# Patient Record
Sex: MALE | Race: WHITE | NOT HISPANIC OR LATINO | Employment: OTHER | URBAN - METROPOLITAN AREA
[De-identification: names, ages, dates, MRNs, and addresses within clinical notes are randomized per-mention and may not be internally consistent; named-entity substitution may affect disease eponyms.]

---

## 2017-02-28 ENCOUNTER — TELEPHONE (OUTPATIENT)
Dept: HEMATOLOGY ONCOLOGY | Facility: MEDICAL CENTER | Age: 52
End: 2017-02-28

## 2017-02-28 NOTE — TELEPHONE ENCOUNTER
Left voicemail for patient to call me back at medical oncology. He is currently scheduled to see Dr. Marlow via telemed on 3/2/17, but Dr. Marlow will be travelling to [place] and seeing patients there on 3/7/17. I wanted to see if he wanted to reschedule his appointment to see Dr. Marlow in person on 3/7/17.

## 2017-03-02 ENCOUNTER — APPOINTMENT (OUTPATIENT)
Dept: HEMATOLOGY ONCOLOGY | Facility: MEDICAL CENTER | Age: 52
End: 2017-03-02
Payer: MEDICAID

## 2017-03-07 ENCOUNTER — OFFICE VISIT (OUTPATIENT)
Dept: HEMATOLOGY ONCOLOGY | Facility: PHYSICIAN GROUP | Age: 52
End: 2017-03-07
Payer: MEDICAID

## 2017-03-07 DIAGNOSIS — C73 PAPILLARY THYROID CARCINOMA (HCC): ICD-10-CM

## 2017-03-07 PROCEDURE — 99213 OFFICE O/P EST LOW 20 MIN: CPT | Performed by: INTERNAL MEDICINE

## 2017-03-07 NOTE — Clinical Note
[facility]    [address] [place] [address] Ave, [facility] [address]  [place] [address]        Date:3/7/2017                     Reference to Ottoniel Davies    Follow Up Note: Oncology   Date: 3/7/17   Time: 11:20 pm   Primary care physician: Dr. Vanegas   Prior Oncologist: Dr. Kong   Diagnosis: H/o Papillary thyroid cancer   Chief complaint: He is here for a follow up visit and to review labs     History of presenting illness:   Mr. Davies is a 51-year-old male with history of papillary thyroid cancer. He was diagnosed at age 25 secondary to change in his voice as well as an enlarged thyroid gland. Further workup showed a cold nodule in the thyroid and he underwent thyroidectomy. He was found to have a tumor in the right thyroid lobe with involvement of pretracheal tissue my right jugular nodes, right cervical lymph nodes. He had been on observation and had recurrence approximately 2 years after original diagnosis. At this time he was treated with radioactive iodine. He had symptoms stable with no evidence of recurrence.   Interval History:   He is here for follow up visit. He has been fairly stable since his last visit without any significant changes. He is not had recent labs. He is on hypertensive medication and has been on potassium replacements. He continues to follow up closely with his primary care physician. He otherwise has stable energy, appetite and weight. He denies any cough, shortness of breath, nausea, vomiting or dysphagia.     Past Medical History:   1. 1990: Papillary thyroid cancer  2. Hypothyroid  3. HTN  4. HLP  Allergies:   Review of patient's allergies indicates no known allergies.   Medications:   Current Outpatient Prescriptions on File Prior to Visit    Medication  Sig  Dispense  Refill    •  potassium chloride SA (K-DUR) 10 MEQ Tab CR  TAKE 1 TABLET BY MOUTH EVERY DAY  60 Tab  0    •  metformin (GLUCOPHAGE) 850 MG TABS  Take 1 Tab by mouth every day.  30 Tab  0    •   hydrocodone-acetaminophen (NORCO) 5-325 MG TABS per tablet  Take 1-2 Tabs by mouth every 6 hours as needed.  16 Tab  0    •  losartan (COZAAR) 100 MG TABS  Take 100 mg by mouth every day.      •  triamterene-hydrochlorothiazide (MAXZIDE 75-50) 75-50 MG TABS  Take 1 Tab by mouth every day.      •  levothyroxine (SYNTHROID) 300 MCG TABS  Take 1 Tab by mouth every day.  30 Tab  3    •  pravastatin (PRAVACHOL) 40 MG tablet  Take 1 Tab by mouth every day.  30 Tab  11      No current facility-administered medications on file prior to visit.      Review of Systems:   All other review of systems are negative except what was mentioned above in the HPI.   Constitutional: Negative for fever and chills. Positive for mild malaise/fatigue stable.   HENT: Negative for ear pain, nosebleeds.   Eyes: Negative for blurred vision.   Respiratory: Negative for cough, sputum production, shortness of breath.   Cardiovascular: Negative for chest pain, and leg swelling.   Gastrointestinal: Negative for nausea, vomiting and abdominal pain.   Genitourinary: Negative for dysuria.   Musculoskeletal: Positive for myalgias and back pain.   Skin: Negative for rash and itching.   Neurological: Negative for dizziness, sensory change, focal weakness and headaches.   Endo/Heme/Allergies: No bruise/bleed easily.   Psychiatric/Behavioral: Negative for depression and memory loss.   Physical Exam:   Vitals: Reviewed   General: Not in acute distress, alert and oriented x 3   HEENT: normalcephalic, atraumatic, pupils equally reactive to light bilaterally, extra ocular muscles intact, moist oral mucus membranes, and oral cavity without any lesions.   Neck: Supple neck, full range of motion, no palpable masses.   Lymph nodes: No palpable bilateral cervical and supraclavicular lymphadenopathy.   CVS: regular rate and rhythm   RESP: Clear to auscultate bilaterally.   ABD: Soft, non tender, non distended, positive bowel sounds, and no palpable organomegaly.      EXT: No edema or cyanosis   CNS: Alert and oriented x3, cranial nerves grossly intact, muscle strength grossly intact, and normal gait.   Labs:   No visits with results within 1 Day(s) from this visit.   Latest known visit with results is:   Hospital Outpatient Visit on 02/18/2016    Component  Date  Value  Ref Range  Status    •  Sodium  02/18/2016  137  135 - 145 mmol/L  Final    •  Potassium  02/18/2016  3.2*  3.6 - 5.5 mmol/L  Final    •  Chloride  02/18/2016  103  96 - 112 mmol/L  Final    •  Co2  02/18/2016  24  20 - 33 mmol/L  Final    •  Anion Gap  02/18/2016  10.0  0.0 - 11.9  Final    •  Glucose  02/18/2016  134*  65 - 99 mg/dL  Final    •  Bun  02/18/2016  27*  8 - 22 mg/dL  Final    •  Creatinine  02/18/2016  1.43*  0.50 - 1.40 mg/dL  Final    •  Calcium  02/18/2016  9.7  8.5 - 10.5 mg/dL  Final    •  AST(SGOT)  02/18/2016  19  12 - 45 U/L  Final    •  ALT(SGPT)  02/18/2016  13  2 - 50 U/L  Final    •  Alkaline Phosphatase  02/18/2016  80  30 - 99 U/L  Final    •  Total Bilirubin  02/18/2016  0.8  0.1 - 1.5 mg/dL  Final    •  Albumin  02/18/2016  4.5  3.2 - 4.9 g/dL  Final    •  Total Protein  02/18/2016  7.2  6.0 - 8.2 g/dL  Final    •  Globulin  02/18/2016  2.7  1.9 - 3.5 g/dL  Final    •  A-G Ratio  02/18/2016  1.7   Final    •  WBC  02/18/2016  6.3  4.8 - 10.8 K/uL  Final    •  RBC  02/18/2016  5.10  4.70 - 6.10 M/uL  Final    •  Hemoglobin  02/18/2016  14.0  14.0 - 18.0 g/dL  Final    •  Hematocrit  02/18/2016  42.8  42.0 - 52.0 %  Final    •  MCV  02/18/2016  83.9  81.4 - 97.8 fL  Final    •  MCH  02/18/2016  27.5  27.0 - 33.0 pg  Final    •  MCHC  02/18/2016  32.7*  33.7 - 35.3 g/dL  Final    •  RDW  02/18/2016  41.0  35.9 - 50.0 fL  Final    •  Platelet Count  02/18/2016  240  164 - 446 K/uL  Final    •  MPV  02/18/2016  10.8  9.0 - 12.9 fL  Final    •  Neutrophils-Polys  02/18/2016  61.40  44.00 - 72.00 %  Final    •  Lymphocytes  02/18/2016  23.40  22.00 - 41.00 %  Final    •  Monocytes   02/18/2016  10.60  0.00 - 13.40 %  Final    •  Eosinophils  02/18/2016  3.50  0.00 - 6.90 %  Final    •  Basophils  02/18/2016  0.60  0.00 - 1.80 %  Final    •  Immature Granulocytes  02/18/2016  0.50  0.00 - 0.90 %  Final    •  Nucleated RBC  02/18/2016  0.00   Final    •  Neutrophils (Absolute)  02/18/2016  3.84  1.82 - 7.42 K/uL  Final     Includes immature neutrophils, if present.    •  Lymphs (Absolute)  02/18/2016  1.46  1.00 - 4.80 K/uL  Final    •  Monos (Absolute)  02/18/2016  0.66  0.00 - 0.85 K/uL  Final    •  Eos (Absolute)  02/18/2016  0.22  0.00 - 0.51 K/uL  Final    •  Baso (Absolute)  02/18/2016  0.04  0.00 - 0.12 K/uL  Final    •  Immature Granulocytes (abs)  02/18/2016  0.03  0.00 - 0.11 K/uL  Final    •  NRBC (Absolute)  02/18/2016  0.00   Final    •  TSH  02/18/2016  0.030*  0.300 - 3.700 uIU/mL  Final    •  Thyroglobulin  02/18/2016  <0.1*  1.3 - 31.8 ng/mL  Final     Comment: INTERPRETIVE INFORMATION: Thyroglobulin, Serum or Plasma   Specimens negative for thyroglobulin antibodies (TgAb) are tested   for thyroglobulin (Tg) by chemiluminescent immunoassay (MARTY) using   the [employer] Access DxI method. Specimens with TgAb results   above the upper reference limit are tested for Tg by   high-performance liquid chromatography-tandem mass spectrometry   (LC-MS/MS). Results obtained with different test methods or kits   cannot be used interchangeably. Tg results, regardless of   concentration, should not be interpreted as absolute evidence for   the presence or absence of papillary or follicular thyroid cancer.   Tg testing is not recommended for use as a screening procedure to   detect the presence of thyroid cancer in the general population.    •  Anti-Thyroglobulin  02/18/2016  <0.9  0.0 - 4.0 IU/mL  Final     Comment: INTERPRETIVE INFORMATION: Thyroglobulin Antibody   A value of 4.0 IU/mL or less indicates a negative result for   thyroglobulin antibodies.   The Thyroglobulin Antibody assay  is being performed using the   [employer] Access DxI method.    •  Thyroglobulin by LC-MC/MS-S/P  02/18/2016  Not Applicable  1.3 - 31.8 ng/mL  Final     Comment: INTERPRETIVE INFORMATION: Thyroglobulin by LC-MS/MS, Serum/Plasma   Lower limit of detection for Thyroglobulin by LC-MS/MS is 0.5   ng/mL.   Test developed and characteristics determined by [facility]. See Compliance Statement B: [email or web address]    •  GFR If   02/18/2016  >60  >60 mL/min/1.73 m 2  Final    •  GFR If Non   02/18/2016  52*  >60 mL/min/1.73 m 2  Final    ]   Assessment and Plan:   1. History of Papillary thyroid cancer: He was diagnosed and treated at age 25. At initial diagnosis he underwent total thyroidectomy. He was found to have locally invasive disease with involvement of the right jugular and cervical lymph nodes. He was on observation and had recurrence approximately 2 years post diagnosis. He was then treated with radioactive iodine. He has been clinically stable without any evidence of recurrence.  2. He was recommended to get TSH, thyroglobulin and antithyroglobulin levels every 6 months. He has not had recent blood work. He is advised to get laboratory workup. Clinically he is fairly asymptomatic.  3. He is advised to get labs every 6 months and standing orders have been placed. If his labs are in range then he is to follow-up again in 6 months or sooner as needed.     He agreed and verbalized his agreement and understanding with the current plan. I answered all questions and concerns he has at this time and advised him to call at any time in the interim with questions or concerns in regards to his care. Thank you for allowing me to participate in his care, I will continue to follow.   Please note that this dictation was created using voice recognition software. I have made every reasonable attempt to correct obvious errors, but I expect that there are errors of grammar and possibly  content that I did not discover before finalizing the note.        Sincerely,  Ce Marlow  [address] [facility], [facility] [address] [phone]

## 2017-03-07 NOTE — PROGRESS NOTES
Follow Up Note: Oncology     Date: 3/7/17  Time: 11:20 pm  Location: [facility]    Primary care physician:  Dr. Vanegas   Prior Oncologist: Dr. Kong    Diagnosis: H/o Papillary thyroid cancer    Chief complaint: He is here for a follow up visit and to review labs    History of presenting illness:  Mr. Davies is a 51-year-old male with history of papillary thyroid cancer. He was diagnosed at age 25 secondary to change in his voice as well as an enlarged thyroid gland. Further workup showed a cold nodule in the thyroid and he underwent thyroidectomy. He was found to have a tumor in the right thyroid lobe with involvement of pretracheal tissue my right jugular nodes, right cervical lymph nodes. He had been on observation and had recurrence approximately 2 years after original diagnosis. At this time he was treated with radioactive iodine. He had symptoms stable with no evidence of recurrence.     Interval History:   He is here for follow up visit. He has been fairly stable since his last visit without any significant changes. He is not had recent labs. He is on hypertensive medication and has been on potassium replacements. He continues to follow up closely with his primary care physician. He otherwise has stable energy, appetite and weight. He denies any cough, shortness of breath, nausea, vomiting or dysphagia.    Past Medical History:  1. 1990: Papillary thyroid cancer  2. Hypothyroid  3. HTN  4. HLP      Allergies:  Review of patient's allergies indicates no known allergies.    Medications:  Current Outpatient Prescriptions on File Prior to Visit   Medication Sig Dispense Refill   • potassium chloride SA (K-DUR) 10 MEQ Tab CR TAKE 1 TABLET BY MOUTH EVERY DAY 60 Tab 0   • metformin (GLUCOPHAGE) 850 MG TABS Take 1 Tab by mouth every day. 30 Tab 0   • hydrocodone-acetaminophen (NORCO) 5-325 MG TABS per tablet Take 1-2 Tabs by mouth every 6 hours as needed. 16 Tab 0   • losartan (COZAAR) 100 MG TABS Take 100 mg by mouth  every day.     • triamterene-hydrochlorothiazide (MAXZIDE 75-50) 75-50 MG TABS Take 1 Tab by mouth every day.     • levothyroxine (SYNTHROID) 300 MCG TABS Take 1 Tab by mouth every day. 30 Tab 3   • pravastatin (PRAVACHOL) 40 MG tablet Take 1 Tab by mouth every day. 30 Tab 11     No current facility-administered medications on file prior to visit.       Review of Systems:  All other review of systems are negative except what was mentioned above in the HPI.  Constitutional: Negative for fever and chills.  Positive for mild malaise/fatigue stable.    HENT: Negative for ear pain, nosebleeds.     Eyes: Negative for blurred vision.    Respiratory: Negative for cough, sputum production, shortness of breath.    Cardiovascular: Negative for chest pain, and leg swelling.    Gastrointestinal: Negative for nausea, vomiting and abdominal pain.   Genitourinary: Negative for dysuria.    Musculoskeletal: Positive for myalgias and back pain.    Skin: Negative for rash and itching.    Neurological: Negative for dizziness, sensory change, focal weakness and headaches.    Endo/Heme/Allergies: No bruise/bleed easily.    Psychiatric/Behavioral: Negative for depression and memory loss.      Physical Exam:  Vitals: Reviewed   General: Not in acute distress, alert and oriented x 3  HEENT: normalcephalic, atraumatic, pupils equally reactive to light bilaterally, extra ocular muscles intact, moist oral mucus membranes, and oral cavity without any lesions.  Neck: Supple neck, full range of motion, no palpable masses.    Lymph nodes: No palpable bilateral cervical and supraclavicular lymphadenopathy.    CVS: regular rate and rhythm  RESP: Clear to auscultate bilaterally.   ABD: Soft, non tender, non distended, positive bowel sounds, and no palpable organomegaly.   EXT: No edema or cyanosis  CNS: Alert and oriented x3, cranial nerves grossly intact, muscle strength grossly intact, and normal gait.     Labs:  No visits with results within 1  Day(s) from this visit.  Latest known visit with results is:    Hospital Outpatient Visit on 02/18/2016   Component Date Value Ref Range Status   • Sodium 02/18/2016 137  135 - 145 mmol/L Final   • Potassium 02/18/2016 3.2* 3.6 - 5.5 mmol/L Final   • Chloride 02/18/2016 103  96 - 112 mmol/L Final   • Co2 02/18/2016 24  20 - 33 mmol/L Final   • Anion Gap 02/18/2016 10.0  0.0 - 11.9 Final   • Glucose 02/18/2016 134* 65 - 99 mg/dL Final   • Bun 02/18/2016 27* 8 - 22 mg/dL Final   • Creatinine 02/18/2016 1.43* 0.50 - 1.40 mg/dL Final   • Calcium 02/18/2016 9.7  8.5 - 10.5 mg/dL Final   • AST(SGOT) 02/18/2016 19  12 - 45 U/L Final   • ALT(SGPT) 02/18/2016 13  2 - 50 U/L Final   • Alkaline Phosphatase 02/18/2016 80  30 - 99 U/L Final   • Total Bilirubin 02/18/2016 0.8  0.1 - 1.5 mg/dL Final   • Albumin 02/18/2016 4.5  3.2 - 4.9 g/dL Final   • Total Protein 02/18/2016 7.2  6.0 - 8.2 g/dL Final   • Globulin 02/18/2016 2.7  1.9 - 3.5 g/dL Final   • A-G Ratio 02/18/2016 1.7   Final   • WBC 02/18/2016 6.3  4.8 - 10.8 K/uL Final   • RBC 02/18/2016 5.10  4.70 - 6.10 M/uL Final   • Hemoglobin 02/18/2016 14.0  14.0 - 18.0 g/dL Final   • Hematocrit 02/18/2016 42.8  42.0 - 52.0 % Final   • MCV 02/18/2016 83.9  81.4 - 97.8 fL Final   • MCH 02/18/2016 27.5  27.0 - 33.0 pg Final   • MCHC 02/18/2016 32.7* 33.7 - 35.3 g/dL Final   • RDW 02/18/2016 41.0  35.9 - 50.0 fL Final   • Platelet Count 02/18/2016 240  164 - 446 K/uL Final   • MPV 02/18/2016 10.8  9.0 - 12.9 fL Final   • Neutrophils-Polys 02/18/2016 61.40  44.00 - 72.00 % Final   • Lymphocytes 02/18/2016 23.40  22.00 - 41.00 % Final   • Monocytes 02/18/2016 10.60  0.00 - 13.40 % Final   • Eosinophils 02/18/2016 3.50  0.00 - 6.90 % Final   • Basophils 02/18/2016 0.60  0.00 - 1.80 % Final   • Immature Granulocytes 02/18/2016 0.50  0.00 - 0.90 % Final   • Nucleated RBC 02/18/2016 0.00   Final   • Neutrophils (Absolute) 02/18/2016 3.84  1.82 - 7.42 K/uL Final    Includes immature  neutrophils, if present.   • Lymphs (Absolute) 02/18/2016 1.46  1.00 - 4.80 K/uL Final   • Monos (Absolute) 02/18/2016 0.66  0.00 - 0.85 K/uL Final   • Eos (Absolute) 02/18/2016 0.22  0.00 - 0.51 K/uL Final   • Baso (Absolute) 02/18/2016 0.04  0.00 - 0.12 K/uL Final   • Immature Granulocytes (abs) 02/18/2016 0.03  0.00 - 0.11 K/uL Final   • NRBC (Absolute) 02/18/2016 0.00   Final   • TSH 02/18/2016 0.030* 0.300 - 3.700 uIU/mL Final   • Thyroglobulin 02/18/2016 <0.1* 1.3 - 31.8 ng/mL Final    Comment: INTERPRETIVE INFORMATION: Thyroglobulin, Serum or Plasma  Specimens negative for thyroglobulin antibodies (TgAb) are tested  for thyroglobulin (Tg) by chemiluminescent immunoassay (MARTY) using  the [employer] Access DxI method. Specimens with TgAb results  above the upper reference limit are tested for Tg by  high-performance liquid chromatography-tandem mass spectrometry  (LC-MS/MS). Results obtained with different test methods or kits  cannot be used interchangeably. Tg results, regardless of  concentration, should not be interpreted as absolute evidence for  the presence or absence of papillary or follicular thyroid cancer.  Tg testing is not recommended for use as a screening procedure to  detect the presence of thyroid cancer in the general population.     • Anti-Thyroglobulin 02/18/2016 <0.9  0.0 - 4.0 IU/mL Final    Comment: INTERPRETIVE INFORMATION: Thyroglobulin Antibody  A value of 4.0 IU/mL or less indicates a negative result for  thyroglobulin antibodies.  The Thyroglobulin Antibody assay is being performed using the  [employer] Access DxI method.     • Thyroglobulin by LC-MC/MS-S/P 02/18/2016 Not Applicable  1.3 - 31.8 ng/mL Final    Comment: INTERPRETIVE INFORMATION: Thyroglobulin by LC-MS/MS, Serum/Plasma  Lower limit of detection for Thyroglobulin by LC-MS/MS is 0.5  ng/mL.  Test developed and characteristics determined by [employer]. See Compliance Statement B: [email or web address]     • GFR  If  02/18/2016 >60  >60 mL/min/1.73 m 2 Final   • GFR If Non  02/18/2016 52* >60 mL/min/1.73 m 2 Final   ]    Assessment and Plan:  1. History of Papillary thyroid cancer:  He was diagnosed and treated at age 25. At initial diagnosis he underwent total thyroidectomy. He was found to have locally invasive disease  with involvement of the right jugular and cervical lymph nodes. He was on observation and had recurrence approximately 2 years post diagnosis. He was then treated with radioactive iodine. He has been clinically stable without any evidence of recurrence.  2. He was recommended to get TSH, thyroglobulin and antithyroglobulin levels every 6 months. He has not had recent blood work. He is advised to get laboratory workup. Clinically he is fairly asymptomatic.  3. He is advised to get labs every 6 months and standing orders have been placed. If his labs are in range then he is to follow-up again in 6 months or sooner as needed.      He agreed and verbalized his agreement and understanding with the current plan.  I answered all questions and concerns he has at this time and advised him to call at any time in the interim with questions or concerns in regards to his care.  Thank you for allowing me to participate in his care, I will continue to follow.    Please note that this dictation was created using voice recognition software. I have made every reasonable attempt to correct obvious errors, but I expect that there are errors of grammar and possibly content that I did not discover before finalizing the note.

## 2017-09-05 ENCOUNTER — APPOINTMENT (OUTPATIENT)
Dept: LAB | Facility: MEDICAL CENTER | Age: 52
End: 2017-09-05
Attending: INTERNAL MEDICINE
Payer: MEDICAID

## 2017-09-05 ENCOUNTER — OFFICE VISIT (OUTPATIENT)
Dept: HEMATOLOGY ONCOLOGY | Facility: MEDICAL CENTER | Age: 52
End: 2017-09-05
Payer: MEDICAID

## 2017-09-05 ENCOUNTER — HOSPITAL ENCOUNTER (OUTPATIENT)
Dept: LAB | Facility: MEDICAL CENTER | Age: 52
End: 2017-09-05
Attending: NURSE PRACTITIONER
Payer: MEDICAID

## 2017-09-05 VITALS
DIASTOLIC BLOOD PRESSURE: 80 MMHG | WEIGHT: 235.34 LBS | BODY MASS INDEX: 36.94 KG/M2 | OXYGEN SATURATION: 95 % | HEART RATE: 78 BPM | SYSTOLIC BLOOD PRESSURE: 145 MMHG | TEMPERATURE: 98.2 F | HEIGHT: 67 IN | RESPIRATION RATE: 14 BRPM

## 2017-09-05 DIAGNOSIS — C73 PAPILLARY THYROID CARCINOMA (HCC): ICD-10-CM

## 2017-09-05 LAB
T3 SERPL-MCNC: 97.6 NG/DL (ref 60–181)
T4 FREE SERPL-MCNC: 2.3 NG/DL (ref 0.53–1.43)
TSH SERPL DL<=0.005 MIU/L-ACNC: 0.03 UIU/ML (ref 0.3–3.7)

## 2017-09-05 PROCEDURE — 99213 OFFICE O/P EST LOW 20 MIN: CPT | Performed by: INTERNAL MEDICINE

## 2017-09-05 PROCEDURE — 84443 ASSAY THYROID STIM HORMONE: CPT

## 2017-09-05 PROCEDURE — 84439 ASSAY OF FREE THYROXINE: CPT

## 2017-09-05 PROCEDURE — 84480 ASSAY TRIIODOTHYRONINE (T3): CPT

## 2017-09-05 PROCEDURE — 36415 COLL VENOUS BLD VENIPUNCTURE: CPT

## 2017-09-05 PROCEDURE — 86800 THYROGLOBULIN ANTIBODY: CPT

## 2017-09-05 PROCEDURE — 84432 ASSAY OF THYROGLOBULIN: CPT

## 2017-09-05 RX ORDER — TRAZODONE HYDROCHLORIDE 50 MG/1
50 TABLET ORAL NIGHTLY
Status: ON HOLD | COMMUNITY
End: 2019-04-22

## 2017-09-05 ASSESSMENT — PAIN SCALES - GENERAL: PAINLEVEL: 2=MINIMAL-SLIGHT
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
## 2017-09-06 LAB
THYROGLOB AB SERPL-ACNC: <0.9 IU/ML (ref 0–4)
THYROGLOB SERPL-MCNC: <0.1 NG/ML (ref 1.3–31.8)
THYROGLOB SERPL-MCNC: ABNORMAL NG/ML (ref 1.3–31.8)

## 2018-09-05 ENCOUNTER — OFFICE VISIT (OUTPATIENT)
Dept: HEMATOLOGY ONCOLOGY | Facility: MEDICAL CENTER | Age: 53
End: 2018-09-05
Payer: MEDICAID

## 2018-09-05 VITALS
SYSTOLIC BLOOD PRESSURE: 154 MMHG | OXYGEN SATURATION: 97 % | WEIGHT: 215.28 LBS | BODY MASS INDEX: 33.79 KG/M2 | TEMPERATURE: 99 F | RESPIRATION RATE: 16 BRPM | HEIGHT: 67 IN | DIASTOLIC BLOOD PRESSURE: 84 MMHG | HEART RATE: 85 BPM

## 2018-09-05 DIAGNOSIS — C73 PAPILLARY THYROID CARCINOMA (HCC): ICD-10-CM

## 2018-09-05 PROCEDURE — 99213 OFFICE O/P EST LOW 20 MIN: CPT | Performed by: INTERNAL MEDICINE

## 2018-09-05 ASSESSMENT — PAIN SCALES - GENERAL: PAINLEVEL: 2=MINIMAL-SLIGHT
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
## 2019-04-09 ENCOUNTER — HOSPITAL ENCOUNTER (INPATIENT)
Facility: MEDICAL CENTER | Age: 54
LOS: 13 days | DRG: 216 | End: 2019-04-22
Attending: HOSPITALIST | Admitting: HOSPITALIST
Payer: MEDICAID

## 2019-04-09 ENCOUNTER — HOSPITAL ENCOUNTER (OUTPATIENT)
Facility: MEDICAL CENTER | Age: 54
DRG: 216 | End: 2019-04-09
Payer: MEDICAID

## 2019-04-09 DIAGNOSIS — Z96.651 S/P TOTAL KNEE ARTHROPLASTY, RIGHT: ICD-10-CM

## 2019-04-09 DIAGNOSIS — Z98.890 S/P MVR (MITRAL VALVE REPAIR): ICD-10-CM

## 2019-04-09 DIAGNOSIS — I05.9 MITRAL VALVE DISEASE: ICD-10-CM

## 2019-04-09 PROCEDURE — A9270 NON-COVERED ITEM OR SERVICE: HCPCS | Performed by: INTERNAL MEDICINE

## 2019-04-09 PROCEDURE — 700111 HCHG RX REV CODE 636 W/ 250 OVERRIDE (IP)

## 2019-04-09 PROCEDURE — 700102 HCHG RX REV CODE 250 W/ 637 OVERRIDE(OP): Performed by: INTERNAL MEDICINE

## 2019-04-09 PROCEDURE — 99255 IP/OBS CONSLTJ NEW/EST HI 80: CPT | Performed by: INTERNAL MEDICINE

## 2019-04-09 PROCEDURE — P9047 ALBUMIN (HUMAN), 25%, 50ML: HCPCS

## 2019-04-09 PROCEDURE — 700101 HCHG RX REV CODE 250

## 2019-04-09 PROCEDURE — 99223 1ST HOSP IP/OBS HIGH 75: CPT | Performed by: INTERNAL MEDICINE

## 2019-04-09 PROCEDURE — 770020 HCHG ROOM/CARE - TELE (206)

## 2019-04-09 RX ORDER — TRAZODONE HYDROCHLORIDE 50 MG/1
50 TABLET ORAL NIGHTLY
Status: DISCONTINUED | OUTPATIENT
Start: 2019-04-09 | End: 2019-04-13

## 2019-04-09 RX ORDER — POTASSIUM CHLORIDE 20 MEQ/1
10 TABLET, EXTENDED RELEASE ORAL DAILY
Status: DISCONTINUED | OUTPATIENT
Start: 2019-04-10 | End: 2019-04-10

## 2019-04-09 RX ORDER — LEVOTHYROXINE SODIUM 0.07 MG/1
300 TABLET ORAL DAILY
Status: DISCONTINUED | OUTPATIENT
Start: 2019-04-10 | End: 2019-04-11

## 2019-04-09 RX ORDER — PRAVASTATIN SODIUM 20 MG
40 TABLET ORAL DAILY
Status: DISCONTINUED | OUTPATIENT
Start: 2019-04-10 | End: 2019-04-22 | Stop reason: HOSPADM

## 2019-04-09 RX ORDER — FUROSEMIDE 20 MG/1
20 TABLET ORAL
Status: DISCONTINUED | OUTPATIENT
Start: 2019-04-10 | End: 2019-04-09

## 2019-04-09 RX ORDER — AMLODIPINE BESYLATE 10 MG/1
10 TABLET ORAL DAILY
Status: ON HOLD | COMMUNITY
Start: 2015-01-01 | End: 2019-04-18

## 2019-04-09 RX ORDER — BISACODYL 10 MG
10 SUPPOSITORY, RECTAL RECTAL
Status: DISCONTINUED | OUTPATIENT
Start: 2019-04-09 | End: 2019-04-13

## 2019-04-09 RX ORDER — DEXTROSE MONOHYDRATE 25 G/50ML
25 INJECTION, SOLUTION INTRAVENOUS
Status: DISCONTINUED | OUTPATIENT
Start: 2019-04-09 | End: 2019-04-13

## 2019-04-09 RX ORDER — AMOXICILLIN 250 MG
2 CAPSULE ORAL 2 TIMES DAILY
Status: DISCONTINUED | OUTPATIENT
Start: 2019-04-09 | End: 2019-04-13

## 2019-04-09 RX ORDER — FUROSEMIDE 10 MG/ML
20 INJECTION INTRAMUSCULAR; INTRAVENOUS
Status: DISCONTINUED | OUTPATIENT
Start: 2019-04-10 | End: 2019-04-10

## 2019-04-09 RX ORDER — POLYETHYLENE GLYCOL 3350 17 G/17G
1 POWDER, FOR SOLUTION ORAL
Status: DISCONTINUED | OUTPATIENT
Start: 2019-04-09 | End: 2019-04-13

## 2019-04-09 RX ORDER — LOSARTAN POTASSIUM 50 MG/1
100 TABLET ORAL DAILY
Status: DISCONTINUED | OUTPATIENT
Start: 2019-04-10 | End: 2019-04-10

## 2019-04-09 RX ADMIN — SENNOSIDES,DOCUSATE SODIUM 2 TABLET: 8.6; 5 TABLET, FILM COATED ORAL at 23:53

## 2019-04-09 RX ADMIN — TRAZODONE HYDROCHLORIDE 50 MG: 50 TABLET ORAL at 23:53

## 2019-04-09 ASSESSMENT — PATIENT HEALTH QUESTIONNAIRE - PHQ9
1. LITTLE INTEREST OR PLEASURE IN DOING THINGS: NOT AT ALL
SUM OF ALL RESPONSES TO PHQ9 QUESTIONS 1 AND 2: 0
2. FEELING DOWN, DEPRESSED, IRRITABLE, OR HOPELESS: NOT AT ALL

## 2019-04-09 ASSESSMENT — COPD QUESTIONNAIRES
COPD SCREENING SCORE: 5
DURING THE PAST 4 WEEKS HOW MUCH DID YOU FEEL SHORT OF BREATH: SOME OF THE TIME
DO YOU EVER COUGH UP ANY MUCUS OR PHLEGM?: YES, A FEW DAYS A WEEK OR MONTH
IN THE PAST 12 MONTHS DO YOU DO LESS THAN YOU USED TO BECAUSE OF YOUR BREATHING PROBLEMS: DISAGREE/UNSURE
HAVE YOU SMOKED AT LEAST 100 CIGARETTES IN YOUR ENTIRE LIFE: YES

## 2019-04-09 ASSESSMENT — LIFESTYLE VARIABLES
CONSUMPTION TOTAL: NEGATIVE
EVER FELT BAD OR GUILTY ABOUT YOUR DRINKING: NO
AVERAGE NUMBER OF DAYS PER WEEK YOU HAVE A DRINK CONTAINING ALCOHOL: 1
HAVE YOU EVER FELT YOU SHOULD CUT DOWN ON YOUR DRINKING: NO
HOW MANY TIMES IN THE PAST YEAR HAVE YOU HAD 5 OR MORE DRINKS IN A DAY: 0
EVER HAD A DRINK FIRST THING IN THE MORNING TO STEADY YOUR NERVES TO GET RID OF A HANGOVER: NO
HAVE PEOPLE ANNOYED YOU BY CRITICIZING YOUR DRINKING: NO
EVER_SMOKED: YES
TOTAL SCORE: 0
ALCOHOL_USE: YES
TOTAL SCORE: 0
TOTAL SCORE: 0
ON A TYPICAL DAY WHEN YOU DRINK ALCOHOL HOW MANY DRINKS DO YOU HAVE: 1

## 2019-04-09 ASSESSMENT — COGNITIVE AND FUNCTIONAL STATUS - GENERAL
CLIMB 3 TO 5 STEPS WITH RAILING: A LOT
MOBILITY SCORE: 14
DRESSING REGULAR UPPER BODY CLOTHING: A LITTLE
WALKING IN HOSPITAL ROOM: A LOT
MOVING FROM LYING ON BACK TO SITTING ON SIDE OF FLAT BED: A LOT
DAILY ACTIVITIY SCORE: 20
MOVING TO AND FROM BED TO CHAIR: A LITTLE
STANDING UP FROM CHAIR USING ARMS: A LOT
TURNING FROM BACK TO SIDE WHILE IN FLAT BAD: A LITTLE
TOILETING: A LITTLE
SUGGESTED CMS G CODE MODIFIER MOBILITY: CL
SUGGESTED CMS G CODE MODIFIER DAILY ACTIVITY: CJ
HELP NEEDED FOR BATHING: A LITTLE
DRESSING REGULAR LOWER BODY CLOTHING: A LITTLE

## 2019-04-09 NOTE — PROGRESS NOTES
Pt admitted to [facility] in [place] for TKA which was completed without issue.  Post op went into acute heart failure and found to have severe MR.  Case DW Dr Emerson by Dr Montiel from [place]; Notify Cards on arrival

## 2019-04-10 ENCOUNTER — APPOINTMENT (OUTPATIENT)
Dept: RADIOLOGY | Facility: MEDICAL CENTER | Age: 54
DRG: 216 | End: 2019-04-10
Attending: NURSE PRACTITIONER
Payer: MEDICAID

## 2019-04-10 ENCOUNTER — APPOINTMENT (OUTPATIENT)
Dept: CARDIOLOGY | Facility: MEDICAL CENTER | Age: 54
DRG: 216 | End: 2019-04-10
Attending: INTERNAL MEDICINE
Payer: MEDICAID

## 2019-04-10 PROBLEM — E78.49 OTHER HYPERLIPIDEMIA: Status: ACTIVE | Noted: 2019-04-10

## 2019-04-10 PROBLEM — I05.9 MITRAL VALVE DISEASE: Status: ACTIVE | Noted: 2019-04-10

## 2019-04-10 PROBLEM — G47.33 OBSTRUCTIVE SLEEP APNEA: Status: ACTIVE | Noted: 2019-04-10

## 2019-04-10 PROBLEM — Z96.651 S/P TOTAL KNEE ARTHROPLASTY, RIGHT: Status: ACTIVE | Noted: 2019-04-10

## 2019-04-10 PROBLEM — J81.1 PULMONARY EDEMA: Status: ACTIVE | Noted: 2019-04-10

## 2019-04-10 PROBLEM — J96.01 ACUTE RESPIRATORY FAILURE WITH HYPOXIA (HCC): Status: ACTIVE | Noted: 2019-04-10

## 2019-04-10 PROBLEM — E87.70 VOLUME OVERLOAD: Status: ACTIVE | Noted: 2019-04-10

## 2019-04-10 LAB
ANION GAP SERPL CALC-SCNC: 11 MMOL/L (ref 0–11.9)
APTT PPP: 35.8 SEC (ref 24.7–36)
BNP SERPL-MCNC: 240 PG/ML (ref 0–100)
BUN SERPL-MCNC: 24 MG/DL (ref 8–22)
CALCIUM SERPL-MCNC: 7.9 MG/DL (ref 8.5–10.5)
CHLORIDE SERPL-SCNC: 105 MMOL/L (ref 96–112)
CO2 SERPL-SCNC: 22 MMOL/L (ref 20–33)
CREAT SERPL-MCNC: 1.18 MG/DL (ref 0.5–1.4)
EKG IMPRESSION: NORMAL
ERYTHROCYTE [DISTWIDTH] IN BLOOD BY AUTOMATED COUNT: 43.8 FL (ref 35.9–50)
GLUCOSE BLD-MCNC: 148 MG/DL (ref 65–99)
GLUCOSE BLD-MCNC: 155 MG/DL (ref 65–99)
GLUCOSE BLD-MCNC: 159 MG/DL (ref 65–99)
GLUCOSE BLD-MCNC: 160 MG/DL (ref 65–99)
GLUCOSE SERPL-MCNC: 183 MG/DL (ref 65–99)
HCT VFR BLD AUTO: 40.3 % (ref 42–52)
HGB BLD-MCNC: 12.5 G/DL (ref 14–18)
LV EJECT FRACT  99904: 65
LV EJECT FRACT MOD 2C 99903: 57.85
LV EJECT FRACT MOD 4C 99902: 57.81
LV EJECT FRACT MOD BP 99901: 60.22
MCH RBC QN AUTO: 26.3 PG (ref 27–33)
MCHC RBC AUTO-ENTMCNC: 31 G/DL (ref 33.7–35.3)
MCV RBC AUTO: 84.8 FL (ref 81.4–97.8)
PLATELET # BLD AUTO: 222 K/UL (ref 164–446)
PMV BLD AUTO: 10.2 FL (ref 9–12.9)
POTASSIUM SERPL-SCNC: 3 MMOL/L (ref 3.6–5.5)
POTASSIUM SERPL-SCNC: 3 MMOL/L (ref 3.6–5.5)
POTASSIUM SERPL-SCNC: 3.5 MMOL/L (ref 3.6–5.5)
RBC # BLD AUTO: 4.75 M/UL (ref 4.7–6.1)
SODIUM SERPL-SCNC: 138 MMOL/L (ref 135–145)
T4 FREE SERPL-MCNC: 3.19 NG/DL (ref 0.53–1.43)
TSH SERPL DL<=0.005 MIU/L-ACNC: <0.005 UIU/ML (ref 0.38–5.33)
WBC # BLD AUTO: 14.8 K/UL (ref 4.8–10.8)

## 2019-04-10 PROCEDURE — 700117 HCHG RX CONTRAST REV CODE 255: Performed by: INTERNAL MEDICINE

## 2019-04-10 PROCEDURE — 93454 CORONARY ARTERY ANGIO S&I: CPT

## 2019-04-10 PROCEDURE — 85027 COMPLETE CBC AUTOMATED: CPT

## 2019-04-10 PROCEDURE — 84439 ASSAY OF FREE THYROXINE: CPT

## 2019-04-10 PROCEDURE — 99255 IP/OBS CONSLTJ NEW/EST HI 80: CPT | Performed by: THORACIC SURGERY (CARDIOTHORACIC VASCULAR SURGERY)

## 2019-04-10 PROCEDURE — 80048 BASIC METABOLIC PNL TOTAL CA: CPT

## 2019-04-10 PROCEDURE — 84132 ASSAY OF SERUM POTASSIUM: CPT | Mod: 91

## 2019-04-10 PROCEDURE — 84443 ASSAY THYROID STIM HORMONE: CPT

## 2019-04-10 PROCEDURE — 93306 TTE W/DOPPLER COMPLETE: CPT

## 2019-04-10 PROCEDURE — A9270 NON-COVERED ITEM OR SERVICE: HCPCS | Performed by: HOSPITALIST

## 2019-04-10 PROCEDURE — 700111 HCHG RX REV CODE 636 W/ 250 OVERRIDE (IP)

## 2019-04-10 PROCEDURE — 93010 ELECTROCARDIOGRAM REPORT: CPT | Performed by: INTERNAL MEDICINE

## 2019-04-10 PROCEDURE — A9270 NON-COVERED ITEM OR SERVICE: HCPCS | Performed by: INTERNAL MEDICINE

## 2019-04-10 PROCEDURE — 700102 HCHG RX REV CODE 250 W/ 637 OVERRIDE(OP): Performed by: HOSPITALIST

## 2019-04-10 PROCEDURE — 700102 HCHG RX REV CODE 250 W/ 637 OVERRIDE(OP): Performed by: INTERNAL MEDICINE

## 2019-04-10 PROCEDURE — 99233 SBSQ HOSP IP/OBS HIGH 50: CPT | Performed by: HOSPITALIST

## 2019-04-10 PROCEDURE — 99152 MOD SED SAME PHYS/QHP 5/>YRS: CPT | Performed by: INTERNAL MEDICINE

## 2019-04-10 PROCEDURE — 85730 THROMBOPLASTIN TIME PARTIAL: CPT

## 2019-04-10 PROCEDURE — 700105 HCHG RX REV CODE 258: Performed by: INTERNAL MEDICINE

## 2019-04-10 PROCEDURE — 94660 CPAP INITIATION&MGMT: CPT

## 2019-04-10 PROCEDURE — 700111 HCHG RX REV CODE 636 W/ 250 OVERRIDE (IP): Performed by: INTERNAL MEDICINE

## 2019-04-10 PROCEDURE — 700111 HCHG RX REV CODE 636 W/ 250 OVERRIDE (IP): Performed by: HOSPITALIST

## 2019-04-10 PROCEDURE — 93454 CORONARY ARTERY ANGIO S&I: CPT | Mod: 26 | Performed by: INTERNAL MEDICINE

## 2019-04-10 PROCEDURE — 700111 HCHG RX REV CODE 636 W/ 250 OVERRIDE (IP): Performed by: NURSE PRACTITIONER

## 2019-04-10 PROCEDURE — 71275 CT ANGIOGRAPHY CHEST: CPT

## 2019-04-10 PROCEDURE — 83880 ASSAY OF NATRIURETIC PEPTIDE: CPT

## 2019-04-10 PROCEDURE — 82962 GLUCOSE BLOOD TEST: CPT

## 2019-04-10 PROCEDURE — 93005 ELECTROCARDIOGRAM TRACING: CPT | Performed by: INTERNAL MEDICINE

## 2019-04-10 PROCEDURE — 700101 HCHG RX REV CODE 250

## 2019-04-10 PROCEDURE — B2111ZZ FLUOROSCOPY OF MULTIPLE CORONARY ARTERIES USING LOW OSMOLAR CONTRAST: ICD-10-PCS | Performed by: INTERNAL MEDICINE

## 2019-04-10 PROCEDURE — 99233 SBSQ HOSP IP/OBS HIGH 50: CPT | Performed by: INTERNAL MEDICINE

## 2019-04-10 PROCEDURE — 700117 HCHG RX CONTRAST REV CODE 255: Performed by: NURSE PRACTITIONER

## 2019-04-10 PROCEDURE — 770022 HCHG ROOM/CARE - ICU (200)

## 2019-04-10 PROCEDURE — 93306 TTE W/DOPPLER COMPLETE: CPT | Mod: 26 | Performed by: INTERNAL MEDICINE

## 2019-04-10 PROCEDURE — 99291 CRITICAL CARE FIRST HOUR: CPT | Performed by: INTERNAL MEDICINE

## 2019-04-10 PROCEDURE — 71045 X-RAY EXAM CHEST 1 VIEW: CPT

## 2019-04-10 RX ORDER — SODIUM CHLORIDE, SODIUM LACTATE, POTASSIUM CHLORIDE, CALCIUM CHLORIDE 600; 310; 30; 20 MG/100ML; MG/100ML; MG/100ML; MG/100ML
INJECTION, SOLUTION INTRAVENOUS
Status: ACTIVE
Start: 2019-04-10 | End: 2019-04-11

## 2019-04-10 RX ORDER — FUROSEMIDE 10 MG/ML
40 INJECTION INTRAMUSCULAR; INTRAVENOUS ONCE
Status: COMPLETED | OUTPATIENT
Start: 2019-04-10 | End: 2019-04-10

## 2019-04-10 RX ORDER — HEPARIN SODIUM 1000 [USP'U]/ML
6000 INJECTION, SOLUTION INTRAVENOUS; SUBCUTANEOUS ONCE
Status: COMPLETED | OUTPATIENT
Start: 2019-04-10 | End: 2019-04-10

## 2019-04-10 RX ORDER — POTASSIUM CHLORIDE 7.45 MG/ML
10 INJECTION INTRAVENOUS
Status: COMPLETED | OUTPATIENT
Start: 2019-04-10 | End: 2019-04-11

## 2019-04-10 RX ORDER — FUROSEMIDE 10 MG/ML
20 INJECTION INTRAMUSCULAR; INTRAVENOUS
Status: DISCONTINUED | OUTPATIENT
Start: 2019-04-10 | End: 2019-04-10

## 2019-04-10 RX ORDER — VERAPAMIL HYDROCHLORIDE 2.5 MG/ML
INJECTION, SOLUTION INTRAVENOUS
Status: COMPLETED
Start: 2019-04-10 | End: 2019-04-10

## 2019-04-10 RX ORDER — MIDAZOLAM HYDROCHLORIDE 1 MG/ML
INJECTION INTRAMUSCULAR; INTRAVENOUS
Status: COMPLETED
Start: 2019-04-10 | End: 2019-04-10

## 2019-04-10 RX ORDER — ENALAPRILAT 1.25 MG/ML
2.5 INJECTION INTRAVENOUS EVERY 4 HOURS
Status: DISCONTINUED | OUTPATIENT
Start: 2019-04-10 | End: 2019-04-11

## 2019-04-10 RX ORDER — SODIUM CHLORIDE 9 MG/ML
INJECTION, SOLUTION INTRAVENOUS CONTINUOUS
Status: ACTIVE | OUTPATIENT
Start: 2019-04-10 | End: 2019-04-10

## 2019-04-10 RX ORDER — DEXTROSE MONOHYDRATE 50 MG/ML
INJECTION, SOLUTION INTRAVENOUS CONTINUOUS
Status: DISCONTINUED | OUTPATIENT
Start: 2019-04-10 | End: 2019-04-13

## 2019-04-10 RX ORDER — LIDOCAINE HYDROCHLORIDE 20 MG/ML
INJECTION, SOLUTION INFILTRATION; PERINEURAL
Status: COMPLETED
Start: 2019-04-10 | End: 2019-04-10

## 2019-04-10 RX ORDER — HEPARIN SODIUM,PORCINE 1000/ML
VIAL (ML) INJECTION
Status: COMPLETED
Start: 2019-04-10 | End: 2019-04-10

## 2019-04-10 RX ORDER — ENALAPRILAT 1.25 MG/ML
2.5 INJECTION INTRAVENOUS
Status: DISCONTINUED | OUTPATIENT
Start: 2019-04-10 | End: 2019-04-13

## 2019-04-10 RX ORDER — FUROSEMIDE 10 MG/ML
80 INJECTION INTRAMUSCULAR; INTRAVENOUS
Status: DISCONTINUED | OUTPATIENT
Start: 2019-04-10 | End: 2019-04-13

## 2019-04-10 RX ORDER — POTASSIUM CHLORIDE 20 MEQ/1
20 TABLET, EXTENDED RELEASE ORAL 2 TIMES DAILY
Status: DISCONTINUED | OUTPATIENT
Start: 2019-04-10 | End: 2019-04-10

## 2019-04-10 RX ORDER — OXYCODONE HYDROCHLORIDE 5 MG/1
5 TABLET ORAL EVERY 4 HOURS PRN
Status: DISCONTINUED | OUTPATIENT
Start: 2019-04-10 | End: 2019-04-13

## 2019-04-10 RX ORDER — FUROSEMIDE 10 MG/ML
20 INJECTION INTRAMUSCULAR; INTRAVENOUS ONCE
Status: COMPLETED | OUTPATIENT
Start: 2019-04-10 | End: 2019-04-10

## 2019-04-10 RX ORDER — MORPHINE SULFATE 4 MG/ML
3 INJECTION, SOLUTION INTRAMUSCULAR; INTRAVENOUS
Status: DISCONTINUED | OUTPATIENT
Start: 2019-04-10 | End: 2019-04-13

## 2019-04-10 RX ORDER — HEPARIN SODIUM 1000 [USP'U]/ML
3200 INJECTION, SOLUTION INTRAVENOUS; SUBCUTANEOUS PRN
Status: DISCONTINUED | OUTPATIENT
Start: 2019-04-10 | End: 2019-04-13

## 2019-04-10 RX ADMIN — POTASSIUM CHLORIDE 20 MEQ: 1500 TABLET, EXTENDED RELEASE ORAL at 06:00

## 2019-04-10 RX ADMIN — LEVOTHYROXINE SODIUM 300 MCG: 100 TABLET ORAL at 05:00

## 2019-04-10 RX ADMIN — PRAVASTATIN SODIUM 40 MG: 20 TABLET ORAL at 05:00

## 2019-04-10 RX ADMIN — MORPHINE SULFATE 3 MG: 4 INJECTION INTRAVENOUS at 09:00

## 2019-04-10 RX ADMIN — FUROSEMIDE 20 MG: 10 INJECTION, SOLUTION INTRAMUSCULAR; INTRAVENOUS at 08:15

## 2019-04-10 RX ADMIN — IOHEXOL 36 ML: 350 INJECTION, SOLUTION INTRAVENOUS at 17:47

## 2019-04-10 RX ADMIN — DEXTROSE MONOHYDRATE: 50 INJECTION, SOLUTION INTRAVENOUS at 18:22

## 2019-04-10 RX ADMIN — LOSARTAN POTASSIUM 100 MG: 50 TABLET ORAL at 05:01

## 2019-04-10 RX ADMIN — POTASSIUM CHLORIDE 10 MEQ: 7.46 INJECTION, SOLUTION INTRAVENOUS at 21:32

## 2019-04-10 RX ADMIN — OXYCODONE HYDROCHLORIDE 5 MG: 5 TABLET ORAL at 11:08

## 2019-04-10 RX ADMIN — ENALAPRILAT 2.5 MG: 2.5 INJECTION INTRAVENOUS at 20:28

## 2019-04-10 RX ADMIN — OXYCODONE HYDROCHLORIDE 5 MG: 5 TABLET ORAL at 20:42

## 2019-04-10 RX ADMIN — AMIODARONE HYDROCHLORIDE 150 MG: 1.5 INJECTION, SOLUTION INTRAVENOUS at 18:21

## 2019-04-10 RX ADMIN — ENALAPRILAT 2.5 MG: 2.5 INJECTION INTRAVENOUS at 14:25

## 2019-04-10 RX ADMIN — POTASSIUM CHLORIDE 10 MEQ: 7.46 INJECTION, SOLUTION INTRAVENOUS at 22:48

## 2019-04-10 RX ADMIN — VITAMIN D, TAB 1000IU (100/BT) 1000 UNITS: 25 TAB at 06:00

## 2019-04-10 RX ADMIN — FUROSEMIDE 20 MG: 10 INJECTION, SOLUTION INTRAMUSCULAR; INTRAVENOUS at 05:02

## 2019-04-10 RX ADMIN — POTASSIUM CHLORIDE 10 MEQ: 7.46 INJECTION, SOLUTION INTRAVENOUS at 23:50

## 2019-04-10 RX ADMIN — HEPARIN SODIUM 2000 UNITS: 1000 INJECTION, SOLUTION INTRAVENOUS; SUBCUTANEOUS at 16:44

## 2019-04-10 RX ADMIN — NITROGLYCERIN 10 ML: 20 INJECTION INTRAVENOUS at 16:44

## 2019-04-10 RX ADMIN — MIDAZOLAM HYDROCHLORIDE 1 MG: 1 INJECTION, SOLUTION INTRAMUSCULAR; INTRAVENOUS at 17:44

## 2019-04-10 RX ADMIN — HEPARIN SODIUM: 1000 INJECTION, SOLUTION INTRAVENOUS; SUBCUTANEOUS at 16:43

## 2019-04-10 RX ADMIN — HEPARIN SODIUM 6000 UNITS: 1000 INJECTION INTRAVENOUS; SUBCUTANEOUS at 21:44

## 2019-04-10 RX ADMIN — LIDOCAINE HYDROCHLORIDE: 20 INJECTION, SOLUTION INFILTRATION; PERINEURAL at 16:43

## 2019-04-10 RX ADMIN — FUROSEMIDE 40 MG: 10 INJECTION, SOLUTION INTRAMUSCULAR; INTRAVENOUS at 00:30

## 2019-04-10 RX ADMIN — IOHEXOL 100 ML: 350 INJECTION, SOLUTION INTRAVENOUS at 10:19

## 2019-04-10 RX ADMIN — HEPARIN SODIUM 1200 UNITS/HR: 5000 INJECTION, SOLUTION INTRAVENOUS at 21:48

## 2019-04-10 RX ADMIN — AMIODARONE HYDROCHLORIDE 1 MG/MIN: 50 INJECTION, SOLUTION INTRAVENOUS at 18:40

## 2019-04-10 RX ADMIN — FUROSEMIDE 80 MG: 10 INJECTION, SOLUTION INTRAMUSCULAR; INTRAVENOUS at 13:30

## 2019-04-10 RX ADMIN — TRAZODONE HYDROCHLORIDE 50 MG: 50 TABLET ORAL at 22:06

## 2019-04-10 RX ADMIN — MORPHINE SULFATE 3 MG: 4 INJECTION INTRAVENOUS at 22:06

## 2019-04-10 RX ADMIN — FENTANYL CITRATE 75 MCG: 50 INJECTION, SOLUTION INTRAMUSCULAR; INTRAVENOUS at 17:44

## 2019-04-10 RX ADMIN — ENALAPRILAT 2.5 MG: 2.5 INJECTION INTRAVENOUS at 11:11

## 2019-04-10 RX ADMIN — VERAPAMIL HYDROCHLORIDE 2.5 MG: 2.5 INJECTION, SOLUTION INTRAVENOUS at 16:44

## 2019-04-10 RX ADMIN — FUROSEMIDE 40 MG: 10 INJECTION, SOLUTION INTRAMUSCULAR; INTRAVENOUS at 10:53

## 2019-04-10 RX ADMIN — INSULIN HUMAN 1 UNITS: 100 INJECTION, SOLUTION PARENTERAL at 20:44

## 2019-04-10 ASSESSMENT — ENCOUNTER SYMPTOMS
TREMORS: 0
COLOR CHANGE: 0
BLURRED VISION: 0
DIZZINESS: 0
EYE REDNESS: 0
FLANK PAIN: 0
NUMBNESS: 0
INSOMNIA: 0
DEPRESSION: 0
BACK PAIN: 1
VOMITING: 0
EYE PAIN: 0
SEIZURES: 0
ABDOMINAL PAIN: 0
ORTHOPNEA: 1
DIAPHORESIS: 0
NAUSEA: 0
HEARTBURN: 0
MYALGIAS: 1
CHEST TIGHTNESS: 0
HEMOPTYSIS: 0
FEVER: 0
PALPITATIONS: 0
COUGH: 0
JOINT SWELLING: 1
FALLS: 0
CONFUSION: 0
NERVOUS/ANXIOUS: 0
FOCAL WEAKNESS: 0
HEADACHES: 0
LOSS OF CONSCIOUSNESS: 0
WHEEZING: 0
NECK PAIN: 0
SORE THROAT: 0
COUGH: 1
BLOOD IN STOOL: 0
WEAKNESS: 1
CHILLS: 0
ABDOMINAL DISTENTION: 0
BACK PAIN: 0
SPUTUM PRODUCTION: 1
DIARRHEA: 0
TROUBLE SWALLOWING: 0
CONSTIPATION: 0
AGITATION: 0
SHORTNESS OF BREATH: 1
BRUISES/BLEEDS EASILY: 0

## 2019-04-10 ASSESSMENT — COPD QUESTIONNAIRES
DURING THE PAST 4 WEEKS HOW MUCH DID YOU FEEL SHORT OF BREATH: SOME OF THE TIME
DO YOU EVER COUGH UP ANY MUCUS OR PHLEGM?: YES, A FEW DAYS A WEEK OR MONTH
HAVE YOU SMOKED AT LEAST 100 CIGARETTES IN YOUR ENTIRE LIFE: YES
COPD SCREENING SCORE: 5

## 2019-04-10 ASSESSMENT — LIFESTYLE VARIABLES: EVER_SMOKED: YES

## 2019-04-10 NOTE — CONSULTS
Critical Care Consultation    Date of consult: 4/10/2019    Referring Physician  Tori Purdy M.D.    Reason for Consultation  SOB    History of Presenting Illness  54 y.o. male who presented 4/9/2019 with shortness of breath from [facility].  He presented there on April 8 for an elective right knee total arthroplasty that was performed without complications.  That evening he started to develop shortness of breath that he says he has had intermittently for the last few months.  He started to cough a frothy tinged mucus.  Chest x-ray revealed pulmonary edema and a CT PE study was negative for pulmonary embolism.  He underwent echocardiography which unfortunately revealed severe mitral valve regurgitation for which she was transferred to [facility].  He was given Lasix and is being evaluated by cardiothoracic surgery for emergent valve replacement.  He was transferred to the intensive care unit for acute hypoxic respiratory failure and I was consulted for his critical care management.    Code Status  Full Code    Review of Systems  Review of Systems   Constitutional: Negative for fever and malaise/fatigue.   HENT: Negative for congestion and sore throat.    Eyes: Negative for blurred vision.   Respiratory: Positive for cough, sputum production and shortness of breath. Negative for hemoptysis and wheezing.    Cardiovascular: Positive for orthopnea and leg swelling. Negative for chest pain and palpitations.   Gastrointestinal: Negative for abdominal pain, heartburn, nausea and vomiting.   Genitourinary: Negative for dysuria and flank pain.   Musculoskeletal: Positive for joint pain. Negative for back pain and neck pain.   Skin: Negative for rash.   Neurological: Negative for dizziness and headaches.   Endo/Heme/Allergies: Does not bruise/bleed easily.   Psychiatric/Behavioral: Negative for depression. The patient is not nervous/anxious.    All other systems reviewed and are negative.      Past  Medical History   has a past medical history of Dandruff; H/O medullary carcinoma of thyroid (1/31/2014); Hyperlipidemia; Hypertension; Hypothyroid; and Thyroid ca (CMS-HCC). He also has no past medical history of Diabetes.    Surgical History   has a past surgical history that includes thyroidectomy; other; rhinoplasty (2007); knee replacement, total (2012); other orthopedic surgery (1976); and hernia repair.    Family History  family history includes Cancer in his mother; Diabetes in his father and maternal grandmother; Stroke in his maternal grandmother.    Social History   reports that he has quit smoking. He has a 8.75 pack-year smoking history. He has quit using smokeless tobacco. His smokeless tobacco use included Chew. He reports that he drinks alcohol. He reports that he does not use drugs.    Medications  Home Medications    **Home medications have not yet been reviewed for this encounter**       Current Facility-Administered Medications   Medication Dose Route Frequency Provider Last Rate Last Dose   • morphine (pf) 4 mg/ml injection 3 mg  3 mg Intravenous Q3HRS PRN Estela Fagan M.D.   3 mg at 04/10/19 0900   • oxyCODONE immediate-release (ROXICODONE) tablet 5 mg  5 mg Oral Q4HRS PRN Estela Fagan M.D.   5 mg at 04/10/19 1108   • enalaprilat (VASOTEC) injection 2.5 mg  2.5 mg Intravenous Q4HR Tori Purdy M.D.   2.5 mg at 04/10/19 1425   • furosemide (LASIX) injection 80 mg  80 mg Intravenous BID DIURETIC Tori Purdy M.D.   80 mg at 04/10/19 1330   • enalaprilat (VASOTEC) injection 2.5 mg  2.5 mg Intravenous Q3HRS PRN Tori Purdy M.D.       • levothyroxine (SYNTHROID) tablet 300 mcg  300 mcg Oral DAILY Ld Morel M.D.   300 mcg at 04/10/19 0500   • pravastatin (PRAVACHOL) tablet 40 mg  40 mg Oral DAILY Ld Morel M.D.   40 mg at 04/10/19 0500   • traZODone (DESYREL) tablet 50 mg  50 mg Oral Nightly Ld Morel M.D.   50 mg at 04/09/19 4473   • senna-docusate  (PERICOLACE or SENOKOT S) 8.6-50 MG per tablet 2 Tab  2 Tab Oral BID Ld Morel M.D.   2 Tab at 04/09/19 2353    And   • polyethylene glycol/lytes (MIRALAX) PACKET 1 Packet  1 Packet Oral QDAY PRN Ld Morel M.D.        And   • magnesium hydroxide (MILK OF MAGNESIA) suspension 30 mL  30 mL Oral QDAY PRN Ld Morel M.D.        And   • bisacodyl (DULCOLAX) suppository 10 mg  10 mg Rectal QDAY PRN Ld Morel M.D.       • Respiratory Care per Protocol   Nebulization Continuous RT Ld Morel M.D.       • enoxaparin (LOVENOX) inj 40 mg  40 mg Subcutaneous DAILY Ld Morel M.D.   Stopped at 04/10/19 0600   • insulin regular (HUMULIN R) injection 1-6 Units  1-6 Units Subcutaneous 4X/DAY ACHS Ld Morel M.D.   Stopped at 04/10/19 0700    And   • glucose 4 g chewable tablet 16 g  16 g Oral Q15 MIN PRN Ld Morel M.D.        And   • dextrose 50% (D50W) injection 25 mL  25 mL Intravenous Q15 MIN PRN Ld Morel M.D.           Allergies  No Known Allergies    Vital Signs last 24 hours  Temp:  [36.9 °C (98.5 °F)-38 °C (100.4 °F)] 36.9 °C (98.5 °F)  Pulse:  [82-96] 90  Resp:  [16-24] 20  BP: (137-151)/(82-97) 151/97  SpO2:  [91 %-97 %] 97 %    Physical Exam  Physical Exam   Constitutional: He is oriented to person, place, and time. He appears well-developed and well-nourished. He appears distressed.   HENT:   Head: Normocephalic and atraumatic.   Nose: Nose normal.   Mouth/Throat: Oropharynx is clear and moist.   Eyes: Pupils are equal, round, and reactive to light. Conjunctivae are normal. No scleral icterus.   Neck: Neck supple. JVD present. No tracheal deviation present.   Cardiovascular: Regular rhythm and intact distal pulses.   Occasional extrasystoles are present. Tachycardia present.  PMI is displaced.  Exam reveals no distant heart sounds.    Murmur heard.   Systolic murmur is present with a grade of 5/6   Pulmonary/Chest: No accessory muscle usage or stridor.  Tachypnea noted. He is in respiratory distress (Mild). He has no decreased breath sounds. He has no wheezes. He has no rhonchi. He has rales in the right lower field and the left lower field.   Abdominal: Soft. Bowel sounds are normal. He exhibits no distension. There is no tenderness. There is no rebound.   Musculoskeletal: He exhibits edema and tenderness.   Right lower extremity bandages clean/dry/intact, ice pack wrapped around knee, very tender to any movement   Neurological: He is alert and oriented to person, place, and time. No cranial nerve deficit. He exhibits normal muscle tone.   Skin: Skin is warm and dry. He is not diaphoretic. No pallor.   Psychiatric: He has a normal mood and affect. His behavior is normal. Judgment and thought content normal.   Nursing note and vitals reviewed.      Fluids    Intake/Output Summary (Last 24 hours) at 04/10/19 1453  Last data filed at 04/10/19 1400   Gross per 24 hour   Intake                0 ml   Output             4895 ml   Net            -4895 ml       Laboratory  Recent Results (from the past 48 hour(s))   CBC without Differential    Collection Time: 04/10/19  2:24 AM   Result Value Ref Range    WBC 14.8 (H) 4.8 - 10.8 K/uL    RBC 4.75 4.70 - 6.10 M/uL    Hemoglobin 12.5 (L) 14.0 - 18.0 g/dL    Hematocrit 40.3 (L) 42.0 - 52.0 %    MCV 84.8 81.4 - 97.8 fL    MCH 26.3 (L) 27.0 - 33.0 pg    MCHC 31.0 (L) 33.7 - 35.3 g/dL    RDW 43.8 35.9 - 50.0 fL    Platelet Count 222 164 - 446 K/uL    MPV 10.2 9.0 - 12.9 fL   Basic Metabolic Panel (BMP)    Collection Time: 04/10/19  2:24 AM   Result Value Ref Range    Sodium 138 135 - 145 mmol/L    Potassium 3.5 (L) 3.6 - 5.5 mmol/L    Chloride 105 96 - 112 mmol/L    Co2 22 20 - 33 mmol/L    Glucose 183 (H) 65 - 99 mg/dL    Bun 24 (H) 8 - 22 mg/dL    Creatinine 1.18 0.50 - 1.40 mg/dL    Calcium 7.9 (L) 8.5 - 10.5 mg/dL    Anion Gap 11.0 0.0 - 11.9   TSH WITH REFLEX TO FT4    Collection Time: 04/10/19  2:24 AM   Result Value Ref  Range    TSH <0.005 (L) 0.380 - 5.330 uIU/mL   BTYPE NATRIURETIC PEPTIDE    Collection Time: 04/10/19  2:24 AM   Result Value Ref Range    B Natriuretic Peptide 240 (H) 0 - 100 pg/mL   ESTIMATED GFR    Collection Time: 04/10/19  2:24 AM   Result Value Ref Range    GFR If African American >60 >60 mL/min/1.73 m 2    GFR If Non African American >60 >60 mL/min/1.73 m 2   FREE THYROXINE    Collection Time: 04/10/19  2:24 AM   Result Value Ref Range    Free T-4 3.19 (H) 0.53 - 1.43 ng/dL   ACCU-CHEK GLUCOSE    Collection Time: 04/10/19  5:12 AM   Result Value Ref Range    Glucose - Accu-Ck 155 (H) 65 - 99 mg/dL   EC-ECHOCARDIOGRAM COMPLETE W/O CONT    Collection Time: 04/10/19 11:25 AM   Result Value Ref Range    Eject.Frac. MOD BP 60.22     Eject.Frac. MOD 4C 57.81     Eject.Frac. MOD 2C 57.85     Left Ventrical Ejection Fraction 65        Imaging  EC-ECHOCARDIOGRAM COMPLETE W/O CONT   Final Result      CT-CTA CHEST PULMONARY ARTERY W/ RECONS   Final Result      1.  Patchy groundglass opacification with septal thickening, perihilar in distribution. Findings are nonspecific and may represent pulmonary edema or an infectious/inflammatory process.      2.  Small left pleural effusion.      3.  Cardiomegaly.      4.  Mild nonspecific mediastinal adenopathy            DX-CHEST-LIMITED (1 VIEW)   Final Result      1.  Cardiomegaly.      2.  Increased perihilar interstitial markings with left basilar and right upper lobe hazy opacification. Findings may be related to pulmonary edema. Typical infection could have a similar appearance.      EC-MICAH W/O CONT    (Results Pending)   CL-LEFT HEART CATHETERIZATION WITH POSSIBLE INTERVENTION    (Results Pending)    *Personally reviewed chest x-ray and compared to prior film showing enlarged cardiac silhouette with mild edema worse in the right upper lobe, retrocardiac opacification, no tubes or lines    Assessment/Plan  * Acute respiratory failure with hypoxia with pulmonary edema (HCC)    Assessment & Plan    Transition to high flow nasal cannula titrating flow and FiO2 to keep SaO2 greater than 92%  Consider BiPAP or intubation if patient's work of breathing worsens  Diuresis  RT/O2 protocol  Limit sedatives     Mitral valve disease   Assessment & Plan    Severe regurgitation with valve dysfunction  Cardio thoracic surgery consultation for possible valve surgery  Diuresis and strict blood pressure control     Other hyperlipidemia   Assessment & Plan    Statin     Obstructive sleep apnea   Assessment & Plan    CPAP nightly     S/P total knee arthroplasty, right   Assessment & Plan    Analgesia  Therapy  Consider involving orthopedics at [facility]     Pulmonary edema   Assessment & Plan    Diuresis  Positive pressure oxygen therapy  Valve surgery     Hypothyroid- (present on admission)   Assessment & Plan    Continue Synthroid at current dose     Full code    Discussed patient condition and risk of morbidity and/or mortality with Hospitalist, RN, RT, Pharmacy, , Charge nurse / hot rounds, Patient and cardiology and [facility].    The patient remains critically ill.  Critical care time = 35 minutes in directly providing and coordinating critical care and extensive data review.  No time overlap and excludes procedures.

## 2019-04-10 NOTE — PROGRESS NOTES
Cardiology Follow Up Progress Note    Date of Service  4/10/2019    Attending Physician  Estela Fagan M.D.    Chief Complaint   Mildly SOB     HPI  Ottoniel Davies is a 54 y.o. male admitted 4/9/2019 with short of breath.  He was transferred from [facility] for right total knee arthroplasty and subsequently postoperatively developed dyspnea.  Past medical history of tobacco abuse and dyslipidemia    Interim Events  4/10/19: Patient sitting up in bed complaining of shortness of breath on 10 L of oxygen.  He stated that it got worse after knee surgery.  Was not on oxygen prior to surgery.  No rhythm issues overnight.  Vital signs stable.    Review of Systems  Review of Systems   Constitutional: Negative for chills, diaphoresis and fever.   HENT: Negative for nosebleeds and trouble swallowing.    Respiratory: Positive for shortness of breath. Negative for cough and chest tightness.    Cardiovascular: Positive for leg swelling. Negative for chest pain and palpitations.   Gastrointestinal: Negative for abdominal distention, abdominal pain and blood in stool.   Genitourinary: Negative for hematuria.   Musculoskeletal: Positive for back pain and joint swelling.   Skin: Negative for color change.   Neurological: Negative for dizziness, syncope and numbness.   Psychiatric/Behavioral: Negative for agitation and confusion. The patient is not nervous/anxious.        Vital signs in last 24 hours  Temp:  [37.3 °C (99.1 °F)-37.6 °C (99.6 °F)] 37.3 °C (99.1 °F)  Pulse:  [92-96] 94  Resp:  [16-18] 16  BP: (137-141)/(82-96) 137/96  SpO2:  [91 %-93 %] 91 %    Physical Exam  Physical Exam   Constitutional: He is oriented to person, place, and time. He appears well-developed and well-nourished. No distress.   HENT:   Head: Normocephalic.   Neck: Normal range of motion. No JVD present.   Cardiovascular: Normal rate, regular rhythm and intact distal pulses.    Murmur heard.  Pulmonary/Chest: Effort normal. No respiratory distress.  He has decreased breath sounds. He has no wheezes.   Abdominal: He exhibits no distension. There is no tenderness.   Musculoskeletal: He exhibits edema (1+ pitting edema ). He exhibits no tenderness.   Brace on the right knee   Neurological: He is alert and oriented to person, place, and time.   Skin: Skin is warm and dry. No rash noted. He is not diaphoretic.   Psychiatric: His behavior is normal. Judgment normal.   Nursing note and vitals reviewed.      Lab Review  Lab Results   Component Value Date/Time    WBC 14.8 (H) 04/10/2019 02:24 AM    RBC 4.75 04/10/2019 02:24 AM    HEMOGLOBIN 12.5 (L) 04/10/2019 02:24 AM    HEMATOCRIT 40.3 (L) 04/10/2019 02:24 AM    MCV 84.8 04/10/2019 02:24 AM    MCH 26.3 (L) 04/10/2019 02:24 AM    MCHC 31.0 (L) 04/10/2019 02:24 AM    MPV 10.2 04/10/2019 02:24 AM      Lab Results   Component Value Date/Time    SODIUM 138 04/10/2019 02:24 AM    POTASSIUM 3.5 (L) 04/10/2019 02:24 AM    CHLORIDE 105 04/10/2019 02:24 AM    CO2 22 04/10/2019 02:24 AM    GLUCOSE 183 (H) 04/10/2019 02:24 AM    BUN 24 (H) 04/10/2019 02:24 AM    CREATININE 1.18 04/10/2019 02:24 AM      Lab Results   Component Value Date/Time    ASTSGOT 19 02/18/2016 09:53 AM    ALTSGPT 13 02/18/2016 09:53 AM     Lab Results   Component Value Date/Time    CHOLSTRLTOT 124 05/21/2015 09:08 AM    LDL 31 05/21/2015 09:08 AM    HDL 28 (A) 05/21/2015 09:08 AM    TRIGLYCERIDE 326 (H) 05/21/2015 09:08 AM       Recent Labs      04/10/19   0224   BNPBTYPENAT  240*       Cardiac Imaging and Procedures Review    ECHO CONCLUSIONS (4/9/2019):  Acute severe mitral regurgitation, possibly flail leaflet not present on prior study 6/2017  Increased left ventricular cavity size LVEDD 6 cm  Severe eccentric mitral regurgitation with possible acute flail posterior mitral leaflet at the P2 scallop  Dilated IVC with reduced inspiratory collapse.  Trivial pericardial effusion.  Report generated by Dr. Emerson        Assessment/Plan  No new Assessment &  Plan notes have been filed under this hospital service since the last note was generated.  Service: Cardiology    1. Severe mitral regurgitation:  - SOB on 10 L of oxygen, repeat CXR as patient is SOB. Hold off MICAH for now. Will discuss with Dr. Purdy  - ECHO: acute flail posterior mitral leaflet at the P2 scallop    2.SOB:  - could be related to the severe mitral regurgitation due to recent knee surgery couple days ago we will rule out pulmonary embolism. CTA chest ordered   - repeat CXR, at [facility] showed pulmonary edema. Will give 20mg furosemide now, beside the BID dosage       3. Acute decompensated vavular heart disease:  - severe MR  -     3. Hyperlipidemia:  - continue pravastatin     Addedenum: CTA chest showed no PE, CTS consulted for Severe MR and surface ECHO ordered     Thank you for allowing me to participate in the care of this patient.  I will continue to follow this patient    Please contact me with any questions.    ADÁN Martinez   [facility]

## 2019-04-10 NOTE — ASSESSMENT & PLAN NOTE
High dose Synthroid 300 mcg outpatient  TSH is suppressed at <0.005  Decreased dose to 175 mcg daily.

## 2019-04-10 NOTE — PROGRESS NOTES
Pt transported to T606 via hospital bed with ICU RNs.  Report given to RUTHIE Cedeno. All belongings with patient.  Monitor room notified.    17 yo F with special needs, epilepsy, presenting with ? seizure. Will discuss with neuro and reassess.
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
## 2019-04-10 NOTE — H&P
Hospital Medicine History & Physical Note    Date of Service  4/9/2019    Primary Care Physician  Shane Vanegas M.D.    Consultants  Dr. Emerson, Cardiology  Dr. Mallory notified.    Code Status  full    Chief Complaint  Direct admit for postoperative acute heart failure.    History of Presenting Illness  54 y.o. male who presented 4/9/2019 direct admit from [facility] for postoperative acute heart failure.  History of severe osteoarthritis of the knees  History of heart murmur  Admitted to Dr. Martin Orthopedics at [facility]. He is s/p R knee arthroplasty on 4/8. Postoperative he developed respiratory failure. Imaging showed evidence of pulmonary edema. An echocardiogram was ordered which showed severe mitral valve regurgitation and mitral valve leaflet fail. It was reported that an echo in 2017 did not show this.   Internal Medicine consulted and Dr. Emerson, Cardiology was notified. Lasix given and transfer recommended for possible MICAH and surgical intervention of mitral valve.IV LAsix was given.  When I saw him at telemetry, no acute distress. Murmur noted. Trace edema, LE. R knee bandages/incision CDI  Bibasilar crackles.    Review of Systems  Review of Systems   Constitutional: Positive for malaise/fatigue. Negative for chills and fever.   HENT: Negative for congestion, hearing loss and nosebleeds.    Eyes: Negative for pain and redness.   Respiratory: Positive for cough and shortness of breath. Negative for hemoptysis and wheezing.    Cardiovascular: Negative for chest pain and palpitations.   Gastrointestinal: Negative for abdominal pain, blood in stool, constipation, diarrhea, nausea and vomiting.   Genitourinary: Negative for dysuria, frequency and hematuria.   Musculoskeletal: Positive for joint pain and myalgias. Negative for falls.   Skin: Negative for rash.   Neurological: Positive for weakness. Negative for dizziness, tremors, focal weakness, seizures, loss of consciousness and headaches.    Psychiatric/Behavioral: The patient is not nervous/anxious and does not have insomnia.    All other systems reviewed and are negative.      Past Medical History   has a past medical history of Dandruff; H/O medullary carcinoma of thyroid (1/31/2014); Hyperlipidemia; Hypertension; Hypothyroid; and Thyroid ca (CMS-HCC). He also has no past medical history of Diabetes.    Surgical History   has a past surgical history that includes thyroidectomy; other; rhinoplasty (2007); knee replacement, total (2012); other orthopedic surgery (1976); and hernia repair.     Family History  family history includes Cancer in his mother; Diabetes in his father and maternal grandmother; Stroke in his maternal grandmother.     Social History   reports that he has quit smoking. He has a 8.75 pack-year smoking history. He has quit using smokeless tobacco. His smokeless tobacco use included Chew. He reports that he drinks alcohol. He reports that he does not use drugs.    Allergies  No Known Allergies    Medications  Prior to Admission Medications   Prescriptions Last Dose Informant Patient Reported? Taking?   amLODIPine (NORVASC) 10 MG Tab 4/9/2019 at 0600 Patient Yes Yes   Sig: Take 10 mg by mouth every day.   hydrocodone-acetaminophen (NORCO) 5-325 MG TABS per tablet 4/9/2019 at 1300  No No   Sig: Take 1-2 Tabs by mouth every 6 hours as needed.   levothyroxine (SYNTHROID) 300 MCG TABS 4/9/2019 at 0600  No No   Sig: Take 1 Tab by mouth every day.   losartan (COZAAR) 100 MG TABS 4/9/2019 at 0600  Yes No   Sig: Take 100 mg by mouth every day.   metformin (GLUCOPHAGE) 850 MG TABS 4/9/2019 at 0600  No No   Sig: Take 1 Tab by mouth every day.   potassium chloride SA (K-DUR) 10 MEQ Tab CR 4/9/2019 at 0600  No No   Sig: TAKE 1 TABLET BY MOUTH EVERY DAY   pravastatin (PRAVACHOL) 40 MG tablet 4/9/2019 at 0600  No No   Sig: Take 1 Tab by mouth every day.   trazodone (DESYREL) 50 MG Tab 4/7/2019 at 2100  Yes No   Sig: Take 50 mg by mouth every  evening.   triamterene-hydrochlorothiazide (MAXZIDE 75-50) 75-50 MG TABS 1/1/2015  Yes No   Sig: Take 1 Tab by mouth every day.   vitamin D (CHOLECALCIFEROL) 1000 UNIT Tab 4/7/2019 at 0600 Patient Yes Yes   Sig: Take 1,000 Units by mouth every day.      Facility-Administered Medications: None       Physical Exam  Temp:  [37.6 °C (99.6 °F)] 37.6 °C (99.6 °F)  Pulse:  [95] 95  Resp:  [18] 18  BP: (141)/(82) 141/82  SpO2:  [93 %] 93 %    Physical Exam   Constitutional: He appears well-developed.   HENT:   Head: Normocephalic and atraumatic.   Eyes: Conjunctivae are normal. No scleral icterus.   Neck: Normal range of motion. Neck supple. JVD present.   Cardiovascular: Normal rate and regular rhythm.  Exam reveals no gallop and no friction rub.    Murmur heard.  Pulmonary/Chest: Effort normal. No respiratory distress. He has no wheezes. He has rales (Bibasilar crackles).   Abdominal: Soft. Bowel sounds are normal. He exhibits no distension. There is no tenderness. There is no rebound and no guarding.   Musculoskeletal: He exhibits edema (Trace, bilateral LE) and tenderness (Mild knee soreness).   Neurological: He is alert.   Skin: Skin is warm.   Psychiatric: He has a normal mood and affect. His behavior is normal.       Laboratory:          No results for input(s): ALTSGPT, ASTSGOT, ALKPHOSPHAT, TBILIRUBIN, DBILIRUBIN, GAMMAGT, AMYLASE, LIPASE, ALB, PREALBUMIN, GLUCOSE in the last 72 hours.              No results for input(s): TROPONINI in the last 72 hours.    Urinalysis:    No results found     Imaging:  No orders to display         Assessment/Plan:  I anticipate this patient will require at least two midnights for appropriate medical management, necessitating inpatient admission.    * Acute respiratory failure with hypoxia with pulmonary edema (HCC)   Assessment & Plan    Resp and O2 per protocol  Ordered IV LAsix  Ordered labs still pending.     S/P total knee arthroplasty, right   Assessment & Plan    Ordered  PT/OT  Can speak with Dr. Andino, orthopedics in the AM or ask for further recs from Ortho PA     Volume overload   Assessment & Plan    IV Lasix  Order K supplementation when labs results.     Mitral valve disease   Assessment & Plan    Was not seen on prior echo  Cardiology planned MICAH, defer to them  Dr. Mallory notified, will defer to Cardiology for MICAH order.     Pulmonary edema   Assessment & Plan    Postoperative pulmonary edema  Ordered Lasix  Cardiology consulted     Hypothyroid- (present on admission)   Assessment & Plan    High dose Synthroid  Ordered TSH with rT4 still pending  Please adjust dosage depending on result.         VTE prophylaxis: Lovenox SQ in the AM however if Cr- abnormal, please switch to heparin SQ    Reviewed vitals, labs, imaging, staff notes.  Discussed assessment and plan with Ottoniel Davies  Discussed with RN.

## 2019-04-10 NOTE — PROGRESS NOTES
Hospital Medicine Daily Progress Note    Date of Service  4/10/2019    Chief Complaint  54 y.o. male admitted 4/9/2019 with shortness of breath.    Hospital Course   Mr. Davies has a history of dyslipidemia, DM, and hypertension that underwent a right knee arthroplasty on 4/8 by Dr. Martin in [place].  Preoperatively, he developed pulmonary edema and shortness of breath therefore an echocardiogram was done which revealed severe mitral regurgitation which was new compared to previous echocardiogram 2017.  He was transferred here for higher level of care.  As his respiratory status has decompensated, decision was made to transfer him to the intensive care unit.  Is been seen by cardiothoracic surgery and likely will go to the operating room after a heart catheterization is done to decide whether he also needs a bypass graft.      Interval Problem Update  4/10: patient seen in the ICU.  Patient notes that he is not able to lay down flat without becoming quite short of breath.  He understands that he will need to go to the cardiac Cath Lab prior to valve surgery.     Consultants/Specialty  Cardiology. I discussed with Dr. Purdy at bedside.  Dr. Littlejohn, Cardiothoracic surgery  Critical Care. I discussed with Dr. Gonda  Code Status  full    Disposition  CIC    Review of Systems  Review of Systems   Constitutional: Negative for chills and fever.   Respiratory: Positive for shortness of breath.    Cardiovascular: Negative for chest pain.   Musculoskeletal:        Right knee pain with movement.    All other systems reviewed and are negative.       Physical Exam  Temp:  [36.9 °C (98.5 °F)-38 °C (100.4 °F)] 36.9 °C (98.5 °F)  Pulse:  [82-96] 89  Resp:  [16-24] 22  BP: (137-151)/(82-97) 151/97  SpO2:  [91 %-96 %] 94 %    Physical Exam   Constitutional: He is oriented to person, place, and time. He appears well-developed and well-nourished.   HENT:   Head: Normocephalic and atraumatic.   Neck: Normal range of motion. Neck  supple.   Cardiovascular: Normal rate and regular rhythm.    V/VI DIPTI left sternal border.    Pulmonary/Chest: Effort normal. No respiratory distress. He has no wheezes.   Abdominal: Soft. He exhibits no distension. There is no tenderness.   Musculoskeletal: He exhibits no edema.   Left leg is wrapped.   Neurological: He is alert and oriented to person, place, and time.   Skin: Skin is warm. He is diaphoretic.   Psychiatric: He has a normal mood and affect. His behavior is normal.   Nursing note and vitals reviewed.      Fluids    Intake/Output Summary (Last 24 hours) at 04/10/19 1327  Last data filed at 04/10/19 1230   Gross per 24 hour   Intake                0 ml   Output             4270 ml   Net            -4270 ml       Laboratory  Recent Labs      04/10/19   0224   WBC  14.8*   RBC  4.75   HEMOGLOBIN  12.5*   HEMATOCRIT  40.3*   MCV  84.8   MCH  26.3*   MCHC  31.0*   RDW  43.8   PLATELETCT  222   MPV  10.2     Recent Labs      04/10/19   0224   SODIUM  138   POTASSIUM  3.5*   CHLORIDE  105   CO2  22   GLUCOSE  183*   BUN  24*   CREATININE  1.18   CALCIUM  7.9*         Recent Labs      04/10/19   0224   BNPBTYPENAT  240*           Imaging  EC-ECHOCARDIOGRAM COMPLETE W/O CONT   Final Result      CT-CTA CHEST PULMONARY ARTERY W/ RECONS   Final Result      1.  Patchy groundglass opacification with septal thickening, perihilar in distribution. Findings are nonspecific and may represent pulmonary edema or an infectious/inflammatory process.      2.  Small left pleural effusion.      3.  Cardiomegaly.      4.  Mild nonspecific mediastinal adenopathy            DX-CHEST-LIMITED (1 VIEW)   Final Result      1.  Cardiomegaly.      2.  Increased perihilar interstitial markings with left basilar and right upper lobe hazy opacification. Findings may be related to pulmonary edema. Typical infection could have a similar appearance.      EC-MICAH W/O CONT    (Results Pending)        Assessment/Plan  * Acute respiratory failure  with hypoxia with pulmonary edema (HCC)   Assessment & Plan    Secondary to severe mitral regurgitation  He is requiring supplemental oxygen and critical care is consulted  IV diuresis as tolerated and follow his basic metabolic panel with electrolytes in the morning as well as renal function and urine output  Supplemental oxygen as indicated     Mitral valve disease   Assessment & Plan    Severe mitral regurgitation noted on echocardiogram  A MICAH has been ordered  He will go to cardiac Cath Lab to evaluate if he needs a bypass graft prior to cardiothoracic surgery for replacement or repair of his mitral valve.     S/P total knee arthroplasty, right   Assessment & Plan    Status post right knee arthroplasty by Dr. Andino, orthopedics in [facility]  Oral pain medications on is n.p.o. IV morphine for breakthrough pain     Volume overload   Assessment & Plan    IV Lasix  Order K supplementation when labs results.     Pulmonary edema   Assessment & Plan    Postoperative pulmonary edema  Ordered Lasix  Cardiology consulted     Hypothyroid- (present on admission)   Assessment & Plan    High dose Synthroid as outpatient - duration unclear  Tsh is suppressed  Likely needs lower dose            VTE prophylaxis:

## 2019-04-10 NOTE — PROGRESS NOTES
Patient is a direct admit from [facility], patient of Dr. Gustafson for acute heart failure and severe mitral valve regurgitation.  Dr. Castellanos is accepting the patient, Dr. Whitmore will consult.  ADT order to be entered by MD.  Held orders to be released upon patients arrival to unit.

## 2019-04-10 NOTE — PROGRESS NOTES
Brought patient down to cath lab holding with this RN and transport on [facility] monitor. No complications.

## 2019-04-10 NOTE — PROGRESS NOTES
Patient arrived to T606 with rapid RNs on 15 L HFNC.Quickly reduced to 8L with SpO2s >92%. Patient AOx4, right knee in pain from surgery yesterday. Sinus rhythm 80s-90s, SBPs 130s-140s.     Dr. Littlejohn and Kiley Perez at bedside and discussed the need for surgery, pending Dr. Dye' consultation.

## 2019-04-10 NOTE — PROGRESS NOTES
Orders to recheck potassium and start K scale. SBP 150s, orders for enalaprilat PRN in addition to scheduled per Dr. Purdy.

## 2019-04-10 NOTE — PROGRESS NOTES
Assumed care of pt.  Assessment complete.  A&O x 4. Pt complains of 10/10 pain in R Knee, medicated per MAR. Discussed plan of care.  Call light within reach.  Bed alarm active.  Treaded socks on pt.  Will continue to monitor.

## 2019-04-10 NOTE — PROGRESS NOTES
Received report from [facility]. Pt is A&O x 4. Pt transported via [facility]. Pt arrived to unit, tele box on patient, confirmed  with monitor room. Pt transferred via sliding. Pt oriented to unit and call light, verbalized understanding. Fall precautions in place. Call light and bedside table within reach, bed locked in lowest position with controls on. Assessment completed. Will continue to monitor. Pt is a direct admit. Hospitalist/Cards paged.

## 2019-04-10 NOTE — PROGRESS NOTES
Updated Dr. Tori Purdy about patient converting into a. Fib around 1345. Rate 90s-110s, asymptomatic.    Orders for amio bolus and drip when patient comes back from cath lab.

## 2019-04-10 NOTE — ASSESSMENT & PLAN NOTE
Severe mitral regurgitation noted on echocardiogram  Clear coronaries on cardiac cath.  Mitral valve surgery on 4/13 by Dr. Dye

## 2019-04-10 NOTE — CONSULTS
Reason of Consult: Acutely decompensated heart failure    Consulting Physician: Dr. Morel    HPI:  54-year-old male admitted to [facility] for right total knee arthroplasty and subsequently developed postoperative dyspnea.  Cardiogram completed there showed previously unknown severe mitral regurgitation and he was diagnosed with valvular acute decompensated heart failure.  He recommended transfer to [facility].  He arrives and is hemodynamically and electrically stable.  Outside records are reviewed.  He remains mildly short of breath.  Has a former smoking history currently not smoking, occasional alcohol use but denies drug use.  No family history precocious CAD.    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   • Hyperlipidemia    • Hypertension    • Hypothyroid    • Thyroid ca (CMS-HCC)        Social History     Social History   • Marital status: Single     Spouse name: N/A   • Number of children: N/A   • Years of education: N/A     Occupational History   • Not on file.     Social History Main Topics   • Smoking status: Former Smoker     Packs/day: 0.25     Years: 35.00   • Smokeless tobacco: Former User     Types: Chew      Comment: quit weeks ago   • Alcohol use Yes      Comment: ocassional   • Drug use: No   • Sexual activity: Not on file     Other Topics Concern   • Not on file     Social History Narrative   • No narrative on file       No current facility-administered medications on file prior to encounter.      Current Outpatient Prescriptions on File Prior to Encounter   Medication Sig Dispense Refill   • trazodone (DESYREL) 50 MG Tab Take 50 mg by mouth every evening.     • potassium chloride SA (K-DUR) 10 MEQ Tab CR TAKE 1 TABLET BY MOUTH EVERY DAY 60 Tab 0   • metformin (GLUCOPHAGE) 850 MG TABS Take 1 Tab by mouth every day. 30 Tab 0   • hydrocodone-acetaminophen (NORCO) 5-325 MG TABS per tablet Take 1-2 Tabs by mouth every 6 hours as needed.  16 Tab 0   • losartan (COZAAR) 100 MG TABS Take 100 mg by mouth every day.     • triamterene-hydrochlorothiazide (MAXZIDE 75-50) 75-50 MG TABS Take 1 Tab by mouth every day.     • levothyroxine (SYNTHROID) 300 MCG TABS Take 1 Tab by mouth every day. 30 Tab 3   • pravastatin (PRAVACHOL) 40 MG tablet Take 1 Tab by mouth every day. 30 Tab 11       Current Facility-Administered Medications   Medication Dose Frequency Provider Last Rate Last Dose   • [START ON 4/10/2019] levothyroxine (SYNTHROID) tablet 300 mcg  300 mcg DAILY Ld Morel M.D.       • [START ON 4/10/2019] losartan (COZAAR) tablet 100 mg  100 mg DAILY Ld Morel M.D.       • [START ON 4/10/2019] potassium chloride SA (Kdur) tablet 10 mEq  10 mEq QDAY Ld Morel M.D.       • [START ON 4/10/2019] pravastatin (PRAVACHOL) tablet 40 mg  40 mg DAILY Ld Morel M.D.       • traZODone (DESYREL) tablet 50 mg  50 mg Nightly Ld Morel M.D.       • [START ON 4/10/2019] vitamin D (cholecalciferol) tablet 1,000 Units  1,000 Units DAILY Ld Morel M.D.       • [START ON 4/10/2019] furosemide (LASIX) tablet 20 mg  20 mg Q DAY Ld Morel M.D.       • senna-docusate (PERICOLACE or SENOKOT S) 8.6-50 MG per tablet 2 Tab  2 Tab BID Ld Morel M.D.        And   • polyethylene glycol/lytes (MIRALAX) PACKET 1 Packet  1 Packet QDAY PRN Ld Morel M.D.        And   • magnesium hydroxide (MILK OF MAGNESIA) suspension 30 mL  30 mL QDAY PRN Ld Morel M.D.        And   • bisacodyl (DULCOLAX) suppository 10 mg  10 mg QDAY PRN Ld Morel M.D.       • Respiratory Care per Protocol   Continuous RT Ld Morel M.D.       • [START ON 4/10/2019] enoxaparin (LOVENOX) inj 40 mg  40 mg DAILY Ld Morel M.D.       • [START ON 4/10/2019] insulin regular (HUMULIN R) injection 1-6 Units  1-6 Units 4X/DAY ACHS Ld Morel M.D.        And   • glucose 4 g chewable tablet 16 g  16 g Q15 MIN PRN Ld CHINO  ANITA Morel        And   • dextrose 50% (D50W) injection 25 mL  25 mL Q15 MIN PRN Ld Morel M.D.         Last reviewed on 9/5/2018 11:26 AM by Kaden Gross Ass't    Patient has no known allergies.    Family History   Problem Relation Age of Onset   • Cancer Mother         breast/skin ca   • Diabetes Father    • Diabetes Maternal Grandmother    • Stroke Maternal Grandmother    • Lung Disease Neg Hx    • Heart Disease Neg Hx        ROS: As per HPI all other systems reviewed and negative     Physical Exam   Blood pressure 141/82, pulse 95, temperature 37.6 °C (99.6 °F), temperature source Temporal, resp. rate 18, weight 101 kg (222 lb 11.2 oz), SpO2 93 %.    Constitutional: Appears well-developed.   HENT: Normocephalic and atraumatic. No scleral icterus.   Neck: No JVD present.   Cardiovascular: Normal rate. Exam reveals no gallop and no friction rub.  3/6 blowing holosystolic murmur heard.   Pulmonary/Chest: Coarse   Abdominal: S/NT/ND BS+   Musculoskeletal:  Pulses present. No atrophy. Strength normal.  Extremities: Exhibits no edema. No clubbing or cyanosis.   Skin: Skin is warm and dry.   Neuro: Non-focal, CN 2-12 intact grossly    No intake or output data in the 24 hours ending 04/09/19 2317                              Laboratory studies 4/9/2019 from [facility]: Heme globin 13, DVC 17.9, platelet 245, A1c 7.4%, creatinine 1.25, sodium 140, potassium 4.4, CO2 23.    ECHO CONCLUSIONS (4/9/2019):  Acute severe mitral regurgitation, possibly flail leaflet not present on prior study 6/2017  Increased left ventricular cavity size LVEDD 6 cm  Severe eccentric mitral regurgitation with possible acute flail posterior mitral leaflet at the P2 scallop  Dilated IVC with reduced inspiratory collapse.  Trivial pericardial effusion.  Report generated by Dr. Emerson    Imaging reviewed    Impressions:  1.  Acute severe mitral regurgitation  2.  Acutely decompensated valvular heart disease  3.  Status post  right total knee arthroplasty 4/8/19  4.  Hyperlipidemia  5.  Diabetes mellitus    Recommendations:  Appears to have severe mitral regurgitation.  His dilated LV is not consistent with acute rather more so with subacute and likely decompensation related to surgery and anesthesia.  Nonetheless it is severe and require surgical evaluation.  Would recommend n.p.o. after midnight for transesophageal echocardiogram in the morning if tolerated from a respiratory standpoint and diuresis.    1.  N.p.o. after midnight for possible MICAH  2.  Following MICAH surgical consultation for with cardio thoracic surgery will be entertained  3.  CBC, CMP  4.  Intravenous diuresis    Thank you for this interesting consultation. It was my pleasure to see Ottoniel Davies today.    Antonio Mallory MD, FACC, [facility]  Division of Interventional Cardiology  [facility]

## 2019-04-10 NOTE — PROGRESS NOTES
· 2 RN skin check complete with RUTHIE Munguia.  · Devices in place Nasal Cannula, Bandage and Icepack to R leg. Teo hoes to bilateral legs  · Skin assessed under devices Foam dressing in place to R knee.  · Confirmed pressure ulcers found on N/A  · New potential pressure ulcers noted on N/A. Wound consult placed and wound reported.         The following interventions in place pillows in use to support/position. Pt educated on Q2 turns.    Bilateral ears red and blanching. Foam pads in place.  Bruising to abdomen noted.  Sacrum pink and intact.  Incision to R knee in dressing. Removed top layer, foam dressing in place. Wound consult placed. (Pt received knee surgery yesterday)  Scab to L shin.   Edema noted to R knee/foot.  Bilateral heels dry, red and blanching. Pillows in use to float.

## 2019-04-10 NOTE — ASSESSMENT & PLAN NOTE
Status post right knee arthroplasty by Dr. Andino, orthopedics in [facility]  Oral pain medications on is n.p.o. IV morphine for breakthrough pain  I called Dr. Andino on 4/11 and he recommends PT for 15 minutes of ROM as tolerated at least BID; CPM is not indicated unless the ROM is not able to be performed.

## 2019-04-11 ENCOUNTER — APPOINTMENT (OUTPATIENT)
Dept: RADIOLOGY | Facility: MEDICAL CENTER | Age: 54
DRG: 216 | End: 2019-04-11
Attending: NURSE PRACTITIONER
Payer: MEDICAID

## 2019-04-11 PROBLEM — E87.6 HYPOKALEMIA: Status: ACTIVE | Noted: 2019-04-11

## 2019-04-11 PROBLEM — I48.91 ATRIAL FIBRILLATION (HCC): Status: ACTIVE | Noted: 2019-04-11

## 2019-04-11 PROBLEM — E83.42 HYPOMAGNESEMIA: Status: ACTIVE | Noted: 2019-04-11

## 2019-04-11 LAB
ABO GROUP BLD: NORMAL
ABO GROUP BLD: NORMAL
ALBUMIN SERPL BCP-MCNC: 3.1 G/DL (ref 3.2–4.9)
ALBUMIN SERPL BCP-MCNC: 3.3 G/DL (ref 3.2–4.9)
ALBUMIN/GLOB SERPL: 1 G/DL
ALBUMIN/GLOB SERPL: 1.2 G/DL
ALP SERPL-CCNC: 51 U/L (ref 30–99)
ALP SERPL-CCNC: 62 U/L (ref 30–99)
ALT SERPL-CCNC: <5 U/L (ref 2–50)
ALT SERPL-CCNC: <5 U/L (ref 2–50)
ANION GAP SERPL CALC-SCNC: 10 MMOL/L (ref 0–11.9)
ANION GAP SERPL CALC-SCNC: 11 MMOL/L (ref 0–11.9)
ANION GAP SERPL CALC-SCNC: 17 MMOL/L (ref 0–11.9)
APPEARANCE UR: CLEAR
APTT PPP: 132.6 SEC (ref 24.7–36)
APTT PPP: 45.6 SEC (ref 24.7–36)
APTT PPP: 47.5 SEC (ref 24.7–36)
APTT PPP: 49.4 SEC (ref 24.7–36)
AST SERPL-CCNC: 10 U/L (ref 12–45)
AST SERPL-CCNC: 11 U/L (ref 12–45)
BASOPHILS # BLD AUTO: 0.2 % (ref 0–1.8)
BASOPHILS # BLD: 0.03 K/UL (ref 0–0.12)
BILIRUB SERPL-MCNC: 0.6 MG/DL (ref 0.1–1.5)
BILIRUB SERPL-MCNC: 0.6 MG/DL (ref 0.1–1.5)
BILIRUB UR QL STRIP.AUTO: NEGATIVE
BLD GP AB SCN SERPL QL: NORMAL
BUN SERPL-MCNC: 20 MG/DL (ref 8–22)
BUN SERPL-MCNC: 22 MG/DL (ref 8–22)
BUN SERPL-MCNC: 23 MG/DL (ref 8–22)
CALCIUM SERPL-MCNC: 7.1 MG/DL (ref 8.5–10.5)
CALCIUM SERPL-MCNC: 7.3 MG/DL (ref 8.5–10.5)
CALCIUM SERPL-MCNC: 7.4 MG/DL (ref 8.5–10.5)
CHLORIDE SERPL-SCNC: 94 MMOL/L (ref 96–112)
CHLORIDE SERPL-SCNC: 99 MMOL/L (ref 96–112)
CHLORIDE SERPL-SCNC: 99 MMOL/L (ref 96–112)
CO2 SERPL-SCNC: 27 MMOL/L (ref 20–33)
CO2 SERPL-SCNC: 27 MMOL/L (ref 20–33)
CO2 SERPL-SCNC: 29 MMOL/L (ref 20–33)
COLOR UR: YELLOW
CREAT SERPL-MCNC: 1.03 MG/DL (ref 0.5–1.4)
CREAT SERPL-MCNC: 1.09 MG/DL (ref 0.5–1.4)
CREAT SERPL-MCNC: 1.16 MG/DL (ref 0.5–1.4)
EKG IMPRESSION: NORMAL
EOSINOPHIL # BLD AUTO: 0.2 K/UL (ref 0–0.51)
EOSINOPHIL NFR BLD: 1.6 % (ref 0–6.9)
ERYTHROCYTE [DISTWIDTH] IN BLOOD BY AUTOMATED COUNT: 42.6 FL (ref 35.9–50)
ERYTHROCYTE [DISTWIDTH] IN BLOOD BY AUTOMATED COUNT: 42.9 FL (ref 35.9–50)
EST. AVERAGE GLUCOSE BLD GHB EST-MCNC: 160 MG/DL
GLOBULIN SER CALC-MCNC: 2.6 G/DL (ref 1.9–3.5)
GLOBULIN SER CALC-MCNC: 3.2 G/DL (ref 1.9–3.5)
GLUCOSE BLD-MCNC: 148 MG/DL (ref 65–99)
GLUCOSE BLD-MCNC: 214 MG/DL (ref 65–99)
GLUCOSE BLD-MCNC: 222 MG/DL (ref 65–99)
GLUCOSE BLD-MCNC: 252 MG/DL (ref 65–99)
GLUCOSE SERPL-MCNC: 162 MG/DL (ref 65–99)
GLUCOSE SERPL-MCNC: 189 MG/DL (ref 65–99)
GLUCOSE SERPL-MCNC: 239 MG/DL (ref 65–99)
GLUCOSE UR STRIP.AUTO-MCNC: NEGATIVE MG/DL
HBA1C MFR BLD: 7.2 % (ref 0–5.6)
HCT VFR BLD AUTO: 33.8 % (ref 42–52)
HCT VFR BLD AUTO: 38.5 % (ref 42–52)
HGB BLD-MCNC: 10.8 G/DL (ref 14–18)
HGB BLD-MCNC: 12.2 G/DL (ref 14–18)
IMM GRANULOCYTES # BLD AUTO: 0.07 K/UL (ref 0–0.11)
IMM GRANULOCYTES NFR BLD AUTO: 0.6 % (ref 0–0.9)
INR PPP: 1.18 (ref 0.87–1.13)
INR PPP: 1.33 (ref 0.87–1.13)
KETONES UR STRIP.AUTO-MCNC: NEGATIVE MG/DL
LEUKOCYTE ESTERASE UR QL STRIP.AUTO: NEGATIVE
LYMPHOCYTES # BLD AUTO: 0.97 K/UL (ref 1–4.8)
LYMPHOCYTES NFR BLD: 8 % (ref 22–41)
MAGNESIUM SERPL-MCNC: 1.4 MG/DL (ref 1.5–2.5)
MCH RBC QN AUTO: 26.5 PG (ref 27–33)
MCH RBC QN AUTO: 26.5 PG (ref 27–33)
MCHC RBC AUTO-ENTMCNC: 31.7 G/DL (ref 33.7–35.3)
MCHC RBC AUTO-ENTMCNC: 32 G/DL (ref 33.7–35.3)
MCV RBC AUTO: 83 FL (ref 81.4–97.8)
MCV RBC AUTO: 83.5 FL (ref 81.4–97.8)
MICRO URNS: NORMAL
MONOCYTES # BLD AUTO: 1.3 K/UL (ref 0–0.85)
MONOCYTES NFR BLD AUTO: 10.7 % (ref 0–13.4)
NEUTROPHILS # BLD AUTO: 9.57 K/UL (ref 1.82–7.42)
NEUTROPHILS NFR BLD: 78.9 % (ref 44–72)
NITRITE UR QL STRIP.AUTO: NEGATIVE
NRBC # BLD AUTO: 0 K/UL
NRBC BLD-RTO: 0 /100 WBC
PH UR STRIP.AUTO: 6 [PH]
PLATELET # BLD AUTO: 181 K/UL (ref 164–446)
PLATELET # BLD AUTO: 254 K/UL (ref 164–446)
PMV BLD AUTO: 10.8 FL (ref 9–12.9)
PMV BLD AUTO: 10.8 FL (ref 9–12.9)
POTASSIUM SERPL-SCNC: 3 MMOL/L (ref 3.6–5.5)
POTASSIUM SERPL-SCNC: 3.1 MMOL/L (ref 3.6–5.5)
POTASSIUM SERPL-SCNC: 3.2 MMOL/L (ref 3.6–5.5)
POTASSIUM SERPL-SCNC: 3.4 MMOL/L (ref 3.6–5.5)
PROT SERPL-MCNC: 5.7 G/DL (ref 6–8.2)
PROT SERPL-MCNC: 6.5 G/DL (ref 6–8.2)
PROT UR QL STRIP: NEGATIVE MG/DL
PROTHROMBIN TIME: 15.1 SEC (ref 12–14.6)
PROTHROMBIN TIME: 16.6 SEC (ref 12–14.6)
RBC # BLD AUTO: 4.07 M/UL (ref 4.7–6.1)
RBC # BLD AUTO: 4.61 M/UL (ref 4.7–6.1)
RBC UR QL AUTO: NEGATIVE
RH BLD: NORMAL
RH BLD: NORMAL
SODIUM SERPL-SCNC: 136 MMOL/L (ref 135–145)
SODIUM SERPL-SCNC: 138 MMOL/L (ref 135–145)
SODIUM SERPL-SCNC: 139 MMOL/L (ref 135–145)
SP GR UR STRIP.AUTO: 1.01
UROBILINOGEN UR STRIP.AUTO-MCNC: 0.2 MG/DL
WBC # BLD AUTO: 11.5 K/UL (ref 4.8–10.8)
WBC # BLD AUTO: 12.1 K/UL (ref 4.8–10.8)

## 2019-04-11 PROCEDURE — 82962 GLUCOSE BLOOD TEST: CPT

## 2019-04-11 PROCEDURE — 80053 COMPREHEN METABOLIC PANEL: CPT

## 2019-04-11 PROCEDURE — 93010 ELECTROCARDIOGRAM REPORT: CPT | Performed by: INTERNAL MEDICINE

## 2019-04-11 PROCEDURE — 700111 HCHG RX REV CODE 636 W/ 250 OVERRIDE (IP): Performed by: HOSPITALIST

## 2019-04-11 PROCEDURE — 700101 HCHG RX REV CODE 250: Performed by: NURSE PRACTITIONER

## 2019-04-11 PROCEDURE — 700111 HCHG RX REV CODE 636 W/ 250 OVERRIDE (IP): Performed by: INTERNAL MEDICINE

## 2019-04-11 PROCEDURE — 99291 CRITICAL CARE FIRST HOUR: CPT | Performed by: INTERNAL MEDICINE

## 2019-04-11 PROCEDURE — 80048 BASIC METABOLIC PNL TOTAL CA: CPT

## 2019-04-11 PROCEDURE — 85610 PROTHROMBIN TIME: CPT

## 2019-04-11 PROCEDURE — 700102 HCHG RX REV CODE 250 W/ 637 OVERRIDE(OP): Performed by: HOSPITALIST

## 2019-04-11 PROCEDURE — 700102 HCHG RX REV CODE 250 W/ 637 OVERRIDE(OP): Performed by: INTERNAL MEDICINE

## 2019-04-11 PROCEDURE — 700105 HCHG RX REV CODE 258

## 2019-04-11 PROCEDURE — 83036 HEMOGLOBIN GLYCOSYLATED A1C: CPT

## 2019-04-11 PROCEDURE — 83735 ASSAY OF MAGNESIUM: CPT

## 2019-04-11 PROCEDURE — 85025 COMPLETE CBC W/AUTO DIFF WBC: CPT

## 2019-04-11 PROCEDURE — 85730 THROMBOPLASTIN TIME PARTIAL: CPT

## 2019-04-11 PROCEDURE — 97535 SELF CARE MNGMENT TRAINING: CPT

## 2019-04-11 PROCEDURE — 85027 COMPLETE CBC AUTOMATED: CPT

## 2019-04-11 PROCEDURE — 99233 SBSQ HOSP IP/OBS HIGH 50: CPT | Performed by: HOSPITALIST

## 2019-04-11 PROCEDURE — 99233 SBSQ HOSP IP/OBS HIGH 50: CPT | Performed by: INTERNAL MEDICINE

## 2019-04-11 PROCEDURE — 86900 BLOOD TYPING SEROLOGIC ABO: CPT

## 2019-04-11 PROCEDURE — 93005 ELECTROCARDIOGRAM TRACING: CPT | Performed by: NURSE PRACTITIONER

## 2019-04-11 PROCEDURE — A9270 NON-COVERED ITEM OR SERVICE: HCPCS | Performed by: HOSPITALIST

## 2019-04-11 PROCEDURE — 700105 HCHG RX REV CODE 258: Performed by: INTERNAL MEDICINE

## 2019-04-11 PROCEDURE — 86901 BLOOD TYPING SEROLOGIC RH(D): CPT

## 2019-04-11 PROCEDURE — A9270 NON-COVERED ITEM OR SERVICE: HCPCS | Performed by: INTERNAL MEDICINE

## 2019-04-11 PROCEDURE — 86850 RBC ANTIBODY SCREEN: CPT

## 2019-04-11 PROCEDURE — 81003 URINALYSIS AUTO W/O SCOPE: CPT

## 2019-04-11 PROCEDURE — 97162 PT EVAL MOD COMPLEX 30 MIN: CPT

## 2019-04-11 PROCEDURE — 770022 HCHG ROOM/CARE - ICU (200)

## 2019-04-11 PROCEDURE — 84132 ASSAY OF SERUM POTASSIUM: CPT

## 2019-04-11 PROCEDURE — 93880 EXTRACRANIAL BILAT STUDY: CPT

## 2019-04-11 PROCEDURE — 94660 CPAP INITIATION&MGMT: CPT

## 2019-04-11 RX ORDER — MAGNESIUM SULFATE HEPTAHYDRATE 40 MG/ML
4 INJECTION, SOLUTION INTRAVENOUS ONCE
Status: COMPLETED | OUTPATIENT
Start: 2019-04-11 | End: 2019-04-11

## 2019-04-11 RX ORDER — SODIUM CHLORIDE 9 MG/ML
INJECTION, SOLUTION INTRAVENOUS CONTINUOUS
Status: DISCONTINUED | OUTPATIENT
Start: 2019-04-11 | End: 2019-04-13

## 2019-04-11 RX ORDER — ALPRAZOLAM 0.25 MG/1
0.25 TABLET ORAL EVERY 6 HOURS PRN
Status: DISCONTINUED | OUTPATIENT
Start: 2019-04-11 | End: 2019-04-13

## 2019-04-11 RX ORDER — POTASSIUM CHLORIDE 7.45 MG/ML
10 INJECTION INTRAVENOUS
Status: COMPLETED | OUTPATIENT
Start: 2019-04-11 | End: 2019-04-11

## 2019-04-11 RX ORDER — DEXMEDETOMIDINE HYDROCHLORIDE 4 UG/ML
0-1.5 INJECTION, SOLUTION INTRAVENOUS CONTINUOUS
Status: DISCONTINUED | OUTPATIENT
Start: 2019-04-12 | End: 2019-04-12

## 2019-04-11 RX ORDER — DEXTROSE MONOHYDRATE 50 MG/ML
INJECTION, SOLUTION INTRAVENOUS
Status: ACTIVE
Start: 2019-04-11 | End: 2019-04-11

## 2019-04-11 RX ORDER — POTASSIUM CHLORIDE 7.45 MG/ML
10 INJECTION INTRAVENOUS
Status: COMPLETED | OUTPATIENT
Start: 2019-04-11 | End: 2019-04-12

## 2019-04-11 RX ORDER — POTASSIUM CHLORIDE 20 MEQ/1
40 TABLET, EXTENDED RELEASE ORAL DAILY
Status: DISCONTINUED | OUTPATIENT
Start: 2019-04-11 | End: 2019-04-13

## 2019-04-11 RX ADMIN — ENALAPRILAT 2.5 MG: 2.5 INJECTION INTRAVENOUS at 04:45

## 2019-04-11 RX ADMIN — MUPIROCIN 1 APPLICATION: 20 OINTMENT TOPICAL at 16:47

## 2019-04-11 RX ADMIN — POTASSIUM CHLORIDE 10 MEQ: 7.46 INJECTION, SOLUTION INTRAVENOUS at 23:10

## 2019-04-11 RX ADMIN — PRAVASTATIN SODIUM 40 MG: 20 TABLET ORAL at 06:02

## 2019-04-11 RX ADMIN — OXYCODONE HYDROCHLORIDE 5 MG: 5 TABLET ORAL at 20:55

## 2019-04-11 RX ADMIN — SENNOSIDES,DOCUSATE SODIUM 2 TABLET: 8.6; 5 TABLET, FILM COATED ORAL at 16:48

## 2019-04-11 RX ADMIN — FUROSEMIDE 80 MG: 10 INJECTION, SOLUTION INTRAMUSCULAR; INTRAVENOUS at 08:29

## 2019-04-11 RX ADMIN — OXYCODONE HYDROCHLORIDE 5 MG: 5 TABLET ORAL at 11:39

## 2019-04-11 RX ADMIN — INSULIN HUMAN 3 UNITS: 100 INJECTION, SOLUTION PARENTERAL at 11:42

## 2019-04-11 RX ADMIN — AMIODARONE HYDROCHLORIDE 0.5 MG/MIN: 50 INJECTION, SOLUTION INTRAVENOUS at 17:25

## 2019-04-11 RX ADMIN — HEPARIN SODIUM 3200 UNITS: 1000 INJECTION INTRAVENOUS; SUBCUTANEOUS at 12:49

## 2019-04-11 RX ADMIN — POTASSIUM CHLORIDE 10 MEQ: 7.46 INJECTION, SOLUTION INTRAVENOUS at 14:33

## 2019-04-11 RX ADMIN — AMIODARONE HYDROCHLORIDE 0.5 MG/MIN: 50 INJECTION, SOLUTION INTRAVENOUS at 03:15

## 2019-04-11 RX ADMIN — POTASSIUM CHLORIDE 10 MEQ: 7.46 INJECTION, SOLUTION INTRAVENOUS at 21:30

## 2019-04-11 RX ADMIN — DEXTROSE MONOHYDRATE: 50 INJECTION, SOLUTION INTRAVENOUS at 03:15

## 2019-04-11 RX ADMIN — POTASSIUM CHLORIDE 40 MEQ: 1500 TABLET, EXTENDED RELEASE ORAL at 10:37

## 2019-04-11 RX ADMIN — MAGNESIUM SULFATE IN WATER 4 G: 40 INJECTION, SOLUTION INTRAVENOUS at 10:37

## 2019-04-11 RX ADMIN — HEPARIN SODIUM 3200 UNITS: 1000 INJECTION INTRAVENOUS; SUBCUTANEOUS at 18:44

## 2019-04-11 RX ADMIN — POTASSIUM CHLORIDE 10 MEQ: 7.46 INJECTION, SOLUTION INTRAVENOUS at 04:20

## 2019-04-11 RX ADMIN — POTASSIUM CHLORIDE 10 MEQ: 7.46 INJECTION, SOLUTION INTRAVENOUS at 11:37

## 2019-04-11 RX ADMIN — SENNOSIDES,DOCUSATE SODIUM 2 TABLET: 8.6; 5 TABLET, FILM COATED ORAL at 06:02

## 2019-04-11 RX ADMIN — ENALAPRILAT 2.5 MG: 2.5 INJECTION INTRAVENOUS at 08:32

## 2019-04-11 RX ADMIN — INSULIN HUMAN 1 UNITS: 100 INJECTION, SOLUTION PARENTERAL at 21:12

## 2019-04-11 RX ADMIN — POTASSIUM CHLORIDE 10 MEQ: 7.46 INJECTION, SOLUTION INTRAVENOUS at 03:12

## 2019-04-11 RX ADMIN — POTASSIUM CHLORIDE 10 MEQ: 7.46 INJECTION, SOLUTION INTRAVENOUS at 08:43

## 2019-04-11 RX ADMIN — POTASSIUM CHLORIDE 10 MEQ: 7.46 INJECTION, SOLUTION INTRAVENOUS at 05:25

## 2019-04-11 RX ADMIN — POTASSIUM CHLORIDE 10 MEQ: 7.46 INJECTION, SOLUTION INTRAVENOUS at 20:47

## 2019-04-11 RX ADMIN — FUROSEMIDE 80 MG: 10 INJECTION, SOLUTION INTRAMUSCULAR; INTRAVENOUS at 16:47

## 2019-04-11 RX ADMIN — LEVOTHYROXINE SODIUM 300 MCG: 100 TABLET ORAL at 06:02

## 2019-04-11 RX ADMIN — POTASSIUM CHLORIDE 10 MEQ: 7.46 INJECTION, SOLUTION INTRAVENOUS at 10:08

## 2019-04-11 RX ADMIN — TRAZODONE HYDROCHLORIDE 50 MG: 50 TABLET ORAL at 23:30

## 2019-04-11 RX ADMIN — OXYCODONE HYDROCHLORIDE 5 MG: 5 TABLET ORAL at 06:42

## 2019-04-11 RX ADMIN — POTASSIUM CHLORIDE 10 MEQ: 7.46 INJECTION, SOLUTION INTRAVENOUS at 15:41

## 2019-04-11 RX ADMIN — ENALAPRILAT 2.5 MG: 2.5 INJECTION INTRAVENOUS at 00:17

## 2019-04-11 RX ADMIN — HEPARIN SODIUM 1600 UNITS/HR: 5000 INJECTION, SOLUTION INTRAVENOUS at 14:36

## 2019-04-11 RX ADMIN — HEPARIN SODIUM 3200 UNITS: 1000 INJECTION INTRAVENOUS; SUBCUTANEOUS at 04:43

## 2019-04-11 ASSESSMENT — ENCOUNTER SYMPTOMS
DEPRESSION: 0
HEADACHES: 0
MYALGIAS: 0
COUGH: 1
SHORTNESS OF BREATH: 1
ORTHOPNEA: 1
FATIGUE: 1
NAUSEA: 0
ABDOMINAL PAIN: 0
BACK PAIN: 0
EYE DISCHARGE: 0
SPUTUM PRODUCTION: 1
ARTHRALGIAS: 1
CHEST TIGHTNESS: 0
EYE ITCHING: 0
FEVER: 0
COUGH: 0
JOINT SWELLING: 1
CHILLS: 0
VOMITING: 0
AGITATION: 0
NERVOUS/ANXIOUS: 0
PALPITATIONS: 0
PHOTOPHOBIA: 0
ADENOPATHY: 0
WHEEZING: 0
HEMOPTYSIS: 0
DIZZINESS: 0
BRUISES/BLEEDS EASILY: 0
FLANK PAIN: 0
SINUS PAIN: 0

## 2019-04-11 ASSESSMENT — COGNITIVE AND FUNCTIONAL STATUS - GENERAL
MOVING FROM LYING ON BACK TO SITTING ON SIDE OF FLAT BED: UNABLE
TURNING FROM BACK TO SIDE WHILE IN FLAT BAD: UNABLE
MOBILITY SCORE: 6
MOVING TO AND FROM BED TO CHAIR: UNABLE
WALKING IN HOSPITAL ROOM: TOTAL
SUGGESTED CMS G CODE MODIFIER MOBILITY: CN
CLIMB 3 TO 5 STEPS WITH RAILING: TOTAL
STANDING UP FROM CHAIR USING ARMS: TOTAL

## 2019-04-11 ASSESSMENT — GAIT ASSESSMENTS: GAIT LEVEL OF ASSIST: UNABLE TO PARTICIPATE

## 2019-04-11 NOTE — CARE PLAN
Problem: Oxygenation/Respiratory Function  Goal: Patient will Achieve/Maintain Optimum Respiratory Rate/Effort  O2 requirements improved from 15L down to 6 L NC after multiple Lasix doses.    Problem: Hemodynamic Status  Goal: Vital Signs and Fluid Balance Management  Converted into a. Fib at 1345 rates 90s-130s. Converted into sinus rhythm at 1845 after amiodarone bolus and gtt.

## 2019-04-11 NOTE — ASSESSMENT & PLAN NOTE
Remains in normal sinus rhythm times 72 hours  Optimize electrolytes  Continuous telemetry  Coumadin, still subtherapeutic.

## 2019-04-11 NOTE — PROGRESS NOTES
Called lab twice to inquire about the latest PTT result.    Will adjust gtt when results post in epic.

## 2019-04-11 NOTE — PROGRESS NOTES
Critical Care Progress Note    Date of admission  4/9/2019    Chief Complaint  54 y.o. male admitted 4/9/2019 with shortness of breath    Hospital Course    54 y.o. male who presented 4/9/2019 with shortness of breath from [facility].  He presented there on April 8 for an elective right knee total arthroplasty that was performed without complications.  That evening he started to develop shortness of breath that he says he has had intermittently for the last few months.  He started to cough a frothy tinged mucus.  Chest x-ray revealed pulmonary edema and a CT PE study was negative for pulmonary embolism.  He underwent echocardiography which unfortunately revealed severe mitral valve regurgitation for which she was transferred to [facility].  He was given Lasix and is being evaluated by cardiothoracic surgery for emergent valve replacement.  He was transferred to the intensive care unit for acute hypoxic respiratory failure and I was consulted for his critical care management.        Interval Problem Update  Reviewed last 24 hour events:   - Underwent cardiac cath yesterday revealing no coronary arterial disease but severe mitral regurgitation   - Started on heparin drip overnight for A-fib + amio gtt   - Wore BiPAP last night   - NSR   - voiding well without beavers   - decreasing WBC, Tm 38   - low K/Mag   - diuresing well with 80 mg lasix    Review of Systems  Review of Systems   Constitutional: Negative for fever.   HENT: Negative for sinus pain.    Eyes: Negative for photophobia.   Respiratory: Positive for cough, sputum production and shortness of breath. Negative for hemoptysis and wheezing.    Cardiovascular: Positive for orthopnea and leg swelling. Negative for chest pain and palpitations.   Gastrointestinal: Negative for abdominal pain, nausea and vomiting.   Genitourinary: Negative for flank pain.   Musculoskeletal: Positive for joint pain. Negative for back pain.   Skin: Negative for  rash.   Neurological: Negative for dizziness and headaches.   Endo/Heme/Allergies: Does not bruise/bleed easily.   Psychiatric/Behavioral: Negative for depression. The patient is not nervous/anxious.    All other systems reviewed and are negative.       Vital Signs for last 24 hours   Temp:  [36.1 °C (96.9 °F)-38 °C (100.4 °F)] 36.7 °C (98.1 °F)  Pulse:  [] 80  Resp:  [13-26] 13  BP: (151)/(97) 151/97  SpO2:  [91 %-97 %] 93 %    Respiratory Information for the last 24 hours   CPAP 8 lpm qhs, 6 L/min nasal cannula during the day    Physical Exam   Physical Exam   Constitutional: He is oriented to person, place, and time. He appears well-developed and well-nourished. No distress.   HENT:   Head: Normocephalic and atraumatic.   Nose: Nose normal.   Mouth/Throat: Oropharynx is clear and moist.   Eyes: Pupils are equal, round, and reactive to light. Conjunctivae are normal. No scleral icterus.   Neck: Neck supple. JVD present. No tracheal deviation present.   Cardiovascular: Normal rate, regular rhythm and intact distal pulses.    Murmur heard.  Pulmonary/Chest: Effort normal. No stridor. No respiratory distress. He has no wheezes. He has rales.   Abdominal: Soft. Bowel sounds are normal. He exhibits no distension. There is no tenderness. There is no rebound.   Musculoskeletal: He exhibits edema and tenderness. He exhibits no deformity.   Right lower extremity with bandage that is clean/dry/intact, tender around the knee   Neurological: He is alert and oriented to person, place, and time. No cranial nerve deficit. He exhibits normal muscle tone.   Skin: Skin is warm and dry. No pallor.   Psychiatric: He has a normal mood and affect. His behavior is normal. Judgment and thought content normal.   Nursing note and vitals reviewed.      Medications  Current Facility-Administered Medications   Medication Dose Route Frequency Provider Last Rate Last Dose   • lidocaine (XYLOCAINE) 1 % injection 0.5 mL  0.5 mL Intradermal  Once PRN Tori Purdy M.D.       • NS infusion   Intravenous Continuous Tori Purdy M.D.       • DEXTROSE 5 % IV SOLN            • morphine (pf) 4 mg/ml injection 3 mg  3 mg Intravenous Q3HRS PRN Estela Fagan M.D.   3 mg at 04/10/19 2206   • oxyCODONE immediate-release (ROXICODONE) tablet 5 mg  5 mg Oral Q4HRS PRN Estela Fagan M.D.   5 mg at 04/11/19 0642   • enalaprilat (VASOTEC) injection 2.5 mg  2.5 mg Intravenous Q4HR Tori Purdy M.D.   2.5 mg at 04/11/19 0445   • furosemide (LASIX) injection 80 mg  80 mg Intravenous BID DIURETIC Tori Purdy M.D.   Stopped at 04/11/19 0602   • enalaprilat (VASOTEC) injection 2.5 mg  2.5 mg Intravenous Q3HRS PRN Tori Purdy M.D.       • D5W infusion   Intravenous Continuous Tori Purdy M.D. 30 mL/hr at 04/11/19 0315     • amiodarone (CORDARONE) 450 mg in D5W 250 mL Infusion  0.5-1 mg/min Intravenous Continuous Tori Purdy M.D. 17 mL/hr at 04/11/19 0315 0.5 mg/min at 04/11/19 0315   • K+ Scale: Goal of 4.5  1 Each Intravenous Q6HRS Tori Purdy M.D.   Stopped at 04/11/19 0722   • heparin injection 3,200 Units  3,200 Units Intravenous PRN Alli Merrill M.D.   3,200 Units at 04/11/19 0443    And   • heparin infusion 25,000 units in 500 ml 0.45% nacl   Intravenous Continuous Alli Merrill M.D. 29 mL/hr at 04/11/19 0440 1,450 Units/hr at 04/11/19 0440   • levothyroxine (SYNTHROID) tablet 300 mcg  300 mcg Oral DAILY Ld Morel M.D.   300 mcg at 04/11/19 0602   • pravastatin (PRAVACHOL) tablet 40 mg  40 mg Oral DAILY Ld Morel M.D.   40 mg at 04/11/19 0602   • traZODone (DESYREL) tablet 50 mg  50 mg Oral Nightly Ld Morel M.D.   50 mg at 04/10/19 2206   • senna-docusate (PERICOLACE or SENOKOT S) 8.6-50 MG per tablet 2 Tab  2 Tab Oral BID Ld Morel M.D.   2 Tab at 04/11/19 0602    And   • polyethylene glycol/lytes (MIRALAX) PACKET 1 Packet  1 Packet Oral QDAY PRN Ld CHINO  ANITA Morel        And   • magnesium hydroxide (MILK OF MAGNESIA) suspension 30 mL  30 mL Oral QDAY PRN Ld Morel M.D.        And   • bisacodyl (DULCOLAX) suppository 10 mg  10 mg Rectal QDAY PRN Ld Morel M.D.       • Respiratory Care per Protocol   Nebulization Continuous RT Ld Morel M.D.       • insulin regular (HUMULIN R) injection 1-6 Units  1-6 Units Subcutaneous 4X/DAY ACHS Ld Morel M.D.   Stopped at 04/11/19 0722    And   • glucose 4 g chewable tablet 16 g  16 g Oral Q15 MIN PRN Ld Morel M.D.        And   • dextrose 50% (D50W) injection 25 mL  25 mL Intravenous Q15 MIN PRN Ld Morel M.D.           Fluids    Intake/Output Summary (Last 24 hours) at 04/11/19 0805  Last data filed at 04/11/19 0600   Gross per 24 hour   Intake          2009.47 ml   Output             4245 ml   Net         -2235.53 ml       Laboratory      Recent Labs      04/10/19   0224   BNPBTYPENAT  240*     Recent Labs      04/10/19   0224  04/10/19   1329  04/10/19   1921  04/11/19   0116   SODIUM  138   --    --   139   POTASSIUM  3.5*  3.0*  3.0*  3.1*   CHLORIDE  105   --    --   99   CO2  22   --    --   29   BUN  24*   --    --   23*   CREATININE  1.18   --    --   1.09   MAGNESIUM   --    --    --   1.4*   CALCIUM  7.9*   --    --   7.4*     Recent Labs      04/10/19   0224  04/11/19   0116   GLUCOSE  183*  189*     Recent Labs      04/10/19   0224   WBC  14.8*     Recent Labs      04/10/19   0224  04/10/19   2116  04/11/19   0350  04/11/19   0652   RBC  4.75   --    --    --    HEMOGLOBIN  12.5*   --    --    --    HEMATOCRIT  40.3*   --    --    --    PLATELETCT  222   --    --    --    PROTHROMBTM   --    --    --   16.6*   APTT   --   35.8  45.6*  132.6*   INR   --    --    --   1.33*       Imaging  X-Ray:  I have personally reviewed the images and compared with prior images. and My impression is: None today but yesterday showed pulmonary edema with enlarged cardiac  silhouette  Echo:   Reviewed    Assessment/Plan  * Acute respiratory failure with hypoxia with pulmonary edema (HCC)   Assessment & Plan    Continue aggressive diuresis  RT/O2 protocol, titrate oxygen to keep SaO2 greater than 92%  CPAP nightly  Limit sedatives     Mitral valve disease   Assessment & Plan    Severe regurgitation with valve dysfunction  Cardio thoracic surgery consultation for possible valve surgery  Continue diuresis and strict blood pressure control     Atrial fibrillation (HCC)   Assessment & Plan    Continue amiodarone and heparin drips  Optimize electrolytes  Continue telemetry     Other hyperlipidemia   Assessment & Plan    Statin     Obstructive sleep apnea   Assessment & Plan    CPAP nightly     S/P total knee arthroplasty, right   Assessment & Plan    Analgesia  Therapy  Knee mobilizer device in the perioperative period  DVT prophylaxis     Pulmonary edema   Assessment & Plan    Diuresis  Positive pressure oxygen therapy if needs  Valve surgery     Hypothyroid- (present on admission)   Assessment & Plan    Continue Synthroid at current dose     Hypomagnesemia   Assessment & Plan    Repletion and monitor closely     Hypokalemia   Assessment & Plan    Repletion and monitor closely     Volume overload   Assessment & Plan    Diuresis  Monitor strict ins and outs and kidney function     Full code    VTE:  Heparin  Ulcer: Not Indicated  Lines: Holder Catheter  Ongoing indication addressed    I have performed a physical exam and reviewed and updated ROS and Plan today (4/11/2019). In review of yesterday's note (4/10/2019), there are no changes except as documented above.     Patient is critically ill today required my active management of his amiodarone and heparin drips. High risk of deterioration and worsening vital organ dysfunction and death without the above critical care interventions.    Discussed patient condition and risk of morbidity and/or mortality with Hospitalist, RN, RT, Pharmacy,  Charge nurse / hot rounds, Patient and cardiology and [facility].  Critical care time: 31 minutes.  No time overlap.  Procedures are not included in time.

## 2019-04-11 NOTE — PROGRESS NOTES
Patient moved to [facility] with cell phone, home bipap and bag of personal belongings.

## 2019-04-11 NOTE — PROGRESS NOTES
0130- called lab because they cancelled the stat potassium I put in, they said they would run it off my BMP stat  0240: called lab again awaiting potassium result, spoke with Ludmila from chemistry and potassium resulted as 3.1 but the labs are taking around an hour to post.

## 2019-04-11 NOTE — PROGRESS NOTES
Received report from RN Echo. Patient resting in bed comfortably. Patient assessed and drips verified. Patient updated on plan of care.

## 2019-04-11 NOTE — WOUND TEAM
Wound consult received for right knee. Patient transferred from [facility] after having a TKA. Patient appears to have initial dressing over right knee incision which remains dry and intact. Updated Echo bedside RN that it may need to be clarified w/ pt's surgeon on when to remove dressing. Wound consult complete at this time. Please reconsult wound team if needed.

## 2019-04-11 NOTE — PROGRESS NOTES
Cardiology Follow Up Progress Note    Date of Service  4/11/2019    Attending Physician  Tori Purdy M.D.    Chief Complaint   Severe acute MR    KATIA Davies is a 54 y.o. male admitted 4/9/2019 from [facility] with SOB and new dx of acute severe MR by echo.  Normal echo there 2016.    POD1 from elective TKR there.  Acute decompensation here yesterday -- tx to [facility] for severe pulmonary edema   Improved dramatically with aggressive IV diuresis & afterload reduction    Wearing his home BiPAP  Interim Events    In AF - Amio gtt and hep gtt    Review of Systems  Review of Systems   Constitutional: Positive for fatigue. Negative for chills and fever.   HENT: Negative for congestion and dental problem.    Eyes: Negative for discharge and itching.   Respiratory: Positive for shortness of breath. Negative for cough, chest tightness and wheezing.    Cardiovascular: Negative for chest pain, palpitations and leg swelling.   Gastrointestinal: Negative for abdominal pain and nausea.   Endocrine: Negative for cold intolerance and heat intolerance.   Genitourinary: Negative for difficulty urinating and dysuria.   Musculoskeletal: Positive for arthralgias and joint swelling. Negative for myalgias.   Skin: Negative for pallor and rash.   Neurological: Negative for dizziness and headaches.   Hematological: Negative for adenopathy. Does not bruise/bleed easily.   Psychiatric/Behavioral: Negative for agitation and behavioral problems.   All other systems reviewed and are negative.      Vital signs in last 24 hours  Temp:  [36.1 °C (96.9 °F)-36.7 °C (98.1 °F)] 36.7 °C (98.1 °F)  Pulse:  [] 82  Resp:  [13-26] 20  SpO2:  [92 %-95 %] 94 %    Physical Exam  Physical Exam   Constitutional: He is oriented to person, place, and time.   HENT:   Head: Normocephalic and atraumatic.   Eyes: Pupils are equal, round, and reactive to light. EOM are normal. No scleral icterus.   Neck: No tracheal deviation present. No thyromegaly  Continued Stay Note   Brigido     Patient Name: Yudy Vásquez  MRN: [id number]  Today's Date: 3/7/2017    Admit Date: 3/2/2017          Discharge Plan       03/07/17 1538    Case Management/Social Work Plan    Plan Pt agreeable to short term rehab.  Has been at [facility] and requests referral be sent.  Referral faxed to [facility].    Additional Comments Received telephone call from Francisca at [facility] who reports will be able to accept pt tomorrow.      03/07/17 1431    Case Management/Social Work Plan    Plan SW spoke to patient due to LACE status. She states she plans to go to [facility] for rehab. Denies other needs. Will discuss with CM, also.      Patient/Family In Agreement With Plan yes    Additional Comments Following due to LACE status    Final Note    Final Note Following due to LACE status.               Discharge Codes     None        Expected Discharge Date and Time     Expected Discharge Date Expected Discharge Time    Mar 6, 2017             Ann Nevarez     present.   Cardiovascular: Normal rate and intact distal pulses.  Exam reveals no friction rub.    Murmur heard.  Pulmonary/Chest: Effort normal. He has rales.   Abdominal: Soft. Bowel sounds are normal.   Musculoskeletal: He exhibits no edema or tenderness.   Neurological: He is alert and oriented to person, place, and time. No cranial nerve deficit.   Skin: Skin is warm and dry.   Psychiatric: He has a normal mood and affect. His behavior is normal.       Lab Review  Lab Results   Component Value Date/Time    WBC 11.5 (H) 04/11/2019 07:38 AM    RBC 4.07 (L) 04/11/2019 07:38 AM    HEMOGLOBIN 10.8 (L) 04/11/2019 07:38 AM    HEMATOCRIT 33.8 (L) 04/11/2019 07:38 AM    MCV 83.0 04/11/2019 07:38 AM    MCH 26.5 (L) 04/11/2019 07:38 AM    MCHC 32.0 (L) 04/11/2019 07:38 AM    MPV 10.8 04/11/2019 07:38 AM      Lab Results   Component Value Date/Time    SODIUM 136 04/11/2019 07:38 AM    POTASSIUM 3.4 (L) 04/11/2019 01:16 PM    CHLORIDE 99 04/11/2019 07:38 AM    CO2 27 04/11/2019 07:38 AM    GLUCOSE 162 (H) 04/11/2019 07:38 AM    BUN 22 04/11/2019 07:38 AM    CREATININE 1.03 04/11/2019 07:38 AM      Lab Results   Component Value Date/Time    ASTSGOT 10 (L) 04/11/2019 07:38 AM    ALTSGPT <5 04/11/2019 07:38 AM     Lab Results   Component Value Date/Time    CHOLSTRLTOT 124 05/21/2015 09:08 AM    LDL 31 05/21/2015 09:08 AM    HDL 28 (A) 05/21/2015 09:08 AM    TRIGLYCERIDE 326 (H) 05/21/2015 09:08 AM       Recent Labs      04/10/19   0224   BNPBTYPENAT  240*       Cardiac Imaging and Procedures Review  EKG:  My personal interpretation of the EKG dated yesterday - AF, rates 90s    Echocardiogram:  As above - normal LVEF    Cardiac Catheterization:  No CAD    Imaging  Chest X-Ray:  Bilateral congestions    Assessment/Plan    MR  Severe  Surgical  D/w Dr Dye - to OR tomorrow  Close scrutiny of hemodynamics in ICU, low threshoild to intubate  Keep SBP <120  agressive  IV lasix   Very critically ill, surgically    HTN  As  above    Thyroid   No evidence of active cancer  Tsh      Thank you for allowing me to participate in the care of this patient.  He is very critically ill, at risk for multisystem organ failure based on the above  Please contact me with any questions.    Tori Purdy M.D.   Cardiologist, [facility]  [phone]

## 2019-04-11 NOTE — PROGRESS NOTES
Spoke with Dr. Cain regarding PTT results. RN instructed to draw PTT at 1000 and adjust gtt regarding that PTT result.

## 2019-04-11 NOTE — PROGRESS NOTES
Patient arrived at 1800 from cath lab with cath lab RNs Manuela and Malorie. Converted back to sinus rhythm roughly around 1845, after receiving amiodarone bolus and initiation of amiodarone drip. TR band on right wrist, 13 mLs of air total, 6 mL removed prior to shift ending. Remains on 6 L nasal cannula.    Per Dr. Cain, cardiac/diabetic diet, then NPO at midnight.

## 2019-04-11 NOTE — CARE PLAN
Problem: Skin Integrity  Goal: Skin Integrity is maintained or improved  Patient turned Q2 hours, check bony prominences for break down and reposition pillows.       Problem: Pain  Goal: Alleviation of Pain or a reduction in pain to the patient's comfort goal    Intervention: Pain Management--Medications  Appropriate pain measuring tool used for assessment. Addressing patients pain needs with appropriate interventions. Assessing pain interventions every two hours or/and PRN.

## 2019-04-11 NOTE — PROGRESS NOTES
2000: TR band removed after finishing deflating per order, no signs of bleeding, patient tolerated well

## 2019-04-11 NOTE — DIETARY
Nutrition Services: Consult cardiac rehab diet education    Pt is a 54 y.o. Male with Dx: Acute Heart Failure, Severe Mitral Valve Regurgitation, Acute systolic heart failure (HCC)    Admit day 2.  S/p cardiac cath (4/10): No evidence of coronary artery disease. Severe mitral regurgitation with heart failure.  CTS following for POC.  Per RD judgment, diet education not warranted @ this time.    RD available as as indicated.

## 2019-04-11 NOTE — PROGRESS NOTES
2030: Spoke with Dr. Eemrson about heparin protocol, okay to began because of patient going in and out of afib.

## 2019-04-11 NOTE — ASSESSMENT & PLAN NOTE
Paroxysmal atrial fibrillation.  IV heparin drip for anticoagulation and titrate PTTs accordingly and stop prior to surgery.  S/p IV amiodarone drip for rate/rhythm control.

## 2019-04-11 NOTE — PROGRESS NOTES
Patient scheduled for mitral valve repair vs replacement with MICAH tomorrow afternoon. D/w patient in detail and all questions and concerns were addressed.

## 2019-04-11 NOTE — ASSESSMENT & PLAN NOTE
Intubated in the OR 4/13-extubated the evening of 4/13  Encourage incentive spirometry, PEP, out of bed to chair  RT/O2 protocol  Titrate oxygen to keep SaO2 greater than 92%  Limit sedatives  Early mobilization, complicated by knee recovery  Continue diuresis

## 2019-04-11 NOTE — FLOWSHEET NOTE
04/11/19 0234   Events/Summary/Plan   Events/Summary/Plan Pt remains on BiPAP   General Vent Information   Pulse Oximetry 94 %   Heart Rate (Monitored) 78   Resuscitation Bag Resuscitation Bag and Mask at Bedside and Checked   CPAP/BiPAP [employer]   Nocturnal CPAP or BiPAP BiPap   #System Evaluation Yes   Home Unit Used? Yes   Equipment Inspected for Cleanliness, Operation, and Safety? Yes   Settings (If Known) (AutoBiPAP)   FiO2 or LPM 8   Home Mask Used? Yes

## 2019-04-11 NOTE — ASSESSMENT & PLAN NOTE
Severe regurgitation with valve dysfunction status post mitral valve repair 4/13 (Dr. Dye)  Routine postop care  Analgesia  Monitor mediastinal chest tubes, possible removal today  V pacing wires in place if needed  Strict blood pressure control  Coumadin remains subtherapeutic, Lovenox DVT prophylaxis initiated

## 2019-04-11 NOTE — PROGRESS NOTES
Called lab again regarding K+ level. Results not posted in epic yet. Will address k-scale once results post.

## 2019-04-12 ENCOUNTER — APPOINTMENT (OUTPATIENT)
Dept: RADIOLOGY | Facility: MEDICAL CENTER | Age: 54
DRG: 216 | End: 2019-04-12
Attending: THORACIC SURGERY (CARDIOTHORACIC VASCULAR SURGERY)
Payer: MEDICAID

## 2019-04-12 LAB
APTT PPP: 45.6 SEC (ref 24.7–36)
GLUCOSE BLD-MCNC: 156 MG/DL (ref 65–99)
GLUCOSE BLD-MCNC: 180 MG/DL (ref 65–99)
GLUCOSE BLD-MCNC: 187 MG/DL (ref 65–99)
GLUCOSE BLD-MCNC: 189 MG/DL (ref 65–99)
MAGNESIUM SERPL-MCNC: 2.1 MG/DL (ref 1.5–2.5)
POTASSIUM SERPL-SCNC: 3.3 MMOL/L (ref 3.6–5.5)
POTASSIUM SERPL-SCNC: 3.7 MMOL/L (ref 3.6–5.5)
POTASSIUM SERPL-SCNC: 3.7 MMOL/L (ref 3.6–5.5)

## 2019-04-12 PROCEDURE — 700101 HCHG RX REV CODE 250: Performed by: NURSE PRACTITIONER

## 2019-04-12 PROCEDURE — 700105 HCHG RX REV CODE 258: Performed by: NURSE PRACTITIONER

## 2019-04-12 PROCEDURE — 700111 HCHG RX REV CODE 636 W/ 250 OVERRIDE (IP): Performed by: HOSPITALIST

## 2019-04-12 PROCEDURE — 770022 HCHG ROOM/CARE - ICU (200)

## 2019-04-12 PROCEDURE — 94660 CPAP INITIATION&MGMT: CPT

## 2019-04-12 PROCEDURE — 99291 CRITICAL CARE FIRST HOUR: CPT | Performed by: INTERNAL MEDICINE

## 2019-04-12 PROCEDURE — 82962 GLUCOSE BLOOD TEST: CPT

## 2019-04-12 PROCEDURE — 700105 HCHG RX REV CODE 258: Performed by: INTERNAL MEDICINE

## 2019-04-12 PROCEDURE — 97140 MANUAL THERAPY 1/> REGIONS: CPT

## 2019-04-12 PROCEDURE — 99233 SBSQ HOSP IP/OBS HIGH 50: CPT | Performed by: INTERNAL MEDICINE

## 2019-04-12 PROCEDURE — 99233 SBSQ HOSP IP/OBS HIGH 50: CPT | Performed by: HOSPITALIST

## 2019-04-12 PROCEDURE — 97110 THERAPEUTIC EXERCISES: CPT

## 2019-04-12 PROCEDURE — 700102 HCHG RX REV CODE 250 W/ 637 OVERRIDE(OP): Performed by: INTERNAL MEDICINE

## 2019-04-12 PROCEDURE — A9270 NON-COVERED ITEM OR SERVICE: HCPCS | Performed by: INTERNAL MEDICINE

## 2019-04-12 PROCEDURE — 700102 HCHG RX REV CODE 250 W/ 637 OVERRIDE(OP): Performed by: NURSE PRACTITIONER

## 2019-04-12 PROCEDURE — A9270 NON-COVERED ITEM OR SERVICE: HCPCS | Performed by: HOSPITALIST

## 2019-04-12 PROCEDURE — 700105 HCHG RX REV CODE 258

## 2019-04-12 PROCEDURE — 84132 ASSAY OF SERUM POTASSIUM: CPT

## 2019-04-12 PROCEDURE — 700111 HCHG RX REV CODE 636 W/ 250 OVERRIDE (IP): Performed by: INTERNAL MEDICINE

## 2019-04-12 PROCEDURE — 83735 ASSAY OF MAGNESIUM: CPT

## 2019-04-12 PROCEDURE — 700111 HCHG RX REV CODE 636 W/ 250 OVERRIDE (IP): Performed by: NURSE PRACTITIONER

## 2019-04-12 PROCEDURE — 700102 HCHG RX REV CODE 250 W/ 637 OVERRIDE(OP): Performed by: HOSPITALIST

## 2019-04-12 PROCEDURE — 85730 THROMBOPLASTIN TIME PARTIAL: CPT

## 2019-04-12 RX ORDER — POTASSIUM CHLORIDE 7.45 MG/ML
10 INJECTION INTRAVENOUS
Status: COMPLETED | OUTPATIENT
Start: 2019-04-12 | End: 2019-04-12

## 2019-04-12 RX ORDER — ENALAPRILAT 1.25 MG/ML
2.5 INJECTION INTRAVENOUS EVERY 6 HOURS
Status: DISCONTINUED | OUTPATIENT
Start: 2019-04-12 | End: 2019-04-13

## 2019-04-12 RX ORDER — POTASSIUM CHLORIDE 20 MEQ/1
40 TABLET, EXTENDED RELEASE ORAL DAILY
Status: DISCONTINUED | OUTPATIENT
Start: 2019-04-12 | End: 2019-04-13

## 2019-04-12 RX ORDER — SODIUM CHLORIDE 9 MG/ML
INJECTION, SOLUTION INTRAVENOUS
Status: COMPLETED
Start: 2019-04-12 | End: 2019-04-13

## 2019-04-12 RX ORDER — POTASSIUM CHLORIDE 7.45 MG/ML
10 INJECTION INTRAVENOUS ONCE
Status: COMPLETED | OUTPATIENT
Start: 2019-04-12 | End: 2019-04-12

## 2019-04-12 RX ORDER — POTASSIUM CHLORIDE 7.45 MG/ML
10 INJECTION INTRAVENOUS ONCE
Status: DISCONTINUED | OUTPATIENT
Start: 2019-04-12 | End: 2019-04-13

## 2019-04-12 RX ORDER — DEXMEDETOMIDINE HYDROCHLORIDE 4 UG/ML
0-1.5 INJECTION, SOLUTION INTRAVENOUS CONTINUOUS
Status: DISCONTINUED | OUTPATIENT
Start: 2019-04-13 | End: 2019-04-13

## 2019-04-12 RX ADMIN — ENALAPRILAT 2.5 MG: 2.5 INJECTION INTRAVENOUS at 08:20

## 2019-04-12 RX ADMIN — POTASSIUM CHLORIDE 10 MEQ: 7.46 INJECTION, SOLUTION INTRAVENOUS at 03:22

## 2019-04-12 RX ADMIN — ENALAPRILAT 2.5 MG: 2.5 INJECTION INTRAVENOUS at 21:05

## 2019-04-12 RX ADMIN — TRAZODONE HYDROCHLORIDE 50 MG: 50 TABLET ORAL at 21:05

## 2019-04-12 RX ADMIN — MORPHINE SULFATE 3 MG: 4 INJECTION INTRAVENOUS at 07:27

## 2019-04-12 RX ADMIN — FUROSEMIDE 80 MG: 10 INJECTION, SOLUTION INTRAMUSCULAR; INTRAVENOUS at 16:25

## 2019-04-12 RX ADMIN — INSULIN HUMAN 1 UNITS: 100 INJECTION, SOLUTION PARENTERAL at 21:00

## 2019-04-12 RX ADMIN — SENNOSIDES,DOCUSATE SODIUM 2 TABLET: 8.6; 5 TABLET, FILM COATED ORAL at 17:39

## 2019-04-12 RX ADMIN — INSULIN HUMAN 1 UNITS: 100 INJECTION, SOLUTION PARENTERAL at 11:34

## 2019-04-12 RX ADMIN — POTASSIUM CHLORIDE 10 MEQ: 7.46 INJECTION, SOLUTION INTRAVENOUS at 15:10

## 2019-04-12 RX ADMIN — MUPIROCIN 1 APPLICATION: 20 OINTMENT TOPICAL at 17:41

## 2019-04-12 RX ADMIN — POTASSIUM CHLORIDE 40 MEQ: 1500 TABLET, EXTENDED RELEASE ORAL at 17:35

## 2019-04-12 RX ADMIN — OXYCODONE HYDROCHLORIDE 5 MG: 5 TABLET ORAL at 19:50

## 2019-04-12 RX ADMIN — POTASSIUM CHLORIDE 10 MEQ: 7.46 INJECTION, SOLUTION INTRAVENOUS at 02:19

## 2019-04-12 RX ADMIN — POTASSIUM CHLORIDE 10 MEQ: 7.46 INJECTION, SOLUTION INTRAVENOUS at 08:09

## 2019-04-12 RX ADMIN — INSULIN HUMAN 1 UNITS: 100 INJECTION, SOLUTION PARENTERAL at 17:50

## 2019-04-12 RX ADMIN — SODIUM CHLORIDE: 9 INJECTION, SOLUTION INTRAVENOUS at 21:24

## 2019-04-12 RX ADMIN — ENALAPRILAT 2.5 MG: 2.5 INJECTION INTRAVENOUS at 14:39

## 2019-04-12 RX ADMIN — INSULIN HUMAN 1 UNITS: 100 INJECTION, SOLUTION PARENTERAL at 06:11

## 2019-04-12 RX ADMIN — AMIODARONE HYDROCHLORIDE 0.5 MG/MIN: 50 INJECTION, SOLUTION INTRAVENOUS at 07:55

## 2019-04-12 RX ADMIN — AMIODARONE HYDROCHLORIDE 0.5 MG/MIN: 50 INJECTION, SOLUTION INTRAVENOUS at 21:06

## 2019-04-12 RX ADMIN — FUROSEMIDE 80 MG: 10 INJECTION, SOLUTION INTRAMUSCULAR; INTRAVENOUS at 05:49

## 2019-04-12 RX ADMIN — POTASSIUM CHLORIDE 10 MEQ: 7.46 INJECTION, SOLUTION INTRAVENOUS at 09:20

## 2019-04-12 RX ADMIN — SODIUM CHLORIDE 500 ML: 9 INJECTION, SOLUTION INTRAVENOUS at 08:06

## 2019-04-12 RX ADMIN — MUPIROCIN 1 APPLICATION: 20 OINTMENT TOPICAL at 06:12

## 2019-04-12 RX ADMIN — HEPARIN SODIUM 2000 UNITS: 5000 INJECTION, SOLUTION INTRAVENOUS at 05:49

## 2019-04-12 RX ADMIN — MORPHINE SULFATE 3 MG: 4 INJECTION INTRAVENOUS at 19:50

## 2019-04-12 RX ADMIN — OXYCODONE HYDROCHLORIDE 5 MG: 5 TABLET ORAL at 13:47

## 2019-04-12 ASSESSMENT — ENCOUNTER SYMPTOMS
JOINT SWELLING: 1
ABDOMINAL PAIN: 0
NERVOUS/ANXIOUS: 1
APNEA: 0
FEVER: 0
WHEEZING: 0
ACTIVITY CHANGE: 0
COUGH: 1
SHORTNESS OF BREATH: 0
HEADACHES: 0
NAUSEA: 0
AGITATION: 0
ARTHRALGIAS: 1
WEAKNESS: 0
BLURRED VISION: 0
BRUISES/BLEEDS EASILY: 0
APPETITE CHANGE: 0
CHEST TIGHTNESS: 0
SPUTUM PRODUCTION: 0
PALPITATIONS: 0
FATIGUE: 1
ORTHOPNEA: 0
STRIDOR: 0
HALLUCINATIONS: 0
COUGH: 0
MYALGIAS: 0
CHILLS: 0
EYE ITCHING: 0
NECK PAIN: 0
DIZZINESS: 0
HEARTBURN: 0
SENSORY CHANGE: 0
ADENOPATHY: 0
EYE DISCHARGE: 0

## 2019-04-12 NOTE — CARE PLAN
Problem: Mobility  Goal: Risk for activity intolerance will decrease    Intervention: Encourage patient to increase activity level in collaboration with Interdisciplinary Team  Patient strongly encouraged and educated to get to up to the chair. He was agreeable in the afternoon and was mobilized to the edge of the bed and then up to the chair.       Problem: Pain  Goal: Alleviation of Pain or a reduction in pain to the patient's comfort goal    Intervention: Pain Management--Non Pharmacologic techniques.  Examples: Relaxation, Distraction, Massage, Cold or Heat Therapy  Patient medicated per MAR and ice packs used for pain.

## 2019-04-12 NOTE — PROGRESS NOTES
INOCENTE Tang, notified of this RN's concerns that patient will be unwilling to get out of bed and walk post surgery.    Per report from Dr. Cain in Hot Rounds (Dr. Cain spoke with the surgeon in [place] who performed surgery), at the very least patient should have passive leg of motion applied to his right leg for 15 minutes X 2 day.

## 2019-04-12 NOTE — PROGRESS NOTES
Pt requested RN to call father, Jaleel, to update him of OHS tomorrow AM. Jaleel called, no answer. Voicemail left with RN's phone number. Pt informed of no answer from Jaleel.

## 2019-04-12 NOTE — PROGRESS NOTES
Hospital Medicine Daily Progress Note    Date of Service  4/12/2019    Chief Complaint  54 y.o. male admitted 4/9/2019 with shortness of breath.    Hospital Course   Mr. Davies has a history of dyslipidemia, DM, and hypertension that underwent a right knee arthroplasty on 4/8 by Dr. Martin in [place].  Preoperatively, he developed pulmonary edema and shortness of breath therefore an echocardiogram was done which revealed severe mitral regurgitation which was new compared to previous echocardiogram 2017.  He was transferred here for higher level of care.  As his respiratory status has decompensated, decision was made to transfer him to the intensive care unit.  Is been seen by cardiothoracic surgery and likely will go to the operating room after a heart catheterization is done to decide whether he also needs a bypass graft.      Interval Problem Update  4/10: patient seen in the ICU.  Patient notes that he is not able to lay down flat without becoming quite short of breath.  He understands that he will need to go to the cardiac Cath Lab prior to valve surgery.   4/11: Patient seen and evaluated in the intensive care unit.  He had a cardiac catheterization yesterday which did not show evidence of coronary disease. He is on an amiodarone drip and heparin drip due to atrial fibrillation.  I discussed with Dr. Dye about probable surgery tomorrow. I called Dr. Martin and discussed ROM with him today.   4/12: patient seen and evaluated in the ICU. He remains on 4 liters nasal cannula oxygen and was on 6 liters over night. He was on IV heparin over night and has been in a sinus rhythm. He is scheduled for mitral valve surgery today.  Consultants/Specialty  Cardiology.   Dr. Dye, Cardiothoracic surgery  Critical Care. I discussed with Dr. Gonda on ICU Hot Rounds  Code Status  full    Disposition  [facility]    Review of Systems  Review of Systems   Constitutional: Negative for fever.   Respiratory:        Shortness of  breath with exertion   Cardiovascular: Positive for leg swelling. Negative for chest pain.   Musculoskeletal:        Right knee pain with movement  Right knee is stiff    Psychiatric/Behavioral:        Sleeping a lot   All other systems reviewed and are negative.       Physical Exam  Temp:  [36.7 °C (98.1 °F)-37.6 °C (99.7 °F)] 36.8 °C (98.2 °F)  Pulse:  [76-91] 82  Resp:  [13-24] 17  SpO2:  [90 %-99 %] 99 %    Physical Exam   Constitutional: He appears well-developed and well-nourished. No distress.   HENT:   Head: Atraumatic.   Neck: Normal range of motion. Neck supple.   Cardiovascular: Normal rate and regular rhythm.    V/VI DIPTI left sternal border.    Pulmonary/Chest: Effort normal. No respiratory distress. He has no wheezes.   Abdominal: Soft. He exhibits no distension. There is no tenderness.   Musculoskeletal: He exhibits no edema.   Right knee incision is covered  Right knee is swollen though not painful.   Neurological:   Sleepy  He opens his eyes and is conversant then goes back to sleep.   Skin: Skin is warm and dry. He is not diaphoretic. There is pallor.   Psychiatric:   Cooperative with exam   Nursing note and vitals reviewed.      Fluids    Intake/Output Summary (Last 24 hours) at 04/12/19 0716  Last data filed at 04/12/19 0600   Gross per 24 hour   Intake           3225.2 ml   Output             4150 ml   Net           -924.8 ml       Laboratory  Recent Labs      04/10/19   0224  04/11/19   0738  04/11/19   1730   WBC  14.8*  11.5*  12.1*   RBC  4.75  4.07*  4.61*   HEMOGLOBIN  12.5*  10.8*  12.2*   HEMATOCRIT  40.3*  33.8*  38.5*   MCV  84.8  83.0  83.5   MCH  26.3*  26.5*  26.5*   MCHC  31.0*  32.0*  31.7*   RDW  43.8  42.6  42.9   PLATELETCT  222  181  254   MPV  10.2  10.8  10.8     Recent Labs      04/11/19   0116  04/11/19   0738   04/11/19   1730  04/12/19   0050  04/12/19   0600   SODIUM  139  136   --   138   --    --    POTASSIUM  3.1*  3.0*   < >  3.2*  3.7  3.3*   CHLORIDE  99  99   --    94*   --    --    CO2  29  27   --   27   --    --    GLUCOSE  189*  162*   --   239*   --    --    BUN  23*  22   --   20   --    --    CREATININE  1.09  1.03   --   1.16   --    --    CALCIUM  7.4*  7.1*   --   7.3*   --    --     < > = values in this interval not displayed.     Recent Labs      04/11/19   0652  04/11/19   1015  04/11/19   1730  04/12/19   0040   APTT  132.6*  47.5*  49.4*  45.6*   INR  1.33*   --   1.18*   --      Recent Labs      04/10/19   0224   BNPBTYPENAT  240*           Imaging  US-CAROTID DOPPLER BILAT   Final Result      EC-ECHOCARDIOGRAM COMPLETE W/O CONT   Final Result      CT-CTA CHEST PULMONARY ARTERY W/ RECONS   Final Result      1.  Patchy groundglass opacification with septal thickening, perihilar in distribution. Findings are nonspecific and may represent pulmonary edema or an infectious/inflammatory process.      2.  Small left pleural effusion.      3.  Cardiomegaly.      4.  Mild nonspecific mediastinal adenopathy            DX-CHEST-LIMITED (1 VIEW)   Final Result      1.  Cardiomegaly.      2.  Increased perihilar interstitial markings with left basilar and right upper lobe hazy opacification. Findings may be related to pulmonary edema. Typical infection could have a similar appearance.      EC-MICAH W/O CONT    (Results Pending)   CL-LEFT HEART CATHETERIZATION WITH POSSIBLE INTERVENTION    (Results Pending)        Assessment/Plan  * Acute respiratory failure with hypoxia with pulmonary edema (HCC)   Assessment & Plan    Secondary to severe mitral regurgitation  He is requiring supplemental oxygen and critical care is consulted  IV lasix for diuresis as tolerated--potassium is low at 3.3   Supplemental oxygen at 4 liters      Mitral valve disease   Assessment & Plan    Severe mitral regurgitation noted on echocardiogram  Clear coronaries on cardiac cath.  Mitral valve surgery on 4/12 by Dr. Dye      Atrial fibrillation (HCC)   Assessment & Plan    Paroxysmal atrial  fibrillation.  IV heparin drip for anticoagulation and titrate PTTs accordingly and stop prior to surgery.  IV amiodarone drip for rate/rhythm control.      Obstructive sleep apnea   Assessment & Plan    Resume home CPAP     S/P total knee arthroplasty, right   Assessment & Plan    Status post right knee arthroplasty by Dr. Andino, orthopedics in [facility]  Oral pain medications on is n.p.o. IV morphine for breakthrough pain  I called Dr. Andino on 4/11 and he recommends PT for 15 minutes of ROM as tolerated at least BID; CPM is not indicated unless the ROM is not able to be performed.      Pulmonary edema   Assessment & Plan    Postoperative pulmonary edema  Ordered Lasix       Hypothyroid- (present on admission)   Assessment & Plan    High dose Synthroid 300 mcg outpatient  TSH is suppressed at <0.005  Decreased dose to 175 mcg daily.        Volume overload   Assessment & Plan    IV Lasix  Potassium scale ordered for replacement          VTE prophylaxis: IV heparin drip

## 2019-04-12 NOTE — CARE PLAN
Problem: Pre Op  Goal: Optimal preparation for CABG/Heart Valve surgery    Intervention: Pre Op education to patient/significant other. Provide patient [facility] Patient Guideline for Cardiac Surgery (See Pt. Ed.)  Complete. TALHA Fernando trained RN went over education with pt.   Intervention: Consents obtained for Surgery, Anesthesia and Transfusion/Bloodless Program Participant  Complete. Anesthesia to be completed by anesthesiologist.   Intervention: Pre op checklist completed. Admit profile completed. Advanced directive verified.  Complete.   Intervention: Pre op labs per MD order: CBC, CMP, INR, PTT, COD if not done in past 72 hours.  HbA1C if diabetic.  Complete.   Intervention: Pre op diagnostics per MD order (EKG, CXR, Bilateral carotid doppler study and vein mapping)  Complete. Vein mapping not needed for pt.   Intervention: Baseline assessment documented to include IS volume, weight, bilateral BP and peripheral pulses.  1500 baseline   Intervention: NPO at midnight except cardiac medications. (No ASA, coumadin or Plavix)  Complete.   Intervention: Shower with chlorhexidine x 2  Complete. 2nd chlorhexidine to be done at 0800.   Intervention: Prep with clippers  Complete.   Intervention: Remove dentures, valuables and jewelry  Complete. Dentures not available.   Intervention: Determine if patient is taking Beta Blockers  Contraindicated per MD for possible heart block  Intervention: Cardiac/BP meds and Mupirocin ointment given prior to surgery. Verify orders for hold or administer of anticoags.  Mupirocin given.   Intervention: If diabetic, administer insulin night before surgery  Complete.   Intervention: If diabetic, FSBS in am and follow sliding scale if indicated  Complete. 1 unit given.   Intervention: Pre op antibiotics sent to surgery with patient per MD order (surgery to administer)  Not available.   Intervention: Medical record sent to surgery with current H&P  Complete.   Intervention: Transport to surgery  with cardiac monitor and oxygen  Complete.

## 2019-04-12 NOTE — PROGRESS NOTES
Patient strongly educated that post-surgery he is going to be required to get up to the chair and walk despite the pain in his right knee from knee surgery. Patient was not very receptive to education. This RN is concerned that patient will not do well after surgery due to his hesitation to participate in recovery regimen. This RN discussed these concerns with MDs during hot rounds and also with TALHA Anthony RN. She will discuss with surgeons.

## 2019-04-12 NOTE — THERAPY
"Physical Therapy Evaluation completed.   Bed Mobility:  Supine to Sit: Total Assist (max raised HOB)  Transfers: Sit to Stand: Unable to Participate  Gait: Level Of Assist: Unable to Participate       Plan of Care: Will reassess post OHS   Discharge Recommendations: Equipment: Will Continue to Assess for Equipment Needs.    Pt presents with impaired activity tolerance, dynamic balance, ROM and pain s/p recent TKA awaiting emergent OHS. Pt is most limited by activity tolerance, extreme pain with any knee movement and has not performed any of the ROM given post operatively at other hospital; removed ace wrap as pt with cold insensate 1st and 2nd toe, improved circulation post removal (pt still has occlusive bandage over inicision itself); manual PROM performed at knee and positioning given; pt was fairly immobile at baseline due to right knee pain and needs specific step wise cues for use of his own strength for balance and support; anticipate prolonged physical recovery with addition of sternal precautions; will follow;        See \"Rehab Therapy-Acute\" Patient Summary Report for complete documentation.     "

## 2019-04-12 NOTE — PROGRESS NOTES
Critical Care Progress Note    Date of admission  4/9/2019    Chief Complaint  54 y.o. male admitted 4/9/2019 with shortness of breath    Hospital Course    54 y.o. male who presented 4/9/2019 with shortness of breath from [facility].  He presented there on April 8 for an elective right knee total arthroplasty that was performed without complications.  That evening he started to develop shortness of breath that he says he has had intermittently for the last few months.  He started to cough a frothy tinged mucus.  Chest x-ray revealed pulmonary edema and a CT PE study was negative for pulmonary embolism.  He underwent echocardiography which unfortunately revealed severe mitral valve regurgitation for which she was transferred to [facility].  He was given Lasix and is being evaluated by cardiothoracic surgery for emergent valve replacement.  He was transferred to the intensive care unit for acute hypoxic respiratory failure and I was consulted for his critical care management.        Interval Problem Update  Reviewed last 24 hour events:   - plan for MVR today at noon   - NPO   - SBP goal 100   - good UOP   - refusing to mobilize   - heparin gtt discontinued   - NSR   - low K   - plans for CPM vs RN mobility of knee    Review of Systems  Review of Systems   Constitutional: Negative for chills.   Eyes: Negative for blurred vision.   Respiratory: Positive for cough. Negative for sputum production and shortness of breath.    Cardiovascular: Positive for leg swelling. Negative for chest pain, palpitations and orthopnea.   Gastrointestinal: Negative for abdominal pain and heartburn.   Genitourinary: Negative for dysuria.   Musculoskeletal: Positive for joint pain. Negative for neck pain.   Neurological: Negative for sensory change.   Psychiatric/Behavioral: Negative for hallucinations. The patient is nervous/anxious.    All other systems reviewed and are negative.       Vital Signs for last 24 hours    Temp:  [36.8 °C (98.2 °F)-37.6 °C (99.7 °F)] 36.8 °C (98.2 °F)  Pulse:  [76-91] 82  Resp:  [16-24] 17  SpO2:  [90 %-99 %] 99 %   SBP:     Respiratory Information for the last 24 hours   CPAP 6 lpm qhs, 4 L/min nasal cannula during the day    Physical Exam   Physical Exam   Constitutional: He is oriented to person, place, and time. He appears well-developed and well-nourished.   Mildly anxious for upcoming surgery   HENT:   Head: Normocephalic and atraumatic.   Right Ear: External ear normal.   Left Ear: External ear normal.   Mouth/Throat: No oropharyngeal exudate.   Eyes: Pupils are equal, round, and reactive to light. Conjunctivae are normal. Right eye exhibits no discharge. Left eye exhibits no discharge.   Neck: Neck supple. JVD present.   Cardiovascular: Normal rate, regular rhythm and intact distal pulses.   Occasional extrasystoles are present. PMI is displaced.  Exam reveals no friction rub.    Murmur heard.  Pulmonary/Chest: Effort normal. No accessory muscle usage. No respiratory distress. He has no decreased breath sounds. He has no wheezes. He has rales in the right lower field and the left lower field.   Breathing comfortably, speaking in full sentences   Abdominal: Soft. Bowel sounds are normal. There is no tenderness. There is no rebound and no guarding.   Musculoskeletal: He exhibits edema (Improving) and tenderness (Of the right knee, dressing clean/dry/intact, good distal circulation). He exhibits no deformity.   Right lower extremity with bandage that is clean/dry/intact, tender around the knee   Lymphadenopathy:     He has no cervical adenopathy.   Neurological: He is alert and oriented to person, place, and time. No cranial nerve deficit. He exhibits normal muscle tone.   Skin: Skin is warm and dry. He is not diaphoretic. No erythema.   Psychiatric: He has a normal mood and affect. His behavior is normal. Judgment and thought content normal.   Nursing note and vitals  reviewed.      Medications  Current Facility-Administered Medications   Medication Dose Route Frequency Provider Last Rate Last Dose   • potassium chloride (KCL) ivpb 10 mEq  10 mEq Intravenous Q HOUR Felix Alvares D.O. 100 mL/hr at 04/12/19 0809 10 mEq at 04/12/19 0809   • enalaprilat (VASOTEC) injection 2.5 mg  2.5 mg Intravenous Q6HRS Tori Purdy M.D.   2.5 mg at 04/12/19 0820   • NS infusion   Intravenous Continuous Tori Purdy M.D.       • potassium chloride SA (Kdur) tablet 40 mEq  40 mEq Oral DAILY Mariano Cain M.D.   Stopped at 04/12/19 0600   • Respiratory Care per Protocol   Nebulization Continuous RT Malina Locke       • lidocaine (XYLOCAINE) 1 % injection 0.5 mL  0.5 mL Intradermal Once PRN Malina Locke       • insulin regular (HUMULIN R) injection 2-15 Units  2-15 Units Subcutaneous Once PRN Malina Locke       • insulin regular human (HUMULIN/NOVOLIN R) 62.5 Units in  mL infusion per protocol  1-6 Units/hr Intravenous Continuous Malina Locke       • EPINEPHrine 4 mg in  mL Infusion  0-0.2 mcg/kg/min Intravenous Continuous Malina Locke       • norepinephrine (LEVOPHED) 8 mg in  mL Infusion  0-30 mcg/min Intravenous Continuous Malina Locke       • dexmedetomidine (PRECEDEX) 400 mcg/100mL NS premix infusion  0-1.5 mcg/kg/hr Intravenous Continuous Malina Locke       • vancomycin 1500 mg/250mL NS IVPB premix  1,500 mg Intravenous Once Malina Locke       • mupirocin (BACTROBAN) 2 % ointment   Topical BID Malina Locke   1 Application at 04/12/19 0612   • ALPRAZolam (XANAX) tablet 0.25 mg  0.25 mg Oral Q6HRS PRN Malina Locke       • aminocaproic acid (AMICAR) 9.3 g in  mL IV Bolus  100 mg/kg Intravenous Once Malina Locke       • aminocaproic acid (AMICAR) 5 g in  mL Infusion  2 g/hr Intravenous Once Malina Locke       • levothyroxine (SYNTHROID) tablet 175 mcg  175 mcg Oral DAILY Mariano Cain M.D.   Stopped at  04/12/19 0600   • morphine (pf) 4 mg/ml injection 3 mg  3 mg Intravenous Q3HRS PRN Estela Fagan M.D.   3 mg at 04/12/19 0727   • oxyCODONE immediate-release (ROXICODONE) tablet 5 mg  5 mg Oral Q4HRS PRN Estela Fagan M.D.   5 mg at 04/11/19 2055   • furosemide (LASIX) injection 80 mg  80 mg Intravenous BID DIURETIC Tori Purdy M.D.   80 mg at 04/12/19 0549   • enalaprilat (VASOTEC) injection 2.5 mg  2.5 mg Intravenous Q3HRS PRN Tori Purdy M.D.       • D5W infusion   Intravenous Continuous Tori Purdy M.D. 30 mL/hr at 04/12/19 0700     • amiodarone (CORDARONE) 450 mg in D5W 250 mL Infusion  0.5-1 mg/min Intravenous Continuous Tori Purdy M.D. 17 mL/hr at 04/12/19 0755 0.5 mg/min at 04/12/19 0755   • K+ Scale: Goal of 4.5  1 Each Intravenous Q6HRS Tori Purdy M.D.   1 Each at 04/12/19 0600   • heparin injection 3,200 Units  3,200 Units Intravenous PRN Alli Merrill M.D.   3,200 Units at 04/11/19 1844    And   • heparin infusion 25,000 units in 500 ml 0.45% nacl   Intravenous Continuous Alli Merrill M.D. 40 mL/hr at 04/12/19 0727 2,000 Units/hr at 04/12/19 0727   • pravastatin (PRAVACHOL) tablet 40 mg  40 mg Oral DAILY Ld Morel M.D.   Stopped at 04/12/19 0600   • traZODone (DESYREL) tablet 50 mg  50 mg Oral Nightly Ld Morel M.D.   50 mg at 04/11/19 2330   • senna-docusate (PERICOLACE or SENOKOT S) 8.6-50 MG per tablet 2 Tab  2 Tab Oral BID Ld Morel M.D.   Stopped at 04/12/19 0600    And   • polyethylene glycol/lytes (MIRALAX) PACKET 1 Packet  1 Packet Oral QDAY PRN Ld Morel M.D.        And   • magnesium hydroxide (MILK OF MAGNESIA) suspension 30 mL  30 mL Oral QDAY PRN Ld Morel M.D.        And   • bisacodyl (DULCOLAX) suppository 10 mg  10 mg Rectal QDAY PRN Ld Morel M.D.       • Respiratory Care per Protocol   Nebulization Continuous RT dL Morel M.D.       • insulin regular (HUMULIN R) injection  1-6 Units  1-6 Units Subcutaneous 4X/DAY ACHS Ld Morel M.D.   1 Units at 04/12/19 0611    And   • glucose 4 g chewable tablet 16 g  16 g Oral Q15 MIN PRN Ld Morel M.D.        And   • dextrose 50% (D50W) injection 25 mL  25 mL Intravenous Q15 MIN PRN Ld Morel M.D.           Fluids    Intake/Output Summary (Last 24 hours) at 04/12/19 0829  Last data filed at 04/12/19 0600   Gross per 24 hour   Intake           2985.2 ml   Output             3650 ml   Net           -664.8 ml       Laboratory      Recent Labs      04/10/19   0224   BNPBTYPENAT  240*     Recent Labs      04/11/19 0116 04/11/19 0738   04/11/19   1730 04/12/19   0050  04/12/19   0600   SODIUM  139  136   --   138   --    --    POTASSIUM  3.1*  3.0*   < >  3.2*  3.7  3.3*   CHLORIDE  99  99   --   94*   --    --    CO2  29  27   --   27   --    --    BUN  23*  22   --   20   --    --    CREATININE  1.09  1.03   --   1.16   --    --    MAGNESIUM  1.4*   --    --    --    --   2.1   CALCIUM  7.4*  7.1*   --   7.3*   --    --     < > = values in this interval not displayed.     Recent Labs      04/11/19 0116 04/11/19 0738 04/11/19 1730   ALTSGPT   --   <5  <5   ASTSGOT   --   10*  11*   ALKPHOSPHAT   --   51  62   TBILIRUBIN   --   0.6  0.6   GLUCOSE  189*  162*  239*     Recent Labs      04/10/19   0224  04/11/19   0738  04/11/19   1730   WBC  14.8*  11.5*  12.1*   NEUTSPOLYS   --    --   78.90*   LYMPHOCYTES   --    --   8.00*   MONOCYTES   --    --   10.70   EOSINOPHILS   --    --   1.60   BASOPHILS   --    --   0.20   ASTSGOT   --   10*  11*   ALTSGPT   --   <5  <5   ALKPHOSPHAT   --   51  62   TBILIRUBIN   --   0.6  0.6     Recent Labs      04/10/19   0224   04/11/19   0652  04/11/19   0738  04/11/19   1015  04/11/19   1730  04/12/19   0040   RBC  4.75   --    --   4.07*   --   4.61*   --    HEMOGLOBIN  12.5*   --    --   10.8*   --   12.2*   --    HEMATOCRIT  40.3*   --    --   33.8*   --   38.5*   --     PLATELETCT  222   --    --   181   --   254   --    PROTHROMBTM   --    --   16.6*   --    --   15.1*   --    APTT   --    < >  132.6*   --   47.5*  49.4*  45.6*   INR   --    --   1.33*   --    --   1.18*   --     < > = values in this interval not displayed.       Imaging  X-Ray:  I have personally reviewed the images and compared with prior images. and No film today  Echo:   Reviewed    Assessment/Plan  * Acute respiratory failure with hypoxia with pulmonary edema (HCC)   Assessment & Plan    Continue aggressive diuresis  RT/O2 protocol, titrate oxygen to keep SaO2 greater than 92%  CPAP nightly  Limit sedatives     Mitral valve disease   Assessment & Plan    Severe regurgitation with valve dysfunction  Planned open heart surgery for valve replacement today  Continue diuresis and strict blood pressure control (goal SBP less than 100)     Atrial fibrillation (HCC)   Assessment & Plan    Back in normal sinus rhythm now  Continue amiodarone drip, heparin drip on hold for surgery  Optimize electrolytes  Continuous telemetry     Other hyperlipidemia   Assessment & Plan    Statin     Obstructive sleep apnea   Assessment & Plan    CPAP nightly     S/P total knee arthroplasty, right   Assessment & Plan    Analgesia  Therapy  CPM  DVT prophylaxis     Pulmonary edema   Assessment & Plan    Improving  Continue diuresis  Valve surgery today     Hypothyroid- (present on admission)   Assessment & Plan    Continue Synthroid     Hypomagnesemia   Assessment & Plan    Repleted     Hypokalemia   Assessment & Plan    Replete again today preoperatively and monitor postoperatively very closely     Volume overload   Assessment & Plan    Continue diuresis  Monitor strict ins and outs and kidney function     Full code    VTE:  Heparin  Ulcer: Not Indicated  Lines: Holder Catheter  Ongoing indication addressed    I have performed a physical exam and reviewed and updated ROS and Plan today (4/12/2019). In review of yesterday's note  (4/11/2019), there are no changes except as documented above.     Patient remains critically ill today requiring active titration of his amiodarone drip in preparation for open heart surgery. High risk of deterioration and worsening vital organ dysfunction and death without the above critical care interventions.    Discussed patient condition and risk of morbidity and/or mortality with RN, RT, Pharmacy, Charge nurse / hot rounds, Patient, cardiology and [facility] and Dr. Cain.  Critical care time: 32 minutes.  No time overlap.  Procedures are not included in this time.

## 2019-04-12 NOTE — PROGRESS NOTES
Dr. Purdy at bedside to assess patient. Discussed patient's BPs. Per Dr. Purdy goal is to keep SBP around 100. Orders entered in Epic by MD.

## 2019-04-12 NOTE — PROGRESS NOTES
INOCENTE Bell, at bedside. Patient is not going to surgery today due to another previous prolonged surgery case. Malina updated patient on this development. Ok to order patient a diet. It is not sure at this time when patient will be rescheduled.

## 2019-04-13 ENCOUNTER — ANESTHESIA (OUTPATIENT)
Dept: SURGERY | Facility: MEDICAL CENTER | Age: 54
DRG: 216 | End: 2019-04-13
Payer: MEDICAID

## 2019-04-13 ENCOUNTER — ANESTHESIA EVENT (OUTPATIENT)
Dept: SURGERY | Facility: MEDICAL CENTER | Age: 54
DRG: 216 | End: 2019-04-13
Payer: MEDICAID

## 2019-04-13 ENCOUNTER — APPOINTMENT (OUTPATIENT)
Dept: CARDIOLOGY | Facility: MEDICAL CENTER | Age: 54
DRG: 216 | End: 2019-04-13
Attending: INTERNAL MEDICINE
Payer: MEDICAID

## 2019-04-13 ENCOUNTER — APPOINTMENT (OUTPATIENT)
Dept: RADIOLOGY | Facility: MEDICAL CENTER | Age: 54
DRG: 216 | End: 2019-04-13
Attending: THORACIC SURGERY (CARDIOTHORACIC VASCULAR SURGERY)
Payer: MEDICAID

## 2019-04-13 LAB
ACTION RANGE TRIGGERED IACRT: NO
ACTION RANGE TRIGGERED IACRT: YES
ALBUMIN SERPL BCP-MCNC: 3.1 G/DL (ref 3.2–4.9)
ALBUMIN/GLOB SERPL: 1.2 G/DL
ALP SERPL-CCNC: 52 U/L (ref 30–99)
ALT SERPL-CCNC: <5 U/L (ref 2–50)
ANION GAP SERPL CALC-SCNC: 11 MMOL/L (ref 0–11.9)
APTT PPP: 28.1 SEC (ref 24.7–36)
AST SERPL-CCNC: 9 U/L (ref 12–45)
BASE EXCESS BLDA CALC-SCNC: -1 MMOL/L (ref -4–3)
BASE EXCESS BLDA CALC-SCNC: -2 MMOL/L (ref -4–3)
BASE EXCESS BLDA CALC-SCNC: -3 MMOL/L (ref -4–3)
BASE EXCESS BLDA CALC-SCNC: -4 MMOL/L (ref -4–3)
BASE EXCESS BLDA CALC-SCNC: -4 MMOL/L (ref -4–3)
BASE EXCESS BLDA CALC-SCNC: -5 MMOL/L (ref -4–3)
BASE EXCESS BLDA CALC-SCNC: -5 MMOL/L (ref -4–3)
BASE EXCESS BLDA CALC-SCNC: 1 MMOL/L (ref -4–3)
BASE EXCESS BLDA CALC-SCNC: 2 MMOL/L (ref -4–3)
BASE EXCESS BLDA CALC-SCNC: 2 MMOL/L (ref -4–3)
BASE EXCESS BLDV CALC-SCNC: 0 MMOL/L (ref -4–3)
BASOPHILS # BLD AUTO: 0.2 % (ref 0–1.8)
BASOPHILS # BLD: 0.02 K/UL (ref 0–0.12)
BILIRUB SERPL-MCNC: 0.7 MG/DL (ref 0.1–1.5)
BODY TEMPERATURE: ABNORMAL DEGREES
BODY TEMPERATURE: NORMAL DEGREES
BUN SERPL-MCNC: 27 MG/DL (ref 8–22)
CA-I BLD ISE-SCNC: 0.84 MMOL/L (ref 1.1–1.3)
CA-I BLD ISE-SCNC: 0.87 MMOL/L (ref 1.1–1.3)
CA-I BLD ISE-SCNC: 0.99 MMOL/L (ref 1.1–1.3)
CA-I BLD ISE-SCNC: 1.05 MMOL/L (ref 1.1–1.3)
CALCIUM SERPL-MCNC: 7.3 MG/DL (ref 8.5–10.5)
CHLORIDE SERPL-SCNC: 102 MMOL/L (ref 96–112)
CO2 BLDA-SCNC: 23 MMOL/L (ref 20–33)
CO2 BLDA-SCNC: 24 MMOL/L (ref 20–33)
CO2 BLDA-SCNC: 25 MMOL/L (ref 20–33)
CO2 BLDA-SCNC: 26 MMOL/L (ref 20–33)
CO2 BLDA-SCNC: 26 MMOL/L (ref 20–33)
CO2 BLDA-SCNC: 27 MMOL/L (ref 20–33)
CO2 BLDA-SCNC: 29 MMOL/L (ref 20–33)
CO2 BLDA-SCNC: 29 MMOL/L (ref 20–33)
CO2 BLDV-SCNC: 27 MMOL/L (ref 20–33)
CO2 SERPL-SCNC: 24 MMOL/L (ref 20–33)
CREAT SERPL-MCNC: 1.09 MG/DL (ref 0.5–1.4)
EKG IMPRESSION: NORMAL
EKG IMPRESSION: NORMAL
EOSINOPHIL # BLD AUTO: 0.44 K/UL (ref 0–0.51)
EOSINOPHIL NFR BLD: 4.3 % (ref 0–6.9)
ERYTHROCYTE [DISTWIDTH] IN BLOOD BY AUTOMATED COUNT: 40.1 FL (ref 35.9–50)
GLOBULIN SER CALC-MCNC: 2.5 G/DL (ref 1.9–3.5)
GLUCOSE BLD-MCNC: 161 MG/DL (ref 65–99)
GLUCOSE BLD-MCNC: 179 MG/DL (ref 65–99)
GLUCOSE BLD-MCNC: 186 MG/DL (ref 65–99)
GLUCOSE BLD-MCNC: 197 MG/DL (ref 65–99)
GLUCOSE BLD-MCNC: 205 MG/DL (ref 65–99)
GLUCOSE BLD-MCNC: 227 MG/DL (ref 65–99)
GLUCOSE BLD-MCNC: 228 MG/DL (ref 65–99)
GLUCOSE BLD-MCNC: 229 MG/DL (ref 65–99)
GLUCOSE BLD-MCNC: 229 MG/DL (ref 65–99)
GLUCOSE SERPL-MCNC: 169 MG/DL (ref 65–99)
HCO3 BLDA-SCNC: 21.9 MMOL/L (ref 17–25)
HCO3 BLDA-SCNC: 22.8 MMOL/L (ref 17–25)
HCO3 BLDA-SCNC: 22.9 MMOL/L (ref 17–25)
HCO3 BLDA-SCNC: 23 MMOL/L (ref 17–25)
HCO3 BLDA-SCNC: 23.2 MMOL/L (ref 17–25)
HCO3 BLDA-SCNC: 23.6 MMOL/L (ref 17–25)
HCO3 BLDA-SCNC: 23.9 MMOL/L (ref 17–25)
HCO3 BLDA-SCNC: 24.3 MMOL/L (ref 17–25)
HCO3 BLDA-SCNC: 24.4 MMOL/L (ref 17–25)
HCO3 BLDA-SCNC: 26 MMOL/L (ref 17–25)
HCO3 BLDA-SCNC: 27.6 MMOL/L (ref 17–25)
HCO3 BLDA-SCNC: 27.9 MMOL/L (ref 17–25)
HCO3 BLDV-SCNC: 25.3 MMOL/L (ref 24–28)
HCT VFR BLD AUTO: 33.7 % (ref 42–52)
HCT VFR BLD CALC: 23 % (ref 42–52)
HCT VFR BLD CALC: 24 % (ref 42–52)
HCT VFR BLD CALC: 26 % (ref 42–52)
HCT VFR BLD CALC: 31 % (ref 42–52)
HCT VFR BLD CALC: 32 % (ref 42–52)
HCT VFR BLD CALC: 34 % (ref 42–52)
HGB BLD-MCNC: 10.5 G/DL (ref 14–18)
HGB BLD-MCNC: 10.9 G/DL (ref 14–18)
HGB BLD-MCNC: 11.2 G/DL (ref 14–18)
HGB BLD-MCNC: 11.6 G/DL (ref 14–18)
HGB BLD-MCNC: 7.8 G/DL (ref 14–18)
HGB BLD-MCNC: 8.2 G/DL (ref 14–18)
HGB BLD-MCNC: 8.8 G/DL (ref 14–18)
HOROWITZ INDEX BLDA+IHG-RTO: 135 MM[HG]
HOROWITZ INDEX BLDA+IHG-RTO: 143 MM[HG]
HOROWITZ INDEX BLDA+IHG-RTO: 151 MM[HG]
HOROWITZ INDEX BLDA+IHG-RTO: 200 MM[HG]
HOROWITZ INDEX BLDA+IHG-RTO: 205 MM[HG]
HOROWITZ INDEX BLDA+IHG-RTO: 206 MM[HG]
HOROWITZ INDEX BLDA+IHG-RTO: 224 MM[HG]
HOROWITZ INDEX BLDA+IHG-RTO: 230 MM[HG]
HOROWITZ INDEX BLDA+IHG-RTO: 260 MM[HG]
HOROWITZ INDEX BLDA+IHG-RTO: 279 MM[HG]
HOROWITZ INDEX BLDA+IHG-RTO: 494 MM[HG]
HOROWITZ INDEX BLDA+IHG-RTO: 66 MM[HG]
HOROWITZ INDEX BLDV+IHG-RTO: 45 MM[HG]
IMM GRANULOCYTES # BLD AUTO: 0.07 K/UL (ref 0–0.11)
IMM GRANULOCYTES NFR BLD AUTO: 0.7 % (ref 0–0.9)
INR PPP: 1.34 (ref 0.87–1.13)
INST. QUALIFIED PATIENT IIQPT: YES
LV EJECT FRACT  99904: 45
LYMPHOCYTES # BLD AUTO: 0.87 K/UL (ref 1–4.8)
LYMPHOCYTES NFR BLD: 8.5 % (ref 22–41)
MAGNESIUM SERPL-MCNC: 1.9 MG/DL (ref 1.5–2.5)
MAGNESIUM SERPL-MCNC: 2.9 MG/DL (ref 1.5–2.5)
MCH RBC QN AUTO: 26.9 PG (ref 27–33)
MCHC RBC AUTO-ENTMCNC: 33.2 G/DL (ref 33.7–35.3)
MCV RBC AUTO: 81 FL (ref 81.4–97.8)
MONOCYTES # BLD AUTO: 1.16 K/UL (ref 0–0.85)
MONOCYTES NFR BLD AUTO: 11.3 % (ref 0–13.4)
NEUTROPHILS # BLD AUTO: 7.7 K/UL (ref 1.82–7.42)
NEUTROPHILS NFR BLD: 75 % (ref 44–72)
NRBC # BLD AUTO: 0 K/UL
NRBC BLD-RTO: 0 /100 WBC
O2/TOTAL GAS SETTING VFR VENT: 100 %
O2/TOTAL GAS SETTING VFR VENT: 30 %
O2/TOTAL GAS SETTING VFR VENT: 40 %
O2/TOTAL GAS SETTING VFR VENT: 50 %
O2/TOTAL GAS SETTING VFR VENT: 80 %
PCO2 BLDA: 38.4 MMHG (ref 26–37)
PCO2 BLDA: 39 MMHG (ref 26–37)
PCO2 BLDA: 43.3 MMHG (ref 26–37)
PCO2 BLDA: 43.6 MMHG (ref 26–37)
PCO2 BLDA: 45.8 MMHG (ref 26–37)
PCO2 BLDA: 46.7 MMHG (ref 26–37)
PCO2 BLDA: 47.2 MMHG (ref 26–37)
PCO2 BLDA: 48 MMHG (ref 26–37)
PCO2 BLDA: 48.7 MMHG (ref 26–37)
PCO2 BLDA: 49.4 MMHG (ref 26–37)
PCO2 BLDA: 49.9 MMHG (ref 26–37)
PCO2 BLDA: 56.4 MMHG (ref 26–37)
PCO2 BLDV: 44.2 MMHG (ref 41–51)
PCO2 TEMP ADJ BLDA: 36.9 MMHG (ref 26–37)
PCO2 TEMP ADJ BLDA: 39 MMHG (ref 26–37)
PCO2 TEMP ADJ BLDA: 42.8 MMHG (ref 26–37)
PCO2 TEMP ADJ BLDA: 43 MMHG (ref 26–37)
PCO2 TEMP ADJ BLDA: 44.6 MMHG (ref 26–37)
PCO2 TEMP ADJ BLDA: 46.3 MMHG (ref 26–37)
PCO2 TEMP ADJ BLDA: 46.4 MMHG (ref 26–37)
PCO2 TEMP ADJ BLDA: 48 MMHG (ref 26–37)
PCO2 TEMP ADJ BLDA: 48.3 MMHG (ref 26–37)
PCO2 TEMP ADJ BLDA: 49.4 MMHG (ref 26–37)
PCO2 TEMP ADJ BLDA: 49.9 MMHG (ref 26–37)
PCO2 TEMP ADJ BLDA: 55.4 MMHG (ref 26–37)
PCO2 TEMP ADJ BLDV: 44.2 MMHG (ref 41–51)
PH BLDA: 7.22 [PH] (ref 7.4–7.5)
PH BLDA: 7.28 [PH] (ref 7.4–7.5)
PH BLDA: 7.28 [PH] (ref 7.4–7.5)
PH BLDA: 7.3 [PH] (ref 7.4–7.5)
PH BLDA: 7.31 [PH] (ref 7.4–7.5)
PH BLDA: 7.32 [PH] (ref 7.4–7.5)
PH BLDA: 7.35 [PH] (ref 7.4–7.5)
PH BLDA: 7.36 [PH] (ref 7.4–7.5)
PH BLDA: 7.39 [PH] (ref 7.4–7.5)
PH BLDA: 7.43 [PH] (ref 7.4–7.5)
PH BLDV: 7.37 [PH] (ref 7.31–7.45)
PH TEMP ADJ BLDA: 7.22 [PH] (ref 7.4–7.5)
PH TEMP ADJ BLDA: 7.28 [PH] (ref 7.4–7.5)
PH TEMP ADJ BLDA: 7.28 [PH] (ref 7.4–7.5)
PH TEMP ADJ BLDA: 7.31 [PH] (ref 7.4–7.5)
PH TEMP ADJ BLDA: 7.32 [PH] (ref 7.4–7.5)
PH TEMP ADJ BLDA: 7.32 [PH] (ref 7.4–7.5)
PH TEMP ADJ BLDA: 7.35 [PH] (ref 7.4–7.5)
PH TEMP ADJ BLDA: 7.36 [PH] (ref 7.4–7.5)
PH TEMP ADJ BLDA: 7.4 [PH] (ref 7.4–7.5)
PH TEMP ADJ BLDA: 7.43 [PH] (ref 7.4–7.5)
PH TEMP ADJ BLDV: 7.37 [PH] (ref 7.31–7.45)
PLATELET # BLD AUTO: 226 K/UL (ref 164–446)
PLATELET # BLD AUTO: 250 K/UL (ref 164–446)
PMV BLD AUTO: 10.4 FL (ref 9–12.9)
PO2 BLDA: 103 MMHG (ref 64–87)
PO2 BLDA: 108 MMHG (ref 64–87)
PO2 BLDA: 121 MMHG (ref 64–87)
PO2 BLDA: 143 MMHG (ref 64–87)
PO2 BLDA: 224 MMHG (ref 64–87)
PO2 BLDA: 279 MMHG (ref 64–87)
PO2 BLDA: 395 MMHG (ref 64–87)
PO2 BLDA: 66 MMHG (ref 64–87)
PO2 BLDA: 78 MMHG (ref 64–87)
PO2 BLDA: 80 MMHG (ref 64–87)
PO2 BLDA: 82 MMHG (ref 64–87)
PO2 BLDA: 92 MMHG (ref 64–87)
PO2 BLDV: 36 MMHG (ref 25–40)
PO2 TEMP ADJ BLDA: 102 MMHG (ref 64–87)
PO2 TEMP ADJ BLDA: 105 MMHG (ref 64–87)
PO2 TEMP ADJ BLDA: 118 MMHG (ref 64–87)
PO2 TEMP ADJ BLDA: 141 MMHG (ref 64–87)
PO2 TEMP ADJ BLDA: 224 MMHG (ref 64–87)
PO2 TEMP ADJ BLDA: 279 MMHG (ref 64–87)
PO2 TEMP ADJ BLDA: 395 MMHG (ref 64–87)
PO2 TEMP ADJ BLDA: 66 MMHG (ref 64–87)
PO2 TEMP ADJ BLDA: 75 MMHG (ref 64–87)
PO2 TEMP ADJ BLDA: 77 MMHG (ref 64–87)
PO2 TEMP ADJ BLDA: 79 MMHG (ref 64–87)
PO2 TEMP ADJ BLDA: 90 MMHG (ref 64–87)
PO2 TEMP ADJ BLDV: 36 MMHG (ref 25–40)
POTASSIUM BLD-SCNC: 3.4 MMOL/L (ref 3.6–5.5)
POTASSIUM BLD-SCNC: 3.7 MMOL/L (ref 3.6–5.5)
POTASSIUM BLD-SCNC: 3.8 MMOL/L (ref 3.6–5.5)
POTASSIUM BLD-SCNC: 3.9 MMOL/L (ref 3.6–5.5)
POTASSIUM BLD-SCNC: 4 MMOL/L (ref 3.6–5.5)
POTASSIUM SERPL-SCNC: 3.5 MMOL/L (ref 3.6–5.5)
POTASSIUM SERPL-SCNC: 3.8 MMOL/L (ref 3.6–5.5)
POTASSIUM SERPL-SCNC: 4 MMOL/L (ref 3.6–5.5)
PROT SERPL-MCNC: 5.6 G/DL (ref 6–8.2)
PROTHROMBIN TIME: 16.7 SEC (ref 12–14.6)
RBC # BLD AUTO: 4.16 M/UL (ref 4.7–6.1)
SAO2 % BLDA: 100 % (ref 93–99)
SAO2 % BLDA: 93 % (ref 93–99)
SAO2 % BLDA: 95 % (ref 93–99)
SAO2 % BLDA: 95 % (ref 93–99)
SAO2 % BLDA: 96 % (ref 93–99)
SAO2 % BLDA: 97 % (ref 93–99)
SAO2 % BLDA: 98 % (ref 93–99)
SAO2 % BLDA: 99 % (ref 93–99)
SAO2 % BLDV: 66 %
SODIUM BLD-SCNC: 136 MMOL/L (ref 135–145)
SODIUM BLD-SCNC: 136 MMOL/L (ref 135–145)
SODIUM BLD-SCNC: 137 MMOL/L (ref 135–145)
SODIUM BLD-SCNC: 137 MMOL/L (ref 135–145)
SODIUM BLD-SCNC: 138 MMOL/L (ref 135–145)
SODIUM SERPL-SCNC: 137 MMOL/L (ref 135–145)
SPECIMEN DRAWN FROM PATIENT: ABNORMAL
SPECIMEN DRAWN FROM PATIENT: NORMAL
WBC # BLD AUTO: 10.3 K/UL (ref 4.8–10.8)

## 2019-04-13 PROCEDURE — B246ZZ4 ULTRASONOGRAPHY OF RIGHT AND LEFT HEART, TRANSESOPHAGEAL: ICD-10-PCS | Performed by: THORACIC SURGERY (CARDIOTHORACIC VASCULAR SURGERY)

## 2019-04-13 PROCEDURE — 85025 COMPLETE CBC W/AUTO DIFF WBC: CPT

## 2019-04-13 PROCEDURE — A9270 NON-COVERED ITEM OR SERVICE: HCPCS | Performed by: NURSE PRACTITIONER

## 2019-04-13 PROCEDURE — 5A1221Z PERFORMANCE OF CARDIAC OUTPUT, CONTINUOUS: ICD-10-PCS | Performed by: THORACIC SURGERY (CARDIOTHORACIC VASCULAR SURGERY)

## 2019-04-13 PROCEDURE — 82962 GLUCOSE BLOOD TEST: CPT

## 2019-04-13 PROCEDURE — 85347 COAGULATION TIME ACTIVATED: CPT | Mod: 91

## 2019-04-13 PROCEDURE — 160009 HCHG ANES TIME/MIN: Performed by: THORACIC SURGERY (CARDIOTHORACIC VASCULAR SURGERY)

## 2019-04-13 PROCEDURE — 93010 ELECTROCARDIOGRAM REPORT: CPT | Mod: 77 | Performed by: INTERNAL MEDICINE

## 2019-04-13 PROCEDURE — 93005 ELECTROCARDIOGRAM TRACING: CPT | Performed by: NURSE PRACTITIONER

## 2019-04-13 PROCEDURE — 82803 BLOOD GASES ANY COMBINATION: CPT | Mod: 91

## 2019-04-13 PROCEDURE — 99232 SBSQ HOSP IP/OBS MODERATE 35: CPT | Performed by: HOSPITALIST

## 2019-04-13 PROCEDURE — 94002 VENT MGMT INPAT INIT DAY: CPT

## 2019-04-13 PROCEDURE — 501519 HCHG SUTURE, E PACK: Performed by: THORACIC SURGERY (CARDIOTHORACIC VASCULAR SURGERY)

## 2019-04-13 PROCEDURE — 33427 REPAIR OF MITRAL VALVE: CPT | Mod: 22 | Performed by: THORACIC SURGERY (CARDIOTHORACIC VASCULAR SURGERY)

## 2019-04-13 PROCEDURE — 700102 HCHG RX REV CODE 250 W/ 637 OVERRIDE(OP): Performed by: NURSE PRACTITIONER

## 2019-04-13 PROCEDURE — 80053 COMPREHEN METABOLIC PANEL: CPT

## 2019-04-13 PROCEDURE — C1729 CATH, DRAINAGE: HCPCS | Performed by: THORACIC SURGERY (CARDIOTHORACIC VASCULAR SURGERY)

## 2019-04-13 PROCEDURE — 84295 ASSAY OF SERUM SODIUM: CPT | Mod: 91

## 2019-04-13 PROCEDURE — 02L70ZK OCCLUSION OF LEFT ATRIAL APPENDAGE, OPEN APPROACH: ICD-10-PCS | Performed by: THORACIC SURGERY (CARDIOTHORACIC VASCULAR SURGERY)

## 2019-04-13 PROCEDURE — 02BG0ZZ EXCISION OF MITRAL VALVE, OPEN APPROACH: ICD-10-PCS | Performed by: THORACIC SURGERY (CARDIOTHORACIC VASCULAR SURGERY)

## 2019-04-13 PROCEDURE — 500890 HCHG PACK, OPEN HEART: Performed by: THORACIC SURGERY (CARDIOTHORACIC VASCULAR SURGERY)

## 2019-04-13 PROCEDURE — 700111 HCHG RX REV CODE 636 W/ 250 OVERRIDE (IP): Performed by: NURSE PRACTITIONER

## 2019-04-13 PROCEDURE — 770022 HCHG ROOM/CARE - ICU (200)

## 2019-04-13 PROCEDURE — 85049 AUTOMATED PLATELET COUNT: CPT

## 2019-04-13 PROCEDURE — 503000 HCHG SUTURE, OHS: Performed by: THORACIC SURGERY (CARDIOTHORACIC VASCULAR SURGERY)

## 2019-04-13 PROCEDURE — 501506 HCHG SUTURE GUIDE, VALVE REPLACEMENT: Performed by: THORACIC SURGERY (CARDIOTHORACIC VASCULAR SURGERY)

## 2019-04-13 PROCEDURE — 93325 DOPPLER ECHO COLOR FLOW MAPG: CPT

## 2019-04-13 PROCEDURE — 500734 HCHG INSERT, STEALTH: Performed by: THORACIC SURGERY (CARDIOTHORACIC VASCULAR SURGERY)

## 2019-04-13 PROCEDURE — 160031 HCHG SURGERY MINUTES - 1ST 30 MINS LEVEL 5: Performed by: THORACIC SURGERY (CARDIOTHORACIC VASCULAR SURGERY)

## 2019-04-13 PROCEDURE — 700105 HCHG RX REV CODE 258: Performed by: NURSE PRACTITIONER

## 2019-04-13 PROCEDURE — C1725 CATH, TRANSLUMIN NON-LASER: HCPCS | Performed by: THORACIC SURGERY (CARDIOTHORACIC VASCULAR SURGERY)

## 2019-04-13 PROCEDURE — 99291 CRITICAL CARE FIRST HOUR: CPT | Performed by: INTERNAL MEDICINE

## 2019-04-13 PROCEDURE — 700111 HCHG RX REV CODE 636 W/ 250 OVERRIDE (IP): Performed by: ANESTHESIOLOGY

## 2019-04-13 PROCEDURE — 700111 HCHG RX REV CODE 636 W/ 250 OVERRIDE (IP): Performed by: CLINICAL NURSE SPECIALIST

## 2019-04-13 PROCEDURE — C1751 CATH, INF, PER/CENT/MIDLINE: HCPCS | Performed by: THORACIC SURGERY (CARDIOTHORACIC VASCULAR SURGERY)

## 2019-04-13 PROCEDURE — 94150 VITAL CAPACITY TEST: CPT

## 2019-04-13 PROCEDURE — 88304 TISSUE EXAM BY PATHOLOGIST: CPT

## 2019-04-13 PROCEDURE — C1898 LEAD, PMKR, OTHER THAN TRANS: HCPCS | Performed by: THORACIC SURGERY (CARDIOTHORACIC VASCULAR SURGERY)

## 2019-04-13 PROCEDURE — C9248 INJ, CLEVIDIPINE BUTYRATE: HCPCS | Performed by: ANESTHESIOLOGY

## 2019-04-13 PROCEDURE — 700111 HCHG RX REV CODE 636 W/ 250 OVERRIDE (IP): Performed by: INTERNAL MEDICINE

## 2019-04-13 PROCEDURE — 160042 HCHG SURGERY MINUTES - EA ADDL 1 MIN LEVEL 5: Performed by: THORACIC SURGERY (CARDIOTHORACIC VASCULAR SURGERY)

## 2019-04-13 PROCEDURE — 500385 HCHG DRAIN, PLEUROVAC ADUL: Performed by: THORACIC SURGERY (CARDIOTHORACIC VASCULAR SURGERY)

## 2019-04-13 PROCEDURE — 501745 HCHG WIRE, SURGICAL STEEL: Performed by: THORACIC SURGERY (CARDIOTHORACIC VASCULAR SURGERY)

## 2019-04-13 PROCEDURE — 85610 PROTHROMBIN TIME: CPT

## 2019-04-13 PROCEDURE — 33427 REPAIR OF MITRAL VALVE: CPT | Mod: 22,AS | Performed by: NURSE PRACTITIONER

## 2019-04-13 PROCEDURE — 85730 THROMBOPLASTIN TIME PARTIAL: CPT

## 2019-04-13 PROCEDURE — 700105 HCHG RX REV CODE 258: Performed by: INTERNAL MEDICINE

## 2019-04-13 PROCEDURE — 700102 HCHG RX REV CODE 250 W/ 637 OVERRIDE(OP): Performed by: ANESTHESIOLOGY

## 2019-04-13 PROCEDURE — 85014 HEMATOCRIT: CPT | Mod: 91

## 2019-04-13 PROCEDURE — 88305 TISSUE EXAM BY PATHOLOGIST: CPT

## 2019-04-13 PROCEDURE — 82330 ASSAY OF CALCIUM: CPT | Mod: 91

## 2019-04-13 PROCEDURE — 503001 HCHG PERFUSION: Performed by: THORACIC SURGERY (CARDIOTHORACIC VASCULAR SURGERY)

## 2019-04-13 PROCEDURE — 93005 ELECTROCARDIOGRAM TRACING: CPT | Performed by: INTERNAL MEDICINE

## 2019-04-13 PROCEDURE — 71045 X-RAY EXAM CHEST 1 VIEW: CPT

## 2019-04-13 PROCEDURE — 02UG08Z SUPPLEMENT MITRAL VALVE WITH ZOOPLASTIC TISSUE, OPEN APPROACH: ICD-10-PCS | Performed by: THORACIC SURGERY (CARDIOTHORACIC VASCULAR SURGERY)

## 2019-04-13 PROCEDURE — 84132 ASSAY OF SERUM POTASSIUM: CPT | Mod: 91

## 2019-04-13 PROCEDURE — 160048 HCHG OR STATISTICAL LEVEL 1-5: Performed by: THORACIC SURGERY (CARDIOTHORACIC VASCULAR SURGERY)

## 2019-04-13 PROCEDURE — 93010 ELECTROCARDIOGRAM REPORT: CPT | Performed by: INTERNAL MEDICINE

## 2019-04-13 PROCEDURE — 02VG0ZZ RESTRICTION OF MITRAL VALVE, OPEN APPROACH: ICD-10-PCS | Performed by: THORACIC SURGERY (CARDIOTHORACIC VASCULAR SURGERY)

## 2019-04-13 PROCEDURE — 110372 HCHG SHELL REV 278: Performed by: THORACIC SURGERY (CARDIOTHORACIC VASCULAR SURGERY)

## 2019-04-13 PROCEDURE — C9248 INJ, CLEVIDIPINE BUTYRATE: HCPCS | Performed by: NURSE PRACTITIONER

## 2019-04-13 PROCEDURE — 700105 HCHG RX REV CODE 258

## 2019-04-13 PROCEDURE — 500002 HCHG ADHESIVE, DERMABOND: Performed by: THORACIC SURGERY (CARDIOTHORACIC VASCULAR SURGERY)

## 2019-04-13 PROCEDURE — 700105 HCHG RX REV CODE 258: Performed by: ANESTHESIOLOGY

## 2019-04-13 PROCEDURE — 83735 ASSAY OF MAGNESIUM: CPT | Mod: 91

## 2019-04-13 PROCEDURE — 37799 UNLISTED PX VASCULAR SURGERY: CPT

## 2019-04-13 PROCEDURE — C1894 INTRO/SHEATH, NON-LASER: HCPCS | Performed by: THORACIC SURGERY (CARDIOTHORACIC VASCULAR SURGERY)

## 2019-04-13 PROCEDURE — 700101 HCHG RX REV CODE 250: Performed by: NURSE PRACTITIONER

## 2019-04-13 PROCEDURE — 700101 HCHG RX REV CODE 250: Performed by: ANESTHESIOLOGY

## 2019-04-13 PROCEDURE — 82947 ASSAY GLUCOSE BLOOD QUANT: CPT

## 2019-04-13 DEVICE — ANNULO. RING MITRAL 4600-38 ---ALWAYS SHIP OVERNIGHT---: Type: IMPLANTABLE DEVICE | Status: FUNCTIONAL

## 2019-04-13 RX ORDER — MORPHINE SULFATE 4 MG/ML
4 INJECTION, SOLUTION INTRAMUSCULAR; INTRAVENOUS
Status: DISCONTINUED | OUTPATIENT
Start: 2019-04-13 | End: 2019-04-14

## 2019-04-13 RX ORDER — ACETAMINOPHEN 325 MG/1
650 TABLET ORAL EVERY 4 HOURS PRN
Status: DISCONTINUED | OUTPATIENT
Start: 2019-04-13 | End: 2019-04-22 | Stop reason: HOSPADM

## 2019-04-13 RX ORDER — METHADONE HYDROCHLORIDE 10 MG/ML
INJECTION, SOLUTION INTRAMUSCULAR; INTRAVENOUS; SUBCUTANEOUS PRN
Status: DISCONTINUED | OUTPATIENT
Start: 2019-04-13 | End: 2019-04-13

## 2019-04-13 RX ORDER — AMOXICILLIN 250 MG
2 CAPSULE ORAL 2 TIMES DAILY
Status: DISCONTINUED | OUTPATIENT
Start: 2019-04-13 | End: 2019-04-22 | Stop reason: HOSPADM

## 2019-04-13 RX ORDER — SODIUM CHLORIDE 9 MG/ML
INJECTION, SOLUTION INTRAVENOUS
Status: DISCONTINUED | OUTPATIENT
Start: 2019-04-13 | End: 2019-04-13

## 2019-04-13 RX ORDER — SODIUM CHLORIDE 9 MG/ML
INJECTION, SOLUTION INTRAVENOUS
Status: DISCONTINUED
Start: 2019-04-13 | End: 2019-04-13

## 2019-04-13 RX ORDER — DEXMEDETOMIDINE HYDROCHLORIDE 4 UG/ML
0-1.5 INJECTION, SOLUTION INTRAVENOUS CONTINUOUS
Status: DISCONTINUED | OUTPATIENT
Start: 2019-04-13 | End: 2019-04-14

## 2019-04-13 RX ORDER — PHENYLEPHRINE HYDROCHLORIDE 10 MG/ML
INJECTION, SOLUTION INTRAMUSCULAR; INTRAVENOUS; SUBCUTANEOUS PRN
Status: DISCONTINUED | OUTPATIENT
Start: 2019-04-13 | End: 2019-04-13

## 2019-04-13 RX ORDER — MAGNESIUM SULFATE 1 G/100ML
1 INJECTION INTRAVENOUS
Status: COMPLETED | OUTPATIENT
Start: 2019-04-13 | End: 2019-04-15

## 2019-04-13 RX ORDER — SODIUM CHLORIDE 9 MG/ML
INJECTION, SOLUTION INTRAVENOUS
Status: COMPLETED
Start: 2019-04-13 | End: 2019-04-13

## 2019-04-13 RX ORDER — NITROGLYCERIN 20 MG/100ML
0-100 INJECTION INTRAVENOUS CONTINUOUS
Status: DISCONTINUED | OUTPATIENT
Start: 2019-04-13 | End: 2019-04-14

## 2019-04-13 RX ORDER — ONDANSETRON 2 MG/ML
INJECTION INTRAMUSCULAR; INTRAVENOUS PRN
Status: DISCONTINUED | OUTPATIENT
Start: 2019-04-13 | End: 2019-04-13 | Stop reason: SURG

## 2019-04-13 RX ORDER — OXYCODONE HYDROCHLORIDE 5 MG/1
5 TABLET ORAL
Status: DISCONTINUED | OUTPATIENT
Start: 2019-04-13 | End: 2019-04-22 | Stop reason: HOSPADM

## 2019-04-13 RX ORDER — POTASSIUM CHLORIDE 14.9 MG/ML
20 INJECTION INTRAVENOUS ONCE
Status: COMPLETED | OUTPATIENT
Start: 2019-04-13 | End: 2019-04-13

## 2019-04-13 RX ORDER — ALUMINA, MAGNESIA, AND SIMETHICONE 2400; 2400; 240 MG/30ML; MG/30ML; MG/30ML
30 SUSPENSION ORAL EVERY 4 HOURS PRN
Status: DISCONTINUED | OUTPATIENT
Start: 2019-04-13 | End: 2019-04-22 | Stop reason: HOSPADM

## 2019-04-13 RX ORDER — OXYCODONE HYDROCHLORIDE 10 MG/1
10 TABLET ORAL
Status: DISCONTINUED | OUTPATIENT
Start: 2019-04-13 | End: 2019-04-22 | Stop reason: HOSPADM

## 2019-04-13 RX ORDER — HEPARIN SODIUM,PORCINE 1000/ML
VIAL (ML) INJECTION PRN
Status: DISCONTINUED | OUTPATIENT
Start: 2019-04-13 | End: 2019-04-13

## 2019-04-13 RX ORDER — CALCIUM CHLORIDE 100 MG/ML
INJECTION INTRAVENOUS; INTRAVENTRICULAR PRN
Status: DISCONTINUED | OUTPATIENT
Start: 2019-04-13 | End: 2019-04-13

## 2019-04-13 RX ORDER — SODIUM CHLORIDE, SODIUM LACTATE, POTASSIUM CHLORIDE, CALCIUM CHLORIDE 600; 310; 30; 20 MG/100ML; MG/100ML; MG/100ML; MG/100ML
INJECTION, SOLUTION INTRAVENOUS
Status: DISCONTINUED | OUTPATIENT
Start: 2019-04-13 | End: 2019-04-13

## 2019-04-13 RX ORDER — PROMETHAZINE HYDROCHLORIDE 25 MG/1
25 SUPPOSITORY RECTAL EVERY 6 HOURS PRN
Status: DISCONTINUED | OUTPATIENT
Start: 2019-04-13 | End: 2019-04-13

## 2019-04-13 RX ORDER — DEXMEDETOMIDINE HYDROCHLORIDE 4 UG/ML
INJECTION, SOLUTION INTRAVENOUS
Status: DISCONTINUED | OUTPATIENT
Start: 2019-04-13 | End: 2019-04-13

## 2019-04-13 RX ORDER — BISACODYL 10 MG
10 SUPPOSITORY, RECTAL RECTAL
Status: DISCONTINUED | OUTPATIENT
Start: 2019-04-13 | End: 2019-04-22 | Stop reason: HOSPADM

## 2019-04-13 RX ORDER — POLYETHYLENE GLYCOL 3350 17 G/17G
1 POWDER, FOR SOLUTION ORAL
Status: DISCONTINUED | OUTPATIENT
Start: 2019-04-13 | End: 2019-04-22 | Stop reason: HOSPADM

## 2019-04-13 RX ORDER — TRAMADOL HYDROCHLORIDE 50 MG/1
50 TABLET ORAL EVERY 4 HOURS PRN
Status: DISCONTINUED | OUTPATIENT
Start: 2019-04-13 | End: 2019-04-22 | Stop reason: HOSPADM

## 2019-04-13 RX ORDER — MIDAZOLAM HYDROCHLORIDE 1 MG/ML
2 INJECTION INTRAMUSCULAR; INTRAVENOUS
Status: DISCONTINUED | OUTPATIENT
Start: 2019-04-13 | End: 2019-04-14

## 2019-04-13 RX ORDER — PROTAMINE SULFATE 10 MG/ML
INJECTION, SOLUTION INTRAVENOUS PRN
Status: DISCONTINUED | OUTPATIENT
Start: 2019-04-13 | End: 2019-04-13

## 2019-04-13 RX ORDER — DIPHENHYDRAMINE HCL 25 MG
25 TABLET ORAL
Status: DISCONTINUED | OUTPATIENT
Start: 2019-04-13 | End: 2019-04-22 | Stop reason: HOSPADM

## 2019-04-13 RX ORDER — ETOMIDATE 2 MG/ML
INJECTION INTRAVENOUS PRN
Status: DISCONTINUED | OUTPATIENT
Start: 2019-04-13 | End: 2019-04-13

## 2019-04-13 RX ORDER — SODIUM CHLORIDE, SODIUM GLUCONATE, SODIUM ACETATE, POTASSIUM CHLORIDE AND MAGNESIUM CHLORIDE 526; 502; 368; 37; 30 MG/100ML; MG/100ML; MG/100ML; MG/100ML; MG/100ML
INJECTION, SOLUTION INTRAVENOUS PRN
Status: DISCONTINUED | OUTPATIENT
Start: 2019-04-13 | End: 2019-04-14

## 2019-04-13 RX ORDER — SODIUM CHLORIDE 9 MG/ML
INJECTION, SOLUTION INTRAVENOUS CONTINUOUS
Status: DISCONTINUED | OUTPATIENT
Start: 2019-04-13 | End: 2019-04-21

## 2019-04-13 RX ORDER — ONDANSETRON 2 MG/ML
4 INJECTION INTRAMUSCULAR; INTRAVENOUS EVERY 6 HOURS PRN
Status: DISCONTINUED | OUTPATIENT
Start: 2019-04-13 | End: 2019-04-13

## 2019-04-13 RX ORDER — METOCLOPRAMIDE HYDROCHLORIDE 5 MG/ML
INJECTION INTRAMUSCULAR; INTRAVENOUS PRN
Status: DISCONTINUED | OUTPATIENT
Start: 2019-04-13 | End: 2019-04-13

## 2019-04-13 RX ORDER — ACETAMINOPHEN 650 MG/1
650 SUPPOSITORY RECTAL EVERY 4 HOURS PRN
Status: DISCONTINUED | OUTPATIENT
Start: 2019-04-13 | End: 2019-04-22 | Stop reason: HOSPADM

## 2019-04-13 RX ORDER — POTASSIUM CHLORIDE 7.45 MG/ML
10 INJECTION INTRAVENOUS ONCE
Status: COMPLETED | OUTPATIENT
Start: 2019-04-13 | End: 2019-04-13

## 2019-04-13 RX ORDER — DEXTROSE MONOHYDRATE 25 G/50ML
25 INJECTION, SOLUTION INTRAVENOUS PRN
Status: ACTIVE | OUTPATIENT
Start: 2019-04-13 | End: 2019-04-14

## 2019-04-13 RX ORDER — MAGNESIUM SULFATE HEPTAHYDRATE 40 MG/ML
INJECTION, SOLUTION INTRAVENOUS PRN
Status: DISCONTINUED | OUTPATIENT
Start: 2019-04-13 | End: 2019-04-13

## 2019-04-13 RX ORDER — SODIUM CHLORIDE, SODIUM GLUCONATE, SODIUM ACETATE, POTASSIUM CHLORIDE AND MAGNESIUM CHLORIDE 526; 502; 368; 37; 30 MG/100ML; MG/100ML; MG/100ML; MG/100ML; MG/100ML
INJECTION, SOLUTION INTRAVENOUS
Status: DISCONTINUED | OUTPATIENT
Start: 2019-04-13 | End: 2019-04-13

## 2019-04-13 RX ORDER — DEXAMETHASONE SODIUM PHOSPHATE 4 MG/ML
INJECTION, SOLUTION INTRA-ARTICULAR; INTRALESIONAL; INTRAMUSCULAR; INTRAVENOUS; SOFT TISSUE PRN
Status: DISCONTINUED | OUTPATIENT
Start: 2019-04-13 | End: 2019-04-13

## 2019-04-13 RX ADMIN — FENTANYL CITRATE 100 MCG: 50 INJECTION, SOLUTION INTRAMUSCULAR; INTRAVENOUS at 10:58

## 2019-04-13 RX ADMIN — INSULIN HUMAN 1 UNITS: 100 INJECTION, SOLUTION PARENTERAL at 17:15

## 2019-04-13 RX ADMIN — MORPHINE SULFATE 4 MG: 4 INJECTION INTRAVENOUS at 14:20

## 2019-04-13 RX ADMIN — ROCURONIUM BROMIDE 50 MG: 10 INJECTION, SOLUTION INTRAVENOUS at 10:23

## 2019-04-13 RX ADMIN — SODIUM CHLORIDE: 9 INJECTION, SOLUTION INTRAVENOUS at 08:20

## 2019-04-13 RX ADMIN — FENTANYL CITRATE 50 MCG: 50 INJECTION, SOLUTION INTRAMUSCULAR; INTRAVENOUS at 23:35

## 2019-04-13 RX ADMIN — CLEVIPIDINE 10 MG/HR: 0.5 EMULSION INTRAVENOUS at 14:23

## 2019-04-13 RX ADMIN — INSULIN HUMAN 4 UNITS: 100 INJECTION, SOLUTION PARENTERAL at 12:31

## 2019-04-13 RX ADMIN — ONDANSETRON 4 MG: 2 INJECTION INTRAMUSCULAR; INTRAVENOUS at 10:46

## 2019-04-13 RX ADMIN — SODIUM CHLORIDE 19 UNITS/HR: 9 INJECTION, SOLUTION INTRAVENOUS at 15:37

## 2019-04-13 RX ADMIN — SODIUM CHLORIDE 500 ML: 9 INJECTION, SOLUTION INTRAVENOUS at 12:20

## 2019-04-13 RX ADMIN — HEPARIN SODIUM 15000 UNITS: 1000 INJECTION, SOLUTION INTRAVENOUS; SUBCUTANEOUS at 09:13

## 2019-04-13 RX ADMIN — SODIUM CHLORIDE, POTASSIUM CHLORIDE, SODIUM LACTATE AND CALCIUM CHLORIDE: 600; 310; 30; 20 INJECTION, SOLUTION INTRAVENOUS at 08:00

## 2019-04-13 RX ADMIN — OXYCODONE HYDROCHLORIDE 10 MG: 10 TABLET ORAL at 21:14

## 2019-04-13 RX ADMIN — PROTAMINE SULFATE 500 MG: 10 INJECTION, SOLUTION INTRAVENOUS at 10:33

## 2019-04-13 RX ADMIN — SUCCINYLCHOLINE CHLORIDE 200 MG: 20 INJECTION, SOLUTION INTRAMUSCULAR; INTRAVENOUS at 08:10

## 2019-04-13 RX ADMIN — SODIUM CHLORIDE, SODIUM GLUCONATE, SODIUM ACETATE, POTASSIUM CHLORIDE AND MAGNESIUM CHLORIDE 1000 ML: 526; 502; 368; 37; 30 INJECTION, SOLUTION INTRAVENOUS at 11:45

## 2019-04-13 RX ADMIN — ROCURONIUM BROMIDE 100 MG: 10 INJECTION, SOLUTION INTRAVENOUS at 08:10

## 2019-04-13 RX ADMIN — CALCIUM CHLORIDE 1 G: 100 INJECTION, SOLUTION INTRAVENOUS at 10:15

## 2019-04-13 RX ADMIN — VANCOMYCIN HYDROCHLORIDE 1.5 G: 1 INJECTION, POWDER, LYOPHILIZED, FOR SOLUTION INTRAVENOUS at 08:25

## 2019-04-13 RX ADMIN — MIDAZOLAM HYDROCHLORIDE 2 MG: 1 INJECTION, SOLUTION INTRAMUSCULAR; INTRAVENOUS at 08:06

## 2019-04-13 RX ADMIN — EPINEPHRINE 0.04 MCG/KG/MIN: 1 INJECTION, SOLUTION, CONCENTRATE INTRAVENOUS at 10:23

## 2019-04-13 RX ADMIN — MUPIROCIN 1 APPLICATION: 20 OINTMENT TOPICAL at 18:06

## 2019-04-13 RX ADMIN — POTASSIUM CHLORIDE 10 MEQ: 7.46 INJECTION, SOLUTION INTRAVENOUS at 18:44

## 2019-04-13 RX ADMIN — AMINOCAPROIC ACID 5 G: 250 INJECTION, SOLUTION INTRAVENOUS at 09:00

## 2019-04-13 RX ADMIN — ETOMIDATE 20 MG: 2 INJECTION INTRAVENOUS at 08:10

## 2019-04-13 RX ADMIN — INSULIN HUMAN 2 UNITS: 100 INJECTION, SOLUTION PARENTERAL at 16:13

## 2019-04-13 RX ADMIN — DEXMEDETOMIDINE HYDROCHLORIDE 0.8 MCG/KG/HR: 100 INJECTION, SOLUTION INTRAVENOUS at 10:23

## 2019-04-13 RX ADMIN — SODIUM CHLORIDE: 9 INJECTION, SOLUTION INTRAVENOUS at 08:02

## 2019-04-13 RX ADMIN — MAGNESIUM SULFATE IN WATER 2 G: 40 INJECTION, SOLUTION INTRAVENOUS at 10:14

## 2019-04-13 RX ADMIN — CALCIUM CHLORIDE 1 G: 100 INJECTION, SOLUTION INTRAVENOUS at 10:55

## 2019-04-13 RX ADMIN — PHENYLEPHRINE HYDROCHLORIDE 100 MCG: 10 INJECTION INTRAVENOUS at 10:34

## 2019-04-13 RX ADMIN — ROCURONIUM BROMIDE 50 MG: 10 INJECTION, SOLUTION INTRAVENOUS at 09:26

## 2019-04-13 RX ADMIN — ENALAPRILAT 2.5 MG: 2.5 INJECTION INTRAVENOUS at 01:40

## 2019-04-13 RX ADMIN — PHENYLEPHRINE HYDROCHLORIDE 100 MCG: 10 INJECTION INTRAVENOUS at 09:18

## 2019-04-13 RX ADMIN — CLEVIDIPINE 2 MG/HR: 0.5 EMULSION INTRAVENOUS at 10:50

## 2019-04-13 RX ADMIN — MUPIROCIN 1 %: 20 OINTMENT TOPICAL at 06:00

## 2019-04-13 RX ADMIN — METOCLOPRAMIDE 10 MG: 5 INJECTION, SOLUTION INTRAMUSCULAR; INTRAVENOUS at 10:46

## 2019-04-13 RX ADMIN — INSULIN HUMAN 3 UNITS: 100 INJECTION, SOLUTION PARENTERAL at 14:44

## 2019-04-13 RX ADMIN — Medication 10 MG: at 08:05

## 2019-04-13 RX ADMIN — SODIUM CHLORIDE 4 UNITS/HR: 9 INJECTION, SOLUTION INTRAVENOUS at 09:36

## 2019-04-13 RX ADMIN — POTASSIUM CHLORIDE 20 MEQ: 14.9 INJECTION, SOLUTION INTRAVENOUS at 11:44

## 2019-04-13 RX ADMIN — INSULIN HUMAN 3 UNITS: 100 INJECTION, SOLUTION PARENTERAL at 13:44

## 2019-04-13 RX ADMIN — SODIUM CHLORIDE: 9 INJECTION, SOLUTION INTRAVENOUS at 11:20

## 2019-04-13 RX ADMIN — SODIUM BICARBONATE 50 MEQ: 84 INJECTION, SOLUTION INTRAVENOUS at 14:02

## 2019-04-13 RX ADMIN — HEPARIN SODIUM 37000 UNITS: 1000 INJECTION, SOLUTION INTRAVENOUS; SUBCUTANEOUS at 08:59

## 2019-04-13 RX ADMIN — AMINOCAPROIC ACID 9.55 G: 250 INJECTION, SOLUTION INTRAVENOUS at 08:25

## 2019-04-13 RX ADMIN — PHENYLEPHRINE HYDROCHLORIDE 100 MCG: 10 INJECTION INTRAVENOUS at 08:49

## 2019-04-13 RX ADMIN — SODIUM CHLORIDE 19 UNITS/HR: 9 INJECTION, SOLUTION INTRAVENOUS at 19:01

## 2019-04-13 RX ADMIN — MAGNESIUM SULFATE IN DEXTROSE 1 G: 10 INJECTION, SOLUTION INTRAVENOUS at 11:44

## 2019-04-13 RX ADMIN — VANCOMYCIN HYDROCHLORIDE 1500 MG: 100 INJECTION, POWDER, LYOPHILIZED, FOR SOLUTION INTRAVENOUS at 19:53

## 2019-04-13 RX ADMIN — SODIUM CHLORIDE, SODIUM GLUCONATE, SODIUM ACETATE, POTASSIUM CHLORIDE AND MAGNESIUM CHLORIDE: 526; 502; 368; 37; 30 INJECTION, SOLUTION INTRAVENOUS at 08:15

## 2019-04-13 RX ADMIN — INSULIN HUMAN 1 UNITS: 100 INJECTION, SOLUTION PARENTERAL at 05:32

## 2019-04-13 RX ADMIN — DEXAMETHASONE SODIUM PHOSPHATE 4 MG: 4 INJECTION, SOLUTION INTRA-ARTICULAR; INTRALESIONAL; INTRAMUSCULAR; INTRAVENOUS; SOFT TISSUE at 08:10

## 2019-04-13 ASSESSMENT — ENCOUNTER SYMPTOMS: FEVER: 0

## 2019-04-13 ASSESSMENT — PULMONARY FUNCTION TESTS: FVC: 1.35

## 2019-04-13 NOTE — PROGRESS NOTES
Patient arrived to unit at 1120 with OR team. Connected to monitor. Report received from Dr. Ortiz. Lines and drips verified, see MAR. Pacer tested, settings in flowsheet. First ABG done and reviewed, BG checked, labs sent, EKG done. Mediastinal chest tubes in place, no airleak noted. Holder draining. Intensivist bedside to assess.

## 2019-04-13 NOTE — ANESTHESIA PROCEDURE NOTES
Arterial Line  Performed by: MIGUEL ERIC  Authorized by: MIGUEL ERIC     Start Time:  4/13/2019 8:08 AM  End Time:  4/13/2019 8:10 AM  Localization: ultrasound guidance  Image captured, interpreted and electronically stored.  Patient Location:  OR  Indication: continuous blood pressure monitoring and blood sampling needed    Catheter Size:  20 G  Seldinger Technique?: Yes    Laterality:  Left  Site:  Radial artery  Line Secured:  Transparent dressing, tape and antimicrobial disc  Events: patient tolerated procedure well with no complications

## 2019-04-13 NOTE — PROGRESS NOTES
Patient unable to open eyes but all extremities twitching. Able to stick tongue out on command. Weak  strength bilaterally, 2/5. Wiggles toes on command. PERRL.

## 2019-04-13 NOTE — ANESTHESIA PROCEDURE NOTES
Airway  Date/Time: 4/13/2019 8:12 AM  Performed by: MIGUEL ERIC  Authorized by: MIGUEL ERIC     Location:  OR  Urgency:  Elective  Difficult Airway: No    Indications for Airway Management:  Anesthesia  Spontaneous Ventilation: absent    Sedation Level:  Deep  Preoxygenated: Yes    Patient Position:  Sniffing  Mask Difficulty Assessment:  0 - not attempted  Final Airway Type:  Endotracheal airway  Final Endotracheal Airway:  ETT  Cuffed: Yes    Technique Used for Successful ETT Placement:  Direct laryngoscopy  Insertion Site:  Oral  Blade Type:  Joana  Laryngoscope Blade/Videolaryngoscope Blade Size:  4  ETT Size (mm):  8.5  Measured from:  Lips  ETT to Lips (cm):  23  Placement Verified by: auscultation and capnometry    Cormack-Lehane Classification:  Grade I - full view of glottis

## 2019-04-13 NOTE — PROGRESS NOTES
[facility] Medicine Daily Progress Note    Date of Service  4/13/2019    Chief Complaint  54 y.o. male admitted 4/9/2019 with shortness of breath.    Hospital Course   Mr. Davies has a history of dyslipidemia, DM, and hypertension that underwent a right knee arthroplasty on 4/8 by Dr. Martin in [place].  Preoperatively, he developed pulmonary edema and shortness of breath therefore an echocardiogram was done which revealed severe mitral regurgitation which was new compared to previous echocardiogram 2017.  He was transferred here for higher level of care.  As his respiratory status has decompensated, decision was made to transfer him to the intensive care unit.  Is been seen by cardiothoracic surgery and likely will go to the operating room after a heart catheterization is done to decide whether he also needs a bypass graft.      Interval Problem Update  4/10: patient seen in the ICU.  Patient notes that he is not able to lay down flat without becoming quite short of breath.  He understands that he will need to go to the cardiac Cath Lab prior to valve surgery.   4/11: Patient seen and evaluated in the intensive care unit.  He had a cardiac catheterization yesterday which did not show evidence of coronary disease. He is on an amiodarone drip and heparin drip due to atrial fibrillation.  I discussed with Dr. Dye about probable surgery tomorrow. I called Dr. Martin and discussed ROM with him today.   4/12: patient seen and evaluated in the ICU. He remains on 4 liters nasal cannula oxygen and was on 6 liters over night. He was on IV heparin over night and has been in a sinus rhythm. He is scheduled f for surgery tomorrow.  4/13: Patient seen and evaluated in the cardiac intensive care unit this morning.  He will go to surgery this morning for valve replacement.  He was quite sleepy this morning and does awaken and tell me that his knee continues to be stiff and have pain.  Discussed with physical therapy and the  decision was made to arrange a CPM as he will likely come back on a ventilator.   Consultants/Specialty  Cardiology.   Dr. Dye, Cardiothoracic surgery  Critical Care. I discussed with Dr. Gonda on ICU Hot Rounds  Code Status  full    Disposition  CIC    Review of Systems  Review of Systems   Constitutional: Negative for fever.   Respiratory:        No shortness of breath at rest   Cardiovascular: Negative for chest pain.   Musculoskeletal:        Right knee pain/swelling   Psychiatric/Behavioral:        Sleepy   All other systems reviewed and are negative.       Physical Exam  Temp:  [35.9 °C (96.7 °F)-36.8 °C (98.3 °F)] 36.8 °C (98.3 °F)  Pulse:  [68-85] 79  Resp:  [13-27] 15  SpO2:  [90 %-98 %] 92 %    Physical Exam   Constitutional: He appears well-developed and well-nourished. No distress.   HENT:   Head: Atraumatic.   Cardiovascular: Normal rate and regular rhythm.    V/VI DIPTI left sternal border.   Sinus rhythm.   Pulmonary/Chest: Effort normal. No respiratory distress. He has no wheezes.   Abdominal: Soft. He exhibits no distension. There is no tenderness.   Musculoskeletal: He exhibits no edema.   Right knee incision is covered  Right knee is swollen though not painful.   Neurological:   He moves extremities equally   Skin: Skin is dry. No rash noted. He is not diaphoretic. There is pallor.   Psychiatric:   sleepy   Nursing note and vitals reviewed.      Fluids    Intake/Output Summary (Last 24 hours) at 04/13/19 0735  Last data filed at 04/13/19 0600   Gross per 24 hour   Intake          2173.33 ml   Output             2190 ml   Net           -16.67 ml       Laboratory  Recent Labs      04/11/19   0738  04/11/19   1730  04/13/19   0659   WBC  11.5*  12.1*  10.3   RBC  4.07*  4.61*  4.16*   HEMOGLOBIN  10.8*  12.2*  11.2*   HEMATOCRIT  33.8*  38.5*  33.7*   MCV  83.0  83.5  81.0*   MCH  26.5*  26.5*  26.9*   MCHC  32.0*  31.7*  33.2*   RDW  42.6  42.9  40.1   PLATELETCT  181  254  250   MPV  10.8  10.8   10.4     Recent Labs      04/11/19   0738   04/11/19   1730   04/12/19   0600  04/12/19   1200  04/13/19   0659   SODIUM  136   --   138   --    --    --   137   POTASSIUM  3.0*   < >  3.2*   < >  3.3*  3.7  3.5*   CHLORIDE  99   --   94*   --    --    --   102   CO2  27   --   27   --    --    --   24   GLUCOSE  162*   --   239*   --    --    --   169*   BUN  22   --   20   --    --    --   27*   CREATININE  1.03   --   1.16   --    --    --   1.09   CALCIUM  7.1*   --   7.3*   --    --    --   7.3*    < > = values in this interval not displayed.     Recent Labs      04/11/19   0652  04/11/19   1015  04/11/19   1730  04/12/19   0040   APTT  132.6*  47.5*  49.4*  45.6*   INR  1.33*   --   1.18*   --                Imaging  US-CAROTID DOPPLER BILAT   Final Result      EC-ECHOCARDIOGRAM COMPLETE W/O CONT   Final Result      CT-CTA CHEST PULMONARY ARTERY W/ RECONS   Final Result      1.  Patchy groundglass opacification with septal thickening, perihilar in distribution. Findings are nonspecific and may represent pulmonary edema or an infectious/inflammatory process.      2.  Small left pleural effusion.      3.  Cardiomegaly.      4.  Mild nonspecific mediastinal adenopathy            DX-CHEST-LIMITED (1 VIEW)   Final Result      1.  Cardiomegaly.      2.  Increased perihilar interstitial markings with left basilar and right upper lobe hazy opacification. Findings may be related to pulmonary edema. Typical infection could have a similar appearance.      EC-MICAH W/O CONT    (Results Pending)   CL-LEFT HEART CATHETERIZATION WITH POSSIBLE INTERVENTION    (Results Pending)        Assessment/Plan  * Acute respiratory failure with hypoxia with pulmonary edema (HCC)   Assessment & Plan    Secondary to severe mitral regurgitation  He is requiring supplemental oxygen and critical care is consulted  IV lasix for diuresis as tolerated--potassium is low at 3.5 with replacement  Supplemental oxygen at 4 liters      Mitral valve  disease   Assessment & Plan    Severe mitral regurgitation noted on echocardiogram  Clear coronaries on cardiac cath.  Mitral valve surgery on 4/13 by Dr. Dye      Atrial fibrillation ([facility])   Assessment & Plan    Paroxysmal atrial fibrillation.  IV heparin drip for anticoagulation and titrate PTTs accordingly and stop prior to surgery.  S/p IV amiodarone drip for rate/rhythm control.      Obstructive sleep apnea   Assessment & Plan    Resume home nocturnal CPAP     S/P total knee arthroplasty, right   Assessment & Plan    Status post right knee arthroplasty by Dr. Andino, orthopedics in [facility]  Oral pain medications on is n.p.o. IV morphine for breakthrough pain  I called Dr. Andino on 4/11 and he recommends PT for 15 minutes of ROM as tolerated at least BID; CPM is not indicated unless the ROM is not able to be performed.      Pulmonary edema- (present on admission)   Assessment & Plan    Postoperative pulmonary edema  IV Lasix  Secondary to mitral valve regurgitation.      Hypothyroid- (present on admission)   Assessment & Plan    High dose Synthroid 300 mcg outpatient  TSH is suppressed at <0.005  Decreased dose to 175 mcg daily.        Volume overload   Assessment & Plan    IV Lasix  Potassium scale ordered for replacement          VTE prophylaxis: IV heparin drip to be turned off

## 2019-04-13 NOTE — ANESTHESIA QCDR
2019 [facility] Clinical Data Registry (for Quality Improvement)     Postoperative nausea/vomiting risk protocol (Adult = 18 yrs and Pediatric 3-17 yrs)- (430 and 463)  General inhalation anesthetic (NOT TIVA) with PONV risk factors: Yes  Provision of anti-emetic therapy with at least 2 different classes of agents: Yes   Patient DID NOT receive anti-emetic therapy and reason is documented in Medical Record:  N/A    Multimodal Pain Management- (AQI59)  Patient undergoing Elective Surgery (i.e. Outpatient, or ASC, or Prescheduled Surgery prior to Hospital Admission): No  Use of Multimodal Pain Management, two or more drugs and/or interventions, NOT including systemic opioids: N/A  Exception: Documented allergy to multiple classes of analgesics: N/A    PACU assessment of acute postoperative pain prior to Anesthesia Care End- Applies to Patients Age = 18- (ABG7)  Initial PACU pain score is which of the following: Pain not assessed  Patient unable to report pain score: Yes (Patient Unable to Report)    Post-anesthetic transfer of care checklist/protocol to PACU/ICU- (426 and 427)  Upon conclusion of case, patient transferred to which of the following locations: ICU  Use of transfer checklist/protocol: Yes  Exclusion: Service Performed in Patient Hospital Room (and thus did not require transfer): N/A    PACU Reintubation- (AQI31)  General anesthesia requiring endotracheal intubation (ETT) along with subsequent extubation in OR or PACU: No  Required reintubation in the PACU: N/A  Extubation was a planned trial documented in the medical record prior to removal of the original airway device: N/A    Unplanned admission to ICU related to anesthesia service up through end of PACU care- (MD51)  Unplanned admission to ICU (not initially anticipated at anesthesia start time): No

## 2019-04-13 NOTE — PROGRESS NOTES
Yenifer from PT followed up. Stated to call traction to have CPM set up.    Called traction, states they can set CPM up but they need order with specific flexion and extension degrees from MD.    Dr. Gonda notified, order to be placed.

## 2019-04-13 NOTE — ANESTHESIA PREPROCEDURE EVALUATION
Relevant Problems   (+) Acute respiratory failure with hypoxia with pulmonary edema (HCC)   (+) Atrial fibrillation (HCC)   (+) HTN (hypertension)   (+) Hypothyroid   (+) Mitral valve disease   (+) Obstructive sleep apnea   (+) Pulmonary edema   (+) S/P total knee arthroplasty, right       Physical Exam    Airway   Mallampati: I  TM distance: >3 FB  Neck ROM: full       Cardiovascular   Rhythm: regular  Rate: normal     Dental   (+) upper dentures        Very poor dentition   Pulmonary   (+) decreased breath sounds     Abdominal    Neurological - normal exam                 Anesthesia Plan    ASA 4   ASA physical status 4 criteria: severe valve dysfunction    Plan - general       Airway plan will be ETT        Induction: intravenous      Pertinent diagnostic labs and testing reviewed    Informed Consent:    Anesthetic plan and risks discussed with patient.

## 2019-04-13 NOTE — THERAPY
"Physical Therapy Treatment completed.        Plan of Care: Will benefit from Physical Therapy 3 times per week  Discharge Recommendations: Equipment: Will Continue to Assess for Equipment Needs.     Pt with improving tolerance of range, achieving full neutral this session, ~ 60 deg flexion with empty end feel due to pain; discussed positioning to reduce torque and improve comfort; appears more comfortable today and willing to mobilize to improve OHS outcomes; will follow; of note, discussed care with hospitalist re: ROM and frequency; will discuss with bedside RN post op for optimal carryover and donning of CPM as this will likely be pt's best option at maintaining current willingness to move extremity while post op vented/sedated and acute PT will continue to progress once pt able to participate;       See \"Rehab Therapy-Acute\" Patient Summary Report for complete documentation.       "

## 2019-04-13 NOTE — PROGRESS NOTES
Critical Care Progress Note    Date of admission  4/9/2019    Chief Complaint  54 y.o. male admitted 4/9/2019 with shortness of breath    Hospital Course    54 y.o. male who presented 4/9/2019 with shortness of breath from [facility].  He presented there on April 8 for an elective right knee total arthroplasty that was performed without complications.  That evening he started to develop shortness of breath that he says he has had intermittently for the last few months.  He started to cough a frothy tinged mucus.  Chest x-ray revealed pulmonary edema and a CT PE study was negative for pulmonary embolism.  He underwent echocardiography which unfortunately revealed severe mitral valve regurgitation for which she was transferred to [facility].  He was given Lasix and is being evaluated by cardiothoracic surgery for emergent valve replacement.  He was transferred to the intensive care unit for acute hypoxic respiratory failure and I was consulted for his critical care management.        Interval Problem Update  Reviewed last 24 hour events:   - to OR today for MVR, returns intubated without immediate complications   - improving leukocytosis   - Insulin drip    Review of Systems  Review of Systems   Unable to perform ROS: Intubated        Vital Signs for last 24 hours   Temp:  [36.3 °C (97.3 °F)-37.3 °C (99.1 °F)] 37.3 °C (99.1 °F)  Pulse:  [73-85] 82  Resp:  [13-27] 15  BP: (127)/(79) 127/79  SpO2:  [90 %-98 %] 94 %     Respiratory Information for the last 24 hours  Logan Vent Mode: ASV  Rate (breaths/min): 22  PEEP/CPAP: 10  P MEAN: 13  Control VTE (exp VT): 481    Physical Exam   Physical Exam   Constitutional: He appears well-developed and well-nourished. He is uncooperative.  Non-toxic appearance. He appears ill. He is sedated and intubated.   HENT:   Head: Normocephalic and atraumatic.   Nose: Nose normal.   Mouth/Throat: Oropharynx is clear and moist.   Endotracheal tube and gastric tube in  position   Eyes: Pupils are equal, round, and reactive to light. Conjunctivae are normal. No scleral icterus.   Pupils are 1-2 mm bilaterally   Neck: No JVD present. No tracheal deviation present.   Right IJ central venous catheter in position without surrounding hematoma   Cardiovascular: Normal rate, regular rhythm and intact distal pulses.   Occasional extrasystoles are present. PMI is displaced.  Exam reveals distant heart sounds.    Murmur (Improving) heard.  Pulses:       Radial pulses are 1+ on the right side, and 1+ on the left side.   Epinephrine drip at 0.04, Cleviprex drip, sternotomy incision is clean/dry/intact, mediastinal chest tubes x2 with minimal serosanguineous output, V wires in place but maintaining normal rhythm without pacing   Pulmonary/Chest: Effort normal. No accessory muscle usage. No tachypnea. He is intubated. He has no decreased breath sounds. He has no wheezes. He has rhonchi in the left lower field. He has rales in the right lower field and the left lower field.   ASV mode with 160% support, PEEP of 10, 100% FiO2 requiring pressure support of 8-10 with a peak pressure of 20   Abdominal: Soft. Bowel sounds are normal. He exhibits no distension. There is no tenderness. There is no rebound.   Genitourinary: Penis normal.   Genitourinary Comments: Holder catheter in place   Musculoskeletal: He exhibits edema and tenderness (Of the right knee, dressing clean/dry/intact, good distal circulation). He exhibits no deformity.   Neurological: He is unresponsive.   Heavily sedated post surgery, Precedex drip   Skin: Skin is dry. No pallor.   Cool   Psychiatric:   Unable to assess given current clinical condition   Nursing note and vitals reviewed.      Medications  Current Facility-Administered Medications   Medication Dose Route Frequency Provider Last Rate Last Dose   • K+ Scale: Goal of 4.5  1 Each Intravenous Q6HRS Malina Locke       • aminocaproic acid (AMICAR) 9.55 g in  mL IV Bolus   100 mg/kg Intravenous Once Malina Locke       • aminocaproic acid (AMICAR) 5 g in  mL Infusion  2 g/hr Intravenous Once Malina Locke       • EPINEPHrine 4 mg in  mL Infusion  0-0.2 mcg/kg/min Intravenous Continuous Malina Locke       • dexmedetomidine (PRECEDEX) 400 mcg/100mL NS premix infusion  0-1.5 mcg/kg/hr Intravenous Continuous Malina Locke       • [MAR Hold] levothyroxine (SYNTHROID) tablet 175 mcg  175 mcg Oral DAILY Mariano Cain M.D.   Stopped at 04/12/19 0600   • [MAR Hold] pravastatin (PRAVACHOL) tablet 40 mg  40 mg Oral DAILY Ld Morel M.D.   Stopped at 04/12/19 0600       Fluids    Intake/Output Summary (Last 24 hours) at 04/13/19 0824  Last data filed at 04/13/19 0600   Gross per 24 hour   Intake          2047.33 ml   Output             1915 ml   Net           132.33 ml       Laboratory          Recent Labs      04/11/19   0116  04/11/19 0738   04/11/19   1730 04/12/19   0600 04/12/19   1200  04/13/19   0659   SODIUM  139  136   --   138   --    --    --   137   POTASSIUM  3.1*  3.0*   < >  3.2*   < >  3.3*  3.7  3.5*   CHLORIDE  99  99   --   94*   --    --    --   102   CO2  29  27   --   27   --    --    --   24   BUN  23*  22   --   20   --    --    --   27*   CREATININE  1.09  1.03   --   1.16   --    --    --   1.09   MAGNESIUM  1.4*   --    --    --    --   2.1   --   1.9   CALCIUM  7.4*  7.1*   --   7.3*   --    --    --   7.3*    < > = values in this interval not displayed.     Recent Labs      04/11/19 0738 04/11/19 1730 04/13/19 0659   ALTSGPT  <5  <5  <5   ASTSGOT  10*  11*  9*   ALKPHOSPHAT  51  62  52   TBILIRUBIN  0.6  0.6  0.7   GLUCOSE  162*  239*  169*     Recent Labs      04/11/19   0738  04/11/19 1730  04/13/19   0659   WBC  11.5*  12.1*  10.3   NEUTSPOLYS   --   78.90*  75.00*   LYMPHOCYTES   --   8.00*  8.50*   MONOCYTES   --   10.70  11.30   EOSINOPHILS   --   1.60  4.30   BASOPHILS   --   0.20  0.20   ASTSGOT  10*  11*  9*    ALTSGPT  <5  <5  <5   ALKPHOSPHAT  51  62  52   TBILIRUBIN  0.6  0.6  0.7     Recent Labs      04/11/19   0652  04/11/19   0738  04/11/19   1015  04/11/19   1730  04/12/19   0040  04/13/19   0659   RBC   --   4.07*   --   4.61*   --   4.16*   HEMOGLOBIN   --   10.8*   --   12.2*   --   11.2*   HEMATOCRIT   --   33.8*   --   38.5*   --   33.7*   PLATELETCT   --   181   --   254   --   250   PROTHROMBTM  16.6*   --    --   15.1*   --    --    APTT  132.6*   --   47.5*  49.4*  45.6*   --    INR  1.33*   --    --   1.18*   --    --        Imaging  X-Ray:  I have personally reviewed the images and compared with prior images. and My impression is: Moderate pulmonary edema with enlarged cardiac silhouette, sternotomy wires noted with retrocardiac opacification, endotracheal tube/gastric tube/right IJ central venous catheter/PA catheter/mediastinal chest tubes x2 in good position  EKG:  I have personally reviewed the images and compared with prior images. and My impression is: Normal sinus rhythm with a rate of 78 with QTc interval prolonged at 567, normal voltage, no ST changes    Assessment/Plan  * Acute respiratory failure with hypoxia with pulmonary edema (HCC)   Assessment & Plan    Intubated in the OR 4/13  Continue full vent management using ASV mode  Begin weaning process per protocol  RT/O2 protocol, titrate oxygen to keep SaO2 greater than 92%  Limit sedatives (Precedex drip)  Early mobilization  Diuresis     Mitral valve disease   Assessment & Plan    Severe regurgitation with valve dysfunction status post mitral valve repair 4/13 (Dr. Dye)  Routine postop care  Analgesia  Monitor mediastinal chest tubes  V pacing wires in place if needed  Strict blood pressure control  Perioperative shock management  Anticoagulation per surgery  Insulin drip for tight glycemic control     Atrial fibrillation (HCC)   Assessment & Plan    Remains in normal sinus rhythm times 48 hours  Optimize electrolytes  Continuous  telemetry     Other hyperlipidemia   Assessment & Plan    Statin     Obstructive sleep apnea   Assessment & Plan    CPAP nightly once extubated     S/P total knee arthroplasty, right   Assessment & Plan    Analgesia  Therapy  CPM  DVT prophylaxis     Pulmonary edema   Assessment & Plan    Worsening postoperatively as expected  Continue diuresis  Blood pressure control     Hypothyroid- (present on admission)   Assessment & Plan    Continue Synthroid     Hypomagnesemia   Assessment & Plan    Repletion and monitor closely     Hypokalemia   Assessment & Plan    Repletion and monitor closely     Volume overload   Assessment & Plan    Continue diuresis  Monitor strict ins and outs and kidney function     Full code    VTE:  Contraindicated  Ulcer: H2 Antagonist  Lines: Central Line  Ongoing indication addressed, Arterial Line  Ongoing indication addressed and Holder Catheter  Ongoing indication addressed    I have performed a physical exam and reviewed and updated ROS and Plan today (4/13/2019). In review of yesterday's note (4/12/2019), there are no changes except as documented above.     Patient is critically ill today requiring active management of his mechanical ventilatory support, titration of the vasoactive drips and sedation. High risk of deterioration and worsening vital organ dysfunction and death without the above critical care interventions.    Discussed patient condition and risk of morbidity and/or mortality with Hospitalist, RN, RT, Pharmacy, Charge nurse / hot rounds and cardiology and [facility].  Critical care time: 35 minutes.  No time overlap.  Procedures are not included in this time.

## 2019-04-13 NOTE — ANESTHESIA PROCEDURE NOTES
Central Venous Line  Performed by: MIGUEL ERIC  Authorized by: MIGUEL ERIC     Start Time:  4/13/2019 8:15 AM  End Time:  4/13/2019 8:24 AM  Patient Location:  OR  Indication: central venous access    provider hand hygiene performed prior to central venous catheter insertion, all 5 sterile barriers used (gloves, gown, cap, mask, large sterile drape) during central venous catheter insertion and skin prep agent completely dried prior to procedure    Patient Position:  Trendelenburg  Laterality:  Right  Site:  Internal jugular  Prep:  Chlorhexidine  Catheter Size:  7 Fr  Catheter Length (cm):  20  Number of Lumens:  Triple lumen  target vein identified, needle advanced into vein and blood aspirated and guidewire advanced into vein    Seldinger Technique?: Yes    Ultrasound-Guided: ultrasound-guided  Image captured, interpreted and electronically stored.  Sterile Gel and Probe Cover Used for Ultrasound?: Yes    Intravenous Verification: verified by ultrasound, venous blood return and chest x-ray pending    all ports aspirated, all ports flushed easily, guidewire was removed intact, biopatch was applied, line was sutured in place and dressing was applied    Events: patient tolerated procedure well with no complications    PA Catheter Placed?: Yes    PA Catheter Type:  Oximetric  PA Catheter Size:  7  Laterality:  Right  Site:  Through introducer  Placement Confirmation: MICAH and waveform    PA Catheter Depth (cm):  60  Events: patient tolerated procedure well with no complications and unable to wedge     Central line advanced to 16 cm at the skin  PA catheter will be repositioned after case and wedge will be attempted

## 2019-04-13 NOTE — PROGRESS NOTES
Call to physical therapy for continuous passive movement machine for patient's right knee. Yenifer stated she would follow up with physical therapist if that is their department's responsibility, and will call back with follow-up.

## 2019-04-13 NOTE — CARE PLAN
Problem: Oxygenation:  Goal: Maintain adequate oxygenation dependent on patient condition    Intervention: Manage oxygen therapy by monitoring pulse oximetry and/or ABG values  Wean fio2  As tolerated to extubate      Problem: Ventilation Defect:  Goal: Ability to achieve and maintain unassisted ventilation or tolerate decreased levels of ventilator support  Outcome: PROGRESSING AS EXPECTED  Day #1   ETT #8.5  24 cm at lips  Vent setting  ASV mode  140%  Ve   .+8, 40%  Wean  To 100% Ve to extubate

## 2019-04-13 NOTE — ANESTHESIA TIME REPORT
Anesthesia Start and Stop Event Times     Date Time Event    4/13/2019 0756 Anesthesia Start     1127 Anesthesia Stop        Responsible Staff  04/13/19    Name Role Begin End    Carlos Alberto Ortiz M.D. Anesth 0756 1127        Preop Diagnosis (Free Text):  Pre-op Diagnosis     severe mitral regurgitation        Preop Diagnosis (Codes):  Diagnosis Information     Diagnosis Code(s):         Post op Diagnosis  Severe mitral valve regurgitation      Premium Reason  E. Weekend    Comments:

## 2019-04-13 NOTE — CARE PLAN
Problem: Pre Op  Goal: Optimal preparation for CABG/Heart Valve surgery    Intervention: Pre Op education to patient/significant other. Provide patient [facility] Patient Guideline for Cardiac Surgery (See Pt. Ed.)  Pre op education given by Abdullahi on 04/12/19  Intervention: Consents obtained for Surgery, Anesthesia and Transfusion/Bloodless Program Participant  Consent for surgery and blood signed.   Intervention: Pre op checklist completed. Admit profile completed. Advanced directive verified.  Completed    Intervention: Pre op labs per MD order: CBC, CMP, INR, PTT, COD if not done in past 72 hours.  HbA1C if diabetic.  Done within 72 hours     Intervention: Pre op diagnostics per MD order (EKG, CXR, Bilateral carotid doppler study and vein mapping)  Completed prior     Intervention: Baseline assessment documented to include IS volume, weight, bilateral BP and peripheral pulses.  Baseline IS 2500  Left arm /78  Right arm /76  Weight 90.8K  2+ bilateral upper and lower pulses     Intervention: NPO at midnight except cardiac medications. (No ASA, coumadin or Plavix)  NPO since midnight     Intervention: Shower with chlorhexidine x 2  Completed    Intervention: Prep with clippers  Completed prior     Intervention: Remove dentures, valuables and jewelry  Completed    Intervention: Cardiac/BP meds and Mupirocin ointment given prior to surgery. Verify orders for hold or administer of anticoags.  Given     Intervention: If diabetic, administer insulin night before surgery  Finger stick 161, one unit given

## 2019-04-13 NOTE — OP REPORT
DATE OF SERVICE:  04/13/2019    REFERRING PHYSICIAN:  Tori Purdy MD    PREOPERATIVE DIAGNOSES:  Acute congestive heart failure, valvular heart   disease (New York Heart Association class 3-4), severe mitral regurgitation   (4+, degenerative), posterior mitral valve leaflet prolapse, acute respiratory   failure, pulmonary edema, atrial fibrillation, obstructive sleep apnea,   hypothyroidism.      POSTOPERATIVE DIAGNOSES:  Acute congestive heart failure, valvular heart   disease (New York Heart Association class 3-4), severe mitral regurgitation   (4+, degenerative), posterior mitral valve leaflet prolapse, acute respiratory   failure, pulmonary edema, atrial fibrillation, obstructive sleep apnea,   hypothyroidism.      PROCEDURE PERFORMED:  Radical mitral valve repair (P2, triangular resection,   38 mm Gray flexible annuloplasty band), left atrial appendage ligation and   intraoperative transesophageal echocardiography.      SURGEON:  Kian Dye MD    FIRST ASSISTANT:  INOCENTE Bell    ANESTHESIOLOGIST:  Carlos Alberto Ortiz MD    ANESTHESIA:  General endotracheal.    ESTIMATED BLOOD LOSS:  Minimal.    DRAINS:  Mediastinal chest tubes x2 (36-Bolivian angled and 32-Bolivian straight).    MISCELLANEOUS:  Temporary epicardial ventricular pacemaker wires.    COMPLICATIONS:  None.    INDICATIONS:  The patient is a 54-year-old male who recently underwent right   knee replacement.  He developed acute respiratory failure postoperatively.    Echocardiography showed severe mitral regurgitation and P2 prolapse.  His left   ventricular ejection fraction was normal at approximately 65%.  Cardiac   catheterization did not show any hemodynamically significant coronary artery   disease.    DESCRIPTION OF PROCEDURE:  The patient was brought to the operating room and   placed on the operating room table in the supine position.  After successful   induction of general anesthesia with endotracheal intubation, the patient was    prepped and draped in the usual sterile fashion.  INOCENTE Bell, assisted   with exposure, retraction, valve repair during the operation and she also   closed the sternal wound.  Intraoperative transesophageal echocardiography   showed severe mitral regurgitation and P2 and partial P3 prolapse with   multiple ruptured primary cords.  His left ventricular ejection fraction was   normal at approximately 60%.  An incision was made from the sternal notch to   the xiphoid.  The sternum was opened longitudinally with a sternal saw.    Hemostasis was obtained with electrocautery of the sternal edges.  The patient   was systemically heparinized.  The pericardium was opened longitudinally and   tented anteriorly with Ethibond stay stitches.  The aortic cannula was   inserted first followed by dual-stage venous cannula.  An antegrade   cardioplegia cannula was placed in the ascending aorta.  Cardiopulmonary   bypass was instituted.  The aorta was cross-clamped and the patient was given   1 L of cold cardioplegia in an antegrade fashion.  There was prompt cardiac   arrest.  Ice slush was placed on the heart for further myocardial protection.    A phrenic nerve protector pad was used.  From this point on, cardioplegia was   given in an antegrade fashion every 20 minutes while the aorta was   cross-clamped.  The left atrial appendage was ligated at its base and excised.    The stump was oversewn in 2 layers using #4-0 Prolene sutures.  Left   atriotomy was performed just below the interatrial groove.  The mitral valve   leaflets had fairly normal appearance.  They were only slightly sclerotic.    There was severe prolapse of P2 and partial prolapse of P3.  There were   several ruptured primary cords.  A small triangular resection of P2 was   performed and the edges were reapproximated using #4-0 Prolene sutures in a   figure-of-eight fashion.  A #2-0 Ethibond stitches were then placed around the   mitral valve annulus from  trigone to trigone.  A 38 mm Gray flexible   annuloplasty band was then placed in the mitral valve annulus and secured in   place with the Ethibond stitches.  Testing of the repair with cold saline   showed mild mitral regurgitation in the area of the leaflet repair.  This was   felt to be more than acceptable.  Rewarming of the patient was initiated.  The   left atriotomy was closed in 2 layers using #4-0 Prolene sutures.  The aortic   cross-clamp was removed.  The aortic cross-clamp time was 60 minutes.  Total   cardiopulmonary bypass time was 70 minutes.  The left ventricle was deaired in   the usual fashion.  The carbon dioxide which had been released over the   operative field during the operation was discontinued.  A straight and angled   32 and 36-Upper sorbian chest tubes were placed in the mediastinum.  Temporary   epicardial ventricular pacemaker wires were inserted.  There was spontaneous   conversion into sinus rhythm.  The antegrade cardioplegia cannula was removed.    When he was adequately warmed, he was slowly taken off cardiopulmonary   bypass, which he tolerated well.  The dual-stage venous cannula was removed.    Protamine was given to reverse the effects of heparin.  The aortic cannula was   removed.  When adequate hemostasis had been obtained, the sternum was   reapproximated using size 5 sternal wires and the remainder of the incision   was closed in 3 layers using Vicryl sutures.  Intraoperative transesophageal   echocardiography showed good mitral valve repair with only mild mitral   regurgitation.  His left ventricular ejection fraction remained the same at   approximately 60%.  There were no apparent complications.  The patient   tolerated the procedure well and left the operating room in guarded condition.       ____________________________________     Kian VALLEJO    DD:  04/13/2019 11:13:33  DT:  04/13/2019 11:35:16    D#:  [id number]  Job#:  [id number]

## 2019-04-13 NOTE — PROGRESS NOTES
Patient has complained about his veins burning from the IV potassium. Potassium administered at half rate for patient's comfort but it did not help the burning sensation. Dr. Purdy notified. Orders received to stop Kscale and to administer 40mg PO Potassium q day with a dose to be given tonight. Discussed that patient only got the 10mEq IV of the 20mEq IV ordered from the 1200 potassium lab draw. Ok to hold the 10mEq that is still ordered and to just give the 40mg PO.

## 2019-04-13 NOTE — OR SURGEON
Immediate Post OP Note    PreOp Diagnosis: Acute congestive heart failure valvular heart disease (NYHA class III-IV), severe mitral regurgitation (4+, degenerative), posterior mitral valve leaflet prolapse, acute respiratory failure, pulmonary edema, obstructive sleep apnea, hypothyroidism.    PostOp Diagnosis: Same    Procedure(s):  RADICAL MITRAL VALVE REPAIR (P2 triangular resection, 38 mm Gray flexible annuloplasty band)  ASHOK LIGATION  ECHOCARDIOGRAM, TRANSESOPHAGEAL    Surgeon(s):  Kian Dye M.D.    Assistant:  INOCENTE Bell    Anesthesiologist/Type of Anesthesia:  Anesthesiologist: Carlos Alberto Ortiz M.D./General    Surgical Staff:  Assistant: Malina Locke  Circulator: Charmaine Davis R.N.  Perfusionist: Kwasi Alexander  Scrub Person: Negrita King; EMMA Page IV    Specimens removed if any:  ID Type Source Tests Collected by Time Destination   A : left atrial appendage Tissue Heart PATHOLOGY SPECIMEN Kian Dye M.D. 4/13/2019  9:35 AM    B : mitral valve leaflet Tissue Heart Valve PATHOLOGY SPECIMEN Kian Dye M.D. 4/13/2019 10:25 AM        Estimated Blood Loss: Minimal    Findings: P2 and partial P3 prolapse with ruptured primary cords    Complications: None        4/13/2019 10:56 AM Kian Dye M.D.

## 2019-04-14 ENCOUNTER — APPOINTMENT (OUTPATIENT)
Dept: RADIOLOGY | Facility: MEDICAL CENTER | Age: 54
DRG: 216 | End: 2019-04-14
Attending: NURSE PRACTITIONER
Payer: MEDICAID

## 2019-04-14 LAB
ANION GAP SERPL CALC-SCNC: 7 MMOL/L (ref 0–11.9)
BUN SERPL-MCNC: 23 MG/DL (ref 8–22)
CALCIUM SERPL-MCNC: 7.8 MG/DL (ref 8.5–10.5)
CHLORIDE SERPL-SCNC: 108 MMOL/L (ref 96–112)
CO2 SERPL-SCNC: 25 MMOL/L (ref 20–33)
CREAT SERPL-MCNC: 0.94 MG/DL (ref 0.5–1.4)
EKG IMPRESSION: NORMAL
ERYTHROCYTE [DISTWIDTH] IN BLOOD BY AUTOMATED COUNT: 42.6 FL (ref 35.9–50)
GLUCOSE BLD-MCNC: 103 MG/DL (ref 65–99)
GLUCOSE BLD-MCNC: 104 MG/DL (ref 65–99)
GLUCOSE BLD-MCNC: 115 MG/DL (ref 65–99)
GLUCOSE BLD-MCNC: 116 MG/DL (ref 65–99)
GLUCOSE BLD-MCNC: 126 MG/DL (ref 65–99)
GLUCOSE BLD-MCNC: 129 MG/DL (ref 65–99)
GLUCOSE BLD-MCNC: 139 MG/DL (ref 65–99)
GLUCOSE BLD-MCNC: 144 MG/DL (ref 65–99)
GLUCOSE BLD-MCNC: 161 MG/DL (ref 65–99)
GLUCOSE BLD-MCNC: 175 MG/DL (ref 65–99)
GLUCOSE BLD-MCNC: 184 MG/DL (ref 65–99)
GLUCOSE BLD-MCNC: 191 MG/DL (ref 65–99)
GLUCOSE BLD-MCNC: 192 MG/DL (ref 65–99)
GLUCOSE BLD-MCNC: 242 MG/DL (ref 65–99)
GLUCOSE BLD-MCNC: 255 MG/DL (ref 65–99)
GLUCOSE BLD-MCNC: 57 MG/DL (ref 65–99)
GLUCOSE BLD-MCNC: 57 MG/DL (ref 65–99)
GLUCOSE BLD-MCNC: 64 MG/DL (ref 65–99)
GLUCOSE BLD-MCNC: 70 MG/DL (ref 65–99)
GLUCOSE BLD-MCNC: 95 MG/DL (ref 65–99)
GLUCOSE SERPL-MCNC: 65 MG/DL (ref 65–99)
HCT VFR BLD AUTO: 30.5 % (ref 42–52)
HGB BLD-MCNC: 9.6 G/DL (ref 14–18)
INR PPP: 1.15 (ref 0.87–1.13)
MCH RBC QN AUTO: 26.4 PG (ref 27–33)
MCHC RBC AUTO-ENTMCNC: 31.5 G/DL (ref 33.7–35.3)
MCV RBC AUTO: 83.8 FL (ref 81.4–97.8)
PLATELET # BLD AUTO: 209 K/UL (ref 164–446)
PMV BLD AUTO: 11.1 FL (ref 9–12.9)
POTASSIUM SERPL-SCNC: 4.3 MMOL/L (ref 3.6–5.5)
POTASSIUM SERPL-SCNC: 4.6 MMOL/L (ref 3.6–5.5)
PROTHROMBIN TIME: 14.8 SEC (ref 12–14.6)
RBC # BLD AUTO: 3.64 M/UL (ref 4.7–6.1)
SODIUM SERPL-SCNC: 140 MMOL/L (ref 135–145)
WBC # BLD AUTO: 18.5 K/UL (ref 4.8–10.8)

## 2019-04-14 PROCEDURE — 770020 HCHG ROOM/CARE - TELE (206)

## 2019-04-14 PROCEDURE — 700105 HCHG RX REV CODE 258: Performed by: NURSE PRACTITIONER

## 2019-04-14 PROCEDURE — 85610 PROTHROMBIN TIME: CPT

## 2019-04-14 PROCEDURE — A9270 NON-COVERED ITEM OR SERVICE: HCPCS | Performed by: INTERNAL MEDICINE

## 2019-04-14 PROCEDURE — 82962 GLUCOSE BLOOD TEST: CPT | Mod: 91

## 2019-04-14 PROCEDURE — 80048 BASIC METABOLIC PNL TOTAL CA: CPT

## 2019-04-14 PROCEDURE — 99024 POSTOP FOLLOW-UP VISIT: CPT | Performed by: NURSE PRACTITIONER

## 2019-04-14 PROCEDURE — 94668 MNPJ CHEST WALL SBSQ: CPT

## 2019-04-14 PROCEDURE — 94660 CPAP INITIATION&MGMT: CPT

## 2019-04-14 PROCEDURE — 99233 SBSQ HOSP IP/OBS HIGH 50: CPT | Performed by: INTERNAL MEDICINE

## 2019-04-14 PROCEDURE — 94667 MNPJ CHEST WALL 1ST: CPT

## 2019-04-14 PROCEDURE — 700102 HCHG RX REV CODE 250 W/ 637 OVERRIDE(OP): Performed by: NURSE PRACTITIONER

## 2019-04-14 PROCEDURE — 93010 ELECTROCARDIOGRAM REPORT: CPT | Performed by: INTERNAL MEDICINE

## 2019-04-14 PROCEDURE — 71045 X-RAY EXAM CHEST 1 VIEW: CPT

## 2019-04-14 PROCEDURE — 85027 COMPLETE CBC AUTOMATED: CPT

## 2019-04-14 PROCEDURE — 700102 HCHG RX REV CODE 250 W/ 637 OVERRIDE(OP): Performed by: HOSPITALIST

## 2019-04-14 PROCEDURE — A9270 NON-COVERED ITEM OR SERVICE: HCPCS | Performed by: HOSPITALIST

## 2019-04-14 PROCEDURE — 700102 HCHG RX REV CODE 250 W/ 637 OVERRIDE(OP): Performed by: INTERNAL MEDICINE

## 2019-04-14 PROCEDURE — A9270 NON-COVERED ITEM OR SERVICE: HCPCS | Performed by: NURSE PRACTITIONER

## 2019-04-14 PROCEDURE — 700111 HCHG RX REV CODE 636 W/ 250 OVERRIDE (IP): Performed by: NURSE PRACTITIONER

## 2019-04-14 PROCEDURE — 93005 ELECTROCARDIOGRAM TRACING: CPT | Performed by: NURSE PRACTITIONER

## 2019-04-14 RX ORDER — FUROSEMIDE 10 MG/ML
20 INJECTION INTRAMUSCULAR; INTRAVENOUS EVERY 12 HOURS
Status: DISCONTINUED | OUTPATIENT
Start: 2019-04-14 | End: 2019-04-14

## 2019-04-14 RX ORDER — CARVEDILOL 3.12 MG/1
3.12 TABLET ORAL 2 TIMES DAILY WITH MEALS
Status: DISCONTINUED | OUTPATIENT
Start: 2019-04-14 | End: 2019-04-22 | Stop reason: HOSPADM

## 2019-04-14 RX ORDER — FUROSEMIDE 10 MG/ML
40 INJECTION INTRAMUSCULAR; INTRAVENOUS 2 TIMES DAILY
Status: DISCONTINUED | OUTPATIENT
Start: 2019-04-14 | End: 2019-04-20

## 2019-04-14 RX ORDER — WARFARIN SODIUM 5 MG/1
5 TABLET ORAL
Status: COMPLETED | OUTPATIENT
Start: 2019-04-14 | End: 2019-04-14

## 2019-04-14 RX ORDER — DEXTROSE MONOHYDRATE 25 G/50ML
25 INJECTION, SOLUTION INTRAVENOUS
Status: DISCONTINUED | OUTPATIENT
Start: 2019-04-14 | End: 2019-04-15

## 2019-04-14 RX ORDER — TRAZODONE HYDROCHLORIDE 50 MG/1
50 TABLET ORAL
Status: DISCONTINUED | OUTPATIENT
Start: 2019-04-14 | End: 2019-04-22 | Stop reason: HOSPADM

## 2019-04-14 RX ORDER — POTASSIUM CHLORIDE 20 MEQ/1
20 TABLET, EXTENDED RELEASE ORAL 2 TIMES DAILY
Status: DISCONTINUED | OUTPATIENT
Start: 2019-04-14 | End: 2019-04-15

## 2019-04-14 RX ADMIN — PRAVASTATIN SODIUM 40 MG: 20 TABLET ORAL at 04:48

## 2019-04-14 RX ADMIN — CARVEDILOL 3.12 MG: 3.12 TABLET, FILM COATED ORAL at 17:32

## 2019-04-14 RX ADMIN — TRAMADOL HYDROCHLORIDE 50 MG: 50 TABLET, FILM COATED ORAL at 17:32

## 2019-04-14 RX ADMIN — TRAZODONE HYDROCHLORIDE 50 MG: 50 TABLET ORAL at 22:52

## 2019-04-14 RX ADMIN — FUROSEMIDE 40 MG: 10 INJECTION, SOLUTION INTRAMUSCULAR; INTRAVENOUS at 17:32

## 2019-04-14 RX ADMIN — INSULIN HUMAN 2 UNITS: 100 INJECTION, SOLUTION PARENTERAL at 01:00

## 2019-04-14 RX ADMIN — INSULIN HUMAN 1 UNITS: 100 INJECTION, SOLUTION PARENTERAL at 17:37

## 2019-04-14 RX ADMIN — OXYCODONE HYDROCHLORIDE 10 MG: 10 TABLET ORAL at 20:21

## 2019-04-14 RX ADMIN — OXYCODONE HYDROCHLORIDE 10 MG: 10 TABLET ORAL at 11:29

## 2019-04-14 RX ADMIN — INSULIN HUMAN 1 UNITS: 100 INJECTION, SOLUTION PARENTERAL at 11:45

## 2019-04-14 RX ADMIN — SENNOSIDES AND DOCUSATE SODIUM 2 TABLET: 8.6; 5 TABLET ORAL at 04:49

## 2019-04-14 RX ADMIN — ASPIRIN 81 MG: 81 TABLET, COATED ORAL at 04:48

## 2019-04-14 RX ADMIN — OXYCODONE HYDROCHLORIDE 10 MG: 10 TABLET ORAL at 06:03

## 2019-04-14 RX ADMIN — INSULIN HUMAN 1 UNITS: 100 INJECTION, SOLUTION PARENTERAL at 01:59

## 2019-04-14 RX ADMIN — SODIUM CHLORIDE 9.5 UNITS/HR: 9 INJECTION, SOLUTION INTRAVENOUS at 01:23

## 2019-04-14 RX ADMIN — MUPIROCIN 1 APPLICATION: 20 OINTMENT TOPICAL at 17:33

## 2019-04-14 RX ADMIN — MUPIROCIN 1 APPLICATION: 20 OINTMENT TOPICAL at 04:48

## 2019-04-14 RX ADMIN — POTASSIUM CHLORIDE 20 MEQ: 1500 TABLET, EXTENDED RELEASE ORAL at 17:32

## 2019-04-14 RX ADMIN — SENNOSIDES AND DOCUSATE SODIUM 2 TABLET: 8.6; 5 TABLET ORAL at 17:32

## 2019-04-14 RX ADMIN — OXYCODONE HYDROCHLORIDE 10 MG: 10 TABLET ORAL at 14:24

## 2019-04-14 RX ADMIN — WARFARIN SODIUM 5 MG: 5 TABLET ORAL at 17:44

## 2019-04-14 RX ADMIN — OXYCODONE HYDROCHLORIDE 10 MG: 10 TABLET ORAL at 00:22

## 2019-04-14 RX ADMIN — FUROSEMIDE 40 MG: 10 INJECTION, SOLUTION INTRAMUSCULAR; INTRAVENOUS at 11:29

## 2019-04-14 RX ADMIN — MAGNESIUM SULFATE IN DEXTROSE 1 G: 10 INJECTION, SOLUTION INTRAVENOUS at 11:29

## 2019-04-14 RX ADMIN — INSULIN HUMAN 2 UNITS: 100 INJECTION, SOLUTION PARENTERAL at 20:22

## 2019-04-14 RX ADMIN — CARVEDILOL 3.12 MG: 3.12 TABLET, FILM COATED ORAL at 11:29

## 2019-04-14 RX ADMIN — LEVOTHYROXINE SODIUM 175 MCG: 75 TABLET ORAL at 04:48

## 2019-04-14 ASSESSMENT — ENCOUNTER SYMPTOMS
GASTROINTESTINAL NEGATIVE: 1
MUSCULOSKELETAL NEGATIVE: 1
WEAKNESS: 0
DEPRESSION: 0
ARTHRALGIAS: 0
DIZZINESS: 0
FEVER: 0
BLURRED VISION: 0
CARDIOVASCULAR NEGATIVE: 1
ACTIVITY CHANGE: 0
SPUTUM PRODUCTION: 1
APNEA: 0
WHEEZING: 0
NAUSEA: 0
CONSTITUTIONAL NEGATIVE: 1
RESPIRATORY NEGATIVE: 1
FLANK PAIN: 0
SORE THROAT: 0
AGITATION: 0
SHORTNESS OF BREATH: 0
ABDOMINAL PAIN: 0
ADENOPATHY: 0
WEAKNESS: 1
NEUROLOGICAL NEGATIVE: 1
PALPITATIONS: 0
ABDOMINAL DISTENTION: 0
CHEST TIGHTNESS: 0
NAUSEA: 1
NERVOUS/ANXIOUS: 0
VOMITING: 0
STRIDOR: 0
BRUISES/BLEEDS EASILY: 0
PSYCHIATRIC NEGATIVE: 1
ORTHOPNEA: 0
POLYDIPSIA: 0
EYES NEGATIVE: 1
COUGH: 1
APPETITE CHANGE: 0
EYE ITCHING: 0
HEMOPTYSIS: 0
EYE DISCHARGE: 0
BACK PAIN: 0
CHILLS: 0

## 2019-04-14 ASSESSMENT — CHA2DS2 SCORE
CHF OR LEFT VENTRICULAR DYSFUNCTION: NO
CHA2DS2 VASC SCORE: 2
PRIOR STROKE OR TIA OR THROMBOEMBOLISM: NO
HYPERTENSION: YES
VASCULAR DISEASE: NO
AGE 65 TO 74: NO
SEX: MALE
AGE 75 OR GREATER: NO
DIABETES: YES

## 2019-04-14 NOTE — PROGRESS NOTES
Inpatient Anticoagulation Service Note    Date: 4/14/2019  Reason for Anticoagulation: Atrial Fibrillation, Mitral Valve Repair  Hemoglobin Value: (!) 9.6  Hematocrit Value: (!) 30.5  Lab Platelet Value: 209  Target INR: 2.0 to 3.0  INR from last 7 days     Date/Time INR Value    04/14/19 0458 (!)  1.15    04/13/19 1130 (!)  1.34    04/11/19 1730 (!)  1.18    04/11/19 0652 (!)  1.33        Dose from last 7 days     Date/Time Dose (mg)    04/14/19 1400  5        Average Dose (mg):  (new start)  Significant Interactions: Aspirin, Statin, Thyroid Medications  Bridge Therapy: No   Reversal Agent Administered: Not Applicable    A/P  POD 1 for MV repair.  Also with new onset AF this admission prior to surgery.  DDI noted above.  Give warfarin 5 mg PO tonight.  INR tomorrow.      Education Material Provided?: No  Pharmacist suggested discharge dosing: TBD pending INR trends, perhaps warfarin 2.5 mg PO daily.  Recommend a follow-up PT/INR within 48-72 hours of discharge.     Charlette Noonan, PharmD, BCPS, BCCCP

## 2019-04-14 NOTE — PROGRESS NOTES
Delivered and applied CPM to patient ROM set from 0-35 degrees. Please call traction at [phone]- [phone] for any adjustments.

## 2019-04-14 NOTE — PROGRESS NOTES
Pt extubated at 1920. Pt had a NIF of of -32, VC of 1.35L, and Pinsp of 5, and SpO2 of 100%. ABG results of PO2 of 77, PCO2 of 43 and pH of 7.359. Per INOCENTE chui OK to extubate per protocol.    Extubation time: 1920  Total Intubation time: 8 hrs and 15 mins

## 2019-04-14 NOTE — PROGRESS NOTES
Report received from Abdullahi HENDRICKS. Patient sitting up in chair, connected to monitor, lines and drips verified. Patient transitioned to tele per protocol by NOC RN. Plan of care discussed with patient as well as expectations for mobility today.

## 2019-04-14 NOTE — PROGRESS NOTES
Monitor summary: SR 60-80s, as low as 58, rare PVCs and rare PACs, .16/.10/.42. Patient has V wires, has not required pacing in this immediate post-op period

## 2019-04-14 NOTE — PROGRESS NOTES
Patient to be transitioned to subcutaneous insulin, however insulin drip has been off since 0500. Per protocol, insulin drip to be calculated based on average rate for last 3 hours, so NPH and ensuing calculations equal zero. Clarified with pharmacist Charlette. Will address possible SSI during AM rounds.

## 2019-04-14 NOTE — CARE PLAN
Problem: Day of surgery post CABG/Heart valve replacement  Goal: Stabilization in immediate post op period    Intervention: VS q 15 min x 4 hours, then q 1 hour. Include temperature immediately upon arrival. Check CO/CI q 2-4 hours and PRN  Documentation per protocol  Intervention: If radial artery used, elevate arm, no BP checks or needle sticks from affected arm, monitor ulnar pulse and capillary refill  N/A  Intervention: First post op hour labs and diagnostics per MD order  Labs completed per MD order  Intervention: Serum K q 6 hours x 24 hours.  ABG and CBC prn.  Hourly ABGs, weaning vent as tolerated, labs done per orders  Intervention: For FSBS greater than 130, start Post Cardiac Surgery Insulin Drip Protocol  Patient arrived to unit on insulin drip  Intervention: FSBS frequency as per Cardiac Surgery Insulin Drip Protocol  Hourly finger sticks  Intervention: Chest tube to 20 cm suction, record CT drainage with VS  No air leak noted, connected to suction, draining adequately  Intervention: For CT drainage > 300 cc in first post op hour and/or 150 cc in subsequent hours: platelets, coag screen, fibrinogen, H&H per order  Output OK  Intervention: Titrate and wean off vasoactive drips per patient's condition and per MD order while maintaining SBP  mmHg per MD order  Weaning off epinephrine r/t CI per Dr. Ortiz's recommendation. Cleviprex on and off, weaning as tolerated  Intervention: VAP protocol in place  HOB elevated 30 degrees, oral care Q2  Intervention: Wean from vent per protocol (see protocol), extubation goal with 2-6 hours post op.  Weaning per protocol

## 2019-04-14 NOTE — ANESTHESIA POSTPROCEDURE EVALUATION
Patient: Ottoniel Davies    Procedure Summary     Date:  04/13/19 Room / Location:  [facility] OR 02 / SURGERY [facility]    Anesthesia Start:  0756 Anesthesia Stop:  1127    Procedures:       MITRAL VALVE REPAIR (Chest)      ECHOCARDIOGRAM, TRANSESOPHAGEAL (Esophagus) Diagnosis:  (severe mitral regurgitation)    Surgeon:  Kian Dye M.D. Responsible Provider:  Carlos Alberto Ortiz M.D.    Anesthesia Type:  general ASA Status:  4          Final Anesthesia Type: general  Last vitals  BP   Blood Pressure: 127/79, NIBP: 129/69, Arterial BP: 119/57    Temp   37.3 °C (99.1 °F)    Pulse   Pulse: 71, Heart Rate (Monitored): 71   Resp   15    SpO2   96 %      Anesthesia Post Evaluation    Patient location during evaluation: ICU  Patient participation: complete - patient participated  Level of consciousness: awake and alert    Airway patency: patent  Anesthetic complications: no  Cardiovascular status: hemodynamically stable  Respiratory status: acceptable  Hydration status: euvolemic    PONV: none           Nurse Pain Score: 7 (NPRS)

## 2019-04-14 NOTE — CARE PLAN
Problem: Hyperinflation:  Goal: Prevent or improve atelectasis  Outcome: PROGRESSING AS EXPECTED  Patient was weaned to 2LPM NC    [id number]   60% of Predicted 1650    PEP QID

## 2019-04-14 NOTE — PROGRESS NOTES
Mediastinal chest tubes removed per order from APN. Patient tolerated removal well. Sites dressed with gauze and foam tape, pacer wires secured. Patient instructed to notify RN in event of SOB. Will monitor site for drainage and patient for hypoxia or changes in respiratory status.

## 2019-04-14 NOTE — PROGRESS NOTES
Cardiology Follow Up Progress Note    Date of Service  4/14/2019    Attending Physician  Tori Purdy M.D.    Chief Complaint   Shortness of breath, transferred from [facility] after knee surgery    HPI  Ottoniel Davies is a 54 y.o. male admitted 4/9/2019 with shortness of breath, echo there revealed probable acute MR.  Here the posterior leaflet of the mitral valve is flail.  Went to surgery yesterday uncomplicated, no coronary disease  Brisk diuresis    Interim Events    Extubated out of bed in chair, feels sweaty but good in general smiling knee feels better with exercise and physical therapy    Review of Systems  Review of Systems   Constitutional: Negative for activity change and appetite change.   HENT: Negative for congestion and dental problem.    Eyes: Negative for discharge and itching.   Respiratory: Negative for apnea, chest tightness, shortness of breath and stridor.    Cardiovascular: Negative for chest pain and leg swelling.   Gastrointestinal: Negative for abdominal distention, abdominal pain, nausea and vomiting.   Endocrine: Positive for heat intolerance. Negative for cold intolerance and polydipsia.   Genitourinary: Negative for difficulty urinating and dysuria.   Musculoskeletal: Negative for arthralgias and back pain.   Allergic/Immunologic: Negative for environmental allergies and food allergies.   Neurological: Negative for dizziness and weakness.   Hematological: Negative for adenopathy. Does not bruise/bleed easily.   Psychiatric/Behavioral: Negative for agitation, behavioral problems and self-injury.   All other systems reviewed and are negative.      Vital signs in last 24 hours  Temp:  [37 °C (98.6 °F)-37.2 °C (99 °F)] 37 °C (98.6 °F)  Pulse:  [57-83] 72  Resp:  [11-26] 19  SpO2:  [92 %-100 %] 95 %    Physical Exam  Physical Exam   Constitutional: He is oriented to person, place, and time. No distress.   obese   HENT:   Head: Normocephalic and atraumatic.   Mouth/Throat: No  oropharyngeal exudate.   Eyes: Pupils are equal, round, and reactive to light. EOM are normal. No scleral icterus.   Neck: No JVD present. No thyromegaly present.   Cardiovascular: Normal rate, regular rhythm and intact distal pulses.  Exam reveals no gallop and no friction rub.    No murmur heard.  Pulmonary/Chest: Breath sounds normal. No respiratory distress.   Abdominal: Soft. Bowel sounds are normal.   Musculoskeletal: He exhibits no edema or tenderness.   Lymphadenopathy:     He has no cervical adenopathy.   Neurological: He is alert and oriented to person, place, and time. He exhibits normal muscle tone. Coordination normal.   Skin: Skin is warm. No rash noted. He is diaphoretic.   Psychiatric: He has a normal mood and affect. His behavior is normal.       Lab Review  Lab Results   Component Value Date/Time    WBC 18.5 (H) 04/14/2019 04:58 AM    RBC 3.64 (L) 04/14/2019 04:58 AM    HEMOGLOBIN 9.6 (L) 04/14/2019 04:58 AM    HEMATOCRIT 30.5 (L) 04/14/2019 04:58 AM    MCV 83.8 04/14/2019 04:58 AM    MCH 26.4 (L) 04/14/2019 04:58 AM    MCHC 31.5 (L) 04/14/2019 04:58 AM    MPV 11.1 04/14/2019 04:58 AM      Lab Results   Component Value Date/Time    SODIUM 140 04/14/2019 04:58 AM    POTASSIUM 4.3 04/14/2019 04:58 AM    CHLORIDE 108 04/14/2019 04:58 AM    CO2 25 04/14/2019 04:58 AM    GLUCOSE 65 04/14/2019 04:58 AM    BUN 23 (H) 04/14/2019 04:58 AM    CREATININE 0.94 04/14/2019 04:58 AM      Lab Results   Component Value Date/Time    ASTSGOT 9 (L) 04/13/2019 06:59 AM    ALTSGPT <5 04/13/2019 06:59 AM     Lab Results   Component Value Date/Time    CHOLSTRLTOT 124 05/21/2015 09:08 AM    LDL 31 05/21/2015 09:08 AM    HDL 28 (A) 05/21/2015 09:08 AM    TRIGLYCERIDE 326 (H) 05/21/2015 09:08 AM             Cardiac Imaging and Procedures Review  EKG:  My personal interpretation of the EKG dated sinus rhythm today    Echocardiogram: As above normal heart function    Cardiac Catheterization: As above    Imaging  Chest X-Ray:  No infiltrate    Assessment/Plan    Valvular heart disease  Remedied with surgery, repaired no complications  Doing quite well postop day 1  Aspirin  We had him on aggressive IV afterload reduction, this has been decreased  IV diuresis, close eye on ins and outs.  Risk of heart failure is low    Atrial fibrillation  Sinus rhythm  Was transient at admission prior to surgery, off amiodarone at this point  Watchful waiting -would warrant anticoagulation if recurrent  Continue carvedilol very low-dose    Cardiac risk factors  Monitoring diabetes treating with insulin  Statin, aggressive blood pressure control    Discussed with the surgical team and nursing  Thank you for allowing me to participate in the care of this patient.      Please contact me with any questions.    Tori Purdy M.D.   Cardiologist, [facility]  [phone]

## 2019-04-14 NOTE — RESPIRATORY CARE
Extubation    Cuff leak noted yes  Stridor present no            Patient toleration well  RCP Complete? yes  Events/Summary/Plan: Extubated at this time per protocol after parameters met.  RN and RT agree. (04/13/19 1920)

## 2019-04-14 NOTE — PROGRESS NOTES
Critical Care Progress Note    Date of admission  4/9/2019    Chief Complaint  54 y.o. male admitted 4/9/2019 with shortness of breath    Hospital Course    54 y.o. male who presented 4/9/2019 with shortness of breath from [facility].  He presented there on April 8 for an elective right knee total arthroplasty that was performed without complications.  That evening he started to develop shortness of breath that he says he has had intermittently for the last few months.  He started to cough a frothy tinged mucus.  Chest x-ray revealed pulmonary edema and a CT PE study was negative for pulmonary embolism.  He underwent echocardiography which unfortunately revealed severe mitral valve regurgitation for which she was transferred to [facility].  He was given Lasix and is being evaluated by cardiothoracic surgery for emergent valve replacement.  He was transferred to the intensive care unit for acute hypoxic respiratory failure and I was consulted for his critical care management.        Interval Problem Update  Reviewed last 24 hour events:   - Extubated without complications at 1930   - AF, increased WBC   - AAOx4   - POD# 1 from MV repair   - pain controlled   - CPM   - NSR   - V-wires, med CTs with 400mL output   - insulin gtt d/c'd for hypoglycemia   - tri diet, up in chair, last BM yesterday   - INR 1.15, coumadin    Review of Systems  Review of Systems   Constitutional: Negative for chills and fever.   HENT: Negative for nosebleeds and sore throat.    Eyes: Negative for blurred vision.   Respiratory: Positive for cough and sputum production. Negative for hemoptysis, shortness of breath and wheezing.    Cardiovascular: Positive for chest pain and leg swelling. Negative for palpitations and orthopnea.   Gastrointestinal: Positive for nausea. Negative for abdominal pain and vomiting.   Genitourinary: Negative for flank pain.   Musculoskeletal: Positive for joint pain. Negative for back pain.    Skin: Negative for rash.   Neurological: Positive for weakness. Negative for dizziness.   Psychiatric/Behavioral: Negative for depression. The patient is not nervous/anxious.    All other systems reviewed and are negative.       Vital Signs for last 24 hours   Pulse:  [57-84] 71  Resp:  [11-26] 17  SpO2:  [92 %-100 %] 96 %   BP: 106-129/60-70    Respiratory Information for the last 24 hours  Logan Vent Mode: ASV  Rate (breaths/min): 22  PEEP/CPAP: 8  FiO2: 30  P MEAN: 11  Control VTE (exp VT): 481 -> extubated 4/13 to CPAP and now down to 4 L/min nasal cannula, IS 1700mL    Physical Exam   Physical Exam   Constitutional: He is oriented to person, place, and time. He appears well-developed and well-nourished. He is cooperative.  Non-toxic appearance. He appears ill.   HENT:   Head: Normocephalic and atraumatic.   Right Ear: External ear normal.   Left Ear: External ear normal.   Nasal cannula in place   Eyes: Pupils are equal, round, and reactive to light. Conjunctivae and EOM are normal. Right eye exhibits no discharge. Left eye exhibits no discharge.   Neck: Neck supple. No JVD present.   Right IJ central venous catheter in position without surrounding hematoma   Cardiovascular: Normal rate, regular rhythm and intact distal pulses.   Occasional extrasystoles are present. PMI is displaced.  Exam reveals distant heart sounds. Exam reveals no friction rub and no decreased pulses.    Murmur (Improving) heard.  Pulmonary/Chest: Effort normal. No accessory muscle usage or stridor. No tachypnea. No respiratory distress. He has no decreased breath sounds. He has no wheezes. He has rhonchi in the right lower field and the left lower field. He has rales in the right lower field and the left lower field.   Weak cough   Abdominal: Soft. Bowel sounds are normal. There is no tenderness. There is no rebound.   Genitourinary:   Genitourinary Comments: Holder catheter in place   Musculoskeletal: He exhibits edema (Trace  bilateral lower extremity) and tenderness (Of the right knee, dressing clean/dry/intact, good distal circulation).   Neurological: He is alert and oriented to person, place, and time. No cranial nerve deficit. He exhibits normal muscle tone.   Skin: Skin is warm and dry. He is not diaphoretic. No erythema.   Psychiatric: He has a normal mood and affect. His behavior is normal. Judgment and thought content normal.   Nursing note and vitals reviewed.      Medications  Current Facility-Administered Medications   Medication Dose Route Frequency Provider Last Rate Last Dose   • [START ON 4/15/2019] metFORMIN (GLUCOPHAGE) tablet 850 mg  850 mg Oral DAILY Malina Locke       • Respiratory Care per Protocol   Nebulization Continuous RT Malina Locke       • NS infusion   Intravenous Continuous Malina Locke 10 mL/hr at 04/13/19 1220 500 mL at 04/13/19 1220   • K+ Scale: Goal of 4.5  1 Each Intravenous Q6HRS Malina Locke   Stopped at 04/14/19 0000   • aspirin EC (ECOTRIN) tablet 81 mg  81 mg Oral DAILY Malina Locke   81 mg at 04/14/19 0448   • Pharmacy Consult Request ...Pain Management Review 1 Each  1 Each Other PHARMACY TO DOSE Malina Locke       • oxyCODONE immediate-release (ROXICODONE) tablet 5 mg  5 mg Oral Q3HRS PRN Malina Locke       • oxyCODONE immediate release (ROXICODONE) tablet 10 mg  10 mg Oral Q3HRS PRN Malina Locke   10 mg at 04/14/19 0603   • tramadol (ULTRAM) 50 MG tablet 50 mg  50 mg Oral Q4HRS PRN Malina Locke       • acetaminophen (TYLENOL) tablet 650 mg  650 mg Oral Q4HRS PRN Malina Locke        Or   • acetaminophen (TYLENOL) suppository 650 mg  650 mg Rectal Q4HRS PRN Malina Locke       • senna-docusate (PERICOLACE or SENOKOT S) 8.6-50 MG per tablet 2 Tab  2 Tab Oral BID Malina Locke   2 Tab at 04/14/19 0449    And   • polyethylene glycol/lytes (MIRALAX) PACKET 1 Packet  1 Packet Oral QDAY PRN Malina Locke        And   • magnesium hydroxide (MILK OF MAGNESIA)  suspension 30 mL  30 mL Oral QDAY PRN Malina Locke        And   • bisacodyl (DULCOLAX) suppository 10 mg  10 mg Rectal QDAY PRN Malina Locke       • mag hydrox-al hydrox-simeth (MAALOX PLUS ES or MYLANTA DS) suspension 30 mL  30 mL Oral Q4HRS PRN Malina Locke       • diphenhydrAMINE (BENADRYL) tablet/capsule 25 mg  25 mg Oral HS PRN - MR X 1 Malina Locke       • mupirocin (BACTROBAN) 2 % ointment 1 Application  1 Application Topical BID Malina Locke   1 Application at 04/14/19 0448   • Magnesium Sulfate in D5W IVPB premix 1 g  1 g Intravenous QDAY Malina Locke   Stopped at 04/13/19 1244   • MD Alert...Warfarin per Pharmacy   Other PHARMACY TO DOSE Malina Locke       • fentaNYL (SUBLIMAZE) injection 50 mcg  50 mcg Intravenous Q3HRS PRN Malina Locke   50 mcg at 04/13/19 2335   • insulin regular human (HUMULIN/NOVOLIN R) 62.5 Units in  mL infusion per protocol  1-6 Units/hr Intravenous Continuous Malina Locke   Stopped at 04/14/19 0500    And   • insulin regular (HUMULIN R) injection 0-14 Units  0-14 Units Intravenous Once Malina Locke   Stopped at 04/13/19 1130    And   • insulin regular (HUMULIN R) injection 0-10 Units  0-10 Units Intravenous PRN Malina Locke   1 Units at 04/14/19 0159    And   • dextrose 50% (D50W) injection 25 mL  25 mL Intravenous PRN Malina Locke       • insulin lispro (HUMALOG) injection 0-20 Units  0-20 Units Subcutaneous PRN Malina Locke       • levothyroxine (SYNTHROID) tablet 175 mcg  175 mcg Oral DAILY Mariano Cain M.D.   175 mcg at 04/14/19 0448   • pravastatin (PRAVACHOL) tablet 40 mg  40 mg Oral DAILY Ld Morel M.D.   40 mg at 04/14/19 0448       Fluids    Intake/Output Summary (Last 24 hours) at 04/14/19 0843  Last data filed at 04/14/19 0600   Gross per 24 hour   Intake          7300.43 ml   Output             3000 ml   Net          4300.43 ml       Laboratory  Recent Labs      04/13/19   1717  04/13/19   1805  04/13/19   1911    ISTATAPH  7.389*  7.347*  7.356*   ISTATAPCO2  38.4*  43.6*  43.3*   ISTATAPO2  80  92*  78   ISTATATCO2  24  25  26   ONVBJHU5HIL  96  97  95   ISTATARTHCO3  23.2  23.9  24.3   ISTATARTBE  -2  -2  -1   ISTATTEMP  36.1 C  36.6 C  36.8 C   ISTATFIO2  40  40  30   ISTATSPEC  Arterial  Arterial  Arterial   ISTATAPHTC  7.402  7.353*  7.359*   [id number]  75  90*  77         Recent Labs      04/11/19 1730 04/12/19   0600   04/13/19   0659  04/13/19   1130  04/13/19   1710  04/13/19   2356  04/14/19 0458   SODIUM  138   --    --    --   137   --    --    --   140   POTASSIUM  3.2*   < >  3.3*   < >  3.5*  3.8  4.0  4.6  4.3   CHLORIDE  94*   --    --    --   102   --    --    --   108   CO2  27   --    --    --   24   --    --    --   25   BUN  20   --    --    --   27*   --    --    --   23*   CREATININE  1.16   --    --    --   1.09   --    --    --   0.94   MAGNESIUM   --    --   2.1   --   1.9  2.9*   --    --    --    CALCIUM  7.3*   --    --    --   7.3*   --    --    --   7.8*    < > = values in this interval not displayed.     Recent Labs      04/11/19 1730 04/13/19 0659  04/14/19 0458   ALTSGPT  <5  <5   --    ASTSGOT  11*  9*   --    ALKPHOSPHAT  62  52   --    TBILIRUBIN  0.6  0.7   --    GLUCOSE  239*  169*  65     Recent Labs      04/11/19 1730 04/13/19 0659  04/14/19 0458   WBC  12.1*  10.3  18.5*   NEUTSPOLYS  78.90*  75.00*   --    LYMPHOCYTES  8.00*  8.50*   --    MONOCYTES  10.70  11.30   --    EOSINOPHILS  1.60  4.30   --    BASOPHILS  0.20  0.20   --    ASTSGOT  11*  9*   --    ALTSGPT  <5  <5   --    ALKPHOSPHAT  62  52   --    TBILIRUBIN  0.6  0.7   --      Recent Labs      04/11/19   1730  04/12/19   0040  04/13/19   0659  04/13/19   1130  04/14/19   0458   RBC  4.61*   --   4.16*   --   3.64*   HEMOGLOBIN  12.2*   --   11.2*   --   9.6*   HEMATOCRIT  38.5*   --   33.7*   --   30.5*   PLATELETCT  254   --   250  226  209   PROTHROMBTM  15.1*   --    --   16.7*  14.8*   APTT   49.4*  45.6*   --   28.1   --    INR  1.18*   --    --   1.34*  1.15*       Imaging  X-Ray:  I have personally reviewed the images and compared with prior images. and My impression is: Unchanged moderate edema with enlarged cardiac silhouette, sternotomy wires, right IJ central venous catheter with PA catheter, mediastinal chest tubes in good position    Assessment/Plan  * Acute respiratory failure with hypoxia with pulmonary edema (HCC)   Assessment & Plan    Intubated in the OR 4/13-extubated the evening of 4/13  Encourage incentive spirometry, PEP, out of bed to chair  RT/O2 protocol  Titrate oxygen to keep SaO2 greater than 92%  Limit sedatives  Early mobilization  Diuresis     Mitral valve disease   Assessment & Plan    Severe regurgitation with valve dysfunction status post mitral valve repair 4/13 (Dr. Dye)  Routine postop care  Analgesia  Monitor mediastinal chest tubes, possible removal today  V pacing wires in place if needed  Strict blood pressure control  Coumadin to start tonight     Atrial fibrillation (HCC)   Assessment & Plan    Remains in normal sinus rhythm times 72 hours  Optimize electrolytes  Continuous telemetry  Coumadin     Other hyperlipidemia   Assessment & Plan    Statin     Obstructive sleep apnea   Assessment & Plan    CPAP nightly      S/P total knee arthroplasty, right   Assessment & Plan    Analgesia  Therapy  CPM while in bed, mobilization and RN range of motion     Pulmonary edema- (present on admission)   Assessment & Plan    Unchanged  Resume diuresis  Blood pressure control     Hypothyroid- (present on admission)   Assessment & Plan    Continue Synthroid     Hypomagnesemia   Assessment & Plan    Repletion and monitor closely     Hypokalemia   Assessment & Plan    Repletion and monitor closely while initiating Lasix postoperatively     Volume overload   Assessment & Plan    Continue diuresis  Monitor strict ins and outs and kidney function     Full code    VTE:   Coumadin  Ulcer: Not Indicated  Lines: Central Line  Ongoing indication addressed and Holder Catheter  Ongoing indication addressed    I have performed a physical exam and reviewed and updated ROS and Plan today (4/14/2019). In review of yesterday's note (4/13/2019), there are no changes except as documented above.     Discussed patient condition and risk of morbidity and/or mortality with RN, RT, Therapies, Pharmacy, Charge nurse / hot rounds, Patient, CVS and Dr. Cain.

## 2019-04-14 NOTE — PROGRESS NOTES
Patient able to stand up and march in place albeit slowly and weakly with two person assist. Weight bearing as tolerated on right knee. Patient unable to ambulate but can side step to recliner from bed and vice versa.

## 2019-04-14 NOTE — PROGRESS NOTES
Cardiovascular Surgery Progress Note    Name: Ottoniel Davies  MRN: [id number]  : 1965  Admit Date: 2019 10:20 PM  Procedure:  Procedure(s) and Anesthesia Type:     * MITRAL VALVE REPAIR - General     * ECHOCARDIOGRAM, TRANSESOPHAGEAL - General  1 Day Post-Op    Vitals:  Patient Vitals for the past 8 hrs:   Monitored Temp 2 SpO2 O2 Delivery O2 (LPM) Pulse Heart Rate (Monitored) Resp NIBP Weight   19 0720 - 96 % - 4 71 71 17 - -   19 0600 37.2 °C (99 °F) 98 % Nasal Cannula 3 74 75 16 129/69 101.4 kg (223 lb 8.7 oz)   19 0500 - 93 % - - 71 72 16 136/70 -   19 0400 37.2 °C (99 °F) 96 % CPAP 4 69 69 13 125/75 -   19 0300 - 96 % - - 70 71 12 124/73 -     No data recorded.      Respiratory:  Logan Vent Mode: ASV, Rate (breaths/min): 22, PEEP/CPAP: 8, FiO2: 30, P Peak (PIP): 17, P MEAN: 11 Respiration: 17, Pulse Oximetry: 96 %, O2 Daily Delivery Respiratory : Silicone Nasal Cannula     Chest Tube Drains:          Fluids:    Intake/Output Summary (Last 24 hours) at 19 1002  Last data filed at 19 1000   Gross per 24 hour   Intake          7300.43 ml   Output             3200 ml   Net          4100.43 ml     Admit weight: Weight: 101 kg (222 lb 11.2 oz)  Current weight: Weight: 101.4 kg (223 lb 8.7 oz) (19 0600)    Labs:  Recent Labs      19   1730  19   0659  19   1130  19   0458   WBC  12.1*  10.3   --   18.5*   RBC  4.61*  4.16*   --   3.64*   HEMOGLOBIN  12.2*  11.2*   --   9.6*   HEMATOCRIT  38.5*  33.7*   --   30.5*   MCV  83.5  81.0*   --   83.8   MCH  26.5*  26.9*   --   26.4*   MCHC  31.7*  33.2*   --   31.5*   RDW  42.9  40.1   --   42.6   PLATELETCT  254  250  226  209   MPV  10.8  10.4   --   11.1     Recent Labs      19   0659   NEUTSPOLYS  78.90*  75.00*   LYMPHOCYTES  8.00*  8.50*   MONOCYTES  10.70  11.30   EOSINOPHILS  1.60  4.30   BASOPHILS  0.20  0.20     Recent Labs      19    0659   04/13/19   1710  04/13/19   2356  04/14/19   0458   SODIUM  138   --   137   --    --    --   140   POTASSIUM  3.2*   < >  3.5*   < >  4.0  4.6  4.3   CHLORIDE  94*   --   102   --    --    --   108   CO2  27   --   24   --    --    --   25   GLUCOSE  239*   --   169*   --    --    --   65   BUN  20   --   27*   --    --    --   23*   CREATININE  1.16   --   1.09   --    --    --   0.94   CALCIUM  7.3*   --   7.3*   --    --    --   7.8*    < > = values in this interval not displayed.     Recent Labs      04/11/19   1730  04/12/19   0040  04/13/19   1130  04/14/19   0458   APTT  49.4*  45.6*  28.1   --    INR  1.18*   --   1.34*  1.15*       Medications:  • furosemide  20 mg     • insulin regular  1-6 Units     • [START ON 4/15/2019] metFORMIN  850 mg     • K+ Scale: Goal of 4.5  1 Each     • aspirin EC  81 mg     • Pharmacy Consult Request  1 Each     • senna-docusate  2 Tab     • mupirocin  1 Application     • magnesium sulfate  1 g Stopped (04/13/19 1244)   • MD Alert...Warfarin per Pharmacy       • insulin regular  0-14 Units     • levothyroxine  175 mcg     • pravastatin  40 mg          Ordered Medications:    ASA - Yes    Plavix - No; contraindicated because of On Coumadin / NOAC    Post-operative Beta Blockers - No; contraindicated because of High risk for heart block    Ace Inhibitor - No; contraindicated because of Normal EF    Statin - Yes        Exam:   Review of Systems   Constitutional: Negative.    HENT: Negative.    Eyes: Negative.    Respiratory: Negative.    Cardiovascular: Negative.    Gastrointestinal: Negative.    Genitourinary: Negative.    Musculoskeletal: Negative.    Skin: Negative.    Neurological: Negative.    Endo/Heme/Allergies: Negative.    Psychiatric/Behavioral: Negative.        Physical Exam   Constitutional: He is oriented to person, place, and time. No distress.   Neck: Neck supple.   Cardiovascular: Normal rate, regular rhythm and normal heart sounds.  Exam reveals no gallop  and no friction rub.    No murmur heard.  Pulmonary/Chest: Effort normal. No respiratory distress. He has decreased breath sounds in the right lower field and the left lower field. He has no wheezes. He has no rales.   Abdominal: Soft. He exhibits no distension. There is no tenderness.   Musculoskeletal: He exhibits edema.   Neurological: He is alert and oriented to person, place, and time.   Skin: Skin is warm and dry.       Quality Measures:   Quality-Core Measures   Reviewed items::  EKG reviewed, Labs reviewed, Medications reviewed and Radiology images reviewed  Beavers catheter::  One or Two Days Post Surgery (Day of Surgery being Day 0)  Central line in place:  Need for access  DVT prophylaxis pharmacological::  Warfarin (Coumadin)  DVT prophylaxis - mechanical:  SCDs  Ulcer Prophylaxis::  Not indicated  Assessed for rehabilitation services:  Patient was assess for and/or received rehabilitation services during this hospitalization      Assessment/Plan:  POD 1 HDS, SR, d/c mediastinal tubes, diurese, keep beavers for strict I&O, amb, enc IS    Active Hospital Problems    Diagnosis   • Acute respiratory failure with hypoxia with pulmonary edema (HCC) [J96.01]     Priority: High   • Mitral valve disease [I05.9]     Priority: High   • Atrial fibrillation (HCC) [I48.91]     Priority: Medium   • Pulmonary edema [J81.1]     Priority: Medium   • S/P total knee arthroplasty, right [Z96.651]     Priority: Medium   • Obstructive sleep apnea [G47.33]     Priority: Medium   • Other hyperlipidemia [E78.49]     Priority: Medium   • Hypothyroid [E03.9]     Priority: Medium   • Hypokalemia [E87.6]     Priority: Low   • Hypomagnesemia [E83.42]     Priority: Low   • Volume overload [E87.70]     Priority: Low

## 2019-04-14 NOTE — CARE PLAN
Problem: Day of surgery post CABG/Heart valve replacement  Goal: Stabilization in immediate post op period    Intervention: VS q 15 min x 4 hours, then q 1 hour. Include temperature immediately upon arrival. Check CO/CI q 2-4 hours and PRN  completed  Intervention: Serum K q 6 hours x 24 hours.  ABG and CBC prn.  K replaced per sliding scale  Intervention: For FSBS greater than 130, start Post Cardiac Surgery Insulin Drip Protocol  Insulin currently paused at the moment  Intervention: FSBS frequency as per Cardiac Surgery Insulin Drip Protocol  FS Q 1 hour  Intervention: Chest tube to 20 cm suction, record CT drainage with VS  400 ml total CT output  Intervention: Titrate and wean off vasoactive drips per patient's condition and per MD order while maintaining SBP  mmHg per MD order  No vasoactive agents currently running  Intervention: Wean from vent per protocol (see protocol), extubation goal with 2-6 hours post op.  Pt extubated at 1920   Intervention: IS q 1 hour while awake post extubation  Pt pulling 1250 ml on IS  Intervention: Out of bed, dangle 4 hours post extubation  Pt EOB post extubation  Intervention: Up in chair 4 hours, day of extubation  Pt currently sitting in chair  Intervention: Clear liquids post extubation, advance as tolerated  Diet ordered  Intervention: Discontinue [place] evelin and arterial line 12-18 hours post op if hemodynamically stable and off vasoactive drips  Toni waters DC'ty

## 2019-04-15 PROBLEM — I42.9 CARDIOMYOPATHY (HCC): Status: ACTIVE | Noted: 2019-04-15

## 2019-04-15 LAB
ANION GAP SERPL CALC-SCNC: 8 MMOL/L (ref 0–11.9)
BUN SERPL-MCNC: 30 MG/DL (ref 8–22)
CA-I BLD ISE-SCNC: 0.99 MMOL/L (ref 1.1–1.3)
CA-I BLD ISE-SCNC: 1.26 MMOL/L (ref 1.1–1.3)
CALCIUM SERPL-MCNC: 7.5 MG/DL (ref 8.5–10.5)
CHLORIDE SERPL-SCNC: 100 MMOL/L (ref 96–112)
CO2 SERPL-SCNC: 26 MMOL/L (ref 20–33)
CREAT SERPL-MCNC: 1.03 MG/DL (ref 0.5–1.4)
EKG IMPRESSION: NORMAL
ERYTHROCYTE [DISTWIDTH] IN BLOOD BY AUTOMATED COUNT: 42.3 FL (ref 35.9–50)
GLUCOSE BLD-MCNC: 114 MG/DL (ref 65–99)
GLUCOSE BLD-MCNC: 133 MG/DL (ref 65–99)
GLUCOSE BLD-MCNC: 161 MG/DL (ref 65–99)
GLUCOSE BLD-MCNC: 165 MG/DL (ref 65–99)
GLUCOSE BLD-MCNC: 175 MG/DL (ref 65–99)
GLUCOSE SERPL-MCNC: 137 MG/DL (ref 65–99)
HCT VFR BLD AUTO: 27.5 % (ref 42–52)
HGB BLD-MCNC: 8.6 G/DL (ref 14–18)
INR PPP: 1.3 (ref 0.87–1.13)
MCH RBC QN AUTO: 26.3 PG (ref 27–33)
MCHC RBC AUTO-ENTMCNC: 31.3 G/DL (ref 33.7–35.3)
MCV RBC AUTO: 84.1 FL (ref 81.4–97.8)
PATHOLOGY CONSULT NOTE: NORMAL
PLATELET # BLD AUTO: 177 K/UL (ref 164–446)
PMV BLD AUTO: 10.9 FL (ref 9–12.9)
POTASSIUM BLD-SCNC: 3.4 MMOL/L (ref 3.6–5.5)
POTASSIUM SERPL-SCNC: 3.8 MMOL/L (ref 3.6–5.5)
PROTHROMBIN TIME: 16.3 SEC (ref 12–14.6)
RBC # BLD AUTO: 3.27 M/UL (ref 4.7–6.1)
SODIUM BLD-SCNC: 138 MMOL/L (ref 135–145)
SODIUM SERPL-SCNC: 134 MMOL/L (ref 135–145)
WBC # BLD AUTO: 12 K/UL (ref 4.8–10.8)

## 2019-04-15 PROCEDURE — 97535 SELF CARE MNGMENT TRAINING: CPT

## 2019-04-15 PROCEDURE — 770020 HCHG ROOM/CARE - TELE (206)

## 2019-04-15 PROCEDURE — 99024 POSTOP FOLLOW-UP VISIT: CPT | Performed by: THORACIC SURGERY (CARDIOTHORACIC VASCULAR SURGERY)

## 2019-04-15 PROCEDURE — 302154 K THERMIA BLANKET 24X60: Performed by: HOSPITALIST

## 2019-04-15 PROCEDURE — 97110 THERAPEUTIC EXERCISES: CPT

## 2019-04-15 PROCEDURE — 85610 PROTHROMBIN TIME: CPT

## 2019-04-15 PROCEDURE — 700102 HCHG RX REV CODE 250 W/ 637 OVERRIDE(OP): Performed by: INTERNAL MEDICINE

## 2019-04-15 PROCEDURE — 700111 HCHG RX REV CODE 636 W/ 250 OVERRIDE (IP): Performed by: INTERNAL MEDICINE

## 2019-04-15 PROCEDURE — 700102 HCHG RX REV CODE 250 W/ 637 OVERRIDE(OP): Performed by: HOSPITALIST

## 2019-04-15 PROCEDURE — 85027 COMPLETE CBC AUTOMATED: CPT

## 2019-04-15 PROCEDURE — 94760 N-INVAS EAR/PLS OXIMETRY 1: CPT

## 2019-04-15 PROCEDURE — A9270 NON-COVERED ITEM OR SERVICE: HCPCS | Performed by: INTERNAL MEDICINE

## 2019-04-15 PROCEDURE — 93010 ELECTROCARDIOGRAM REPORT: CPT | Performed by: INTERNAL MEDICINE

## 2019-04-15 PROCEDURE — 99233 SBSQ HOSP IP/OBS HIGH 50: CPT | Performed by: INTERNAL MEDICINE

## 2019-04-15 PROCEDURE — A9270 NON-COVERED ITEM OR SERVICE: HCPCS | Performed by: NURSE PRACTITIONER

## 2019-04-15 PROCEDURE — 80048 BASIC METABOLIC PNL TOTAL CA: CPT

## 2019-04-15 PROCEDURE — 700111 HCHG RX REV CODE 636 W/ 250 OVERRIDE (IP): Performed by: NURSE PRACTITIONER

## 2019-04-15 PROCEDURE — 700102 HCHG RX REV CODE 250 W/ 637 OVERRIDE(OP): Performed by: NURSE PRACTITIONER

## 2019-04-15 PROCEDURE — 93005 ELECTROCARDIOGRAM TRACING: CPT | Performed by: THORACIC SURGERY (CARDIOTHORACIC VASCULAR SURGERY)

## 2019-04-15 PROCEDURE — 94660 CPAP INITIATION&MGMT: CPT

## 2019-04-15 PROCEDURE — A9270 NON-COVERED ITEM OR SERVICE: HCPCS | Performed by: HOSPITALIST

## 2019-04-15 PROCEDURE — 82962 GLUCOSE BLOOD TEST: CPT | Mod: 91

## 2019-04-15 RX ORDER — WARFARIN SODIUM 2.5 MG/1
2.5 TABLET ORAL
Status: DISCONTINUED | OUTPATIENT
Start: 2019-04-15 | End: 2019-04-16

## 2019-04-15 RX ORDER — POTASSIUM CHLORIDE 20 MEQ/1
40 TABLET, EXTENDED RELEASE ORAL 2 TIMES DAILY
Status: DISCONTINUED | OUTPATIENT
Start: 2019-04-15 | End: 2019-04-20

## 2019-04-15 RX ORDER — DEXTROSE MONOHYDRATE 25 G/50ML
25 INJECTION, SOLUTION INTRAVENOUS
Status: DISCONTINUED | OUTPATIENT
Start: 2019-04-15 | End: 2019-04-17

## 2019-04-15 RX ORDER — POTASSIUM CHLORIDE 20 MEQ/1
20 TABLET, EXTENDED RELEASE ORAL ONCE
Status: COMPLETED | OUTPATIENT
Start: 2019-04-15 | End: 2019-04-15

## 2019-04-15 RX ADMIN — ENOXAPARIN SODIUM 40 MG: 100 INJECTION SUBCUTANEOUS at 11:48

## 2019-04-15 RX ADMIN — MUPIROCIN 1 APPLICATION: 20 OINTMENT TOPICAL at 06:15

## 2019-04-15 RX ADMIN — SENNOSIDES AND DOCUSATE SODIUM 2 TABLET: 8.6; 5 TABLET ORAL at 16:55

## 2019-04-15 RX ADMIN — OXYCODONE HYDROCHLORIDE 10 MG: 10 TABLET ORAL at 16:55

## 2019-04-15 RX ADMIN — LEVOTHYROXINE SODIUM 175 MCG: 75 TABLET ORAL at 06:14

## 2019-04-15 RX ADMIN — OXYCODONE HYDROCHLORIDE 10 MG: 10 TABLET ORAL at 11:49

## 2019-04-15 RX ADMIN — SENNOSIDES AND DOCUSATE SODIUM 2 TABLET: 8.6; 5 TABLET ORAL at 06:15

## 2019-04-15 RX ADMIN — METFORMIN HYDROCHLORIDE 850 MG: 850 TABLET ORAL at 06:14

## 2019-04-15 RX ADMIN — PRAVASTATIN SODIUM 40 MG: 20 TABLET ORAL at 06:14

## 2019-04-15 RX ADMIN — CARVEDILOL 3.12 MG: 3.12 TABLET, FILM COATED ORAL at 06:15

## 2019-04-15 RX ADMIN — CARVEDILOL 3.12 MG: 3.12 TABLET, FILM COATED ORAL at 16:55

## 2019-04-15 RX ADMIN — POTASSIUM CHLORIDE 40 MEQ: 1500 TABLET, EXTENDED RELEASE ORAL at 16:55

## 2019-04-15 RX ADMIN — TRAMADOL HYDROCHLORIDE 50 MG: 50 TABLET, FILM COATED ORAL at 08:41

## 2019-04-15 RX ADMIN — POTASSIUM CHLORIDE 20 MEQ: 1500 TABLET, EXTENDED RELEASE ORAL at 11:49

## 2019-04-15 RX ADMIN — MAGNESIUM SULFATE IN DEXTROSE 1 G: 10 INJECTION, SOLUTION INTRAVENOUS at 11:48

## 2019-04-15 RX ADMIN — ASPIRIN 81 MG: 81 TABLET, COATED ORAL at 06:20

## 2019-04-15 RX ADMIN — FUROSEMIDE 40 MG: 10 INJECTION, SOLUTION INTRAMUSCULAR; INTRAVENOUS at 06:15

## 2019-04-15 RX ADMIN — MUPIROCIN 1 APPLICATION: 20 OINTMENT TOPICAL at 18:07

## 2019-04-15 RX ADMIN — WARFARIN SODIUM 2.5 MG: 2.5 TABLET ORAL at 18:01

## 2019-04-15 RX ADMIN — FUROSEMIDE 40 MG: 10 INJECTION, SOLUTION INTRAMUSCULAR; INTRAVENOUS at 16:54

## 2019-04-15 RX ADMIN — TRAZODONE HYDROCHLORIDE 50 MG: 50 TABLET ORAL at 20:51

## 2019-04-15 RX ADMIN — OXYCODONE HYDROCHLORIDE 10 MG: 10 TABLET ORAL at 06:31

## 2019-04-15 RX ADMIN — POTASSIUM CHLORIDE 20 MEQ: 1500 TABLET, EXTENDED RELEASE ORAL at 06:14

## 2019-04-15 ASSESSMENT — ENCOUNTER SYMPTOMS
ABDOMINAL PAIN: 0
ORTHOPNEA: 0
DIZZINESS: 0
HEMOPTYSIS: 0
NEUROLOGICAL NEGATIVE: 1
FLANK PAIN: 0
BACK PAIN: 0
CHILLS: 0
GASTROINTESTINAL NEGATIVE: 1
VOMITING: 0
RESPIRATORY NEGATIVE: 1
CARDIOVASCULAR NEGATIVE: 1
SHORTNESS OF BREATH: 0
EYES NEGATIVE: 1
FEVER: 0
COUGH: 1
CONSTITUTIONAL NEGATIVE: 1
NAUSEA: 0
MUSCULOSKELETAL NEGATIVE: 1
BLURRED VISION: 0
WHEEZING: 0
NERVOUS/ANXIOUS: 0
SPUTUM PRODUCTION: 1
PSYCHIATRIC NEGATIVE: 1
DEPRESSION: 0
SORE THROAT: 0
PALPITATIONS: 0

## 2019-04-15 ASSESSMENT — COGNITIVE AND FUNCTIONAL STATUS - GENERAL
CLIMB 3 TO 5 STEPS WITH RAILING: TOTAL
STANDING UP FROM CHAIR USING ARMS: TOTAL
MOVING TO AND FROM BED TO CHAIR: UNABLE
WALKING IN HOSPITAL ROOM: TOTAL
TURNING FROM BACK TO SIDE WHILE IN FLAT BAD: UNABLE
SUGGESTED CMS G CODE MODIFIER MOBILITY: CN
MOBILITY SCORE: 6
MOVING FROM LYING ON BACK TO SITTING ON SIDE OF FLAT BED: UNABLE

## 2019-04-15 NOTE — PROGRESS NOTES
Bedside report received from Dennis RN; POC discussed and hand off completed.  Patient was assisted back to bed by 2 RN assist.  Traction was called to set up ACPM machine now that patient is in bed.

## 2019-04-15 NOTE — PROGRESS NOTES
Cardiovascular Surgery Progress Note    Name: Ottoniel Davies  MRN: [id number]  : 1965  Admit Date: 2019 10:20 PM  Procedure:  Procedure(s) and Anesthesia Type:     * MITRAL VALVE REPAIR - General     * ECHOCARDIOGRAM, TRANSESOPHAGEAL - General  2 Day Post-Op    Vitals:  Patient Vitals for the past 8 hrs:   Temp SpO2 O2 Delivery O2 (LPM) Pulse Heart Rate (Monitored) Resp NIBP Weight   04/15/19 0800 - 96 % Silicone Nasal Cannula 2 (!) 57 (!) 55 14 118/65 100.2 kg (220 lb 14.4 oz)   04/15/19 0736 - 95 % - 2 61 - 13 - -   04/15/19 0700 - 94 % - - 69 (!) 58 12 - -   04/15/19 0600 - 97 % - - 66 66 (!) 23 - -   04/15/19 0500 - 92 % - - 67 67 12 - -   04/15/19 0400 36.5 °C (97.7 °F) 94 % CPAP - 68 68 (!) 10 142/76 -   04/15/19 0300 - 95 % - - 67 67 12 - -   04/15/19 0243 - 99 % - - - 66 - - -   04/15/19 0200 - 89 % - - 68 68 13 - -   04/15/19 0100 - 99 % - - 66 66 12 - -     Temp (24hrs), Av.7 °C (98 °F), Min:36.3 °C (97.4 °F), Max:37 °C (98.6 °F)      Respiratory:    Respiration: 14, Pulse Oximetry: 96 %, O2 Daily Delivery Respiratory : Silicone Nasal Cannula     Chest Tube Drains:          Fluids:    Intake/Output Summary (Last 24 hours) at 04/15/19 0828  Last data filed at 04/15/19 0600   Gross per 24 hour   Intake              100 ml   Output             3300 ml   Net            -3200 ml     Admit weight: Weight: 101 kg (222 lb 11.2 oz)  Current weight: Weight: 100.2 kg (220 lb 14.4 oz) (04/15/19 0800)    Labs:  Recent Labs      19   0659  19   1130  19   0458  04/15/19   0530   WBC  10.3   --   18.5*  12.0*   RBC  4.16*   --   3.64*  3.27*   HEMOGLOBIN  11.2*   --   9.6*  8.6*   HEMATOCRIT  33.7*   --   30.5*  27.5*   MCV  81.0*   --   83.8  84.1   MCH  26.9*   --   26.4*  26.3*   MCHC  33.2*   --   31.5*  31.3*   RDW  40.1   --   42.6  42.3   PLATELETCT  250  226  209  177   MPV  10.4   --   11.1  10.9     Recent Labs      19   0659   NEUTSPOLYS  75.00*   LYMPHOCYTES  8.50*    MONOCYTES  11.30   EOSINOPHILS  4.30   BASOPHILS  0.20     Recent Labs      04/13/19   0659   04/13/19   2356  04/14/19   0458  04/15/19   0530   SODIUM  137   --    --   140  134*   POTASSIUM  3.5*   < >  4.6  4.3  3.8   CHLORIDE  102   --    --   108  100   CO2  24   --    --   25  26   GLUCOSE  169*   --    --   65  137*   BUN  27*   --    --   23*  30*   CREATININE  1.09   --    --   0.94  1.03   CALCIUM  7.3*   --    --   7.8*  7.5*    < > = values in this interval not displayed.     Recent Labs      04/13/19   1130  04/14/19   0458  04/15/19   0530   APTT  28.1   --    --    INR  1.34*  1.15*  1.30*       Medications:  • insulin regular  1-6 Units     • furosemide  40 mg     • potassium chloride SA  20 mEq     • carvedilol  3.125 mg     • traZODone  50 mg     • metFORMIN  850 mg     • aspirin EC  81 mg     • Pharmacy Consult Request  1 Each     • senna-docusate  2 Tab     • mupirocin  1 Application     • magnesium sulfate  1 g Stopped (04/14/19 1229)   • MD Alert...Warfarin per Pharmacy       • levothyroxine  175 mcg     • pravastatin  40 mg          Ordered Medications:    ASA - Yes    Plavix - No; contraindicated because of On Coumadin / NOAC    Post-operative Beta Blockers - Yes    Ace Inhibitor - Yes    Statin - Yes        Exam:   Review of Systems   Constitutional: Negative.    HENT: Negative.    Eyes: Negative.    Respiratory: Negative.    Cardiovascular: Negative.    Gastrointestinal: Negative.    Genitourinary: Negative.    Musculoskeletal: Negative.    Skin: Negative.    Neurological: Negative.    Endo/Heme/Allergies: Negative.    Psychiatric/Behavioral: Negative.        Physical Exam   Constitutional: He is oriented to person, place, and time. No distress.   Neck: Neck supple.   Cardiovascular: Normal rate, regular rhythm and normal heart sounds.  Exam reveals no gallop and no friction rub.    No murmur heard.  Pulmonary/Chest: Effort normal. No respiratory distress. He has decreased breath sounds  in the right lower field and the left lower field. He has no wheezes. He has no rales.   Abdominal: Soft. He exhibits no distension. There is no tenderness.   Musculoskeletal: He exhibits edema.   Neurological: He is alert and oriented to person, place, and time.   Skin: Skin is warm and dry.       Quality Measures:   Quality-Core Measures   Reviewed items::  EKG reviewed, Labs reviewed, Medications reviewed and Radiology images reviewed  Beavers catheter::  One or Two Days Post Surgery (Day of Surgery being Day 0)  Central line in place:  Need for access  DVT prophylaxis pharmacological::  Warfarin (Coumadin)  DVT prophylaxis - mechanical:  SCDs  Ulcer Prophylaxis::  Not indicated  Assessed for rehabilitation services:  Patient was assess for and/or received rehabilitation services during this hospitalization      Assessment/Plan:  POD 1 HDS, SR, d/c mediastinal tubes, diurese, keep beavers for strict I&O, amb, enc IS  POD 2 HDS, SR, add low dose ace, d/c beavers, PT/OT- mibilize as much as possible, enc IS    Active Hospital Problems    Diagnosis   • Acute respiratory failure with hypoxia with pulmonary edema (HCC) [J96.01]     Priority: High   • Mitral valve disease [I05.9]     Priority: High   • Atrial fibrillation (HCC) [I48.91]     Priority: Medium   • Pulmonary edema [J81.1]     Priority: Medium   • S/P total knee arthroplasty, right [Z96.651]     Priority: Medium   • Obstructive sleep apnea [G47.33]     Priority: Medium   • Other hyperlipidemia [E78.49]     Priority: Medium   • Hypothyroid [E03.9]     Priority: Medium   • Hypokalemia [E87.6]     Priority: Low   • Hypomagnesemia [E83.42]     Priority: Low   • Volume overload [E87.70]     Priority: Low

## 2019-04-15 NOTE — CARE PLAN
Problem: Hyperinflation:  Goal: Prevent or improve atelectasis  Outcome: PROGRESSING AS EXPECTED  QID IS, 2000mL on IS

## 2019-04-15 NOTE — PREADMISSION SCREENING NOTE
· RPG ordered received from ADÁN Bell      · Medicaid FFS is listed as insurance provider.      · PT transferred from [facility] s/p R total knee arthroplasty on 4/8/2019.    · At [facility], Dx w/ acute congestive heart failure valvular heart disease (NYHA class III-IV), severe mitral regurgitation (4+, degenerative), posterior mitral valve leaflet prolapse, acute respiratory failure, pulmonary edema, obstructive sleep apnea, and hypothyroidism.      · S/ P RADICAL MITRAL VALVE REPAIR, ASHOK LIGATION, ECHOCARDIOGRAM, TRANSESOPHAGEAL on 4/13/2019    · Pt lives in [facility] in [place] with his mother, Jennifer Davies. PT using [facility] prior to hospitalization, and has 4ww.       · PT has evaluated patient; awaiting current PT notes and OT evaluation/notes  .   · Thank you for referral.   Please call [phone] w/ any questions.

## 2019-04-15 NOTE — CARE PLAN
Problem: Post Op Day 1 CABG/Heart Valve Replacement  Goal: Optimal care of the post op CABG/heart valve replacement Post Op Day 1    Intervention: Up in chair for all meals  Up to chair for all meals   Intervention: Ambulate in am if stable. First ambulation is 25 feet. Repeat x 3 as tolerated.  Ambulate again before bed.  Patient unable to ambulate, weight bearing on right knee as tolerated. Patient able to side step and place min weight on leg but c/o pain, and tires easily.   Intervention: Discontinue beavers catheter unless documented reason for continuation  Keep beavers for strict I/O  Intervention: Remove original surgical dressing after 24 hrs, leave open to air unless otherwise specified by physician  Midline island dressing removed  Intervention: Consider chest tube removal by MD  Mediastinal chest tubes removed  Intervention: IS q 1 hour while awake and record best IS volume  1250 ml  Intervention: Graduated elastic stockings  Teo hose on  Intervention: Saline lock IV  TKO running  Intervention: Transfer to tele status, begin VS q 4 hours  Tele status as of this AM  Intervention: After 24th hour post-anesthesia end time, transition patient to Cardiac Surgery SQ Insulin Protocol  Transitioned to SSI  Intervention: If patient is CABG or on home beta-blocker, start Beta-Blocker on POD 1 or POD 2 or contraindication documented  BB started

## 2019-04-15 NOTE — PROGRESS NOTES
Bedside report received from Noc RN, assumed care of Pt. VSS. Pt is A&Ox4. No c/o pain at this time. Did convert into a slow Afib 50-60's. EKG ordered to verify. Call light within reach, chair-alarm on, Pt updated on POC. Will continue to monitor closely.

## 2019-04-15 NOTE — THERAPY
OT eval attempted, pt just BTB with Nsg.  Educated pt on role of OT, instructed Nsg how to correctly place CPM & encouraged pt to ice knee.  Formal OT eval to be completed another day.

## 2019-04-15 NOTE — PROGRESS NOTES
Critical Care Progress Note    Date of admission  4/9/2019    Chief Complaint  54 y.o. male admitted 4/9/2019 with shortness of breath    Hospital Course    54 y.o. male who presented 4/9/2019 with shortness of breath from [facility].  He presented there on April 8 for an elective right knee total arthroplasty that was performed without complications.  That evening he started to develop shortness of breath that he says he has had intermittently for the last few months.  He started to cough a frothy tinged mucus.  Chest x-ray revealed pulmonary edema and a CT PE study was negative for pulmonary embolism.  He underwent echocardiography which unfortunately revealed severe mitral valve regurgitation for which she was transferred to [facility] ([place]).  He was given Lasix and is being evaluated by cardiothoracic surgery for emergent valve replacement.  He was transferred to the intensive care unit for acute hypoxic respiratory failure and I was consulted for his critical care management.        Interval Problem Update     Reviewed last 24 hour events:              - POD#2, Extubated yesterday at 1930              - Tm: AF              - HR: SR to aFib controlled rate , V wires in place              - SBP: 110-120              - Neuro:ao x 4              - GI: cardiac diet              - UOP: Adequate              - Holder: yes to be removed              - Lines: CVC              - PPx: Warfarin for afib, INR 1.3, Lovenox dvt.              - CXR (personally reviewed):       Review of Systems  Review of Systems   Constitutional: Negative for chills and fever.   HENT: Negative for nosebleeds and sore throat.    Eyes: Negative for blurred vision.   Respiratory: Positive for cough and sputum production. Negative for hemoptysis, shortness of breath and wheezing.    Cardiovascular: Positive for leg swelling. Negative for chest pain, palpitations and orthopnea.   Gastrointestinal: Negative for abdominal pain, nausea  and vomiting.   Genitourinary: Negative for flank pain.   Musculoskeletal: Positive for joint pain. Negative for back pain.   Skin: Negative for rash.   Neurological: Negative for dizziness.   Psychiatric/Behavioral: Negative for depression. The patient is not nervous/anxious.    All other systems reviewed and are negative.       Vital Signs for last 24 hours   Temp:  [36.3 °C (97.4 °F)-37.1 °C (98.7 °F)] 37.1 °C (98.7 °F)  Pulse:  [57-69] 63  Resp:  [10-23] 20  SpO2:  [88 %-99 %] 95 %   BP: 106-129/60-70    Respiratory Information for the last 24 hours    -> extubated 4/13 to CPAP and now down to 4 L/min nasal cannula, IS 1700mL    Physical Exam   Physical Exam   Constitutional: He is oriented to person, place, and time. He appears well-developed and well-nourished. He is cooperative.  Non-toxic appearance. He appears ill.   HENT:   Head: Normocephalic and atraumatic.   Right Ear: External ear normal.   Left Ear: External ear normal.   Nasal cannula in place   Eyes: Pupils are equal, round, and reactive to light. Conjunctivae and EOM are normal. Right eye exhibits no discharge. Left eye exhibits no discharge.   Neck: Neck supple. No JVD present.   Right IJ central venous catheter in position without surrounding hematoma   Cardiovascular: Normal rate, regular rhythm and intact distal pulses.   Occasional extrasystoles are present. PMI is displaced.  Exam reveals distant heart sounds. Exam reveals no friction rub and no decreased pulses.    No murmur (Improving) heard.  Pulmonary/Chest: Effort normal. No accessory muscle usage or stridor. No tachypnea. No respiratory distress. He has no decreased breath sounds. He has no wheezes. He has rhonchi in the right lower field and the left lower field. He has rales in the right lower field and the left lower field.   Weak cough   Abdominal: Soft. Bowel sounds are normal. There is no tenderness. There is no rebound.   Genitourinary:   Genitourinary Comments: Holder catheter in  place   Musculoskeletal: He exhibits edema (Trace bilateral lower extremity) and tenderness (Of the right knee, dressing clean/dry/intact, good distal circulation).   Right knee remains swollen, nonerythematous nontender surgical dressings are clean dry and intact.    Sternum is stable   Neurological: He is alert and oriented to person, place, and time. No cranial nerve deficit. He exhibits normal muscle tone.   Skin: Skin is warm and dry. He is not diaphoretic. No erythema.   Surgical incision is well approximated no erythema or signs of infection   Psychiatric: He has a normal mood and affect. His behavior is normal. Judgment and thought content normal.   Nursing note and vitals reviewed.      Medications  Current Facility-Administered Medications   Medication Dose Route Frequency Provider Last Rate Last Dose   • potassium chloride SA (Kdur) tablet 40 mEq  40 mEq Oral BID Frederick Andrews   40 mEq at 04/15/19 1655   • enoxaparin (LOVENOX) inj 40 mg  40 mg Subcutaneous DAILY Frederick Andrews   40 mg at 04/15/19 1148   • insulin regular (HUMULIN R) injection 3-14 Units  3-14 Units Subcutaneous 4X/DAY ACHS Frederick Andrews   Stopped at 04/15/19 1700    And   • glucose 4 g chewable tablet 16 g  16 g Oral Q15 MIN PRN Frederick Andrews        And   • dextrose 50% (D50W) injection 25 mL  25 mL Intravenous Q15 MIN PRN Frederick Andrews       • warfarin (COUMADIN) tablet 2.5 mg  2.5 mg Oral COUMADIN-DAILY Malina Locke   2.5 mg at 04/15/19 1801   • furosemide (LASIX) injection 40 mg  40 mg Intravenous BID Malina Locke   40 mg at 04/15/19 1654   • carvedilol (COREG) tablet 3.125 mg  3.125 mg Oral BID WITH MEALS Malina Locke   3.125 mg at 04/15/19 1655   • traZODone (DESYREL) tablet 50 mg  50 mg Oral QHS Malina Locke   50 mg at 04/14/19 2252   • metFORMIN (GLUCOPHAGE) tablet 850 mg  850 mg Oral DAILY Malina Locke   850 mg at 04/15/19 0614   • Respiratory Care per Protocol   Nebulization Continuous RT Malina Locke        • NS infusion   Intravenous Continuous Malina Locke 10 mL/hr at 04/13/19 1220 500 mL at 04/13/19 1220   • aspirin EC (ECOTRIN) tablet 81 mg  81 mg Oral DAILY Malina Locke   81 mg at 04/15/19 0620   • Pharmacy Consult Request ...Pain Management Review 1 Each  1 Each Other PHARMACY TO DOSE Malina Locke       • oxyCODONE immediate-release (ROXICODONE) tablet 5 mg  5 mg Oral Q3HRS PRN Malina Locke       • oxyCODONE immediate release (ROXICODONE) tablet 10 mg  10 mg Oral Q3HRS PRN Malina Locke   10 mg at 04/15/19 1655   • tramadol (ULTRAM) 50 MG tablet 50 mg  50 mg Oral Q4HRS PRN Malina Locke   50 mg at 04/15/19 0841   • acetaminophen (TYLENOL) tablet 650 mg  650 mg Oral Q4HRS PRN Malina Locke        Or   • acetaminophen (TYLENOL) suppository 650 mg  650 mg Rectal Q4HRS PRN Malina Locke       • senna-docusate (PERICOLACE or SENOKOT S) 8.6-50 MG per tablet 2 Tab  2 Tab Oral BID Malina Locke   2 Tab at 04/15/19 1655    And   • polyethylene glycol/lytes (MIRALAX) PACKET 1 Packet  1 Packet Oral QDAY PRN Malina Locke        And   • magnesium hydroxide (MILK OF MAGNESIA) suspension 30 mL  30 mL Oral QDAY PRN Malina Locke        And   • bisacodyl (DULCOLAX) suppository 10 mg  10 mg Rectal QDAY PRN Malina Locke       • mag hydrox-al hydrox-simeth (MAALOX PLUS ES or MYLANTA DS) suspension 30 mL  30 mL Oral Q4HRS PRN Malina Locke       • diphenhydrAMINE (BENADRYL) tablet/capsule 25 mg  25 mg Oral HS PRN - MR X 1 Malina Locke       • mupirocin (BACTROBAN) 2 % ointment 1 Application  1 Application Topical BID Malina Locke   1 Application at 04/15/19 1807   • MD Alert...Warfarin per Pharmacy   Other PHARMACY TO DOSE Malina Locke       • fentaNYL (SUBLIMAZE) injection 50 mcg  50 mcg Intravenous Q3HRS PRN Malina Locke   50 mcg at 04/13/19 2335   • levothyroxine (SYNTHROID) tablet 175 mcg  175 mcg Oral DAILY Mariano Cain M.D.   175 mcg at 04/15/19 0614   • pravastatin (PRAVACHOL)  tablet 40 mg  40 mg Oral DAILY Ld Morel M.D.   40 mg at 04/15/19 0614       Fluids    Intake/Output Summary (Last 24 hours) at 04/15/19 1854  Last data filed at 04/15/19 1000   Gross per 24 hour   Intake                0 ml   Output             2900 ml   Net            -2900 ml       Laboratory  Recent Labs      04/13/19   1717  04/13/19   1805  04/13/19   1911   ISTATAPH  7.389*  7.347*  7.356*   ISTATAPCO2  38.4*  43.6*  43.3*   ISTATAPO2  80  92*  78   ISTATATCO2  24  25  26   [id number]  96  97  95   ISTATARTHCO3  23.2  23.9  24.3   ISTATARTBE  -2  -2  -1   ISTATTEMP  36.1 C  36.6 C  36.8 C   ISTATFIO2  40  40  30   ISTATSPEC  Arterial  Arterial  Arterial   ISTATAPHTC  7.402  7.353*  7.359*   UTSOYUGB0IU  75  90*  77         Recent Labs      04/13/19   0659  04/13/19   1130   04/13/19   2356  04/14/19   0458  04/15/19   0530   SODIUM  137   --    --    --   140  134*   POTASSIUM  3.5*  3.8   < >  4.6  4.3  3.8   CHLORIDE  102   --    --    --   108  100   CO2  24   --    --    --   25  26   BUN  27*   --    --    --   23*  30*   CREATININE  1.09   --    --    --   0.94  1.03   MAGNESIUM  1.9  2.9*   --    --    --    --    CALCIUM  7.3*   --    --    --   7.8*  7.5*    < > = values in this interval not displayed.     Recent Labs      04/13/19   0659  04/14/19   0458  04/15/19   0530   ALTSGPT  <5   --    --    ASTSGOT  9*   --    --    ALKPHOSPHAT  52   --    --    TBILIRUBIN  0.7   --    --    GLUCOSE  169*  65  137*     Recent Labs      04/13/19   0659  04/14/19   0458  04/15/19   0530   WBC  10.3  18.5*  12.0*   NEUTSPOLYS  75.00*   --    --    LYMPHOCYTES  8.50*   --    --    MONOCYTES  11.30   --    --    EOSINOPHILS  4.30   --    --    BASOPHILS  0.20   --    --    ASTSGOT  9*   --    --    ALTSGPT  <5   --    --    ALKPHOSPHAT  52   --    --    TBILIRUBIN  0.7   --    --      Recent Labs      04/13/19   0659  04/13/19   1130  04/14/19   0458  04/15/19   0530   RBC  4.16*   --   3.64*  3.27*    HEMOGLOBIN  11.2*   --   9.6*  8.6*   HEMATOCRIT  33.7*   --   30.5*  27.5*   PLATELETCT  250  226  209  177   PROTHROMBTM   --   16.7*  14.8*  16.3*   APTT   --   28.1   --    --    INR   --   1.34*  1.15*  1.30*       Imaging  No new images today    Assessment/Plan  * Acute respiratory failure with hypoxia with pulmonary edema (HCC)   Assessment & Plan    Intubated in the OR 4/13-extubated the evening of 4/13  Encourage incentive spirometry, PEP, out of bed to chair  RT/O2 protocol  Titrate oxygen to keep SaO2 greater than 92%  Limit sedatives  Early mobilization, complicated by knee recovery  Continue diuresis     Mitral valve disease   Assessment & Plan    Severe regurgitation with valve dysfunction status post mitral valve repair 4/13 (Dr. Dye)  Routine postop care  Analgesia  Monitor mediastinal chest tubes, possible removal today  V pacing wires in place if needed  Strict blood pressure control  Coumadin remains subtherapeutic, Lovenox DVT prophylaxis initiated     Atrial fibrillation (HCC)   Assessment & Plan    Remains in normal sinus rhythm times 72 hours  Optimize electrolytes  Continuous telemetry  Coumadin, still subtherapeutic.     Other hyperlipidemia   Assessment & Plan    Statin     Obstructive sleep apnea   Assessment & Plan    CPAP nightly      S/P total knee arthroplasty, right   Assessment & Plan    Analgesia  Therapy  CPM while in bed, mobilization and RN range of motion     Pulmonary edema- (present on admission)   Assessment & Plan    Clinically improving  Continue diuresis  Maintain blood pressure control     Hypothyroid- (present on admission)   Assessment & Plan    Continue Synthroid     Hypomagnesemia   Assessment & Plan    Repletion and monitor closely     Hypokalemia   Assessment & Plan    Repletion and monitor closely while initiating Lasix postoperatively     Volume overload   Assessment & Plan    Continue diuresis  Maintain net negative fluid balance     Full code    VTE:   Coumadin  Ulcer: Not Indicated  Lines: Central Line  Ongoing indication addressed and Holder Catheter  Ongoing indication addressed    I have performed a physical exam and reviewed and updated ROS and Plan today (4/15/2019). In review of yesterday's note (4/14/2019), there are no changes except as documented above.     Discussed patient condition and risk of morbidity and/or mortality with RN, RT, Therapies, Pharmacy, Charge nurse / hot rounds and Patient.

## 2019-04-15 NOTE — CARE PLAN
Problem: Post op day 2 CABG/Heart Valve Replacement  Goal: Optimal care of the post op CABG/heart valve replacement post op day 2  Outcome: MET Date Met: 04/15/19    Intervention: FSBS: when 2 consecutive BS < 130 after post op day 2, discontinue FSBS unless patient is insulin dependent diabetic  Patient is diabetic; remains on finger sticks  Intervention: Up in chair for all meals  Up in chair for breakfast  Intervention: Ambulate, increasing the distance each time x 3 and before bed  Will be done today with day RN; patient has complication of recent total right knee replacement requiring 2 person assist with turns and pivoting to chair  Intervention: IS q 1 hour while awake and record best IS volume  Best IS is 2000  Intervention: Consider pacer wire removal by MD  Wires capped   Intervention: Consider removal of beavers and chest tube if not already done  Chest tube previously removed;  Beavers to come out today if okay by physician

## 2019-04-15 NOTE — PROGRESS NOTES
Inpatient Anticoagulation Service Note    Date: 4/15/2019  Reason for Anticoagulation: Atrial Fibrillation, Mitral Valve Repair  Hemoglobin Value: (!) 8.6  Hematocrit Value: (!) 27.5  Lab Platelet Value: 177  Target INR: 2.0 to 3.0  INR from last 7 days     Date/Time INR Value    04/15/19 0530 (!)  1.3    04/14/19 0458 (!)  1.15    04/13/19 1130 (!)  1.34    04/11/19 1730 (!)  1.18    04/11/19 0652 (!)  1.33        Dose from last 7 days     Date/Time Dose (mg)    04/15/19 1528  2.5    04/14/19 1400  5        Average Dose (mg):  (new start)  Significant Interactions: Aspirin, Statin, Thyroid Medications  Bridge Therapy: No (Lovenox for DVT prophylaxis)  Reversal Agent Administered: Not Applicable    A/P  Subtherapeutic INR after starting warfarin yesterday for MV repair & new onset AF this admission prior to surgery.  DDI noted above.  Give warfarin 2.5 mg PO tonight.  INR tomorrow.       Education Material Provided?: Yes  Pharmacist suggested discharge dosing: TBD pending INR trends, perhaps warfarin 2.5 mg PO daily.  Recommend a follow-up PT/INR within 48-72 hours of discharge.     Charlette Noonan, PharmD, BCPS, BCCCP

## 2019-04-15 NOTE — THERAPY
" PT session attempted but Pt reportedly had just gotten back to bed with NRSG and refused further mobility. PT adjusted CPM to perform 0-60 degrees of range at which point Pt became upset. Pt reporting he needs to \"stay no more than 3-35\" because of pain in extension. Extensive education provided regarding necessity of movement and to achieve zero degrees of extension. Explained to Pt that he will experience pain after a TKA and that it is not a reason to not mobilize. Pt told that the only way he will be functional again and get the full benefit of his new joint is to mobilize. Pt resistant to education and unhappy with report that pain post TKA is normal and expected. PT to continue working with Pt in acute setting to increase functional mobility in conjunction with cardiac rehab.    Elly Taylor PT/DPT  Pager# [phone]  "

## 2019-04-16 LAB
ACT BLD: 114 SEC (ref 74–137)
ACT BLD: 147 SEC (ref 74–137)
ACT BLD: 362 SEC (ref 74–137)
ACT BLD: 384 SEC (ref 74–137)
ACT BLD: 406 SEC (ref 74–137)
ACT BLD: 461 SEC (ref 74–137)
ANION GAP SERPL CALC-SCNC: 8 MMOL/L (ref 0–11.9)
BUN SERPL-MCNC: 26 MG/DL (ref 8–22)
CALCIUM SERPL-MCNC: 7.7 MG/DL (ref 8.5–10.5)
CHLORIDE SERPL-SCNC: 105 MMOL/L (ref 96–112)
CO2 SERPL-SCNC: 26 MMOL/L (ref 20–33)
CREAT SERPL-MCNC: 0.99 MG/DL (ref 0.5–1.4)
ERYTHROCYTE [DISTWIDTH] IN BLOOD BY AUTOMATED COUNT: 40.8 FL (ref 35.9–50)
GLUCOSE BLD-MCNC: 102 MG/DL (ref 65–99)
GLUCOSE BLD-MCNC: 127 MG/DL (ref 65–99)
GLUCOSE BLD-MCNC: 128 MG/DL (ref 65–99)
GLUCOSE BLD-MCNC: 132 MG/DL (ref 65–99)
GLUCOSE SERPL-MCNC: 125 MG/DL (ref 65–99)
HCT VFR BLD AUTO: 29 % (ref 42–52)
HGB BLD-MCNC: 9.2 G/DL (ref 14–18)
INR PPP: 1.24 (ref 0.87–1.13)
MCH RBC QN AUTO: 26.4 PG (ref 27–33)
MCHC RBC AUTO-ENTMCNC: 31.7 G/DL (ref 33.7–35.3)
MCV RBC AUTO: 83.3 FL (ref 81.4–97.8)
PLATELET # BLD AUTO: 199 K/UL (ref 164–446)
PMV BLD AUTO: 10.9 FL (ref 9–12.9)
POTASSIUM SERPL-SCNC: 4.2 MMOL/L (ref 3.6–5.5)
PROTHROMBIN TIME: 15.7 SEC (ref 12–14.6)
RBC # BLD AUTO: 3.48 M/UL (ref 4.7–6.1)
SODIUM SERPL-SCNC: 139 MMOL/L (ref 135–145)
WBC # BLD AUTO: 9.5 K/UL (ref 4.8–10.8)

## 2019-04-16 PROCEDURE — 700111 HCHG RX REV CODE 636 W/ 250 OVERRIDE (IP): Performed by: INTERNAL MEDICINE

## 2019-04-16 PROCEDURE — 85027 COMPLETE CBC AUTOMATED: CPT

## 2019-04-16 PROCEDURE — 700102 HCHG RX REV CODE 250 W/ 637 OVERRIDE(OP): Performed by: INTERNAL MEDICINE

## 2019-04-16 PROCEDURE — 700111 HCHG RX REV CODE 636 W/ 250 OVERRIDE (IP): Performed by: NURSE PRACTITIONER

## 2019-04-16 PROCEDURE — 97166 OT EVAL MOD COMPLEX 45 MIN: CPT

## 2019-04-16 PROCEDURE — A9270 NON-COVERED ITEM OR SERVICE: HCPCS | Performed by: INTERNAL MEDICINE

## 2019-04-16 PROCEDURE — A9270 NON-COVERED ITEM OR SERVICE: HCPCS | Performed by: NURSE PRACTITIONER

## 2019-04-16 PROCEDURE — 770020 HCHG ROOM/CARE - TELE (206)

## 2019-04-16 PROCEDURE — 97116 GAIT TRAINING THERAPY: CPT

## 2019-04-16 PROCEDURE — 700102 HCHG RX REV CODE 250 W/ 637 OVERRIDE(OP): Performed by: HOSPITALIST

## 2019-04-16 PROCEDURE — 82962 GLUCOSE BLOOD TEST: CPT | Mod: 91

## 2019-04-16 PROCEDURE — 85610 PROTHROMBIN TIME: CPT

## 2019-04-16 PROCEDURE — 99024 POSTOP FOLLOW-UP VISIT: CPT | Performed by: THORACIC SURGERY (CARDIOTHORACIC VASCULAR SURGERY)

## 2019-04-16 PROCEDURE — A9270 NON-COVERED ITEM OR SERVICE: HCPCS | Performed by: HOSPITALIST

## 2019-04-16 PROCEDURE — 97110 THERAPEUTIC EXERCISES: CPT

## 2019-04-16 PROCEDURE — 94660 CPAP INITIATION&MGMT: CPT

## 2019-04-16 PROCEDURE — 80048 BASIC METABOLIC PNL TOTAL CA: CPT

## 2019-04-16 PROCEDURE — 700102 HCHG RX REV CODE 250 W/ 637 OVERRIDE(OP): Performed by: NURSE PRACTITIONER

## 2019-04-16 PROCEDURE — 97530 THERAPEUTIC ACTIVITIES: CPT

## 2019-04-16 RX ORDER — WARFARIN SODIUM 5 MG/1
5 TABLET ORAL
Status: DISCONTINUED | OUTPATIENT
Start: 2019-04-16 | End: 2019-04-18

## 2019-04-16 RX ORDER — LEVOTHYROXINE SODIUM 0.07 MG/1
300 TABLET ORAL DAILY
Status: DISCONTINUED | OUTPATIENT
Start: 2019-04-16 | End: 2019-04-16

## 2019-04-16 RX ORDER — LOSARTAN POTASSIUM 25 MG/1
100 TABLET ORAL DAILY
Status: DISCONTINUED | OUTPATIENT
Start: 2019-04-16 | End: 2019-04-22 | Stop reason: HOSPADM

## 2019-04-16 RX ADMIN — MUPIROCIN 1 APPLICATION: 20 OINTMENT TOPICAL at 06:18

## 2019-04-16 RX ADMIN — POTASSIUM CHLORIDE 40 MEQ: 1500 TABLET, EXTENDED RELEASE ORAL at 06:17

## 2019-04-16 RX ADMIN — ASPIRIN 81 MG: 81 TABLET, COATED ORAL at 06:17

## 2019-04-16 RX ADMIN — LOSARTAN POTASSIUM 100 MG: 25 TABLET ORAL at 08:13

## 2019-04-16 RX ADMIN — SENNOSIDES AND DOCUSATE SODIUM 2 TABLET: 8.6; 5 TABLET ORAL at 06:17

## 2019-04-16 RX ADMIN — CARVEDILOL 3.12 MG: 3.12 TABLET, FILM COATED ORAL at 17:32

## 2019-04-16 RX ADMIN — ACETAMINOPHEN 650 MG: 325 TABLET, FILM COATED ORAL at 12:34

## 2019-04-16 RX ADMIN — MUPIROCIN 1 APPLICATION: 20 OINTMENT TOPICAL at 17:40

## 2019-04-16 RX ADMIN — WARFARIN SODIUM 5 MG: 5 TABLET ORAL at 17:33

## 2019-04-16 RX ADMIN — CARVEDILOL 3.12 MG: 3.12 TABLET, FILM COATED ORAL at 08:13

## 2019-04-16 RX ADMIN — FUROSEMIDE 40 MG: 10 INJECTION, SOLUTION INTRAMUSCULAR; INTRAVENOUS at 06:18

## 2019-04-16 RX ADMIN — ENOXAPARIN SODIUM 40 MG: 100 INJECTION SUBCUTANEOUS at 06:18

## 2019-04-16 RX ADMIN — LEVOTHYROXINE SODIUM 175 MCG: 75 TABLET ORAL at 06:17

## 2019-04-16 RX ADMIN — SENNOSIDES AND DOCUSATE SODIUM 2 TABLET: 8.6; 5 TABLET ORAL at 17:32

## 2019-04-16 RX ADMIN — POTASSIUM CHLORIDE 40 MEQ: 1500 TABLET, EXTENDED RELEASE ORAL at 17:32

## 2019-04-16 RX ADMIN — METFORMIN HYDROCHLORIDE 850 MG: 850 TABLET ORAL at 06:17

## 2019-04-16 RX ADMIN — TRAZODONE HYDROCHLORIDE 50 MG: 50 TABLET ORAL at 21:27

## 2019-04-16 RX ADMIN — FUROSEMIDE 40 MG: 10 INJECTION, SOLUTION INTRAMUSCULAR; INTRAVENOUS at 17:33

## 2019-04-16 RX ADMIN — OXYCODONE HYDROCHLORIDE 10 MG: 10 TABLET ORAL at 12:34

## 2019-04-16 RX ADMIN — OXYCODONE HYDROCHLORIDE 10 MG: 10 TABLET ORAL at 17:32

## 2019-04-16 RX ADMIN — PRAVASTATIN SODIUM 40 MG: 20 TABLET ORAL at 06:17

## 2019-04-16 ASSESSMENT — ENCOUNTER SYMPTOMS
EYES NEGATIVE: 1
GASTROINTESTINAL NEGATIVE: 1
MUSCULOSKELETAL NEGATIVE: 1
CARDIOVASCULAR NEGATIVE: 1
RESPIRATORY NEGATIVE: 1
CONSTITUTIONAL NEGATIVE: 1
NEUROLOGICAL NEGATIVE: 1
PSYCHIATRIC NEGATIVE: 1

## 2019-04-16 ASSESSMENT — COGNITIVE AND FUNCTIONAL STATUS - GENERAL
STANDING UP FROM CHAIR USING ARMS: A LITTLE
TOILETING: A LITTLE
MOVING FROM LYING ON BACK TO SITTING ON SIDE OF FLAT BED: UNABLE
MOVING TO AND FROM BED TO CHAIR: A LOT
TURNING FROM BACK TO SIDE WHILE IN FLAT BAD: A LOT
WALKING IN HOSPITAL ROOM: A LOT
SUGGESTED CMS G CODE MODIFIER DAILY ACTIVITY: CK
MOBILITY SCORE: 11
HELP NEEDED FOR BATHING: A LITTLE
DRESSING REGULAR LOWER BODY CLOTHING: A LOT
SUGGESTED CMS G CODE MODIFIER MOBILITY: CL
DRESSING REGULAR UPPER BODY CLOTHING: A LITTLE
DAILY ACTIVITIY SCORE: 19
CLIMB 3 TO 5 STEPS WITH RAILING: TOTAL

## 2019-04-16 ASSESSMENT — GAIT ASSESSMENTS
DEVIATION: ANTALGIC;STEP TO
DISTANCE (FEET): 5
ASSISTIVE DEVICE: FRONT WHEEL WALKER
GAIT LEVEL OF ASSIST: MINIMAL ASSIST

## 2019-04-16 ASSESSMENT — ACTIVITIES OF DAILY LIVING (ADL): TOILETING: INDEPENDENT

## 2019-04-16 NOTE — CARE PLAN
Problem: Hemodynamic Status  Goal: Vital Signs and Fluid Balance Management    Intervention: Daily Weights  Completed in am      Goal: Stable hemodynamics, hemostasis, fluid/electrolyte balance and rhythm control    Intervention: Daily Weights  Completed in am        Problem: Post Op Day 1 CABG/Heart Valve Replacement  Goal: Optimal care of the post op CABG/heart valve replacement Post Op Day 1    Intervention: Daily Weights  Completed in am        Problem: Post op day 2 CABG/Heart Valve Replacement  Goal: Optimal care of the post op CABG/heart valve replacement post op day 2    Intervention: FSBS: when 2 consecutive BS < 130 after post op day 2, discontinue FSBS unless patient is insulin dependent diabetic  Sliding Scale  Reordered per MD  Intervention: Up in chair for all meals  Completed  Intervention: Ambulate, increasing the distance each time x 3 and before bed  Pt unable to ambulate d/t R knee surgery. PT will come by tomorrow to work with Pt.   Intervention: Stand at sink and wash up with assistance.  Clean incisions twice daily with soap and water.  Cleansed   Intervention: IS q 1 hour while awake and record best IS volume  Completed, Best 1250  Intervention: Consider pacer wire removal by MD  Still in place, until tomorrow  Intervention: Consider removal of beavers and chest tube if not already done  Beavers removed today per order    Intervention: Daily Weights  Completed in am        Problem: Post Op Day 3 CABG/Heart Valve replacement  Goal: Optimal care of the post op CABG/Heart Valve replacement post op day 3    Intervention: Daily Weights  Completed in am        Problem: Post Op Day 4 CABG/Heart Valve Replacement  Goal: Optimal care of the Post Op CABG/Heart Valve replacement Post Op Day 4    Intervention: Daily Weights  Completed in am

## 2019-04-16 NOTE — PROGRESS NOTES
Cardiovascular Surgery Progress Note    Name: Ottoniel Davies  MRN: [id number]  : 1965  Admit Date: 2019 10:20 PM  Procedure:  Procedure(s) and Anesthesia Type:     * MITRAL VALVE REPAIR - General     * ECHOCARDIOGRAM, TRANSESOPHAGEAL - General  3 Day Post-Op    Vitals:  Patient Vitals for the past 8 hrs:   SpO2 O2 Delivery O2 (LPM) Pulse Heart Rate (Monitored) Resp NIBP Weight   19 0600 92 % CPAP 0 75 75 13 - 97.9 kg (215 lb 13.3 oz)   19 0500 89 % - - 71 71 13 - -   19 0400 91 % CPAP 0 70 70 12 143/82 -   19 0300 93 % - - 67 67 13 - -   19 0257 96 % - - - 66 - - -   19 0200 93 % CPAP 0 74 72 17 - -   19 0100 90 % - - 66 66 14 - -   19 0000 96 % CPAP 1 64 66 16 136/74 -     Temp (24hrs), Av.9 °C (98.5 °F), Min:36.8 °C (98.2 °F), Max:37.1 °C (98.7 °F)      Respiratory:    Respiration: 13, Pulse Oximetry: 92 %, O2 Daily Delivery Respiratory : Silicone Nasal Cannula     Chest Tube Drains:          Fluids:    Intake/Output Summary (Last 24 hours) at 19 0739  Last data filed at 19 0700   Gross per 24 hour   Intake              840 ml   Output             5500 ml   Net            -4660 ml     Admit weight: Weight: 101 kg (222 lb 11.2 oz)  Current weight: Weight: 97.9 kg (215 lb 13.3 oz) (19 0600)    Labs:  Recent Labs      19   0458  04/15/19   0530  19   0630   WBC  18.5*  12.0*  9.5   RBC  3.64*  3.27*  3.48*   HEMOGLOBIN  9.6*  8.6*  9.2*   HEMATOCRIT  30.5*  27.5*  29.0*   MCV  83.8  84.1  83.3   MCH  26.4*  26.3*  26.4*   MCHC  31.5*  31.3*  31.7*   RDW  42.6  42.3  40.8   PLATELETCT  209  177  199   MPV  11.1  10.9  10.9         Recent Labs      19   0458  04/15/19   0530  19   0630   SODIUM  140  134*  139   POTASSIUM  4.3  3.8  4.2   CHLORIDE  108  100  105   CO2  25  26  26   GLUCOSE  65  137*  125*   BUN  23*  30*  26*   CREATININE  0.94  1.03  0.99   CALCIUM  7.8*  7.5*  7.7*     Recent Labs      19    1130  04/14/19   0458  04/15/19   0530  04/16/19   0630   APTT  28.1   --    --    --    INR  1.34*  1.15*  1.30*  1.24*       Medications:  • levothyroxine  300 mcg     • losartan  100 mg     • potassium chloride SA  40 mEq     • enoxaparin (LOVENOX) injection  40 mg     • insulin regular  3-14 Units     • warfarin  2.5 mg     • furosemide  40 mg     • carvedilol  3.125 mg     • traZODone  50 mg     • metFORMIN  850 mg     • aspirin EC  81 mg     • Pharmacy Consult Request  1 Each     • senna-docusate  2 Tab     • mupirocin  1 Application     • MD Alert...Warfarin per Pharmacy       • levothyroxine  175 mcg     • pravastatin  40 mg          Ordered Medications:    ASA - Yes    Plavix - No; contraindicated because of On Coumadin / NOAC    Post-operative Beta Blockers - Yes    Ace Inhibitor - Yes    Statin - Yes        Exam:   Review of Systems   Constitutional: Negative.    HENT: Negative.    Eyes: Negative.    Respiratory: Negative.    Cardiovascular: Negative.    Gastrointestinal: Negative.    Genitourinary: Negative.    Musculoskeletal: Negative.    Skin: Negative.    Neurological: Negative.    Endo/Heme/Allergies: Negative.    Psychiatric/Behavioral: Negative.        Physical Exam   Constitutional: He is oriented to person, place, and time. No distress.   Neck: Neck supple.   Cardiovascular: Normal rate, regular rhythm and normal heart sounds.  Exam reveals no gallop and no friction rub.    No murmur heard.  Pulmonary/Chest: Effort normal. No respiratory distress. He has decreased breath sounds in the right lower field and the left lower field. He has no wheezes. He has no rales.   Abdominal: Soft. He exhibits no distension. There is no tenderness.   Musculoskeletal: He exhibits edema.   Neurological: He is alert and oriented to person, place, and time.   Skin: Skin is warm and dry.       Quality Measures:   Quality-Core Measures   Reviewed items::  EKG reviewed, Labs reviewed, Medications reviewed and Radiology  images reviewed  Beavers catheter::  No Beavers  Central line in place:  Need for access  DVT prophylaxis pharmacological::  Warfarin (Coumadin)  DVT prophylaxis - mechanical:  SCDs  Ulcer Prophylaxis::  Not indicated  Assessed for rehabilitation services:  Patient was assess for and/or received rehabilitation services during this hospitalization      Assessment/Plan:  POD 1 HDS, SR, d/c mediastinal tubes, diurese, keep beavers for strict I&O, amb, enc IS  POD 2 HDS, SR, add low dose ace, d/c beavers, PT/OT- mibilize as much as possible, enc IS  POD 3 HDS, SR, Diuresed well- cont, Had long discussion with patient about expectations of rehab, d/w him he had to participate with therapies in order to even be considered for rehab- pt upset, states he know he was wrong yesterday and had a bad day. He really wants to go to rehab and will start participating and trying harder with PT/OT and the nurses.    Active Hospital Problems    Diagnosis   • Cardiomyopathy (HCC) [I42.9]     Priority: High   • Acute respiratory failure with hypoxia with pulmonary edema (HCC) [J96.01]     Priority: High   • Mitral valve disease [I05.9]     Priority: High   • Atrial fibrillation (HCC) [I48.91]     Priority: Medium   • Pulmonary edema [J81.1]     Priority: Medium   • S/P total knee arthroplasty, right [Z96.651]     Priority: Medium   • Obstructive sleep apnea [G47.33]     Priority: Medium   • Other hyperlipidemia [E78.49]     Priority: Medium   • Hypothyroid [E03.9]     Priority: Medium   • Hypokalemia [E87.6]     Priority: Low   • Hypomagnesemia [E83.42]     Priority: Low   • Volume overload [E87.70]     Priority: Low

## 2019-04-16 NOTE — PROGRESS NOTES
Inpatient Anticoagulation Service Note    Date: 4/16/2019  Reason for Anticoagulation: Atrial Fibrillation, Mitral Valve Repair   [id number] VASc Score: 2  HAS-BLED Score: 1     Hemoglobin Value: (!) 9.2  Hematocrit Value: (!) 29  Lab Platelet Value: 199  Target INR: 2.0 to 3.0    INR from last 7 days     Date/Time INR Value    04/16/19 0630 (!)  1.24    04/15/19 0530 (!)  1.3    04/14/19 0458 (!)  1.15    04/13/19 1130 (!)  1.34    04/11/19 1730 (!)  1.18    04/11/19 0652 (!)  1.33        Dose from last 7 days     Date/Time Dose (mg)    04/16/19 1300  5    04/15/19 1528  2.5    04/14/19 1400  5        Average Dose (mg):  (New start)  Significant Interactions: Aspirin, Statin, Thyroid Medications  Bridge Therapy: No (Lovenox for DVT prophylaxis)    Reversal Agent Administered: Not Applicable  Comments: INR dropped slightly today, most likely due to delayed INR response to warfarin as well as being loaded with relatively small doses of warfarin. Patient is ~ 100 kg and relatively young in age. Vitals stable w/o s/sx of active bleed while H/H depressed but uptrending. Will dose 5 mg today and f/u on INR.     Plan:  Warfarin 5 mg today  Education Material Provided?: Yes  Pharmacist suggested discharge dosing: likely warfarin 5 mg daily with INR check w/in 48-72 hours     Katherine Leonard, PharmD

## 2019-04-16 NOTE — PREADMISSION SCREENING NOTE
Reviewed chart.   Current documentation doesn't support tolerance/participation for acute in patient rehab at this time.  [facility] will continue to follow and monitor pt's progress with therapies PT/OT and pain management.   Please call Elvia Zelaya @ [phone] with any questions.   Thank you.

## 2019-04-16 NOTE — PROGRESS NOTES
"Assumed care of pt from Abhinav HENDRICKS. Pt A+O. Medicated for pain control. Stands with help of this RN, uses walker to take some steps & march in place. Sitting in recliner, eating lunch. Heat packs given for c/o \"sciatica.\"   "

## 2019-04-16 NOTE — PROGRESS NOTES
Monitor Summary:   Pt converting between SR with 1st degree, .24/.10/.48 to   Afib 50-70, -/.10/-       12 hour chart check.

## 2019-04-16 NOTE — ASSESSMENT & PLAN NOTE
Postop ejection fraction 45%  Recommend increase Coreg to 6.25 bid  Continue diuresis, to euvolemia

## 2019-04-16 NOTE — THERAPY
"Physical Therapy Treatment completed.   Bed Mobility:  Supine to Sit:  (up in chair)  Transfers: Sit to Stand: Minimal Assist  Gait: Level Of Assist: Minimal Assist with Front-Wheel Walker       Plan of Care: Will benefit from Physical Therapy 4 times per week  Discharge Recommendations: Equipment: Will Continue to Assess for Equipment Needs. Post-acute therapy Discharge to a transitional care facility for continued skilled therapy services.     See \"Rehab Therapy-Acute\" Patient Summary Report for complete documentation.     Pt with great improvement in attitude and motivation with therapy today. Pt able to participate in standing activity up to a FWW with verbal and demo cuing on how to use FWW while observing sternal precautions as best as Pt could. After exercises in weight shifting while standing with walker followed by static marching, Pt reported feeling much more confident in his right knee and was able to safely take a few steps forward and back with FWW and placing full weight onto the RLE. PT to continue working with Pt in acute setting with compensations to be made to accommodate right TKA as well as OHS protocol/cardiac rehab.   "

## 2019-04-16 NOTE — THERAPY
"Occupational Therapy Evaluation completed.   Functional Status:  Madisyn sit>stand, MaxA LB dress, Setup seated grooming, Madisyn stand pivot txf  Plan of Care: Will benefit from Occupational Therapy 3 times per week  Discharge Recommendations:  Equipment: Will Continue to Assess for Equipment Needs. Post-acute therapy Recommend inpatient transitional care services for continued occupational therapy services.     See \"Rehab Therapy-Acute\" Patient Summary Report for complete documentation.    Pt is 53yo male s/p TKA 4/8/19 in [place] who developed post-op respiratory failure with pulmonary edema, transferred here for emergent OHS. Pt presents to OT eval limited by decreased activity tolerance, pain in R knee and cardiac/sternal precautions s/p OHS, now POD#3. Pt currently requires MaxA to don/doff socks, has not attempted toileting on BSC or toilet 2/2 fear of pain in R knee with ambulation. Pt demonstrated sit>stand with Madisyn, and marching/weight shifting using FWW in prep for functional txfs, required encouraging/reassuring cues throughout. Pt educated/reminded of sternal pxns and ability to use FWW to gently off-weight R knee during weight-shifting. Pt will benefit from acute OT to increase activity tolerance and independence with ADLs via AE training. Post-acute placement recommended as pt lives with elderly parents who are mostly unable to assist.   "

## 2019-04-16 NOTE — CARE PLAN
Problem: Knowledge Deficit  Goal: Knowledge of disease process/condition, treatment plan, diagnostic tests, and medications will improve  Outcome: PROGRESSING AS EXPECTED  Discussed plan of care for today w/ pt upon assuming care, he is A+O, he verbalizes understanding of his plan of care, no questions. Emotional support given. Will monitor for educational needs. Pt up with PT today per RN Abhinav. Pt motivated to get up and take steps this afternoon w/ this RN.       Problem: Pain Management  Goal: Pain level will decrease to patient's comfort goal  Outcome: PROGRESSING AS EXPECTED  Assessing every four hrs for pain per protocol; see doc flowsheets, pt medicated with tylenol po prn and oxycodone po prn for c/o sciatica pain today.

## 2019-04-16 NOTE — CARE PLAN
Problem: Hyperinflation:  Goal: Prevent or improve atelectasis  Outcome: PROGRESSING AS EXPECTED  IS QID  60% - 1650 mls, ACTUAL - 9831-0402 mls

## 2019-04-17 LAB
ANION GAP SERPL CALC-SCNC: 9 MMOL/L (ref 0–11.9)
BUN SERPL-MCNC: 25 MG/DL (ref 8–22)
CALCIUM SERPL-MCNC: 8 MG/DL (ref 8.5–10.5)
CHLORIDE SERPL-SCNC: 105 MMOL/L (ref 96–112)
CO2 SERPL-SCNC: 25 MMOL/L (ref 20–33)
CREAT SERPL-MCNC: 1.13 MG/DL (ref 0.5–1.4)
ERYTHROCYTE [DISTWIDTH] IN BLOOD BY AUTOMATED COUNT: 40.4 FL (ref 35.9–50)
GLUCOSE BLD-MCNC: 118 MG/DL (ref 65–99)
GLUCOSE BLD-MCNC: 144 MG/DL (ref 65–99)
GLUCOSE SERPL-MCNC: 113 MG/DL (ref 65–99)
HCT VFR BLD AUTO: 30 % (ref 42–52)
HGB BLD-MCNC: 9.5 G/DL (ref 14–18)
INR PPP: 1.28 (ref 0.87–1.13)
MCH RBC QN AUTO: 26.2 PG (ref 27–33)
MCHC RBC AUTO-ENTMCNC: 31.7 G/DL (ref 33.7–35.3)
MCV RBC AUTO: 82.6 FL (ref 81.4–97.8)
PLATELET # BLD AUTO: 240 K/UL (ref 164–446)
PMV BLD AUTO: 10.7 FL (ref 9–12.9)
POTASSIUM SERPL-SCNC: 4.4 MMOL/L (ref 3.6–5.5)
PROTHROMBIN TIME: 16.1 SEC (ref 12–14.6)
RBC # BLD AUTO: 3.63 M/UL (ref 4.7–6.1)
SODIUM SERPL-SCNC: 139 MMOL/L (ref 135–145)
WBC # BLD AUTO: 10.3 K/UL (ref 4.8–10.8)

## 2019-04-17 PROCEDURE — A9270 NON-COVERED ITEM OR SERVICE: HCPCS | Performed by: HOSPITALIST

## 2019-04-17 PROCEDURE — 700102 HCHG RX REV CODE 250 W/ 637 OVERRIDE(OP): Performed by: HOSPITALIST

## 2019-04-17 PROCEDURE — 700111 HCHG RX REV CODE 636 W/ 250 OVERRIDE (IP): Performed by: NURSE PRACTITIONER

## 2019-04-17 PROCEDURE — 700102 HCHG RX REV CODE 250 W/ 637 OVERRIDE(OP): Performed by: INTERNAL MEDICINE

## 2019-04-17 PROCEDURE — A9270 NON-COVERED ITEM OR SERVICE: HCPCS | Performed by: INTERNAL MEDICINE

## 2019-04-17 PROCEDURE — 770020 HCHG ROOM/CARE - TELE (206)

## 2019-04-17 PROCEDURE — 82962 GLUCOSE BLOOD TEST: CPT

## 2019-04-17 PROCEDURE — 85027 COMPLETE CBC AUTOMATED: CPT

## 2019-04-17 PROCEDURE — 700101 HCHG RX REV CODE 250: Performed by: NURSE PRACTITIONER

## 2019-04-17 PROCEDURE — A9270 NON-COVERED ITEM OR SERVICE: HCPCS | Performed by: NURSE PRACTITIONER

## 2019-04-17 PROCEDURE — 94660 CPAP INITIATION&MGMT: CPT

## 2019-04-17 PROCEDURE — 700111 HCHG RX REV CODE 636 W/ 250 OVERRIDE (IP)

## 2019-04-17 PROCEDURE — 700102 HCHG RX REV CODE 250 W/ 637 OVERRIDE(OP): Performed by: NURSE PRACTITIONER

## 2019-04-17 PROCEDURE — 85610 PROTHROMBIN TIME: CPT

## 2019-04-17 PROCEDURE — 700111 HCHG RX REV CODE 636 W/ 250 OVERRIDE (IP): Performed by: INTERNAL MEDICINE

## 2019-04-17 PROCEDURE — 99024 POSTOP FOLLOW-UP VISIT: CPT | Performed by: THORACIC SURGERY (CARDIOTHORACIC VASCULAR SURGERY)

## 2019-04-17 PROCEDURE — 80048 BASIC METABOLIC PNL TOTAL CA: CPT

## 2019-04-17 RX ORDER — ONDANSETRON 2 MG/ML
4 INJECTION INTRAMUSCULAR; INTRAVENOUS EVERY 6 HOURS PRN
Status: DISCONTINUED | OUTPATIENT
Start: 2019-04-17 | End: 2019-04-22 | Stop reason: HOSPADM

## 2019-04-17 RX ORDER — ONDANSETRON 2 MG/ML
INJECTION INTRAMUSCULAR; INTRAVENOUS
Status: COMPLETED
Start: 2019-04-17 | End: 2019-04-17

## 2019-04-17 RX ADMIN — TRAZODONE HYDROCHLORIDE 50 MG: 50 TABLET ORAL at 21:56

## 2019-04-17 RX ADMIN — LEVOTHYROXINE SODIUM 175 MCG: 75 TABLET ORAL at 05:01

## 2019-04-17 RX ADMIN — OXYCODONE HYDROCHLORIDE 5 MG: 5 TABLET ORAL at 01:47

## 2019-04-17 RX ADMIN — POTASSIUM CHLORIDE 40 MEQ: 1500 TABLET, EXTENDED RELEASE ORAL at 05:03

## 2019-04-17 RX ADMIN — METFORMIN HYDROCHLORIDE 850 MG: 850 TABLET ORAL at 05:00

## 2019-04-17 RX ADMIN — ALTEPLASE 2 MG: 2.2 INJECTION, POWDER, LYOPHILIZED, FOR SOLUTION INTRAVENOUS at 21:56

## 2019-04-17 RX ADMIN — ASPIRIN 81 MG: 81 TABLET, COATED ORAL at 05:00

## 2019-04-17 RX ADMIN — OXYCODONE HYDROCHLORIDE 10 MG: 10 TABLET ORAL at 17:41

## 2019-04-17 RX ADMIN — FUROSEMIDE 40 MG: 10 INJECTION, SOLUTION INTRAMUSCULAR; INTRAVENOUS at 17:40

## 2019-04-17 RX ADMIN — WARFARIN SODIUM 5 MG: 5 TABLET ORAL at 17:41

## 2019-04-17 RX ADMIN — CARVEDILOL 3.12 MG: 3.12 TABLET, FILM COATED ORAL at 17:40

## 2019-04-17 RX ADMIN — POTASSIUM CHLORIDE 40 MEQ: 1500 TABLET, EXTENDED RELEASE ORAL at 17:40

## 2019-04-17 RX ADMIN — OXYCODONE HYDROCHLORIDE 10 MG: 10 TABLET ORAL at 23:15

## 2019-04-17 RX ADMIN — FUROSEMIDE 40 MG: 10 INJECTION, SOLUTION INTRAMUSCULAR; INTRAVENOUS at 05:00

## 2019-04-17 RX ADMIN — LOSARTAN POTASSIUM 100 MG: 25 TABLET ORAL at 05:01

## 2019-04-17 RX ADMIN — MUPIROCIN 1 APPLICATION: 20 OINTMENT TOPICAL at 17:46

## 2019-04-17 RX ADMIN — MUPIROCIN 1 APPLICATION: 20 OINTMENT TOPICAL at 05:02

## 2019-04-17 RX ADMIN — ONDANSETRON 4 MG: 2 INJECTION INTRAMUSCULAR; INTRAVENOUS at 12:00

## 2019-04-17 RX ADMIN — PRAVASTATIN SODIUM 40 MG: 20 TABLET ORAL at 05:03

## 2019-04-17 RX ADMIN — CARVEDILOL 3.12 MG: 3.12 TABLET, FILM COATED ORAL at 07:49

## 2019-04-17 RX ADMIN — ENOXAPARIN SODIUM 40 MG: 100 INJECTION SUBCUTANEOUS at 05:02

## 2019-04-17 RX ADMIN — OXYCODONE HYDROCHLORIDE 10 MG: 10 TABLET ORAL at 11:13

## 2019-04-17 ASSESSMENT — ENCOUNTER SYMPTOMS
CONSTITUTIONAL NEGATIVE: 1
NEUROLOGICAL NEGATIVE: 1
RESPIRATORY NEGATIVE: 1
GASTROINTESTINAL NEGATIVE: 1
CARDIOVASCULAR NEGATIVE: 1
PSYCHIATRIC NEGATIVE: 1
EYES NEGATIVE: 1
MUSCULOSKELETAL NEGATIVE: 1

## 2019-04-17 ASSESSMENT — PATIENT HEALTH QUESTIONNAIRE - PHQ9
1. LITTLE INTEREST OR PLEASURE IN DOING THINGS: NOT AT ALL
2. FEELING DOWN, DEPRESSED, IRRITABLE, OR HOPELESS: NOT AT ALL
SUM OF ALL RESPONSES TO PHQ9 QUESTIONS 1 AND 2: 0

## 2019-04-17 NOTE — DISCHARGE PLANNING
Care Transition Team Discharge Planning     Anticipated Discharge Disposition: Acute Rehab     Action: This RN CM spoke with Cari from Rehab to updated on patient expected discharge date.   Cari states that patient is appropriate for rehab services and is currently pending insurance authorization.       Barriers to Discharge: insurance authorization.      Plan: Follow up with treatment team and rehab services.

## 2019-04-17 NOTE — PREADMISSION SCREENING NOTE
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 Complications: Cognitive Impairment, Contractures, Deep Vein Thrombosis, Incontinence, Malnutrition, Pain, Perceptual Impairment, Pneumonia, Pressure Ulcer, Urinary Tract Infection and Infection  Level of Risk: High    Ongoing Medical Management Needed (Medical/Nursing Needs):   Patient Active Problem List    Diagnosis Date Noted   • Cardiomyopathy (HCC) 04/15/2019     Priority: High   • Acute respiratory failure with hypoxia with pulmonary edema (HCC) 04/10/2019     Priority: High   • Mitral valve disease 04/10/2019     Priority: High   • Atrial fibrillation (HCC) 04/11/2019     Priority: Medium   • Pulmonary edema 04/10/2019     Priority: Medium   • S/P total knee arthroplasty, right 04/10/2019     Priority: Medium   • Obstructive sleep apnea 04/10/2019     Priority: Medium   • Other hyperlipidemia 04/10/2019     Priority: Medium   • Hypothyroid 01/31/2014     Priority: Medium   • Hypokalemia 04/11/2019     Priority: Low   • Hypomagnesemia 04/11/2019     Priority: Low   • Volume  "overload 04/10/2019     Priority: Low   • Papillary thyroid carcinoma (HCC) 04/09/2014   • Blood per rectum 01/31/2014   • Urinary frequency 01/31/2014   • Dandruff 01/31/2014   • HTN (hypertension) 01/31/2014     Jailyn Polk R.N. Registered Nurse Keri Needed   Progress Notes Date of Service: 4/17/2019  7:37 AM           Monitor summary: SR w/rare PACs/PVCs and 1st degree block        Current Vital Signs:   Temperature: 36.7 °C (98.1 °F) Pulse: 71 Respiration: (!) 21 Blood Pressure: 136/70  Weight: 94 kg (207 lb 3.7 oz) Height: 170.2 cm (5' 7\")  Pulse Oximetry: 89 % O2 (LPM): 2      Completed Laboratory Reports:  Recent Labs      04/14/19   1128  04/14/19   1737  04/14/19   2020  04/15/19   0530  04/15/19   0633  04/15/19   1157  04/15/19   1759  04/15/19   2055  04/16/19   0630  04/16/19   0633  04/16/19   1145  04/16/19   1737  04/16/19   2133  04/17/19   0340  04/17/19   0705   WBC   --    --    --   12.0*   --    --    --    --   9.5   --    --    --    --   10.3   --    HEMOGLOBIN   --    --    --   8.6*   --    --    --    --   9.2*   --    --    --    --   9.5*   --    HEMATOCRIT   --    --    --   27.5*   --    --    --    --   29.0*   --    --    --    --   30.0*   --    PLATELETCT   --    --    --   177   --    --    --    --   199   --    --    --    --   240   --    SODIUM   --    --    --   134*   --    --    --    --   139   --    --    --    --   139   --    POTASSIUM   --    --    --   3.8   --    --    --    --   4.2   --    --    --    --   4.4   --    BUN   --    --    --   30*   --    --    --    --   26*   --    --    --    --   25*   --    CREATININE   --    --    --   1.03   --    --    --    --   0.99   --    --    --    --   1.13   --    GLUCOSE   --    --    --   137*   --    --    --    --   125*   --    --    --    --   113*   --    POCGLUCOSE  161*  192*  242*   --   114*  175*  133*  161*   --   127*  132*  102*  128*   --   144*   INR   --    --    --   1.30*   --    --    --    " --   1.24*   --    --    --    --   1.28*   --      Additional Labs: Not Applicable    Prior Living Situation:   Housing / Facility: 1 Story House  Steps Into Home: 1  Steps In Home: 0  Lives with - Patient's Self Care Capacity: Alone and Able to Care For Self, Parents  Equipment Owned: 4-Wheel Walker, Single Point Cane    Prior Level of Function / Living Situation:   Physical Therapy: Prior Services: None  Housing / Facility: 1 Story House  Steps Into Home: 1  Steps In Home: 0  Elevator: No  Bathroom Set up: Bathtub / Shower Combination, Walk In Shower, Shower Chair  Equipment Owned: 4-Wheel Walker, Single Point Cane  Lives with - Patient's Self Care Capacity: Alone and Able to Care For Self, Parents  Bed Mobility: Independent  Transfer Status: Independent  Ambulation: Independent  Distance Ambulation (Feet):  (limited household )  Assistive Devices Used: Single Point Cane  Stairs: Independent  Current Level of Function:   Level Of Assist: Minimal Assist  Assistive Device: Front Wheel Walker  Distance (Feet): 5  Deviation: Antalgic, Step To  Weight Bearing Status: no restrictions, WBAT RLE   Skilled Intervention: Compensatory Strategies, Sequencing, Tactile Cuing, Verbal Cuing  Supine to Sit:  (up in chair)  Sit to Supine:  (back to chair )  Sit to Stand: Minimal Assist  Bed, Chair, Wheelchair Transfer: Minimal Assist  Toilet Transfers: Refused  Transfer Method: Other (Comments) (step to )  Sitting in Chair: > 1 hour prior to session   Sitting Edge of Bed: NT  Standin-3 min total   Occupational Therapy:   Self Feeding: Independent  Grooming / Hygiene: Independent  Bathing: Independent  Dressing: Independent  Toileting: Independent  Medication Management: Independent  Laundry: Independent  Kitchen Mobility: Independent  Finances: Independent  Home Management: Independent  Shopping: Independent  Prior Level Of Mobility: Independent With Device in Community, Independent Without Device in Home  Driving /  Transportation: Driving Independent  Prior Services: None  Housing / Facility: 1 Story House  Occupation (Pre-Hospital Vocational): Not Employed  Current Level of Function:   Eating: Modified Independent  Bathing: Not Tested  Lower Body Dressing: Maximal Assist  Speech Language Pathology:      Rehabilitation Prognosis/Potential: Good  Estimated Length of Stay: 10-14 days    Nursing:   Orientation : Oriented x 4  Continent    Scope/Intensity of Services Recommended:  Physical Therapy: 1 hr / day  5 days / week. Therapeutic Interventions Required: Maximize Endurance, Mobility, Strength and Safety  Occupational Therapy: 1 hr / day 5 days / week. Therapeutic Interventions Required: Maximize Self Care, ADLs, IADLs and Energy Conservation  Speech & Language Pathology: 1 hr / day 5 days / week. Therapeutic Interventions Required: Maximize Cognition, Swallowing and Safety  Rehabilitation Nursin/7. Therapeutic Interventions Required: Monitor Pain, Skin, Vital Signs, Intake and Output, Labs, Safety, Aspiration Risk, Family Training and Sternal incision care; RLE knee surgical incision care; DVT prophylaxis; Bowel & Bladder regimen; ADL's.   Rehabilitation Physician: 3 - 5 days / week. Therapeutic Interventions Required: Medical Management  Respiratory Care: Daily. Therapeutic Interventions Required: Pulmonary Toileting, O2 Weaning, Aspiration Risk and Respiratory care per protocol.   Dietician: Consult. Therapeutic Interventions Required: Nutritional evaluation with recommendations to promote optimal health/healing.     Rehabilitation Goals and Plan (Expected frequency & duration of treatment in the IRF):   Return to the Community, Modified Independent Level of Care and Outpatient Support  Anticipated Date of Rehabilitation Admission: 2019  Patient/Family oriented IRF level of care/facility/plan: Yes  Patient/Family willing to participate in IRF care/facility/plan: Yes  Patient able to tolerate IRF level of care  proposed: Yes  Patient has potential to benefit IRF level of care proposed: Yes  Comments: Not Applicable    Special Needs or Precautions - Medical Necessity:  Safety Concerns/Precautions:  Fall Risk / High Risk for Falls and Balance  Complex Wound Care: Sternal incision care; RLE knee replacement incision care.  Pain Management  Special Equipment: CPM RLE  Requires Oxygen  Weight-bearing Status: As Tolerated, Right, Lower Extremity  Cardiac Precautions       Diet:   DIET ORDERS (Through next 24h)    Start     Ordered    04/15/19 0930  Diet Order Diabetic, Cardiac  ALL MEALS     Question Answer Comment   Diet: Diabetic    Diet: Cardiac    Texture/Fiber modifications: Dysphagia 3(Mechanical Soft)specify fluid consistency(question 6)        04/15/19 0931          Anticipated Discharge Destination / Patient/Family Goal:  Destination: Home Alone Support System: Family   Anticipated home health services: OT, PT and Nursing  Previously used HH service/ provider: Not Applicable  Anticipated DME Needs: To be determined  Outpatient Services: To be determined  Alternative resources to address additional identified needs:   Outpatient Cardiac Rehabilitation Program  Pre-Screen Completed: 4/17/2019 11:18 AM Cynthia Dickey R.N.

## 2019-04-17 NOTE — DISCHARGE PLANNING
Care Transition Team Discharge Planning     Anticipated Discharge Disposition: Rehab     Action: This RN CM met with patient at bedside to address his concern about SS letter he received in his mail.     This RN CM helped work through patient's paperwork and guided him on calling the SS department and clarifying their expectations.      Barriers to Discharge: None.      Plan: Follow up on D/C plan tomorrow.

## 2019-04-17 NOTE — PROGRESS NOTES
Cardiovascular Surgery Progress Note    Name: Ottoniel Davies  MRN: [id number]  : 1965  Admit Date: 2019 10:20 PM  Procedure:  Procedure(s) and Anesthesia Type:     * MITRAL VALVE REPAIR - General     * ECHOCARDIOGRAM, TRANSESOPHAGEAL - General  3 Day Post-Op    Vitals:  Patient Vitals for the past 8 hrs:   Temp SpO2 O2 Delivery Pulse Heart Rate (Monitored) Resp NIBP Weight   19 0600 - 89 % - 71 72 (!) 21 - -   19 0510 - 94 % - 70 70 17 136/76 -   19 0400 36.7 °C (98.1 °F) 96 % - 69 69 13 144/83 94 kg (207 lb 3.7 oz)   19 0259 - 91 % - - 71 - - -   19 0200 - 96 % CPAP 67 69 12 129/74 -     Temp (24hrs), Av.7 °C (98 °F), Min:36.4 °C (97.5 °F), Max:37 °C (98.6 °F)      Respiratory:    Respiration: (!) 21, Pulse Oximetry: 89 %, O2 Daily Delivery Respiratory : Room Air with O2 Available     Chest Tube Drains:          Fluids:    Intake/Output Summary (Last 24 hours) at 19 0942  Last data filed at 19 0600   Gross per 24 hour   Intake              900 ml   Output             4860 ml   Net            -3960 ml     Admit weight: Weight: 101 kg (222 lb 11.2 oz)  Current weight: Weight: 94 kg (207 lb 3.7 oz) (19 0400)    Labs:  Recent Labs      04/15/19   0530  19   0630  19   0340   WBC  12.0*  9.5  10.3   RBC  3.27*  3.48*  3.63*   HEMOGLOBIN  8.6*  9.2*  9.5*   HEMATOCRIT  27.5*  29.0*  30.0*   MCV  84.1  83.3  82.6   MCH  26.3*  26.4*  26.2*   MCHC  31.3*  31.7*  31.7*   RDW  42.3  40.8  40.4   PLATELETCT  177  199  240   MPV  10.9  10.9  10.7         Recent Labs      04/15/19   0530  19   0630  19   0340   SODIUM  134*  139  139   POTASSIUM  3.8  4.2  4.4   CHLORIDE  100  105  105   CO2  26  26  25   GLUCOSE  137*  125*  113*   BUN  30*  26*  25*   CREATININE  1.03  0.99  1.13   CALCIUM  7.5*  7.7*  8.0*     Recent Labs      04/15/19   0530  19   0630  19   0340   INR  1.30*  1.24*  1.28*       Medications:  • losartan  100 mg      • warfarin  5 mg     • potassium chloride SA  40 mEq     • enoxaparin (LOVENOX) injection  40 mg     • insulin regular  3-14 Units     • furosemide  40 mg     • carvedilol  3.125 mg     • traZODone  50 mg     • metFORMIN  850 mg     • aspirin EC  81 mg     • Pharmacy Consult Request  1 Each     • senna-docusate  2 Tab     • mupirocin  1 Application     • MD Alert...Warfarin per Pharmacy       • levothyroxine  175 mcg     • pravastatin  40 mg          Ordered Medications:    ASA - Yes    Plavix - No; contraindicated because of On Coumadin / NOAC    Post-operative Beta Blockers - Yes    Ace Inhibitor - Yes    Statin - Yes        Exam:   Review of Systems   Constitutional: Negative.    HENT: Negative.    Eyes: Negative.    Respiratory: Negative.    Cardiovascular: Negative.    Gastrointestinal: Negative.    Genitourinary: Negative.    Musculoskeletal: Negative.    Skin: Negative.    Neurological: Negative.    Endo/Heme/Allergies: Negative.    Psychiatric/Behavioral: Negative.        Physical Exam   Constitutional: He is oriented to person, place, and time. No distress.   Neck: Neck supple.   Cardiovascular: Normal rate, regular rhythm and normal heart sounds.  Exam reveals no gallop and no friction rub.    No murmur heard.  Pulmonary/Chest: Effort normal. No respiratory distress. He has decreased breath sounds in the right lower field and the left lower field. He has no wheezes. He has no rales.   Abdominal: Soft. He exhibits no distension. There is no tenderness.   Musculoskeletal: He exhibits edema.   Neurological: He is alert and oriented to person, place, and time.   Skin: Skin is warm and dry.       Quality Measures:   Quality-Core Measures   Reviewed items::  EKG reviewed, Labs reviewed, Medications reviewed and Radiology images reviewed  Holder catheter::  No Holder  Central line in place:  Need for access  DVT prophylaxis pharmacological::  Warfarin (Coumadin)  DVT prophylaxis - mechanical:  SCDs  Ulcer  Prophylaxis::  Not indicated  Assessed for rehabilitation services:  Patient was assess for and/or received rehabilitation services during this hospitalization      Assessment/Plan:  POD 1 HDS, SR, d/c mediastinal tubes, diurese, keep beavers for strict I&O, amb, enc IS  POD 2 HDS, SR, add low dose ace, d/c beavers, PT/OT- mibilize as much as possible, enc IS  POD 3 HDS, SR, Diuresed well- cont, Had long discussion with patient about expectations of rehab, d/w him he had to participate with therapies in order to even be considered for rehab- pt upset, states he know he was wrong yesterday and had a bad day. He really wants to go to rehab and will start participating and trying harder with PT/OT and the nurses.  POD 4 HDS, SR, cont diuresis, amb, enc IS, planning to rehab tomorrow    Active Hospital Problems    Diagnosis   • Cardiomyopathy (HCC) [I42.9]     Priority: High   • Acute respiratory failure with hypoxia with pulmonary edema (HCC) [J96.01]     Priority: High   • Mitral valve disease [I05.9]     Priority: High   • Atrial fibrillation (HCC) [I48.91]     Priority: Medium   • Pulmonary edema [J81.1]     Priority: Medium   • S/P total knee arthroplasty, right [Z96.651]     Priority: Medium   • Obstructive sleep apnea [G47.33]     Priority: Medium   • Other hyperlipidemia [E78.49]     Priority: Medium   • Hypothyroid [E03.9]     Priority: Medium   • Hypokalemia [E87.6]     Priority: Low   • Hypomagnesemia [E83.42]     Priority: Low   • Volume overload [E87.70]     Priority: Low

## 2019-04-17 NOTE — PROGRESS NOTES
Inpatient Anticoagulation Service Note    Date: 4/17/2019  Reason for Anticoagulation: Atrial Fibrillation, Mitral Valve Repair   [id number] VASc Score: 2  HAS-BLED Score: 1     Hemoglobin Value: (!) 9.5  Hematocrit Value: (!) 30  Lab Platelet Value: 240  Target INR: 2.0 to 3.0    INR from last 7 days     Date/Time INR Value    04/17/19 0340 (!)  1.28    04/16/19 0630 (!)  1.24    04/15/19 0530 (!)  1.3    04/14/19 0458 (!)  1.15    04/13/19 1130 (!)  1.34    04/11/19 1730 (!)  1.18    04/11/19 0652 (!)  1.33        Dose from last 7 days     Date/Time Dose (mg)    04/17/19 1100  5    04/16/19 1300  5    04/15/19 1528  2.5    04/14/19 1400  5        Average Dose (mg):  (New start)  Significant Interactions: Aspirin, Statin, Thyroid Medications  Bridge Therapy: No (Lovenox for DVT prophylaxis)    Reversal Agent Administered: Not Applicable  Comments: INR remained the same, likely due to delayed INR response to warfarin. Will continue with warfarin 5 mg today with low threshold to increase dosing if INR remains unchanged tomorrow. Vitals stable w/o s/sx of active bleed while H/H is depressed but stable.     Plan:  Warfarin 5 mg today  Education Material Provided?: Yes  Pharmacist suggested discharge dosing: likely warfarin 5 mg daily w/INR check w/in 48-72 hours     Katherine Leonard, PharmD
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
## 2019-04-17 NOTE — PROGRESS NOTES
Monitor summary: SR w/rare PACs/PVCs and 1st degree block    12 hour chart check    2 RN skin check

## 2019-04-17 NOTE — DISCHARGE PLANNING
Physiatry Dr. Laboy recommending pt appropriate for acute rehab. Submitting care to Medicaid for consideration of IRF level care. Will follow for auth determination. WAYNE haynes.

## 2019-04-17 NOTE — PROGRESS NOTES
Tele - SR 63 to 73 w/ first degree AVB.  .22 / .10 / .48    Pt up to chair for dinner. Pt medicated for pain control this evening. Will pass care to NOC RN @ [facility].

## 2019-04-17 NOTE — DISCHARGE PLANNING
Pt participating w/ both PT/OT.   Referral to sent to Dr. ROBERT Laboy/Physiatry per protocol.   TCC continuing to follow.   Please call Elvia Zelaya @  [phone] with any questions.

## 2019-04-17 NOTE — CARE PLAN
Problem: Hyperinflation:  Goal: Prevent or improve atelectasis  Outcome: PROGRESSING AS EXPECTED  IS BID 2100

## 2019-04-17 NOTE — CARE PLAN
Problem: Hemodynamic Status  Goal: Vital Signs and Fluid Balance Management    Intervention: Daily Weights  Completed; 94.0kg.    Goal: Stable hemodynamics, hemostasis, fluid/electrolyte balance and rhythm control    Intervention: Daily Weights  Completed; 94.0kg.      Problem: Post Op Day 1 CABG/Heart Valve Replacement  Goal: Optimal care of the post op CABG/heart valve replacement Post Op Day 1    Intervention: Daily Weights  Completed; 94.0kg.      Problem: Post op day 2 CABG/Heart Valve Replacement  Goal: Optimal care of the post op CABG/heart valve replacement post op day 2    Intervention: Daily Weights  Completed; 94.0kg.      Problem: Post Op Day 3 CABG/Heart Valve replacement  Goal: Optimal care of the post op CABG/Heart Valve replacement post op day 3    Intervention: Daily Weights  Completed; 94.0kg.      Problem: Post Op Day 4 CABG/Heart Valve Replacement  Goal: Optimal care of the Post Op CABG/Heart Valve replacement Post Op Day 4  Outcome: PROGRESSING SLOWER THAN EXPECTED    Intervention: Shower daily and clean incisions twice daily with soap and water  Completed.  Intervention: Up in chair for all meals  NOC shift completed; up in chair for breakfast.  Intervention: Ambulate, increasing the distance each time x 3 and before bed  Not completed; marched in place per PT; total right knee replacement.  Intervention: IS q 1 hour while awake and record best IS volume  Completed; 2750.  Intervention: Consider removal of beavers, chest tube and pacer wire if not already done  Completed.  Intervention: Discharge Education  Not applicable.  Intervention: Add special instructions for cardiac surgery discharge instructions - [facility] to after visit summary and review with patient  Not applicable.  Intervention: Daily Weights  Completed; 94.0kg.

## 2019-04-18 PROBLEM — J96.01 ACUTE RESPIRATORY FAILURE WITH HYPOXIA (HCC): Status: RESOLVED | Noted: 2019-04-10 | Resolved: 2019-04-18

## 2019-04-18 PROBLEM — J81.1 PULMONARY EDEMA: Status: RESOLVED | Noted: 2019-04-10 | Resolved: 2019-04-18

## 2019-04-18 PROBLEM — E83.42 HYPOMAGNESEMIA: Status: RESOLVED | Noted: 2019-04-11 | Resolved: 2019-04-18

## 2019-04-18 PROBLEM — E87.6 HYPOKALEMIA: Status: RESOLVED | Noted: 2019-04-11 | Resolved: 2019-04-18

## 2019-04-18 LAB
ANION GAP SERPL CALC-SCNC: 7 MMOL/L (ref 0–11.9)
BUN SERPL-MCNC: 25 MG/DL (ref 8–22)
CALCIUM SERPL-MCNC: 8.1 MG/DL (ref 8.5–10.5)
CHLORIDE SERPL-SCNC: 101 MMOL/L (ref 96–112)
CO2 SERPL-SCNC: 25 MMOL/L (ref 20–33)
CREAT SERPL-MCNC: 1.12 MG/DL (ref 0.5–1.4)
ERYTHROCYTE [DISTWIDTH] IN BLOOD BY AUTOMATED COUNT: 40.5 FL (ref 35.9–50)
GLUCOSE SERPL-MCNC: 112 MG/DL (ref 65–99)
HCT VFR BLD AUTO: 30.1 % (ref 42–52)
HGB BLD-MCNC: 9.6 G/DL (ref 14–18)
INR PPP: 1.36 (ref 0.87–1.13)
MCH RBC QN AUTO: 26.2 PG (ref 27–33)
MCHC RBC AUTO-ENTMCNC: 31.9 G/DL (ref 33.7–35.3)
MCV RBC AUTO: 82 FL (ref 81.4–97.8)
PLATELET # BLD AUTO: 274 K/UL (ref 164–446)
PMV BLD AUTO: 10.4 FL (ref 9–12.9)
POTASSIUM SERPL-SCNC: 4.1 MMOL/L (ref 3.6–5.5)
PROTHROMBIN TIME: 16.9 SEC (ref 12–14.6)
RBC # BLD AUTO: 3.67 M/UL (ref 4.7–6.1)
SODIUM SERPL-SCNC: 133 MMOL/L (ref 135–145)
WBC # BLD AUTO: 11.3 K/UL (ref 4.8–10.8)

## 2019-04-18 PROCEDURE — 99024 POSTOP FOLLOW-UP VISIT: CPT | Performed by: THORACIC SURGERY (CARDIOTHORACIC VASCULAR SURGERY)

## 2019-04-18 PROCEDURE — 700111 HCHG RX REV CODE 636 W/ 250 OVERRIDE (IP): Performed by: INTERNAL MEDICINE

## 2019-04-18 PROCEDURE — 700111 HCHG RX REV CODE 636 W/ 250 OVERRIDE (IP): Performed by: NURSE PRACTITIONER

## 2019-04-18 PROCEDURE — 700102 HCHG RX REV CODE 250 W/ 637 OVERRIDE(OP): Performed by: INTERNAL MEDICINE

## 2019-04-18 PROCEDURE — A9270 NON-COVERED ITEM OR SERVICE: HCPCS | Performed by: INTERNAL MEDICINE

## 2019-04-18 PROCEDURE — 94660 CPAP INITIATION&MGMT: CPT

## 2019-04-18 PROCEDURE — 85610 PROTHROMBIN TIME: CPT

## 2019-04-18 PROCEDURE — 700102 HCHG RX REV CODE 250 W/ 637 OVERRIDE(OP): Performed by: HOSPITALIST

## 2019-04-18 PROCEDURE — A9270 NON-COVERED ITEM OR SERVICE: HCPCS | Performed by: NURSE PRACTITIONER

## 2019-04-18 PROCEDURE — 80048 BASIC METABOLIC PNL TOTAL CA: CPT

## 2019-04-18 PROCEDURE — 770020 HCHG ROOM/CARE - TELE (206)

## 2019-04-18 PROCEDURE — 85027 COMPLETE CBC AUTOMATED: CPT

## 2019-04-18 PROCEDURE — A9270 NON-COVERED ITEM OR SERVICE: HCPCS | Performed by: HOSPITALIST

## 2019-04-18 PROCEDURE — 700102 HCHG RX REV CODE 250 W/ 637 OVERRIDE(OP): Performed by: NURSE PRACTITIONER

## 2019-04-18 RX ORDER — CARVEDILOL 3.12 MG/1
3.12 TABLET ORAL 2 TIMES DAILY WITH MEALS
Qty: 60 TAB
Start: 2019-04-18 | End: 2019-04-22

## 2019-04-18 RX ORDER — ASPIRIN 81 MG/1
81 TABLET ORAL DAILY
Qty: 30 TAB
Start: 2019-04-19 | End: 2019-04-22

## 2019-04-18 RX ORDER — DIPHENHYDRAMINE HCL 25 MG
25 TABLET ORAL
Qty: 30 TAB | Refills: 0
Start: 2019-04-18 | End: 2019-04-22

## 2019-04-18 RX ORDER — BISACODYL 10 MG
10 SUPPOSITORY, RECTAL RECTAL
Refills: 0
Start: 2019-04-18 | End: 2019-04-22

## 2019-04-18 RX ORDER — POLYETHYLENE GLYCOL 3350 17 G/17G
17 POWDER, FOR SOLUTION ORAL
Refills: 3
Start: 2019-04-18 | End: 2019-04-22

## 2019-04-18 RX ORDER — WARFARIN SODIUM 5 MG/1
5 TABLET ORAL
Status: DISCONTINUED | OUTPATIENT
Start: 2019-04-18 | End: 2019-04-20

## 2019-04-18 RX ORDER — ONDANSETRON 2 MG/ML
4 INJECTION INTRAMUSCULAR; INTRAVENOUS EVERY 6 HOURS PRN
Qty: 84 ML
Start: 2019-04-18 | End: 2019-04-22

## 2019-04-18 RX ORDER — ALUMINA, MAGNESIA, AND SIMETHICONE 2400; 2400; 240 MG/30ML; MG/30ML; MG/30ML
30 SUSPENSION ORAL EVERY 4 HOURS PRN
Qty: 560 ML
Start: 2019-04-18 | End: 2019-11-15

## 2019-04-18 RX ORDER — WARFARIN SODIUM 7.5 MG/1
7.5 TABLET ORAL
Status: DISCONTINUED | OUTPATIENT
Start: 2019-04-18 | End: 2019-04-18

## 2019-04-18 RX ORDER — OXYCODONE HYDROCHLORIDE 10 MG/1
10 TABLET ORAL
Qty: 28 TAB | Refills: 0
Start: 2019-04-18 | End: 2019-04-22

## 2019-04-18 RX ORDER — ACETAMINOPHEN 325 MG/1
650 TABLET ORAL EVERY 4 HOURS PRN
Qty: 30 TAB | Refills: 0 | COMMUNITY
Start: 2019-04-18 | End: 2019-04-22

## 2019-04-18 RX ORDER — AMOXICILLIN 250 MG
2 CAPSULE ORAL 2 TIMES DAILY
Qty: 30 TAB | Refills: 0 | Status: SHIPPED | OUTPATIENT
Start: 2019-04-18 | End: 2019-04-22

## 2019-04-18 RX ORDER — WARFARIN SODIUM 5 MG/1
5 TABLET ORAL
Qty: 30 TAB | Refills: 3
Start: 2019-04-18 | End: 2019-04-22

## 2019-04-18 RX ADMIN — SENNOSIDES AND DOCUSATE SODIUM 2 TABLET: 8.6; 5 TABLET ORAL at 05:01

## 2019-04-18 RX ADMIN — TRAZODONE HYDROCHLORIDE 50 MG: 50 TABLET ORAL at 20:41

## 2019-04-18 RX ADMIN — CARVEDILOL 3.12 MG: 3.12 TABLET, FILM COATED ORAL at 17:37

## 2019-04-18 RX ADMIN — POTASSIUM CHLORIDE 40 MEQ: 1500 TABLET, EXTENDED RELEASE ORAL at 05:01

## 2019-04-18 RX ADMIN — ENOXAPARIN SODIUM 40 MG: 100 INJECTION SUBCUTANEOUS at 05:01

## 2019-04-18 RX ADMIN — FUROSEMIDE 40 MG: 10 INJECTION, SOLUTION INTRAMUSCULAR; INTRAVENOUS at 17:37

## 2019-04-18 RX ADMIN — OXYCODONE HYDROCHLORIDE 10 MG: 10 TABLET ORAL at 16:20

## 2019-04-18 RX ADMIN — WARFARIN SODIUM 5 MG: 5 TABLET ORAL at 17:39

## 2019-04-18 RX ADMIN — SENNOSIDES AND DOCUSATE SODIUM 2 TABLET: 8.6; 5 TABLET ORAL at 17:37

## 2019-04-18 RX ADMIN — POTASSIUM CHLORIDE 40 MEQ: 1500 TABLET, EXTENDED RELEASE ORAL at 17:37

## 2019-04-18 RX ADMIN — FUROSEMIDE 40 MG: 10 INJECTION, SOLUTION INTRAMUSCULAR; INTRAVENOUS at 05:01

## 2019-04-18 RX ADMIN — OXYCODONE HYDROCHLORIDE 10 MG: 10 TABLET ORAL at 05:01

## 2019-04-18 RX ADMIN — OXYCODONE HYDROCHLORIDE 10 MG: 10 TABLET ORAL at 10:01

## 2019-04-18 RX ADMIN — LEVOTHYROXINE SODIUM 175 MCG: 75 TABLET ORAL at 05:01

## 2019-04-18 RX ADMIN — METFORMIN HYDROCHLORIDE 850 MG: 850 TABLET ORAL at 05:01

## 2019-04-18 RX ADMIN — PRAVASTATIN SODIUM 40 MG: 20 TABLET ORAL at 05:01

## 2019-04-18 RX ADMIN — LOSARTAN POTASSIUM 100 MG: 25 TABLET ORAL at 05:01

## 2019-04-18 RX ADMIN — CARVEDILOL 3.12 MG: 3.12 TABLET, FILM COATED ORAL at 07:29

## 2019-04-18 RX ADMIN — ASPIRIN 81 MG: 81 TABLET, COATED ORAL at 05:01

## 2019-04-18 ASSESSMENT — ENCOUNTER SYMPTOMS
EYES NEGATIVE: 1
NEUROLOGICAL NEGATIVE: 1
CONSTITUTIONAL NEGATIVE: 1
CARDIOVASCULAR NEGATIVE: 1
GASTROINTESTINAL NEGATIVE: 1
PSYCHIATRIC NEGATIVE: 1
MUSCULOSKELETAL NEGATIVE: 1
RESPIRATORY NEGATIVE: 1

## 2019-04-18 ASSESSMENT — PATIENT HEALTH QUESTIONNAIRE - PHQ9
2. FEELING DOWN, DEPRESSED, IRRITABLE, OR HOPELESS: NOT AT ALL
1. LITTLE INTEREST OR PLEASURE IN DOING THINGS: NOT AT ALL
SUM OF ALL RESPONSES TO PHQ9 QUESTIONS 1 AND 2: 0

## 2019-04-18 NOTE — PROGRESS NOTES
Inpatient Anticoagulation Service Note    Date: 4/18/2019  Reason for Anticoagulation: Atrial Fibrillation, Mitral Valve Repair   [id number] VASc Score: 2  HAS-BLED Score: 1     Hemoglobin Value: (!) 9.6  Hematocrit Value: (!) 30.1  Lab Platelet Value: 274  Target INR: 2.0 to 3.0    INR from last 7 days     Date/Time INR Value    04/18/19 0420 (!)  1.36    04/17/19 0340 (!)  1.28    04/16/19 0630 (!)  1.24    04/15/19 0530 (!)  1.3    04/14/19 0458 (!)  1.15    04/13/19 1130 (!)  1.34    04/11/19 1730 (!)  1.18        Dose from last 7 days     Date/Time Dose (mg)    04/18/19 1151  7.5    04/17/19 1409  5    04/17/19 1100  5    04/16/19 1300  5    04/15/19 1528  2.5    04/14/19 1400  5        Average Dose (mg):  (New start)  Significant Interactions: Aspirin, Statin, Thyroid Medications  Bridge Therapy: No     Reversal Agent Administered: Not Applicable  Comments: INR remained largely the same, likely due to delayed INR response as well as being underdosed due to patient of considerable weight and relatively young. Will dose 5 mg today. Vitals remain stable w/o s/sx of active bleed while H/H is depressed but stable.     Plan:  Warfarin 5 mg today  Education Material Provided?: Yes  Pharmacist suggested discharge dosing: likely 5 mg daily with INR check w/in 48-72 hours of discharge     Katherine Leonard, PharmD     Discussed with Charlette Noonan, PharmD, BCPS, BCCCP

## 2019-04-18 NOTE — CARE PLAN
Problem: Post Op Day 4 CABG/Heart Valve Replacement  Goal: Optimal care of the Post Op CABG/Heart Valve replacement Post Op Day 4  Outcome: PROGRESSING SLOWER THAN EXPECTED    Intervention: Shower daily and clean incisions twice daily with soap and water  NOC complete.  Intervention: Up in chair for all meals  In chair for breakfast.  Intervention: Ambulate, increasing the distance each time x 3 and before bed  AM walk complete.  Intervention: IS q 1 hour while awake and record best IS volume  Completed; 2000.  Intervention: Consider removal of beavers, chest tube and pacer wire if not already done  Completed.  Intervention: Discharge Education  A.M. shift to complete.  Intervention: Add special instructions for cardiac surgery discharge instructions - [facility] to after visit summary and review with patient  A.M. shift to complete.

## 2019-04-18 NOTE — PROGRESS NOTES
Monitor summary: SR w/occasional PACs, rare PVCS: .26/.12/.50    12 hour chart check    2 RN skin check

## 2019-04-18 NOTE — DISCHARGE PLANNING
Medicaid requested additional therapy notes which were sent this am.   Authorization remains pending for rehab.   Will continue to follow.   Please call Elvia Zelaya [phone] w/ any questions.

## 2019-04-18 NOTE — CARE PLAN
Problem: Hemodynamic Status  Goal: Vital Signs and Fluid Balance Management    Intervention: Daily Weights  Complete 92.6kg    Goal: Stable hemodynamics, hemostasis, fluid/electrolyte balance and rhythm control    Intervention: Daily Weights  Complete 92.6kg      Problem: Post Op Day 1 CABG/Heart Valve Replacement  Goal: Optimal care of the post op CABG/heart valve replacement Post Op Day 1    Intervention: Daily Weights  Complete 92.6kg      Problem: Post op day 2 CABG/Heart Valve Replacement  Goal: Optimal care of the post op CABG/heart valve replacement post op day 2    Intervention: Daily Weights  Complete 92.6kg      Problem: Post Op Day 3 CABG/Heart Valve replacement  Goal: Optimal care of the post op CABG/Heart Valve replacement post op day 3    Intervention: Daily Weights  Complete 92.6kg      Problem: Post Op Day 4 CABG/Heart Valve Replacement  Goal: Optimal care of the Post Op CABG/Heart Valve replacement Post Op Day 4  Outcome: PROGRESSING AS EXPECTED    Intervention: Shower daily and clean incisions twice daily with soap and water  complete  Intervention: Up in chair for all meals  complete  Intervention: Ambulate, increasing the distance each time x 3 and before bed  Increased number of walks and distance.  Intervention: IS q 1 hour while awake and record best IS volume  Record 2000  Intervention: Consider removal of beavers, chest tube and pacer wire if not already done  complete  Intervention: Daily Weights  Complete 92.6kg

## 2019-04-18 NOTE — DISCHARGE SUMMARY
DISCHARGE SUMMARY    ADMISSION DATE: 4/9/2019    DISCHARGE DATE: 4/18/2019    CHIEF COMPLAINT ON ADMISSION: No chief complaint on file.    ADMITTING DIAGNOSES:  Acute congestive heart failure, valvular heart   disease (New York Heart Association class 3-4), severe mitral regurgitation   (4+, degenerative), posterior mitral valve leaflet prolapse, acute respiratory   failure, pulmonary edema, atrial fibrillation, obstructive sleep apnea,   hypothyroidism.      DISCHARGE DIAGNOSES:  Acute congestive heart failure, valvular heart   disease (New York Heart Association class 3-4), severe mitral regurgitation   (4+, degenerative), posterior mitral valve leaflet prolapse, acute respiratory   failure, pulmonary edema, atrial fibrillation, obstructive sleep apnea,   hypothyroidism.      PROCEDURES PERFORMED: Radical mitral valve repair (P2, triangular resection,   38 mm Gray flexible annuloplasty band), left atrial appendage ligation and   intraoperative transesophageal echocardiography.      HISTORY OF PRESENT ILLNESS:  The patient is a 54-year-old male who recently underwent right   knee replacement.  He developed acute respiratory failure postoperatively.    Echocardiography showed severe mitral regurgitation and P2 prolapse.  His left   ventricular ejection fraction was normal at approximately 65%.  Cardiac   catheterization did not show any hemodynamically significant coronary artery   disease.    HOSPITAL COURSE: POD 1 HDS, SR, d/c mediastinal tubes, diurese, keep beavers for strict I&O, amb, enc IS  POD 2 HDS, SR, add low dose ace, d/c beavers, PT/OT- mibilize as much as possible, enc IS  POD 3 HDS, SR, Diuresed well- cont, Had long discussion with patient about expectations of rehab, d/w him he had to participate with therapies in order to even be considered for rehab- pt upset, states he know he was wrong yesterday and had a bad day. He really wants to go to rehab and will start participating and trying harder  with PT/OT and the nurses.  POD 4 HDS, SR, cont diuresis, amb, enc IS, planning to rehab tomorrow  POD 5 HDS, SR, neuro intact, wounds CDI, INR trending up, Amb halls with assistance from PT.  Plan:  OK to transfer to rehab.  Doing well.      RECENT LABS:   Lab Results   Component Value Date/Time    SODIUM 133 (L) 04/18/2019 04:20 AM    POTASSIUM 4.1 04/18/2019 04:20 AM    CHLORIDE 101 04/18/2019 04:20 AM    CO2 25 04/18/2019 04:20 AM    GLUCOSE 112 (H) 04/18/2019 04:20 AM    BUN 25 (H) 04/18/2019 04:20 AM    CREATININE 1.12 04/18/2019 04:20 AM    Lab Results   Component Value Date/Time    WBC 11.3 (H) 04/18/2019 04:20 AM    RBC 3.67 (L) 04/18/2019 04:20 AM    HEMOGLOBIN 9.6 (L) 04/18/2019 04:20 AM    HEMATOCRIT 30.1 (L) 04/18/2019 04:20 AM    MCV 82.0 04/18/2019 04:20 AM    MCH 26.2 (L) 04/18/2019 04:20 AM    MCHC 31.9 (L) 04/18/2019 04:20 AM    MPV 10.4 04/18/2019 04:20 AM    NEUTSPOLYS 75.00 (H) 04/13/2019 06:59 AM    LYMPHOCYTES 8.50 (L) 04/13/2019 06:59 AM    MONOCYTES 11.30 04/13/2019 06:59 AM    EOSINOPHILS 4.30 04/13/2019 06:59 AM    BASOPHILS 0.20 04/13/2019 06:59 AM    HYPOCHROMIA 1+ 02/21/2014 08:00 AM    Lab Results   Component Value Date/Time    PROTHROMBTM 16.9 (H) 04/18/2019 04:20 AM    INR 1.36 (H) 04/18/2019 04:20 AM        ALLERGIES:     Patient has no known allergies.    DISCHARGE MEDICATIONS:    Medication List      START taking these medications      Instructions   acetaminophen 325 MG Tabs  Commonly known as:  TYLENOL   Take 2 Tabs by mouth every four hours as needed for Fever (Temperature greater than 101.5 F).  Dose:  650 mg     aspirin 81 MG EC tablet  Start taking on:  4/19/2019   Take 1 Tab by mouth every day.  Dose:  81 mg     bisacodyl 10 MG Supp  Commonly known as:  DULCOLAX   Insert 1 Suppository in rectum 1 time daily as needed (if magnesium hydroxide ineffective after 24 hours).  Dose:  10 mg     carvedilol 3.125 MG Tabs  Commonly known as:  COREG   Take 1 Tab by mouth 2 times a day,  with meals.  Dose:  3.125 mg     diphenhydrAMINE 25 MG Tabs  Commonly known as:  BENADRYL   Take 1 tablet by mouth at bedtime as needed.  May repeat 1 time. for Sleep.  Dose:  25 mg     enoxaparin 40 MG/0.4ML Soln inj  Start taking on:  4/19/2019  Commonly known as:  LOVENOX   Inject 40 mg as instructed every day.  Dose:  40 mg     mag hydrox-al hydrox-simeth 400-400-40 MG/5ML Susp  Commonly known as:  MAALOX PLUS ES or MYLANTA DS   Take 30 mL by mouth every four hours as needed (Indigestion).  Dose:  30 mL     magnesium hydroxide 400 MG/5ML Susp  Commonly known as:  MILK OF MAGNESIA   Take 30 mL by mouth 1 time daily as needed (if polyethylene glycol ineffective after 24 hours).  Dose:  30 mL     ondansetron 4 MG/2ML Soln injection  Commonly known as:  ZOFRAN   2 mL by Intravenous route every 6 hours as needed for Nausea.  Dose:  4 mg     oxyCODONE immediate release 10 MG immediate release tablet  Commonly known as:  ROXICODONE   Take 1 Tab by mouth every 3 hours as needed for up to 14 days.  Dose:  10 mg     polyethylene glycol/lytes Pack  Commonly known as:  MIRALAX   Take 1 Packet by mouth 1 time daily as needed (if sennosides and docusate ineffective after 24 hours).  Dose:  17 g     senna-docusate 8.6-50 MG Tabs  Commonly known as:  PERICOLACE or SENOKOT S   Take 2 Tabs by mouth 2 Times a Day.  Dose:  2 Tab     warfarin 5 MG Tabs  Commonly known as:  COUMADIN   Take 1 Tab by mouth COUMADIN-DAILY.  Dose:  5 mg        CONTINUE taking these medications      Instructions   levothyroxine 300 MCG Tabs  Commonly known as:  SYNTHROID   Take 1 Tab by mouth every day.  Dose:  300 mcg     losartan 100 MG Tabs  Commonly known as:  COZAAR   Take 100 mg by mouth every day.  Dose:  100 mg     metFORMIN 850 MG Tabs  Commonly known as:  GLUCOPHAGE   Take 1 Tab by mouth every day.  Dose:  850 mg     pravastatin 40 MG tablet  Commonly known as:  PRAVACHOL   Take 1 Tab by mouth every day.  Dose:  40 mg     traZODone 50 MG  Tabs  Commonly known as:  DESYREL   Take 50 mg by mouth every evening.  Dose:  50 mg     vitamin D 1000 UNIT Tabs  Commonly known as:  cholecalciferol   Take 1,000 Units by mouth every day.  Dose:  1000 Units        STOP taking these medications    amLODIPine 10 MG Tabs  Commonly known as:  NORVASC     HYDROcodone-acetaminophen 5-325 MG Tabs per tablet  Commonly known as:  NORCO     potassium chloride SA 10 MEQ Tbcr  Commonly known as:  K-DUR     triamterene-hydrochlorothiazide 75-50 MG Tabs  Commonly known as:  MAXZIDE 75-50            NARCOTIC PAIN MEDICATIONS:   In prescribing controlled substances to this patient, I certify that I have obtained and reviewed their medical history. I have also made a good marcio effort to obtain applicable records from other providers who have treated the patient .    I have conducted a physical exam and documented it. I have reviewed the patient's prescription history as maintained by the [place] Prescription Monitoring Program.     I have assessed the patient’s risk for abuse, dependency, and addiction using the validated Opioid Risk Tool.    Given the above, I believe the benefits of controlled substance therapy outweigh the risks. The reasons for prescribing controlled substances include non-narcotic, oral analgesic alternatives have been inadequate for pain control. Accordingly, I have discussed the risk and benefits, treatment plan, and alternative therapies with the patient.     Pt understands this prescription is a controlled substance which is potentially habit-forming and its use is regulated by the [facility]. It must be submitted to the pharmacy within 5 days of the date written and can not be called in or faxed to the pharmacy. Refills are subject to terms of a medicine agreement. Any refill requires a new prescription that must be obtained from this office during regular office hours. We ask for 72 hours notice to get an appointment for a narcotic pain medication refill. This  medicine can cause nausea, significant constipation, sedation, confusion.     DIET:   Cardiac diet    DISCHARGE INSTRUCTIONS DISCUSSED WITH THE PATIENT:      1. NO driving for 4 weeks after surgery. You may ride as a passenger.  2. NO lifting of any item over 10 lbs (e.g. gallon of milk) for 6 weeks after surgery.  3. DO walk as much as possible! Walk a minimum of once a day. Depending on your fatigue and comfort level, you may walk as much as you wish. There is no maximum.  4. Other physical activities (sex, housework, gardening, etc.) are OK after 4 weeks   5. Continue using incentive spirometer for 2 weeks, especially if going home on oxygen.    Incision Care:  1. SHOWER ONLY - no baths. Clean incision daily with plain Ivory ® soap or any other dye or perfume free soap. Then pat incision dry with clean towel. Avoid creams or lotions on the incision(s).  a. If there is any increase in redness or swelling, or separation of the incision line, or thick drainage* from any of the incisions, call right away  * Clear, thin drainage is not abnormal especially from the leg incision and/or                         chest tube sites.  2. Continue to wear your MAYTE Stockings for 4 weeks. You may take off the stockings when in bed or when the legs are elevated.    Patient instructed to call [facility] cardiac surgery at [phone]  if any increased shortness of breath, uncontrolled pain, weight gain greater than 2 pounds in 1 day, SBP >140, HR <60 or redness swelling or drainage of incisions.      FOLLOW-UP: Future Appointments  Date Time Provider Department Center   5/10/2019 12:30 PM FARIDA Parikh [facility] None   5/10/2019 1:20 PM Alex Kilpatrick M.D. [facility] None    No follow-up provider specified.

## 2019-04-18 NOTE — DISCHARGE PLANNING
I met w/ pt earlier today, and he is in agreement w/ transfer to rehab.   He is familiar with [facility] as his mom was there several years ago.   Informed patient we have requseted authorization from Medicaid---status still pending.    Dc plan is for pt to return to [place] where he lives w/ his parents.   Pt independent w/ gait using SPC, indep w/ adl's/iadl's/driving/managing meds/finances.   Single story home; w 1 step to enter; 2 bathroom-one with walk in shower + built in seat and other tub/shower combo.   PCP is Dr. Shane Vanegas.    Spoke w/ pt's RN Liliana [phone] who reports pt has been medically cleared for rehab.     Plan:  Awaiting auth from Medicaid before able to accept pt to Acute Rehab.

## 2019-04-18 NOTE — PROGRESS NOTES
Cardiovascular Surgery Progress Note    Name: Ottoniel Davies  MRN: [id number]  : 1965  Admit Date: 2019 10:20 PM  Procedure:  Procedure(s) and Anesthesia Type:     * MITRAL VALVE REPAIR - General     * ECHOCARDIOGRAM, TRANSESOPHAGEAL - General  5 Day Post-Op    Vitals:  Patient Vitals for the past 8 hrs:   Temp SpO2 O2 Delivery Pulse Heart Rate (Monitored) Resp NIBP   19 0649 - - - - 74 - -   19 0500 - 93 % - 75 74 15 137/78   19 0400 36.7 °C (98 °F) 93 % CPAP 70 70 17 122/76   19 0218 - 97 % - - 70 - -   19 0200 - 98 % - 70 70 14 -     Temp (24hrs), Av.7 °C (98.1 °F), Min:36.7 °C (98 °F), Max:36.8 °C (98.3 °F)      Respiratory:    Respiration: 15, Pulse Oximetry: 93 %     Chest Tube Drains:          Fluids:    Intake/Output Summary (Last 24 hours) at 19 0924  Last data filed at 19 0600   Gross per 24 hour   Intake              960 ml   Output             3985 ml   Net            -3025 ml     Admit weight: Weight: 101 kg (222 lb 11.2 oz)  Current weight: Weight: 94 kg (207 lb 3.7 oz) (19 0400)    Labs:  Recent Labs      19   0630  19   0340  19   0420   WBC  9.5  10.3  11.3*   RBC  3.48*  3.63*  3.67*   HEMOGLOBIN  9.2*  9.5*  9.6*   HEMATOCRIT  29.0*  30.0*  30.1*   MCV  83.3  82.6  82.0   MCH  26.4*  26.2*  26.2*   MCHC  31.7*  31.7*  31.9*   RDW  40.8  40.4  40.5   PLATELETCT  199  240  274   MPV  10.9  10.7  10.4         Recent Labs      19   0630  19   0340  19   0420   SODIUM  139  139  133*   POTASSIUM  4.2  4.4  4.1   CHLORIDE  105  105  101   CO2  26  25  25   GLUCOSE  125*  113*  112*   BUN  26*  25*  25*   CREATININE  0.99  1.13  1.12   CALCIUM  7.7*  8.0*  8.1*     Recent Labs      19   0630  19   0340  19   0420   INR  1.24*  1.28*  1.36*       Medications:  • losartan  100 mg     • warfarin  5 mg     • potassium chloride SA  40 mEq     • enoxaparin (LOVENOX) injection  40 mg     •  furosemide  40 mg     • carvedilol  3.125 mg     • traZODone  50 mg     • metFORMIN  850 mg     • aspirin EC  81 mg     • Pharmacy Consult Request  1 Each     • senna-docusate  2 Tab     • MD Alert...Warfarin per Pharmacy       • levothyroxine  175 mcg     • pravastatin  40 mg          Ordered Medications:    ASA - Yes    Plavix - No; contraindicated because of On Coumadin / NOAC    Post-operative Beta Blockers - Yes    Ace Inhibitor - Yes    Statin - Yes        Exam:   Review of Systems   Constitutional: Negative.    HENT: Negative.    Eyes: Negative.    Respiratory: Negative.    Cardiovascular: Negative.    Gastrointestinal: Negative.    Genitourinary: Negative.    Musculoskeletal: Negative.    Skin: Negative.    Neurological: Negative.    Endo/Heme/Allergies: Negative.    Psychiatric/Behavioral: Negative.        Physical Exam   Constitutional: He is oriented to person, place, and time. No distress.   Neck: Neck supple.   Cardiovascular: Normal rate, regular rhythm and normal heart sounds.  Exam reveals no gallop and no friction rub.    No murmur heard.  Pulmonary/Chest: Effort normal. No respiratory distress. He has decreased breath sounds in the right lower field and the left lower field. He has no wheezes. He has no rales.   Abdominal: Soft. He exhibits no distension. There is no tenderness.   Musculoskeletal: He exhibits edema.   Neurological: He is alert and oriented to person, place, and time.   Skin: Skin is warm and dry.       Quality Measures:   Quality-Core Measures   Reviewed items::  EKG reviewed, Labs reviewed, Medications reviewed and Radiology images reviewed  Holder catheter::  No Holder  Central line in place:  Need for access  DVT prophylaxis pharmacological::  Warfarin (Coumadin)  DVT prophylaxis - mechanical:  SCDs  Ulcer Prophylaxis::  Not indicated  Assessed for rehabilitation services:  Patient was assess for and/or received rehabilitation services during this  hospitalization      Assessment/Plan:  POD 1 HDS, SR, d/c mediastinal tubes, diurese, keep beavers for strict I&O, amb, enc IS  POD 2 HDS, SR, add low dose ace, d/c beavers, PT/OT- mibilize as much as possible, enc IS  POD 3 HDS, SR, Diuresed well- cont, Had long discussion with patient about expectations of rehab, d/w him he had to participate with therapies in order to even be considered for rehab- pt upset, states he know he was wrong yesterday and had a bad day. He really wants to go to rehab and will start participating and trying harder with PT/OT and the nurses.  POD 4 HDS, SR, cont diuresis, amb, enc IS, planning to rehab tomorrow  POD 5 HDS, SR, neuro intact, wounds CDI, INR trending up, Amb halls with assistance from PT.  Plan:  OK to transfer to rehab.  Doing well.      Active Hospital Problems    Diagnosis   • Cardiomyopathy (HCC) [I42.9]     Priority: High   • Acute respiratory failure with hypoxia with pulmonary edema (HCC) [J96.01]     Priority: High   • Mitral valve disease [I05.9]     Priority: High   • Atrial fibrillation (HCC) [I48.91]     Priority: Medium   • Pulmonary edema [J81.1]     Priority: Medium   • S/P total knee arthroplasty, right [Z96.651]     Priority: Medium   • Obstructive sleep apnea [G47.33]     Priority: Medium   • Other hyperlipidemia [E78.49]     Priority: Medium   • Hypothyroid [E03.9]     Priority: Medium   • Hypokalemia [E87.6]     Priority: Low   • Hypomagnesemia [E83.42]     Priority: Low   • Volume overload [E87.70]     Priority: Low

## 2019-04-18 NOTE — CARE PLAN
Problem: Hemodynamic Status  Goal: Vital Signs and Fluid Balance Management    Intervention: Daily Weights  Completed with morning walk with night shift.     Goal: Stable hemodynamics, hemostasis, fluid/electrolyte balance and rhythm control    Intervention: Daily Weights  Completed with morning walk with night shift.       Problem: Post Op Day 1 CABG/Heart Valve Replacement  Goal: Optimal care of the post op CABG/heart valve replacement Post Op Day 1    Intervention: Daily Weights  Completed with morning walk with night shift.       Problem: Post op day 2 CABG/Heart Valve Replacement  Goal: Optimal care of the post op CABG/heart valve replacement post op day 2    Intervention: Daily Weights  Completed with morning walk with night shift.       Problem: Post Op Day 3 CABG/Heart Valve replacement  Goal: Optimal care of the post op CABG/Heart Valve replacement post op day 3    Intervention: Daily Weights  Completed with morning walk with night shift.       Problem: Post Op Day 4 CABG/Heart Valve Replacement  Goal: Optimal care of the Post Op CABG/Heart Valve replacement Post Op Day 4  Outcome: PROGRESSING AS EXPECTED    Intervention: Shower daily and clean incisions twice daily with soap and water  Showered and CHG wipes complete.  Intervention: Up in chair for all meals  Complete    Intervention: Ambulate, increasing the distance each time x 3 and before bed  complete  Intervention: IS q 1 hour while awake and record best IS volume  Record 2000    Intervention: Consider removal of beavers, chest tube and pacer wire if not already done  complete  Intervention: Daily Weights  Completed with morning walk with night shift.

## 2019-04-19 LAB
ANION GAP SERPL CALC-SCNC: 8 MMOL/L (ref 0–11.9)
BUN SERPL-MCNC: 24 MG/DL (ref 8–22)
CALCIUM SERPL-MCNC: 8.5 MG/DL (ref 8.5–10.5)
CHLORIDE SERPL-SCNC: 104 MMOL/L (ref 96–112)
CO2 SERPL-SCNC: 24 MMOL/L (ref 20–33)
CREAT SERPL-MCNC: 1.19 MG/DL (ref 0.5–1.4)
ERYTHROCYTE [DISTWIDTH] IN BLOOD BY AUTOMATED COUNT: 41.2 FL (ref 35.9–50)
GLUCOSE SERPL-MCNC: 124 MG/DL (ref 65–99)
HCT VFR BLD AUTO: 32.4 % (ref 42–52)
HGB BLD-MCNC: 10.2 G/DL (ref 14–18)
INR PPP: 1.5 (ref 0.87–1.13)
MCH RBC QN AUTO: 26.2 PG (ref 27–33)
MCHC RBC AUTO-ENTMCNC: 31.5 G/DL (ref 33.7–35.3)
MCV RBC AUTO: 83.3 FL (ref 81.4–97.8)
PLATELET # BLD AUTO: 325 K/UL (ref 164–446)
PMV BLD AUTO: 10.4 FL (ref 9–12.9)
POTASSIUM SERPL-SCNC: 4.3 MMOL/L (ref 3.6–5.5)
PROTHROMBIN TIME: 18.2 SEC (ref 12–14.6)
RBC # BLD AUTO: 3.89 M/UL (ref 4.7–6.1)
SODIUM SERPL-SCNC: 136 MMOL/L (ref 135–145)
WBC # BLD AUTO: 13.3 K/UL (ref 4.8–10.8)

## 2019-04-19 PROCEDURE — A9270 NON-COVERED ITEM OR SERVICE: HCPCS | Performed by: NURSE PRACTITIONER

## 2019-04-19 PROCEDURE — 97530 THERAPEUTIC ACTIVITIES: CPT

## 2019-04-19 PROCEDURE — 700102 HCHG RX REV CODE 250 W/ 637 OVERRIDE(OP): Performed by: INTERNAL MEDICINE

## 2019-04-19 PROCEDURE — A9270 NON-COVERED ITEM OR SERVICE: HCPCS | Performed by: INTERNAL MEDICINE

## 2019-04-19 PROCEDURE — A9270 NON-COVERED ITEM OR SERVICE: HCPCS | Performed by: CLINICAL NURSE SPECIALIST

## 2019-04-19 PROCEDURE — 97535 SELF CARE MNGMENT TRAINING: CPT

## 2019-04-19 PROCEDURE — 80048 BASIC METABOLIC PNL TOTAL CA: CPT

## 2019-04-19 PROCEDURE — 770020 HCHG ROOM/CARE - TELE (206)

## 2019-04-19 PROCEDURE — 700102 HCHG RX REV CODE 250 W/ 637 OVERRIDE(OP): Performed by: CLINICAL NURSE SPECIALIST

## 2019-04-19 PROCEDURE — 94660 CPAP INITIATION&MGMT: CPT

## 2019-04-19 PROCEDURE — A9270 NON-COVERED ITEM OR SERVICE: HCPCS | Performed by: HOSPITALIST

## 2019-04-19 PROCEDURE — 700102 HCHG RX REV CODE 250 W/ 637 OVERRIDE(OP): Performed by: HOSPITALIST

## 2019-04-19 PROCEDURE — 99024 POSTOP FOLLOW-UP VISIT: CPT | Performed by: THORACIC SURGERY (CARDIOTHORACIC VASCULAR SURGERY)

## 2019-04-19 PROCEDURE — 700111 HCHG RX REV CODE 636 W/ 250 OVERRIDE (IP): Performed by: NURSE PRACTITIONER

## 2019-04-19 PROCEDURE — 97116 GAIT TRAINING THERAPY: CPT

## 2019-04-19 PROCEDURE — 700111 HCHG RX REV CODE 636 W/ 250 OVERRIDE (IP): Performed by: INTERNAL MEDICINE

## 2019-04-19 PROCEDURE — 700102 HCHG RX REV CODE 250 W/ 637 OVERRIDE(OP): Performed by: NURSE PRACTITIONER

## 2019-04-19 PROCEDURE — 85610 PROTHROMBIN TIME: CPT

## 2019-04-19 PROCEDURE — 85027 COMPLETE CBC AUTOMATED: CPT

## 2019-04-19 PROCEDURE — 97110 THERAPEUTIC EXERCISES: CPT

## 2019-04-19 RX ORDER — WARFARIN SODIUM 5 MG/1
5 TABLET ORAL
Qty: 30 TAB | Refills: 3
Start: 2019-04-19 | End: 2019-04-22

## 2019-04-19 RX ORDER — CEPHALEXIN 500 MG/1
500 CAPSULE ORAL EVERY 6 HOURS
Status: DISCONTINUED | OUTPATIENT
Start: 2019-04-19 | End: 2019-04-22 | Stop reason: HOSPADM

## 2019-04-19 RX ORDER — CEPHALEXIN 500 MG/1
500 CAPSULE ORAL EVERY 6 HOURS
Refills: 0
Start: 2019-04-19 | End: 2019-04-22

## 2019-04-19 RX ADMIN — WARFARIN SODIUM 5 MG: 5 TABLET ORAL at 17:12

## 2019-04-19 RX ADMIN — CEPHALEXIN 500 MG: 500 CAPSULE ORAL at 11:23

## 2019-04-19 RX ADMIN — POTASSIUM CHLORIDE 40 MEQ: 1500 TABLET, EXTENDED RELEASE ORAL at 17:12

## 2019-04-19 RX ADMIN — POTASSIUM CHLORIDE 40 MEQ: 1500 TABLET, EXTENDED RELEASE ORAL at 06:03

## 2019-04-19 RX ADMIN — TRAZODONE HYDROCHLORIDE 50 MG: 50 TABLET ORAL at 21:52

## 2019-04-19 RX ADMIN — LOSARTAN POTASSIUM 100 MG: 25 TABLET ORAL at 06:02

## 2019-04-19 RX ADMIN — POLYETHYLENE GLYCOL 3350 1 PACKET: 17 POWDER, FOR SOLUTION ORAL at 10:26

## 2019-04-19 RX ADMIN — OXYCODONE HYDROCHLORIDE 10 MG: 10 TABLET ORAL at 15:27

## 2019-04-19 RX ADMIN — METFORMIN HYDROCHLORIDE 850 MG: 850 TABLET ORAL at 06:04

## 2019-04-19 RX ADMIN — OXYCODONE HYDROCHLORIDE 10 MG: 10 TABLET ORAL at 22:00

## 2019-04-19 RX ADMIN — CEPHALEXIN 500 MG: 500 CAPSULE ORAL at 17:14

## 2019-04-19 RX ADMIN — FUROSEMIDE 40 MG: 10 INJECTION, SOLUTION INTRAMUSCULAR; INTRAVENOUS at 17:14

## 2019-04-19 RX ADMIN — ASPIRIN 81 MG: 81 TABLET, COATED ORAL at 06:03

## 2019-04-19 RX ADMIN — FUROSEMIDE 40 MG: 10 INJECTION, SOLUTION INTRAMUSCULAR; INTRAVENOUS at 06:03

## 2019-04-19 RX ADMIN — PRAVASTATIN SODIUM 40 MG: 20 TABLET ORAL at 06:03

## 2019-04-19 RX ADMIN — CARVEDILOL 3.12 MG: 3.12 TABLET, FILM COATED ORAL at 17:13

## 2019-04-19 RX ADMIN — ENOXAPARIN SODIUM 40 MG: 100 INJECTION SUBCUTANEOUS at 06:03

## 2019-04-19 RX ADMIN — LEVOTHYROXINE SODIUM 175 MCG: 75 TABLET ORAL at 06:02

## 2019-04-19 RX ADMIN — OXYCODONE HYDROCHLORIDE 10 MG: 10 TABLET ORAL at 06:02

## 2019-04-19 RX ADMIN — SENNOSIDES AND DOCUSATE SODIUM 2 TABLET: 8.6; 5 TABLET ORAL at 06:03

## 2019-04-19 ASSESSMENT — ENCOUNTER SYMPTOMS
GASTROINTESTINAL NEGATIVE: 1
CONSTITUTIONAL NEGATIVE: 1
MUSCULOSKELETAL NEGATIVE: 1
NEUROLOGICAL NEGATIVE: 1
PSYCHIATRIC NEGATIVE: 1
CARDIOVASCULAR NEGATIVE: 1
EYES NEGATIVE: 1
RESPIRATORY NEGATIVE: 1

## 2019-04-19 ASSESSMENT — COGNITIVE AND FUNCTIONAL STATUS - GENERAL
TOILETING: A LITTLE
SUGGESTED CMS G CODE MODIFIER MOBILITY: CL
STANDING UP FROM CHAIR USING ARMS: A LITTLE
MOVING TO AND FROM BED TO CHAIR: UNABLE
MOBILITY SCORE: 12
DAILY ACTIVITIY SCORE: 19
MOVING FROM LYING ON BACK TO SITTING ON SIDE OF FLAT BED: UNABLE
TURNING FROM BACK TO SIDE WHILE IN FLAT BAD: UNABLE
DRESSING REGULAR UPPER BODY CLOTHING: A LITTLE
WALKING IN HOSPITAL ROOM: A LITTLE
CLIMB 3 TO 5 STEPS WITH RAILING: A LITTLE
SUGGESTED CMS G CODE MODIFIER DAILY ACTIVITY: CK
HELP NEEDED FOR BATHING: A LITTLE
DRESSING REGULAR LOWER BODY CLOTHING: A LOT

## 2019-04-19 ASSESSMENT — GAIT ASSESSMENTS
DISTANCE (FEET): 100
GAIT LEVEL OF ASSIST: MINIMAL ASSIST
ASSISTIVE DEVICE: FRONT WHEEL WALKER
DEVIATION: ANTALGIC;STEP TO

## 2019-04-19 NOTE — PROGRESS NOTES
Cardiovascular Surgery Progress Note    Name: Ottoniel Davies  MRN: [id number]  : 1965  Admit Date: 2019 10:20 PM  Procedure:  Procedure(s) and Anesthesia Type:     * MITRAL VALVE REPAIR - General     * ECHOCARDIOGRAM, TRANSESOPHAGEAL - General  6 Day Post-Op    Vitals:  Patient Vitals for the past 8 hrs:   Temp SpO2 O2 Delivery O2 (LPM) Pulse Heart Rate (Monitored) Resp NIBP Weight   19 0738 36.6 °C (97.9 °F) - - - - 74 14 102/70 -   19 0600 - 96 % None (Room Air) 0 75 76 16 - -   19 0500 - 90 % - - 76 76 18 - -   19 0400 - 94 % None (Room Air) 0 78 78 (!) 10 130/69 91.3 kg (201 lb 4.5 oz)   19 0300 - 95 % - - 75 75 (!) 6 - -   19 0200 - 95 % - - 73 73 13 130/81 -     Temp (24hrs), Av.7 °C (98 °F), Min:36.6 °C (97.9 °F), Max:36.8 °C (98.2 °F)      Respiratory:    Respiration: 14, Pulse Oximetry: 96 %     Chest Tube Drains:          Fluids:    Intake/Output Summary (Last 24 hours) at 19 0920  Last data filed at 19 0800   Gross per 24 hour   Intake             1050 ml   Output             4355 ml   Net            -3305 ml     Admit weight: Weight: 101 kg (222 lb 11.2 oz)  Current weight: Weight: 91.3 kg (201 lb 4.5 oz) (19 0400)    Labs:  Recent Labs      19   0340  19   0420  19   0515   WBC  10.3  11.3*  13.3*   RBC  3.63*  3.67*  3.89*   HEMOGLOBIN  9.5*  9.6*  10.2*   HEMATOCRIT  30.0*  30.1*  32.4*   MCV  82.6  82.0  83.3   MCH  26.2*  26.2*  26.2*   MCHC  31.7*  31.9*  31.5*   RDW  40.4  40.5  41.2   PLATELETCT  240  274  325   MPV  10.7  10.4  10.4         Recent Labs      19   0340  19   0420  19   0515   SODIUM  139  133*  136   POTASSIUM  4.4  4.1  4.3   CHLORIDE  105  101  104   CO2  25  25  24   GLUCOSE  113*  112*  124*   BUN  25*  25*  24*   CREATININE  1.13  1.12  1.19   CALCIUM  8.0*  8.1*  8.5     Recent Labs      19   0420  19   0515   INR  1.28*  1.36*  1.50*        Medications:  • warfarin  5 mg     • losartan  100 mg     • potassium chloride SA  40 mEq     • enoxaparin (LOVENOX) injection  40 mg     • furosemide  40 mg     • carvedilol  3.125 mg     • traZODone  50 mg     • metFORMIN  850 mg     • aspirin EC  81 mg     • Pharmacy Consult Request  1 Each     • senna-docusate  2 Tab     • MD Alert...Warfarin per Pharmacy       • levothyroxine  175 mcg     • pravastatin  40 mg          Ordered Medications:    ASA - Yes    Plavix - No; contraindicated because of On Coumadin / NOAC    Post-operative Beta Blockers - Yes    Ace Inhibitor - Yes    Statin - Yes        Exam:   Review of Systems   Constitutional: Negative.    HENT: Negative.    Eyes: Negative.    Respiratory: Negative.    Cardiovascular: Negative.    Gastrointestinal: Negative.    Genitourinary: Negative.    Musculoskeletal: Negative.    Skin: Negative.    Neurological: Negative.    Endo/Heme/Allergies: Negative.    Psychiatric/Behavioral: Negative.        Physical Exam   Constitutional: He is oriented to person, place, and time. No distress.   Neck: Neck supple.   Cardiovascular: Normal rate, regular rhythm and normal heart sounds.  Exam reveals no gallop and no friction rub.    No murmur heard.  Pulmonary/Chest: Effort normal. No respiratory distress. He has decreased breath sounds in the right lower field and the left lower field. He has no wheezes. He has no rales.   Abdominal: Soft. He exhibits no distension. There is no tenderness.   Musculoskeletal: He exhibits edema.   Neurological: He is alert and oriented to person, place, and time.   Skin: Skin is warm and dry.       Quality Measures:   Quality-Core Measures   Reviewed items::  EKG reviewed, Labs reviewed, Medications reviewed and Radiology images reviewed  Holder catheter::  No Holder  Central line in place:  Need for access  DVT prophylaxis pharmacological::  Warfarin (Coumadin)  DVT prophylaxis - mechanical:  SCDs  Ulcer Prophylaxis::  Not  indicated  Assessed for rehabilitation services:  Patient was assess for and/or received rehabilitation services during this hospitalization      Assessment/Plan:  POD 1 HDS, SR, d/c mediastinal tubes, diurese, keep beavers for strict I&O, amb, enc IS  POD 2 HDS, SR, add low dose ace, d/c beavers, PT/OT- mibilize as much as possible, enc IS  POD 3 HDS, SR, Diuresed well- cont, Had long discussion with patient about expectations of rehab, d/w him he had to participate with therapies in order to even be considered for rehab- pt upset, states he know he was wrong yesterday and had a bad day. He really wants to go to rehab and will start participating and trying harder with PT/OT and the nurses.  POD 4 HDS, SR, cont diuresis, amb, enc IS, planning to rehab tomorrow  POD 5 HDS, SR, neuro intact, wounds CDI, INR trending up, Amb halls with assistance from PT.  Plan:  OK to transfer to rehab.  Doing well.    POD 6 HDS, SR, neuro intact, wounds CDI, WBC up slightly.  IVP right wrist, red and hot to touch.  Ambulating halls with assistance.  Plan:  Start keflex for IVP.  Warm compresses to site.  Still OK to transfer to rehab.      Active Hospital Problems    Diagnosis   • Cardiomyopathy (HCC) [I42.9]     Priority: High   • Mitral valve disease [I05.9]     Priority: High   • Atrial fibrillation (HCC) [I48.91]     Priority: Medium   • S/P total knee arthroplasty, right [Z96.651]     Priority: Medium   • Obstructive sleep apnea [G47.33]     Priority: Medium   • Other hyperlipidemia [E78.49]     Priority: Medium   • Hypothyroid [E03.9]     Priority: Medium   • Volume overload [E87.70]     Priority: Low

## 2019-04-19 NOTE — DISCHARGE PLANNING
Medicaid auth for acute rehab pending this morning. Following for update from Rehab PAR. Updated therapy notes forwarded to Medicaid yesterday. Hopeful for auth determination today. CCA Cynthia Vaughn updated.

## 2019-04-19 NOTE — DISCHARGE PLANNING
Agency/Facility Name: [facility]  Outcome: Left message at [phone] for David re: need to know if Insurance has given authorization.  Per Lorie (RN CM) she has heard that transfer pending due to no approval on insurance and a need for PT notes to be forwarded for such approval.      CCA left voicemail for Cordelll to followup on insurance and requested call back on the status.

## 2019-04-19 NOTE — THERAPY
"Physical Therapy Treatment completed.   Bed Mobility:  Supine to Sit:  (NT, in chair pre/post)  Transfers: Sit to Stand: Minimal Assist (with FWW; attempted by self x 3 without success)  Gait: Level Of Assist: Minimal Assist with Front-Wheel Walker       Plan of Care: Will benefit from Physical Therapy 4 times per week  Discharge Recommendations: Equipment: Will Continue to Assess for Equipment Needs.    Pt with much improved activity toelrance and motivation; very appropriate and progressing and eager to improve; given medical complexity and comorbidities highly recommend acute rehab for daily therapy and careful progression. Will follow.         See \"Rehab Therapy-Acute\" Patient Summary Report for complete documentation.       "

## 2019-04-19 NOTE — DISCHARGE SUMMARY
Addendum to discharge summary:  Patient accepted to rehab.  POD 6 HDS, SR, neuro intact, wounds CDI, WBC up slightly.  IVP right wrist, red and hot to touch.  Ambulating halls with assistance.  Plan:  Start keflex for IVP.  Warm compresses to site.  Still OK to transfer to rehab.      New medications:  Keflex 600 mg po q 6 hours.    Coumadin 5 mg po tonight.  Daily INR's to keep INR between 2.0-3.0.

## 2019-04-19 NOTE — PROGRESS NOTES
Inpatient Anticoagulation Service Note    Date: 4/19/2019  Reason for Anticoagulation: Atrial Fibrillation, Mitral Valve Repair   [id number] VASc Score: 2  HAS-BLED Score: 1     Hemoglobin Value: (!) 10.2  Hematocrit Value: (!) 32.4  Lab Platelet Value: 325  Target INR: 2.0 to 3.0    INR from last 7 days     Date/Time INR Value    04/19/19 0515 (!)  1.5    04/18/19 0420 (!)  1.36    04/17/19 0340 (!)  1.28    04/16/19 0630 (!)  1.24    04/15/19 0530 (!)  1.3    04/14/19 0458 (!)  1.15    04/13/19 1130 (!)  1.34        Dose from last 7 days     Date/Time Dose (mg)    04/19/19 1143  5    04/18/19 1151  7.5    04/17/19 1409  5    04/17/19 1100  5    04/16/19 1300  5    04/15/19 1528  2.5    04/14/19 1400  5        Average Dose (mg):  (New start)  Significant Interactions: Aspirin, Statin, Thyroid Medications, Antibiotics  Bridge Therapy: No     Reversal Agent Administered: Not Applicable  Comments: INR trended up from 1.36 to 1.5. vitals stable w/o s/sx of active bleed while H/H depressed but uptrending. Will continue 5 mg dosing and monitor closely.    Plan:  Warfarin 5 mg today  Education Material Provided?: Yes  Pharmacist suggested discharge dosing: likely warfarin 5 mg daily with INR check w/in 48-72 hr of discharge     Katherine Leonard, PharmD    Per prior discussion with Charlette Noonan, PharmD, BCPS, BCCCP

## 2019-04-19 NOTE — DISCHARGE PLANNING
Care Transition Team Discharge Planning     Anticipated Discharge Disposition: Rehab     Action: This RN CM received VM from Elvia Zelaya @ [facility]. Insurance is requesting updated PT/OT notes. Updated BS RN.      Barriers to Discharge: Rehab Set-Up and authorization.     Plan: Follow up with patient and treatment team.

## 2019-04-19 NOTE — DISCHARGE PLANNING
Insurance is requesting fresh [place] evals.  Msg placed to [place] [employer]. Will fax [place] evals once available.

## 2019-04-19 NOTE — CARE PLAN
Problem: Hemodynamic Status  Goal: Vital Signs and Fluid Balance Management    Intervention: Daily Weights  Done      Goal: Stable hemodynamics, hemostasis, fluid/electrolyte balance and rhythm control    Intervention: Daily Weights  Done        Problem: Post Op Day 1 CABG/Heart Valve Replacement  Goal: Optimal care of the post op CABG/heart valve replacement Post Op Day 1    Intervention: Daily Weights  Done        Problem: Post op day 2 CABG/Heart Valve Replacement  Goal: Optimal care of the post op CABG/heart valve replacement post op day 2    Intervention: Daily Weights  Done        Problem: Post Op Day 3 CABG/Heart Valve replacement  Goal: Optimal care of the post op CABG/Heart Valve replacement post op day 3    Intervention: Daily Weights  Done        Problem: Post Op Day 4 CABG/Heart Valve Replacement  Goal: Optimal care of the Post Op CABG/Heart Valve replacement Post Op Day 4  Outcome: PROGRESSING AS EXPECTED    Intervention: Shower daily and clean incisions twice daily with soap and water  Done    Intervention: Up in chair for all meals  Done    Intervention: Ambulate, increasing the distance each time x 3 and before bed  Done    Intervention: IS q 1 hour while awake and record best IS volume  Done    Intervention: Consider removal of beavers, chest tube and pacer wire if not already done  Done    Intervention: Daily Weights  Done

## 2019-04-20 LAB
ANION GAP SERPL CALC-SCNC: 11 MMOL/L (ref 0–11.9)
APPEARANCE UR: CLEAR
BILIRUB UR QL STRIP.AUTO: NEGATIVE
BUN SERPL-MCNC: 22 MG/DL (ref 8–22)
CALCIUM SERPL-MCNC: 8.7 MG/DL (ref 8.5–10.5)
CHLORIDE SERPL-SCNC: 105 MMOL/L (ref 96–112)
CO2 SERPL-SCNC: 22 MMOL/L (ref 20–33)
COLOR UR: YELLOW
CREAT SERPL-MCNC: 1.19 MG/DL (ref 0.5–1.4)
EKG IMPRESSION: NORMAL
ERYTHROCYTE [DISTWIDTH] IN BLOOD BY AUTOMATED COUNT: 41.5 FL (ref 35.9–50)
GLUCOSE SERPL-MCNC: 132 MG/DL (ref 65–99)
GLUCOSE UR STRIP.AUTO-MCNC: NEGATIVE MG/DL
HCT VFR BLD AUTO: 34.2 % (ref 42–52)
HGB BLD-MCNC: 10.8 G/DL (ref 14–18)
INR PPP: 1.52 (ref 0.87–1.13)
KETONES UR STRIP.AUTO-MCNC: NEGATIVE MG/DL
LEUKOCYTE ESTERASE UR QL STRIP.AUTO: NEGATIVE
MCH RBC QN AUTO: 26.1 PG (ref 27–33)
MCHC RBC AUTO-ENTMCNC: 31.6 G/DL (ref 33.7–35.3)
MCV RBC AUTO: 82.6 FL (ref 81.4–97.8)
MICRO URNS: NORMAL
NITRITE UR QL STRIP.AUTO: NEGATIVE
PH UR STRIP.AUTO: 6 [PH]
PLATELET # BLD AUTO: 321 K/UL (ref 164–446)
PMV BLD AUTO: 10.3 FL (ref 9–12.9)
POTASSIUM SERPL-SCNC: 4 MMOL/L (ref 3.6–5.5)
PROT UR QL STRIP: NEGATIVE MG/DL
PROTHROMBIN TIME: 18.4 SEC (ref 12–14.6)
RBC # BLD AUTO: 4.14 M/UL (ref 4.7–6.1)
RBC UR QL AUTO: NEGATIVE
SODIUM SERPL-SCNC: 138 MMOL/L (ref 135–145)
SP GR UR STRIP.AUTO: 1.01
UROBILINOGEN UR STRIP.AUTO-MCNC: 1 MG/DL
WBC # BLD AUTO: 14.4 K/UL (ref 4.8–10.8)

## 2019-04-20 PROCEDURE — 700102 HCHG RX REV CODE 250 W/ 637 OVERRIDE(OP): Performed by: NURSE PRACTITIONER

## 2019-04-20 PROCEDURE — 700102 HCHG RX REV CODE 250 W/ 637 OVERRIDE(OP): Performed by: INTERNAL MEDICINE

## 2019-04-20 PROCEDURE — 700111 HCHG RX REV CODE 636 W/ 250 OVERRIDE (IP): Performed by: NURSE PRACTITIONER

## 2019-04-20 PROCEDURE — 700105 HCHG RX REV CODE 258: Performed by: CLINICAL NURSE SPECIALIST

## 2019-04-20 PROCEDURE — 93005 ELECTROCARDIOGRAM TRACING: CPT | Performed by: INTERNAL MEDICINE

## 2019-04-20 PROCEDURE — A9270 NON-COVERED ITEM OR SERVICE: HCPCS | Performed by: CLINICAL NURSE SPECIALIST

## 2019-04-20 PROCEDURE — 99024 POSTOP FOLLOW-UP VISIT: CPT | Performed by: THORACIC SURGERY (CARDIOTHORACIC VASCULAR SURGERY)

## 2019-04-20 PROCEDURE — 700111 HCHG RX REV CODE 636 W/ 250 OVERRIDE (IP): Performed by: CLINICAL NURSE SPECIALIST

## 2019-04-20 PROCEDURE — 81003 URINALYSIS AUTO W/O SCOPE: CPT

## 2019-04-20 PROCEDURE — 93010 ELECTROCARDIOGRAM REPORT: CPT | Performed by: INTERNAL MEDICINE

## 2019-04-20 PROCEDURE — 85610 PROTHROMBIN TIME: CPT

## 2019-04-20 PROCEDURE — 94660 CPAP INITIATION&MGMT: CPT

## 2019-04-20 PROCEDURE — 80048 BASIC METABOLIC PNL TOTAL CA: CPT

## 2019-04-20 PROCEDURE — A9270 NON-COVERED ITEM OR SERVICE: HCPCS | Performed by: INTERNAL MEDICINE

## 2019-04-20 PROCEDURE — 700102 HCHG RX REV CODE 250 W/ 637 OVERRIDE(OP): Performed by: HOSPITALIST

## 2019-04-20 PROCEDURE — 85027 COMPLETE CBC AUTOMATED: CPT

## 2019-04-20 PROCEDURE — 700111 HCHG RX REV CODE 636 W/ 250 OVERRIDE (IP): Performed by: INTERNAL MEDICINE

## 2019-04-20 PROCEDURE — 770020 HCHG ROOM/CARE - TELE (206)

## 2019-04-20 PROCEDURE — A9270 NON-COVERED ITEM OR SERVICE: HCPCS | Performed by: HOSPITALIST

## 2019-04-20 PROCEDURE — 700102 HCHG RX REV CODE 250 W/ 637 OVERRIDE(OP): Performed by: CLINICAL NURSE SPECIALIST

## 2019-04-20 PROCEDURE — A9270 NON-COVERED ITEM OR SERVICE: HCPCS | Performed by: NURSE PRACTITIONER

## 2019-04-20 RX ORDER — WARFARIN SODIUM 7.5 MG/1
7.5 TABLET ORAL
Status: COMPLETED | OUTPATIENT
Start: 2019-04-20 | End: 2019-04-20

## 2019-04-20 RX ORDER — DEXTROSE MONOHYDRATE 50 MG/ML
INJECTION, SOLUTION INTRAVENOUS CONTINUOUS
Status: DISCONTINUED | OUTPATIENT
Start: 2019-04-20 | End: 2019-04-21

## 2019-04-20 RX ORDER — SODIUM CHLORIDE, SODIUM GLUCONATE, SODIUM ACETATE, POTASSIUM CHLORIDE AND MAGNESIUM CHLORIDE 526; 502; 368; 37; 30 MG/100ML; MG/100ML; MG/100ML; MG/100ML; MG/100ML
500 INJECTION, SOLUTION INTRAVENOUS ONCE
Status: COMPLETED | OUTPATIENT
Start: 2019-04-20 | End: 2019-04-20

## 2019-04-20 RX ORDER — DEXTROSE MONOHYDRATE 50 MG/ML
INJECTION, SOLUTION INTRAVENOUS
Status: ACTIVE
Start: 2019-04-20 | End: 2019-04-20

## 2019-04-20 RX ORDER — AMIODARONE HYDROCHLORIDE 200 MG/1
400 TABLET ORAL 2 TIMES DAILY
Status: DISCONTINUED | OUTPATIENT
Start: 2019-04-20 | End: 2019-04-22 | Stop reason: HOSPADM

## 2019-04-20 RX ORDER — SODIUM CHLORIDE, SODIUM GLUCONATE, SODIUM ACETATE, POTASSIUM CHLORIDE AND MAGNESIUM CHLORIDE 526; 502; 368; 37; 30 MG/100ML; MG/100ML; MG/100ML; MG/100ML; MG/100ML
INJECTION, SOLUTION INTRAVENOUS
Status: ACTIVE
Start: 2019-04-20 | End: 2019-04-20

## 2019-04-20 RX ADMIN — ENOXAPARIN SODIUM 40 MG: 100 INJECTION SUBCUTANEOUS at 05:37

## 2019-04-20 RX ADMIN — OXYCODONE HYDROCHLORIDE 10 MG: 10 TABLET ORAL at 17:19

## 2019-04-20 RX ADMIN — FUROSEMIDE 40 MG: 10 INJECTION, SOLUTION INTRAMUSCULAR; INTRAVENOUS at 05:37

## 2019-04-20 RX ADMIN — CEPHALEXIN 500 MG: 500 CAPSULE ORAL at 12:00

## 2019-04-20 RX ADMIN — LOSARTAN POTASSIUM 100 MG: 25 TABLET ORAL at 05:37

## 2019-04-20 RX ADMIN — CEPHALEXIN 500 MG: 500 CAPSULE ORAL at 17:21

## 2019-04-20 RX ADMIN — TRAMADOL HYDROCHLORIDE 50 MG: 50 TABLET, FILM COATED ORAL at 07:39

## 2019-04-20 RX ADMIN — TRAMADOL HYDROCHLORIDE 50 MG: 50 TABLET, FILM COATED ORAL at 12:00

## 2019-04-20 RX ADMIN — METFORMIN HYDROCHLORIDE 850 MG: 850 TABLET ORAL at 05:36

## 2019-04-20 RX ADMIN — ONDANSETRON 4 MG: 2 INJECTION INTRAMUSCULAR; INTRAVENOUS at 07:42

## 2019-04-20 RX ADMIN — AMIODARONE HYDROCHLORIDE 400 MG: 200 TABLET ORAL at 10:02

## 2019-04-20 RX ADMIN — CEPHALEXIN 500 MG: 500 CAPSULE ORAL at 23:23

## 2019-04-20 RX ADMIN — DEXTROSE MONOHYDRATE: 50 INJECTION, SOLUTION INTRAVENOUS at 02:31

## 2019-04-20 RX ADMIN — ASPIRIN 81 MG: 81 TABLET, COATED ORAL at 05:37

## 2019-04-20 RX ADMIN — CARVEDILOL 3.12 MG: 3.12 TABLET, FILM COATED ORAL at 17:19

## 2019-04-20 RX ADMIN — SENNOSIDES AND DOCUSATE SODIUM 2 TABLET: 8.6; 5 TABLET ORAL at 05:36

## 2019-04-20 RX ADMIN — SODIUM CHLORIDE, SODIUM GLUCONATE, SODIUM ACETATE, POTASSIUM CHLORIDE AND MAGNESIUM CHLORIDE 500 ML: 526; 502; 368; 37; 30 INJECTION, SOLUTION INTRAVENOUS at 08:27

## 2019-04-20 RX ADMIN — WARFARIN SODIUM 7.5 MG: 7.5 TABLET ORAL at 17:22

## 2019-04-20 RX ADMIN — AMIODARONE HYDROCHLORIDE 1 MG/MIN: 50 INJECTION, SOLUTION INTRAVENOUS at 02:48

## 2019-04-20 RX ADMIN — AMIODARONE HYDROCHLORIDE 150 MG: 1.5 INJECTION, SOLUTION INTRAVENOUS at 02:30

## 2019-04-20 RX ADMIN — CEPHALEXIN 500 MG: 500 CAPSULE ORAL at 05:36

## 2019-04-20 RX ADMIN — SENNOSIDES AND DOCUSATE SODIUM 2 TABLET: 8.6; 5 TABLET ORAL at 17:20

## 2019-04-20 RX ADMIN — CEPHALEXIN 500 MG: 500 CAPSULE ORAL at 01:53

## 2019-04-20 RX ADMIN — POTASSIUM CHLORIDE 40 MEQ: 1500 TABLET, EXTENDED RELEASE ORAL at 05:37

## 2019-04-20 RX ADMIN — LEVOTHYROXINE SODIUM 175 MCG: 75 TABLET ORAL at 05:37

## 2019-04-20 RX ADMIN — AMIODARONE HYDROCHLORIDE 400 MG: 200 TABLET ORAL at 17:20

## 2019-04-20 RX ADMIN — TRAZODONE HYDROCHLORIDE 50 MG: 50 TABLET ORAL at 22:14

## 2019-04-20 RX ADMIN — PRAVASTATIN SODIUM 40 MG: 20 TABLET ORAL at 05:37

## 2019-04-20 ASSESSMENT — ENCOUNTER SYMPTOMS
MUSCULOSKELETAL NEGATIVE: 1
PSYCHIATRIC NEGATIVE: 1
RESPIRATORY NEGATIVE: 1
EYES NEGATIVE: 1
CONSTITUTIONAL NEGATIVE: 1
NEUROLOGICAL NEGATIVE: 1
GASTROINTESTINAL NEGATIVE: 1
CARDIOVASCULAR NEGATIVE: 1

## 2019-04-20 NOTE — DISCHARGE PLANNING
Once [place] evals were available, insurance was closed for the weekend.  Left a VM for the [place] weekend, [phone] requesting updated PT/OT evals tomorrow so I may submit to insurance Monday morning.

## 2019-04-20 NOTE — PROGRESS NOTES
Inpatient Anticoagulation Service Note    Date: 2019  Reason for Anticoagulation: Mitral Valve Repair, Atrial Fibrillation   [id number] VASc Score: 2  HAS-BLED Score: 1     Hemoglobin Value: (!) 10.8  Hematocrit Value: (!) 34.2  Lab Platelet Value: 321  Target INR: 2.0 to 3.0    INR from last 7 days     Date/Time INR Value    19 0550 (!)  1.52    19 0515 (!)  1.5    19 0420 (!)  1.36    19 0340 (!)  1.28    19 0630 (!)  1.24    04/15/19 0530 (!)  1.3    19 0458 (!)  1.15        Dose from last 7 days     Date/Time Dose (mg)    19 1304  7.5    19 1143  5    19 1151  5    19 1409  5    19 1100  5    19 1300  5    04/15/19 1528  2.5    19 1400  5        Average Dose (mg):  (New start)  Significant Interactions: Aspirin, Statin, Thyroid Medications, Antibiotics  Bridge Therapy: No     Reversal Agent Administered: Not Applicable  Comments: INR essentially unchanged from yesterday. Will give one time dose of 7.5 mg tonight. Plan to discharge to rehab soon. Will continue to follow.    Plan:  7.5 mg tonight, INR tomorrow  Education Material Provided?: Yes  Pharmacist suggested discharge dosin.5 mg daily, INR within 48 hours of discharge     Daisha Gomes, KAREND

## 2019-04-20 NOTE — CARE PLAN
Problem: Post Op Day 4 CABG/Heart Valve Replacement  Goal: Optimal care of the Post Op CABG/Heart Valve replacement Post Op Day 4  Outcome: PROGRESSING AS EXPECTED    Intervention: Shower daily and clean incisions twice daily with soap and water  Refused shower, CHG and bed bath complete.  Intervention: Up in chair for all meals  Complete.  Intervention: Ambulate, increasing the distance each time x 3 and before bed  Complete.   Intervention: IS q 1 hour while awake and record best IS volume  Complete. Best IS: 3000

## 2019-04-20 NOTE — PROGRESS NOTES
Cardiovascular Surgery Progress Note    Name: Ottoniel Davies  MRN: [id number]  : 1965  Admit Date: 2019 10:20 PM  Procedure:  Procedure(s) and Anesthesia Type:     * MITRAL VALVE REPAIR - General     * ECHOCARDIOGRAM, TRANSESOPHAGEAL - General  7 Day Post-Op    Vitals:  Patient Vitals for the past 8 hrs:   Temp SpO2 O2 Delivery O2 (LPM) Pulse Heart Rate (Monitored) Resp NIBP Weight   19 0814 - 94 % - - - 71 - - -   19 0800 36.6 °C (97.9 °F) 95 % Silicone Nasal Cannula 2 62 69 18 (!) 97/60 -   19 0730 - - - - 60 74 (!) 10 110/49 -   19 0725 - - - - (!) 56 81 17 (!) 84/51 -   19 0600 - 95 % - - (!) 49 75 18 106/67 -   19 0500 - 92 % - - (!) 55 71 13 108/68 -   19 0400 - 95 % - - 61 71 15 100/70 90.8 kg (200 lb 2.8 oz)   19 0328 - 94 % - - - 79 - - -   19 0300 - 96 % - - (!) 39 76 19 122/59 -   19 0200 - 93 % - - (!) 55 80 (!) 25 - -     Temp (24hrs), Av.8 °C (98.2 °F), Min:36.6 °C (97.9 °F), Max:37.1 °C (98.8 °F)      Respiratory:    Respiration: 18, Pulse Oximetry: 94 %     Chest Tube Drains:          Fluids:    Intake/Output Summary (Last 24 hours) at 19 0911  Last data filed at 19 0700   Gross per 24 hour   Intake           1054.5 ml   Output             3080 ml   Net          -2025.5 ml     Admit weight: Weight: 101 kg (222 lb 11.2 oz)  Current weight: Weight: 90.8 kg (200 lb 2.8 oz) (19 0400)    Labs:  Recent Labs      19   0420  19   0515  19   0550   WBC  11.3*  13.3*  14.4*   RBC  3.67*  3.89*  4.14*   HEMOGLOBIN  9.6*  10.2*  10.8*   HEMATOCRIT  30.1*  32.4*  34.2*   MCV  82.0  83.3  82.6   MCH  26.2*  26.2*  26.1*   MCHC  31.9*  31.5*  31.6*   RDW  40.5  41.2  41.5   PLATELETCT  274  325  321   MPV  10.4  10.4  10.3         Recent Labs      19   0420  19   0515  19   0550   SODIUM  133*  136  138   POTASSIUM  4.1  4.3  4.0   CHLORIDE  101  104  105   CO2  25  24  22   GLUCOSE  112*   124*  132*   BUN  25*  24*  22   CREATININE  1.12  1.19  1.19   CALCIUM  8.1*  8.5  8.7     Recent Labs      04/18/19   0420  04/19/19   0515  04/20/19   0550   INR  1.36*  1.50*  1.52*       Medications:  • amiodarone       • electrolyte-A       • cephALEXin  500 mg     • warfarin  5 mg     • losartan  100 mg     • potassium chloride SA  40 mEq     • enoxaparin (LOVENOX) injection  40 mg     • furosemide  40 mg     • carvedilol  3.125 mg     • traZODone  50 mg     • metFORMIN  850 mg     • aspirin EC  81 mg     • Pharmacy Consult Request  1 Each     • senna-docusate  2 Tab     • MD Alert...Warfarin per Pharmacy       • levothyroxine  175 mcg     • pravastatin  40 mg          Ordered Medications:    ASA - Yes    Plavix - No; contraindicated because of On Coumadin / NOAC    Post-operative Beta Blockers - Yes    Ace Inhibitor - Yes    Statin - Yes        Exam:   Review of Systems   Constitutional: Negative.    HENT: Negative.    Eyes: Negative.    Respiratory: Negative.    Cardiovascular: Negative.    Gastrointestinal: Negative.    Genitourinary: Negative.    Musculoskeletal: Negative.    Skin: Negative.    Neurological: Negative.    Endo/Heme/Allergies: Negative.    Psychiatric/Behavioral: Negative.        Physical Exam   Constitutional: He is oriented to person, place, and time. No distress.   Neck: Neck supple.   Cardiovascular: Normal rate, regular rhythm and normal heart sounds.  Exam reveals no gallop and no friction rub.    No murmur heard.  Pulmonary/Chest: Effort normal. No respiratory distress. He has decreased breath sounds in the right lower field and the left lower field. He has no wheezes. He has no rales.   Abdominal: Soft. He exhibits no distension. There is no tenderness.   Musculoskeletal: He exhibits edema.   Neurological: He is alert and oriented to person, place, and time.   Skin: Skin is warm and dry.       Quality Measures:   Quality-Core Measures   Reviewed items::  EKG reviewed, Labs reviewed,  Medications reviewed and Radiology images reviewed  Beavers catheter::  No Beavers  Central line in place:  Need for access  DVT prophylaxis pharmacological::  Warfarin (Coumadin)  DVT prophylaxis - mechanical:  SCDs  Ulcer Prophylaxis::  Not indicated  Assessed for rehabilitation services:  Patient was assess for and/or received rehabilitation services during this hospitalization      Assessment/Plan:  POD 1 HDS, SR, d/c mediastinal tubes, diurese, keep beavers for strict I&O, amb, enc IS  POD 2 HDS, SR, add low dose ace, d/c beavers, PT/OT- mibilize as much as possible, enc IS  POD 3 HDS, SR, Diuresed well- cont, Had long discussion with patient about expectations of rehab, d/w him he had to participate with therapies in order to even be considered for rehab- pt upset, states he know he was wrong yesterday and had a bad day. He really wants to go to rehab and will start participating and trying harder with PT/OT and the nurses.  POD 4 HDS, SR, cont diuresis, amb, enc IS, planning to rehab tomorrow  POD 5 HDS, SR, neuro intact, wounds CDI, INR trending up, Amb halls with assistance from PT.  Plan:  OK to transfer to rehab.  Doing well.    POD 6 HDS, SR, neuro intact, wounds CDI, WBC up slightly.  IVP right wrist, red and hot to touch.  Ambulating halls with assistance.  Plan:  Start keflex for IVP.  Warm compresses to site.  Still OK to transfer to rehab.    POD 7  HDS, SR was afib this AM, neuro intact, wounds CDI, abdomin S/NT, fluid balance negative, wt down.  HoTN this AM.    Plan:  Dc lasix, BB, ACE, Statin, continue amio, UA, start po amiodarone, IS/ambulate. CPM.    Active Hospital Problems    Diagnosis   • Cardiomyopathy (HCC) [I42.9]     Priority: High   • Mitral valve disease [I05.9]     Priority: High   • Atrial fibrillation (HCC) [I48.91]     Priority: Medium   • S/P total knee arthroplasty, right [Z96.651]     Priority: Medium   • Obstructive sleep apnea [G47.33]     Priority: Medium   • Other hyperlipidemia  [E78.49]     Priority: Medium   • Hypothyroid [E03.9]     Priority: Medium   • Volume overload [E87.70]     Priority: Low

## 2019-04-20 NOTE — THERAPY
"Occupational Therapy Treatment completed with focus on ADLs and ADL transfers.  Functional Status:    Pt sitting in chair eating dinner when received by OT. He requires Min A for sit><stand. He ambulated to the bathroom using FWW and completed standing toileting with CGA. He returned to chair and attempted LB dressing, but continues to require Max A given poor hip flexibility and recent knee surgery. Significant time spent with patient reviewing precautions in relation to ADL ie UB and LB dressing, functional transfers, toileting, showering, etc. Pt also educated on what to expect at rehab. Pt is motivated and a pleasure to work with.     Plan of Care: Will benefit from Occupational Therapy 3 times per week  Discharge Recommendations:  Equipment Will Continue to Assess for Equipment Needs. Post-acute therapy Recommend inpatient transitional care services for continued occupational therapy services.       See \"Rehab Therapy-Acute\" Patient Summary Report for complete documentation.   "

## 2019-04-20 NOTE — PROGRESS NOTES
Patient began feeling dizzy and drowsy, systolic blood pressure 81-98, HR 60s-70s in atrial fibrillation. Patient transferred from chair back to bed. ADÁN Cao updated. Orders received to stop amiodarone gtt and give 500ml plasmalyte bolus, see MAR for further details.

## 2019-04-20 NOTE — CARE PLAN
Problem: Hemodynamic Status  Goal: Vital Signs and Fluid Balance Management    Intervention: Daily Weights  Done      Goal: Stable hemodynamics, hemostasis, fluid/electrolyte balance and rhythm control    Intervention: Daily Weights  Done        Problem: Post Op Day 1 CABG/Heart Valve Replacement  Goal: Optimal care of the post op CABG/heart valve replacement Post Op Day 1    Intervention: Daily Weights  Done        Problem: Post op day 2 CABG/Heart Valve Replacement  Goal: Optimal care of the post op CABG/heart valve replacement post op day 2    Intervention: Daily Weights  Done        Problem: Post Op Day 3 CABG/Heart Valve replacement  Goal: Optimal care of the post op CABG/Heart Valve replacement post op day 3    Intervention: Daily Weights  Done        Problem: Post Op Day 4 CABG/Heart Valve Replacement  Goal: Optimal care of the Post Op CABG/Heart Valve replacement Post Op Day 4    Intervention: Shower daily and clean incisions twice daily with soap and water  Done during day shift  Intervention: Up in chair for all meals  done  Intervention: Ambulate, increasing the distance each time x 3 and before bed  Done    Intervention: IS q 1 hour while awake and record best IS volume  Done    Intervention: Consider removal of beavers, chest tube and pacer wire if not already done  Done    Intervention: Daily Weights  Done

## 2019-04-20 NOTE — PROGRESS NOTES
Monitor Summary    -/.08/-  SR 60-80, converted to A-fib 60-90 @~0200, BP stable, Surgeons paged, amiodarone protocol began.      12 hr chart check

## 2019-04-20 NOTE — PROGRESS NOTES
Monitor Summary:     Rhythm: Sinus rhythm   Ectopy: Rare PVCs  Rate: 70s-80s  Measurements: 0.24/0.10/0.48

## 2019-04-21 LAB
ANION GAP SERPL CALC-SCNC: 9 MMOL/L (ref 0–11.9)
BUN SERPL-MCNC: 25 MG/DL (ref 8–22)
CALCIUM SERPL-MCNC: 8.6 MG/DL (ref 8.5–10.5)
CHLORIDE SERPL-SCNC: 105 MMOL/L (ref 96–112)
CO2 SERPL-SCNC: 25 MMOL/L (ref 20–33)
CREAT SERPL-MCNC: 1.5 MG/DL (ref 0.5–1.4)
ERYTHROCYTE [DISTWIDTH] IN BLOOD BY AUTOMATED COUNT: 42.1 FL (ref 35.9–50)
GLUCOSE SERPL-MCNC: 108 MG/DL (ref 65–99)
HCT VFR BLD AUTO: 32.4 % (ref 42–52)
HGB BLD-MCNC: 10.1 G/DL (ref 14–18)
INR PPP: 1.7 (ref 0.87–1.13)
MCH RBC QN AUTO: 26 PG (ref 27–33)
MCHC RBC AUTO-ENTMCNC: 31.2 G/DL (ref 33.7–35.3)
MCV RBC AUTO: 83.5 FL (ref 81.4–97.8)
PLATELET # BLD AUTO: 327 K/UL (ref 164–446)
PMV BLD AUTO: 10.2 FL (ref 9–12.9)
POTASSIUM SERPL-SCNC: 4 MMOL/L (ref 3.6–5.5)
PROTHROMBIN TIME: 20 SEC (ref 12–14.6)
RBC # BLD AUTO: 3.88 M/UL (ref 4.7–6.1)
SODIUM SERPL-SCNC: 139 MMOL/L (ref 135–145)
WBC # BLD AUTO: 10.5 K/UL (ref 4.8–10.8)

## 2019-04-21 PROCEDURE — 97116 GAIT TRAINING THERAPY: CPT

## 2019-04-21 PROCEDURE — A9270 NON-COVERED ITEM OR SERVICE: HCPCS | Performed by: NURSE PRACTITIONER

## 2019-04-21 PROCEDURE — 700102 HCHG RX REV CODE 250 W/ 637 OVERRIDE(OP): Performed by: NURSE PRACTITIONER

## 2019-04-21 PROCEDURE — 97110 THERAPEUTIC EXERCISES: CPT

## 2019-04-21 PROCEDURE — A9270 NON-COVERED ITEM OR SERVICE: HCPCS | Performed by: CLINICAL NURSE SPECIALIST

## 2019-04-21 PROCEDURE — 94660 CPAP INITIATION&MGMT: CPT

## 2019-04-21 PROCEDURE — A9270 NON-COVERED ITEM OR SERVICE: HCPCS | Performed by: INTERNAL MEDICINE

## 2019-04-21 PROCEDURE — 80048 BASIC METABOLIC PNL TOTAL CA: CPT

## 2019-04-21 PROCEDURE — 99024 POSTOP FOLLOW-UP VISIT: CPT | Performed by: THORACIC SURGERY (CARDIOTHORACIC VASCULAR SURGERY)

## 2019-04-21 PROCEDURE — 85027 COMPLETE CBC AUTOMATED: CPT

## 2019-04-21 PROCEDURE — 97535 SELF CARE MNGMENT TRAINING: CPT

## 2019-04-21 PROCEDURE — 700102 HCHG RX REV CODE 250 W/ 637 OVERRIDE(OP): Performed by: INTERNAL MEDICINE

## 2019-04-21 PROCEDURE — 700102 HCHG RX REV CODE 250 W/ 637 OVERRIDE(OP): Performed by: CLINICAL NURSE SPECIALIST

## 2019-04-21 PROCEDURE — A9270 NON-COVERED ITEM OR SERVICE: HCPCS | Performed by: HOSPITALIST

## 2019-04-21 PROCEDURE — 700111 HCHG RX REV CODE 636 W/ 250 OVERRIDE (IP): Performed by: INTERNAL MEDICINE

## 2019-04-21 PROCEDURE — 770020 HCHG ROOM/CARE - TELE (206)

## 2019-04-21 PROCEDURE — 97530 THERAPEUTIC ACTIVITIES: CPT

## 2019-04-21 PROCEDURE — 85610 PROTHROMBIN TIME: CPT

## 2019-04-21 PROCEDURE — 700102 HCHG RX REV CODE 250 W/ 637 OVERRIDE(OP): Performed by: HOSPITALIST

## 2019-04-21 RX ORDER — WARFARIN SODIUM 7.5 MG/1
7.5 TABLET ORAL
Status: DISCONTINUED | OUTPATIENT
Start: 2019-04-21 | End: 2019-04-22 | Stop reason: HOSPADM

## 2019-04-21 RX ADMIN — CARVEDILOL 3.12 MG: 3.12 TABLET, FILM COATED ORAL at 17:36

## 2019-04-21 RX ADMIN — SENNOSIDES AND DOCUSATE SODIUM 2 TABLET: 8.6; 5 TABLET ORAL at 17:36

## 2019-04-21 RX ADMIN — SENNOSIDES AND DOCUSATE SODIUM 2 TABLET: 8.6; 5 TABLET ORAL at 06:00

## 2019-04-21 RX ADMIN — AMIODARONE HYDROCHLORIDE 400 MG: 200 TABLET ORAL at 17:36

## 2019-04-21 RX ADMIN — ENOXAPARIN SODIUM 40 MG: 100 INJECTION SUBCUTANEOUS at 05:05

## 2019-04-21 RX ADMIN — CARVEDILOL 3.12 MG: 3.12 TABLET, FILM COATED ORAL at 08:47

## 2019-04-21 RX ADMIN — CEPHALEXIN 500 MG: 500 CAPSULE ORAL at 17:36

## 2019-04-21 RX ADMIN — TRAMADOL HYDROCHLORIDE 50 MG: 50 TABLET, FILM COATED ORAL at 17:36

## 2019-04-21 RX ADMIN — WARFARIN SODIUM 7.5 MG: 7.5 TABLET ORAL at 17:36

## 2019-04-21 RX ADMIN — OXYCODONE HYDROCHLORIDE 5 MG: 5 TABLET ORAL at 13:07

## 2019-04-21 RX ADMIN — TRAZODONE HYDROCHLORIDE 50 MG: 50 TABLET ORAL at 20:10

## 2019-04-21 RX ADMIN — TRAMADOL HYDROCHLORIDE 50 MG: 50 TABLET, FILM COATED ORAL at 08:47

## 2019-04-21 RX ADMIN — OXYCODONE HYDROCHLORIDE 5 MG: 5 TABLET ORAL at 20:10

## 2019-04-21 RX ADMIN — CEPHALEXIN 500 MG: 500 CAPSULE ORAL at 13:07

## 2019-04-21 RX ADMIN — LOSARTAN POTASSIUM 100 MG: 25 TABLET ORAL at 05:03

## 2019-04-21 RX ADMIN — METFORMIN HYDROCHLORIDE 850 MG: 850 TABLET ORAL at 05:00

## 2019-04-21 RX ADMIN — AMIODARONE HYDROCHLORIDE 400 MG: 200 TABLET ORAL at 05:04

## 2019-04-21 RX ADMIN — LEVOTHYROXINE SODIUM 175 MCG: 75 TABLET ORAL at 05:02

## 2019-04-21 RX ADMIN — ASPIRIN 81 MG: 81 TABLET, COATED ORAL at 05:01

## 2019-04-21 RX ADMIN — CEPHALEXIN 500 MG: 500 CAPSULE ORAL at 05:00

## 2019-04-21 RX ADMIN — PRAVASTATIN SODIUM 40 MG: 20 TABLET ORAL at 05:04

## 2019-04-21 ASSESSMENT — COGNITIVE AND FUNCTIONAL STATUS - GENERAL
DRESSING REGULAR UPPER BODY CLOTHING: A LITTLE
STANDING UP FROM CHAIR USING ARMS: A LITTLE
SUGGESTED CMS G CODE MODIFIER MOBILITY: CK
MOVING TO AND FROM BED TO CHAIR: UNABLE
MOBILITY SCORE: 18
TOILETING: A LITTLE
HELP NEEDED FOR BATHING: A LOT
CLIMB 3 TO 5 STEPS WITH RAILING: A LITTLE
DAILY ACTIVITIY SCORE: 19
DRESSING REGULAR LOWER BODY CLOTHING: A LITTLE
WALKING IN HOSPITAL ROOM: A LITTLE
SUGGESTED CMS G CODE MODIFIER DAILY ACTIVITY: CK

## 2019-04-21 ASSESSMENT — GAIT ASSESSMENTS
GAIT LEVEL OF ASSIST: SUPERVISED
ASSISTIVE DEVICE: FRONT WHEEL WALKER
DISTANCE (FEET): 250
DEVIATION: BRADYKINETIC

## 2019-04-21 ASSESSMENT — ENCOUNTER SYMPTOMS
CONSTITUTIONAL NEGATIVE: 1
CARDIOVASCULAR NEGATIVE: 1
MUSCULOSKELETAL NEGATIVE: 1
NEUROLOGICAL NEGATIVE: 1
RESPIRATORY NEGATIVE: 1
EYES NEGATIVE: 1
PSYCHIATRIC NEGATIVE: 1
GASTROINTESTINAL NEGATIVE: 1

## 2019-04-21 NOTE — CARE PLAN
Problem: Post Op Day 4 CABG/Heart Valve Replacement  Goal: Optimal care of the Post Op CABG/Heart Valve replacement Post Op Day 4    Intervention: Shower daily and clean incisions twice daily with soap and water  Patient refused shower. Full bed bath and CHG complete.   Intervention: Up in chair for all meals  Complete.  Intervention: Ambulate, increasing the distance each time x 3 and before bed  Complete.  Intervention: IS q 1 hour while awake and record best IS volume  Complete. Best IS: 3000

## 2019-04-21 NOTE — PROGRESS NOTES
Inpatient Anticoagulation Service Note    Date: 2019  Reason for Anticoagulation: Atrial Fibrillation, Mitral Valve Repair   [id number] VASc Score: 2  HAS-BLED Score: 1     Hemoglobin Value: (!) 10.1  Hematocrit Value: (!) 32.4  Lab Platelet Value: 327  Target INR: 2.0 to 3.0    INR from last 7 days     Date/Time INR Value    19 0455 (!)  1.7    19 0550 (!)  1.52    19 0515 (!)  1.5    19 0420 (!)  1.36    19 0340 (!)  1.28    19 0630 (!)  1.24    04/15/19 0530 (!)  1.3        Dose from last 7 days     Date/Time Dose (mg)    19 1208  7.5    19 1304  7.5    19 1143  5    19 1151  5    19 1409  5    19 1100  5    19 1300  5    04/15/19 1528  2.5    19 1400  5        Average Dose (mg):  (New start)  Significant Interactions: Amiodarone, Aspirin, Antibiotics, Thyroid Medications, Statin  Bridge Therapy: No     Reversal Agent Administered: Not Applicable  Comments: dosing with warfarin continues. No overt S/S bleeding noted. INR still subtherapeutic today, but now trending up. Will give another dose of 7.5 mg tonight. Working towards discharge to rehab. Remains on DVT ppx with lovenox. Will continue to follow.    Plan:  7.5 mg tonight, INR tomorrow  Education Material Provided?: Yes  Pharmacist suggested discharge dosin.5 mg daily, INR within 48 hours of discharge     Daisha Gomes, PHARMD

## 2019-04-21 NOTE — PROGRESS NOTES
Cardiovascular Surgery Progress Note    Name: Ottoniel Davies  MRN: [id number]  : 1965  Admit Date: 2019 10:20 PM  Procedure:  Procedure(s) and Anesthesia Type:     * MITRAL VALVE REPAIR - General     * ECHOCARDIOGRAM, TRANSESOPHAGEAL - General  8 Day Post-Op    Vitals:  Patient Vitals for the past 8 hrs:   Temp SpO2 O2 Delivery Pulse Resp NIBP   19 0400 36.4 °C (97.6 °F) 98 % CPAP 71 18 142/75     Temp (24hrs), Av.6 °C (97.8 °F), Min:36.4 °C (97.6 °F), Max:36.7 °C (98 °F)      Respiratory:    Respiration: 18, Pulse Oximetry: 98 %     Chest Tube Drains:          Fluids:    Intake/Output Summary (Last 24 hours) at 19 0822  Last data filed at 19 0600   Gross per 24 hour   Intake             1640 ml   Output             2375 ml   Net             -735 ml     Admit weight: Weight: 101 kg (222 lb 11.2 oz)  Current weight: Weight: 90.8 kg (200 lb 2.8 oz) (19 0400)    Labs:  Recent Labs      19   0515  19   0550  19   0455   WBC  13.3*  14.4*  10.5   RBC  3.89*  4.14*  3.88*   HEMOGLOBIN  10.2*  10.8*  10.1*   HEMATOCRIT  32.4*  34.2*  32.4*   MCV  83.3  82.6  83.5   MCH  26.2*  26.1*  26.0*   MCHC  31.5*  31.6*  31.2*   RDW  41.2  41.5  42.1   PLATELETCT  325  321  327   MPV  10.4  10.3  10.2         Recent Labs      19   0515  19   0550  19   0455   SODIUM  136  138  139   POTASSIUM  4.3  4.0  4.0   CHLORIDE  104  105  105   CO2  24  22  25   GLUCOSE  124*  132*  108*   BUN  24*  22  25*   CREATININE  1.19  1.19  1.50*   CALCIUM  8.5  8.7  8.6     Recent Labs      19   0515  19   0550  19   0455   INR  1.50*  1.52*  1.70*       Medications:  • amiodarone  400 mg     • cephALEXin  500 mg     • losartan  100 mg     • enoxaparin (LOVENOX) injection  40 mg     • carvedilol  3.125 mg     • traZODone  50 mg     • metFORMIN  850 mg     • aspirin EC  81 mg     • Pharmacy Consult Request  1 Each     • senna-docusate  2 Tab     • MD  Alert...Warfarin per Pharmacy       • levothyroxine  175 mcg     • pravastatin  40 mg          Ordered Medications:    ASA - Yes    Plavix - No; contraindicated because of On Coumadin / NOAC    Post-operative Beta Blockers - Yes    Ace Inhibitor - Yes    Statin - Yes        Exam:   Review of Systems   Constitutional: Negative.    HENT: Negative.    Eyes: Negative.    Respiratory: Negative.    Cardiovascular: Negative.    Gastrointestinal: Negative.    Genitourinary: Negative.    Musculoskeletal: Negative.    Skin: Negative.    Neurological: Negative.    Endo/Heme/Allergies: Negative.    Psychiatric/Behavioral: Negative.        Physical Exam   Constitutional: He is oriented to person, place, and time. No distress.   Neck: Neck supple.   Cardiovascular: Normal rate, regular rhythm and normal heart sounds.  Exam reveals no gallop and no friction rub.    No murmur heard.  Pulmonary/Chest: Effort normal. No respiratory distress. He has decreased breath sounds in the right lower field and the left lower field. He has no wheezes. He has no rales.   Abdominal: Soft. He exhibits no distension. There is no tenderness.   Musculoskeletal: He exhibits edema.   Neurological: He is alert and oriented to person, place, and time.   Skin: Skin is warm and dry.       Quality Measures:   Quality-Core Measures   Reviewed items::  EKG reviewed, Labs reviewed, Medications reviewed and Radiology images reviewed  Beavers catheter::  No Beavers  Central line in place:  Need for access  DVT prophylaxis pharmacological::  Warfarin (Coumadin)  DVT prophylaxis - mechanical:  SCDs  Ulcer Prophylaxis::  Not indicated  Assessed for rehabilitation services:  Patient was assess for and/or received rehabilitation services during this hospitalization      Assessment/Plan:  POD 1 HDS, SR, d/c mediastinal tubes, diurese, keep beavers for strict I&O, amb, enc IS  POD 2 HDS, SR, add low dose ace, d/c beavers, PT/OT- mibilize as much as possible, enc IS  POD 3 HDS,  SR, Diuresed well- cont, Had long discussion with patient about expectations of rehab, d/w him he had to participate with therapies in order to even be considered for rehab- pt upset, states he know he was wrong yesterday and had a bad day. He really wants to go to rehab and will start participating and trying harder with PT/OT and the nurses.  POD 4 HDS, SR, cont diuresis, amb, enc IS, planning to rehab tomorrow  POD 5 HDS, SR, neuro intact, wounds CDI, INR trending up, Amb halls with assistance from PT.  Plan:  OK to transfer to rehab.  Doing well.    POD 6 HDS, SR, neuro intact, wounds CDI, WBC up slightly.  IVP right wrist, red and hot to touch.  Ambulating halls with assistance.  Plan:  Start keflex for IVP.  Warm compresses to site.  Still OK to transfer to rehab.    POD 7  HDS, SR was afib this AM, neuro intact, wounds CDI, abdomin S/NT, fluid balance negative, wt down.  HoTN this AM.    Plan:  Dc lasix, BB, ACE, Statin, continue amio, UA, start po amiodarone, IS/ambulate. CPM.  POD 8 HDS, SR, neuro intact, wounds CDI, ambulating halls with assistance, fluid balance negative, wt stable, WBC normalized.  Plan:  Awaiting insurance auth for rehab.  CPM.    Active Hospital Problems    Diagnosis   • Cardiomyopathy (HCC) [I42.9]     Priority: High   • Mitral valve disease [I05.9]     Priority: High   • Atrial fibrillation (HCC) [I48.91]     Priority: Medium   • S/P total knee arthroplasty, right [Z96.651]     Priority: Medium   • Obstructive sleep apnea [G47.33]     Priority: Medium   • Other hyperlipidemia [E78.49]     Priority: Medium   • Hypothyroid [E03.9]     Priority: Medium   • Volume overload [E87.70]     Priority: Low

## 2019-04-21 NOTE — THERAPY
"Occupational Therapy Treatment completed with focus on ADLs and ADL transfers.  Functional Status: Supine>sit with min A and HOB slightly elevated. LB dressing with min A. Sit>stand with SPV. Functional ambulation with SPV and FWW. Toilet txf with min A for balance and safety. Standing toileting w/ SPV. Stand>sit in chair with CGA. Left in chair with alarm on.  Plan of Care: Will benefit from Occupational Therapy 3 times per week  Discharge Recommendations:  Equipment Will Continue to Assess for Equipment Needs. Post-acute therapy: Recommend inpatient transitional care services for continued occupational therapy services. 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       See \"Rehab Therapy-Acute\" Patient Summary Report for complete documentation.    Pt seen for OT session. Progressing with balance and activity tolerance during ADLs. Continues to be limited by decreased functional mobility, activity tolerance, balance, and pain which are currently affecting pt's ability to complete ADLs/IADLs at baseline. Currently recommend acute OT, and recommend inpatient transitional care services for continued occupational therapy services. 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     "

## 2019-04-21 NOTE — PROGRESS NOTES
Monitor Summary:     Rhythm: Converted from atrial fibrillation to sinus rhythm at 0825  Ectopy: Occasional PVCs  Rate: 60s-90s  Measurements: 0.24/0.12/0.44

## 2019-04-21 NOTE — THERAPY
"Physical Therapy Treatment completed.   Bed Mobility:  Supine to Sit: Minimal Assist  Transfers: Sit to Stand: Supervised  Gait: Level Of Assist: Supervised with Front-Wheel Walker       Plan of Care: Will benefit from Physical Therapy 4 times per week  Discharge Recommendations: Equipment: Will Continue to Assess for Equipment Needs. Post-acute therapy Discharge to a transitional care facility for continued skilled therapy services.     See \"Rehab Therapy-Acute\" Patient Summary Report for complete documentation.     Pt demonstrating greatly improved activity tolerance and is now able to bear normal weight through the left knee/leg. Pt with improved left knee ROM active and passive with only minimal reports of pain at end range flexion (89-91 degrees) PT to continue working with Pt in acute setting to increase functional mobility but recommend post acute transitional care for continued therapy intervention prior to returning home.   "

## 2019-04-21 NOTE — DISCHARGE PLANNING
Spoke with [place] and they will see Ottoniel today.  Will submit updated [place] evals to insurance Monday am.

## 2019-04-22 VITALS
HEART RATE: 69 BPM | DIASTOLIC BLOOD PRESSURE: 77 MMHG | HEIGHT: 67 IN | RESPIRATION RATE: 16 BRPM | OXYGEN SATURATION: 96 % | SYSTOLIC BLOOD PRESSURE: 131 MMHG | WEIGHT: 202.82 LBS | TEMPERATURE: 97.9 F | BODY MASS INDEX: 31.83 KG/M2

## 2019-04-22 LAB
ANION GAP SERPL CALC-SCNC: 9 MMOL/L (ref 0–11.9)
BUN SERPL-MCNC: 20 MG/DL (ref 8–22)
CALCIUM SERPL-MCNC: 8.6 MG/DL (ref 8.5–10.5)
CHLORIDE SERPL-SCNC: 105 MMOL/L (ref 96–112)
CO2 SERPL-SCNC: 23 MMOL/L (ref 20–33)
CREAT SERPL-MCNC: 1.31 MG/DL (ref 0.5–1.4)
ERYTHROCYTE [DISTWIDTH] IN BLOOD BY AUTOMATED COUNT: 42 FL (ref 35.9–50)
GLUCOSE SERPL-MCNC: 146 MG/DL (ref 65–99)
HCT VFR BLD AUTO: 32 % (ref 42–52)
HGB BLD-MCNC: 10.2 G/DL (ref 14–18)
INR PPP: 1.94 (ref 0.87–1.13)
MCH RBC QN AUTO: 26.6 PG (ref 27–33)
MCHC RBC AUTO-ENTMCNC: 31.9 G/DL (ref 33.7–35.3)
MCV RBC AUTO: 83.6 FL (ref 81.4–97.8)
PLATELET # BLD AUTO: 317 K/UL (ref 164–446)
PMV BLD AUTO: 9.9 FL (ref 9–12.9)
POTASSIUM SERPL-SCNC: 4 MMOL/L (ref 3.6–5.5)
PROTHROMBIN TIME: 22.2 SEC (ref 12–14.6)
RBC # BLD AUTO: 3.83 M/UL (ref 4.7–6.1)
SODIUM SERPL-SCNC: 137 MMOL/L (ref 135–145)
WBC # BLD AUTO: 9.5 K/UL (ref 4.8–10.8)

## 2019-04-22 PROCEDURE — 80048 BASIC METABOLIC PNL TOTAL CA: CPT

## 2019-04-22 PROCEDURE — 99024 POSTOP FOLLOW-UP VISIT: CPT | Performed by: CLINICAL NURSE SPECIALIST

## 2019-04-22 PROCEDURE — 700102 HCHG RX REV CODE 250 W/ 637 OVERRIDE(OP): Performed by: HOSPITALIST

## 2019-04-22 PROCEDURE — 94660 CPAP INITIATION&MGMT: CPT

## 2019-04-22 PROCEDURE — A9270 NON-COVERED ITEM OR SERVICE: HCPCS | Performed by: INTERNAL MEDICINE

## 2019-04-22 PROCEDURE — 700102 HCHG RX REV CODE 250 W/ 637 OVERRIDE(OP): Performed by: INTERNAL MEDICINE

## 2019-04-22 PROCEDURE — 700102 HCHG RX REV CODE 250 W/ 637 OVERRIDE(OP): Performed by: NURSE PRACTITIONER

## 2019-04-22 PROCEDURE — A9270 NON-COVERED ITEM OR SERVICE: HCPCS | Performed by: HOSPITALIST

## 2019-04-22 PROCEDURE — A9270 NON-COVERED ITEM OR SERVICE: HCPCS | Performed by: NURSE PRACTITIONER

## 2019-04-22 PROCEDURE — 700102 HCHG RX REV CODE 250 W/ 637 OVERRIDE(OP): Performed by: CLINICAL NURSE SPECIALIST

## 2019-04-22 PROCEDURE — 85027 COMPLETE CBC AUTOMATED: CPT

## 2019-04-22 PROCEDURE — A9270 NON-COVERED ITEM OR SERVICE: HCPCS | Performed by: CLINICAL NURSE SPECIALIST

## 2019-04-22 PROCEDURE — 85610 PROTHROMBIN TIME: CPT

## 2019-04-22 PROCEDURE — 700111 HCHG RX REV CODE 636 W/ 250 OVERRIDE (IP): Performed by: INTERNAL MEDICINE

## 2019-04-22 RX ORDER — AMIODARONE HYDROCHLORIDE 200 MG/1
200 TABLET ORAL DAILY
Qty: 30 TAB | Refills: 1 | Status: SHIPPED | OUTPATIENT
Start: 2019-04-22 | End: 2019-06-25 | Stop reason: SDUPTHER

## 2019-04-22 RX ORDER — CEPHALEXIN 500 MG/1
500 CAPSULE ORAL EVERY 6 HOURS
Qty: 16 CAP | Refills: 0 | Status: SHIPPED | OUTPATIENT
Start: 2019-04-22 | End: 2019-05-10

## 2019-04-22 RX ORDER — CARVEDILOL 3.12 MG/1
3.12 TABLET ORAL 2 TIMES DAILY WITH MEALS
Qty: 60 TAB | Refills: 2 | Status: SHIPPED | OUTPATIENT
Start: 2019-04-22 | End: 2019-05-10 | Stop reason: SDUPTHER

## 2019-04-22 RX ORDER — WARFARIN SODIUM 5 MG/1
5 TABLET ORAL
Qty: 30 TAB | Refills: 3 | Status: SHIPPED | OUTPATIENT
Start: 2019-04-22 | End: 2019-05-16 | Stop reason: SDUPTHER

## 2019-04-22 RX ORDER — ASPIRIN 81 MG/1
81 TABLET ORAL DAILY
Qty: 30 TAB | COMMUNITY
Start: 2019-04-22 | End: 2023-09-29

## 2019-04-22 RX ORDER — OXYCODONE HYDROCHLORIDE 10 MG/1
TABLET ORAL
Qty: 40 TAB | Refills: 0 | Status: SHIPPED | OUTPATIENT
Start: 2019-04-22 | End: 2019-05-06

## 2019-04-22 RX ADMIN — SENNOSIDES AND DOCUSATE SODIUM 2 TABLET: 8.6; 5 TABLET ORAL at 06:24

## 2019-04-22 RX ADMIN — LEVOTHYROXINE SODIUM 175 MCG: 75 TABLET ORAL at 06:22

## 2019-04-22 RX ADMIN — CEPHALEXIN 500 MG: 500 CAPSULE ORAL at 00:11

## 2019-04-22 RX ADMIN — OXYCODONE HYDROCHLORIDE 5 MG: 5 TABLET ORAL at 09:09

## 2019-04-22 RX ADMIN — AMIODARONE HYDROCHLORIDE 400 MG: 200 TABLET ORAL at 06:20

## 2019-04-22 RX ADMIN — CARVEDILOL 3.12 MG: 3.12 TABLET, FILM COATED ORAL at 08:29

## 2019-04-22 RX ADMIN — PRAVASTATIN SODIUM 40 MG: 20 TABLET ORAL at 06:23

## 2019-04-22 RX ADMIN — CEPHALEXIN 500 MG: 500 CAPSULE ORAL at 12:15

## 2019-04-22 RX ADMIN — CEPHALEXIN 500 MG: 500 CAPSULE ORAL at 06:26

## 2019-04-22 RX ADMIN — ASPIRIN 81 MG: 81 TABLET, COATED ORAL at 06:23

## 2019-04-22 RX ADMIN — ENOXAPARIN SODIUM 40 MG: 100 INJECTION SUBCUTANEOUS at 06:24

## 2019-04-22 RX ADMIN — TRAMADOL HYDROCHLORIDE 50 MG: 50 TABLET, FILM COATED ORAL at 00:11

## 2019-04-22 RX ADMIN — LOSARTAN POTASSIUM 100 MG: 25 TABLET ORAL at 06:21

## 2019-04-22 RX ADMIN — METFORMIN HYDROCHLORIDE 850 MG: 850 TABLET ORAL at 06:25

## 2019-04-22 ASSESSMENT — ENCOUNTER SYMPTOMS
RESPIRATORY NEGATIVE: 1
GASTROINTESTINAL NEGATIVE: 1
CONSTITUTIONAL NEGATIVE: 1
MUSCULOSKELETAL NEGATIVE: 1
CARDIOVASCULAR NEGATIVE: 1
EYES NEGATIVE: 1
NEUROLOGICAL NEGATIVE: 1
PSYCHIATRIC NEGATIVE: 1

## 2019-04-22 NOTE — DISCHARGE SUMMARY
DISCHARGE SUMMARY    ADMISSION DATE: 4/9/2019    DISCHARGE DATE: 4/22/2019    CHIEF COMPLAINT ON ADMISSION: No chief complaint on file.    ADMITTING DIAGNOSES:  Acute congestive heart failure, valvular heart   disease (New York Heart Association class 3-4), severe mitral regurgitation   (4+, degenerative), posterior mitral valve leaflet prolapse, acute respiratory   failure, pulmonary edema, atrial fibrillation, obstructive sleep apnea,   hypothyroidism.      DISCHARGE DIAGNOSES:  Acute congestive heart failure, valvular heart   disease (New York Heart Association class 3-4), severe mitral regurgitation   (4+, degenerative), posterior mitral valve leaflet prolapse, acute respiratory   failure, pulmonary edema, atrial fibrillation, obstructive sleep apnea,   hypothyroidism.      PROCEDURES PERFORMED:  Radical mitral valve repair (P2, triangular resection,   38 mm Gray flexible annuloplasty band), left atrial appendage ligation and   intraoperative transesophageal echocardiography.      HISTORY OF PRESENT ILLNESS:  The patient is a 54-year-old male who recently underwent right   knee replacement.  He developed acute respiratory failure postoperatively.    Echocardiography showed severe mitral regurgitation and P2 prolapse.  His left   ventricular ejection fraction was normal at approximately 65%.  Cardiac   catheterization did not show any hemodynamically significant coronary artery   disease.    HOSPITAL COURSE:   POD 1 HDS, SR, d/c mediastinal tubes, diurese, keep beavers for strict I&O, amb, enc IS  POD 2 HDS, SR, add low dose ace, d/c beavers, PT/OT- mibilize as much as possible, enc IS  POD 3 HDS, SR, Diuresed well- cont, Had long discussion with patient about expectations of rehab, d/w him he had to participate with therapies in order to even be considered for rehab- pt upset, states he know he was wrong yesterday and had a bad day. He really wants to go to rehab and will start participating and trying harder  with PT/OT and the nurses.  POD 4 HDS, SR, cont diuresis, amb, enc IS, planning to rehab tomorrow  POD 5 HDS, SR, neuro intact, wounds CDI, INR trending up, Amb halls with assistance from PT.  Plan:  OK to transfer to rehab.  Doing well.    POD 6 HDS, SR, neuro intact, wounds CDI, WBC up slightly.  IVP right wrist, red and hot to touch.  Ambulating halls with assistance.  Plan:  Start keflex for IVP.  Warm compresses to site.  Still OK to transfer to rehab.    POD 7  HDS, SR was afib this AM, neuro intact, wounds CDI, abdomin S/NT, fluid balance negative, wt down.  HoTN this AM.    Plan:  Dc lasix, BB, ACE, Statin, continue amio, UA, start po amiodarone, IS/ambulate. CPM.  POD 8 HDS, SR, neuro intact, wounds CDI, ambulating halls with assistance, fluid balance negative, wt stable, WBC normalized.  Plan:  Awaiting insurance auth for rehab.  CPM.  POD 9 HDS, SR, neuro intact, wounds CDI, right knee wound CDI.  Abd S/NT, + BS.  Right knee wound CDI.  Yesterday ambulated halls several time with minimal assistance.  Lives with mother so will have help.  Plan:  Dc home today with home PT/OT.     RECENT LABS:     Lab Results   Component Value Date/Time    SODIUM 137 04/22/2019 03:15 AM    POTASSIUM 4.0 04/22/2019 03:15 AM    CHLORIDE 105 04/22/2019 03:15 AM    CO2 23 04/22/2019 03:15 AM    GLUCOSE 146 (H) 04/22/2019 03:15 AM    BUN 20 04/22/2019 03:15 AM    CREATININE 1.31 04/22/2019 03:15 AM      Lab Results   Component Value Date/Time    WBC 9.5 04/22/2019 03:15 AM    RBC 3.83 (L) 04/22/2019 03:15 AM    HEMOGLOBIN 10.2 (L) 04/22/2019 03:15 AM    HEMATOCRIT 32.0 (L) 04/22/2019 03:15 AM    MCV 83.6 04/22/2019 03:15 AM    MCH 26.6 (L) 04/22/2019 03:15 AM    MCHC 31.9 (L) 04/22/2019 03:15 AM    MPV 9.9 04/22/2019 03:15 AM    NEUTSPOLYS 75.00 (H) 04/13/2019 06:59 AM    LYMPHOCYTES 8.50 (L) 04/13/2019 06:59 AM    MONOCYTES 11.30 04/13/2019 06:59 AM    EOSINOPHILS 4.30 04/13/2019 06:59 AM    BASOPHILS 0.20 04/13/2019 06:59 AM     HYPOCHROMIA 1+ 02/21/2014 08:00 AM      Lab Results   Component Value Date/Time    PROTHROMBTM 22.2 (H) 04/22/2019 03:15 AM    INR 1.94 (H) 04/22/2019 03:15 AM        ALLERGIES:     Patient has no known allergies.    DISCHARGE MEDICATIONS:      Medication List      START taking these medications      Instructions   amiodarone 200 MG Tabs  Commonly known as:  CORDARONE   Take 1 Tab by mouth every day.  Dose:  200 mg     aspirin 81 MG EC tablet   Take 1 Tab by mouth every day.  Dose:  81 mg     carvedilol 3.125 MG Tabs  Commonly known as:  COREG   Take 1 Tab by mouth 2 times a day, with meals.  Dose:  3.125 mg     cephALEXin 500 MG Caps  Commonly known as:  KEFLEX   Take 1 Cap by mouth every 6 hours.  Dose:  500 mg     mag hydrox-al hydrox-simeth 400-400-40 MG/5ML Susp  Commonly known as:  MAALOX PLUS ES or MYLANTA DS   Take 30 mL by mouth every four hours as needed (Indigestion).  Dose:  30 mL     magnesium hydroxide 400 MG/5ML Susp  Commonly known as:  MILK OF MAGNESIA   Take 30 mL by mouth 1 time daily as needed (if polyethylene glycol ineffective after 24 hours).  Dose:  30 mL     oxyCODONE immediate release 10 MG immediate release tablet  Commonly known as:  ROXICODONE   1/2 to one tab q 4-6 hours prn pain.  14 days.     warfarin 5 MG Tabs  Commonly known as:  COUMADIN   Take 1 Tab by mouth COUMADIN-DAILY.  Dose:  5 mg        CONTINUE taking these medications      Instructions   levothyroxine 300 MCG Tabs  Commonly known as:  SYNTHROID   Take 1 Tab by mouth every day.  Dose:  300 mcg     losartan 100 MG Tabs  Commonly known as:  COZAAR   Take 100 mg by mouth every day.  Dose:  100 mg     metFORMIN 850 MG Tabs  Commonly known as:  GLUCOPHAGE   Take 1 Tab by mouth every day.  Dose:  850 mg     pravastatin 40 MG tablet  Commonly known as:  PRAVACHOL   Take 1 Tab by mouth every day.  Dose:  40 mg     vitamin D 1000 UNIT Tabs  Commonly known as:  cholecalciferol   Take 1,000 Units by mouth every day.  Dose:  1000  Units        STOP taking these medications    amLODIPine 10 MG Tabs  Commonly known as:  NORVASC     HYDROcodone-acetaminophen 5-325 MG Tabs per tablet  Commonly known as:  NORCO     potassium chloride SA 10 MEQ Tbcr  Commonly known as:  K-DUR     traZODone 50 MG Tabs  Commonly known as:  DESYREL     triamterene-hydrochlorothiazide 75-50 MG Tabs  Commonly known as:  MAXZIDE 75-50            NARCOTIC PAIN MEDICATIONS:   In prescribing controlled substances to this patient, I certify that I have obtained and reviewed their medical history. I have also made a good marcio effort to obtain applicable records from other providers who have treated the patient .    I have conducted a physical exam and documented it. I have reviewed the patient's prescription history as maintained by the [place] Prescription Monitoring Program.     I have assessed the patient’s risk for abuse, dependency, and addiction using the validated Opioid Risk Tool.    Given the above, I believe the benefits of controlled substance therapy outweigh the risks. The reasons for prescribing controlled substances include non-narcotic, oral analgesic alternatives have been inadequate for pain control. Accordingly, I have discussed the risk and benefits, treatment plan, and alternative therapies with the patient.     Pt understands this prescription is a controlled substance which is potentially habit-forming and its use is regulated by the [employer]. It must be submitted to the pharmacy within 5 days of the date written and can not be called in or faxed to the pharmacy. Refills are subject to terms of a medicine agreement. Any refill requires a new prescription that must be obtained from this office during regular office hours. We ask for 72 hours notice to get an appointment for a narcotic pain medication refill. This medicine can cause nausea, significant constipation, sedation, confusion.     DIET:   Cardiac diet    DISCHARGE INSTRUCTIONS DISCUSSED WITH THE  PATIENT:      1. NO driving for 4 weeks after surgery. You may ride as a passenger.  2. NO lifting of any item over 10 lbs (e.g. gallon of milk) for 6 weeks after surgery.  3. DO walk as much as possible! Walk a minimum of once a day. Depending on your fatigue and comfort level, you may walk as much as you wish. There is no maximum.  4. Other physical activities (sex, housework, gardening, etc.) are OK after 4 weeks   5. Continue using incentive spirometer for 2 weeks, especially if going home on oxygen.    Incision Care:  1. SHOWER ONLY - no baths. Clean incision daily with plain Ivory ® soap or any other dye or perfume free soap. Then pat incision dry with clean towel. Avoid creams or lotions on the incision(s).  a. If there is any increase in redness or swelling, or separation of the incision line, or thick drainage* from any of the incisions, call right away  * Clear, thin drainage is not abnormal especially from the leg incision and/or                         chest tube sites.  2. Continue to wear your MAYTE Stockings for 4 weeks. You may take off the stockings when in bed or when the legs are elevated.    Patient instructed to call [facility] cardiac surgery at [phone]  if any increased shortness of breath, uncontrolled pain, weight gain greater than 2 pounds in 1 day, SBP >140, HR <60 or redness swelling or drainage of incisions.      FOLLOW-UP: Future Appointments  Date Time Provider Department Center   5/10/2019 12:30 PM FARIDA Parikh [facility] None   5/10/2019 1:20 PM Alex Kilpatrick M.D. [facility] None    Reinaldo Martin M.D.  [address]  [facility] [address]  [facility] [address]  [phone]    Go on 5/6/2019  Please follow up with your orthopedic surgeon on 5/6 at 1:15pm

## 2019-04-22 NOTE — DISCHARGE PLANNING
Care Transition Team Discharge Planning     Anticipated Discharge Disposition: Home w/ outpatient rehab and coumadin clinic.     Action: This RN CM spoke with patient and updated him on declines from HH agencies.     Discussed outpatient rehab and INR lab draws. Patient states that his father can drive him to and from his appointments.      Barriers to Discharge: Coumadin clinic set-up.     Plan: Follow up with treatment team.

## 2019-04-22 NOTE — PROGRESS NOTES
Cardiovascular Surgery Progress Note    Name: Ottoniel Davies  MRN: [id number]  : 1965  Admit Date: 2019 10:20 PM  Procedure:  Procedure(s) and Anesthesia Type:     * MITRAL VALVE REPAIR - General     * ECHOCARDIOGRAM, TRANSESOPHAGEAL - General  9 Day Post-Op    Vitals:  Patient Vitals for the past 8 hrs:   Temp SpO2 O2 Delivery Pulse NIBP   19 0400 36.6 °C (97.8 °F) 100 % CPAP 69 142/79   19 0000 36.4 °C (97.6 °F) 98 % CPAP 68 129/79     Temp (24hrs), Av.6 °C (97.8 °F), Min:36.4 °C (97.6 °F), Max:36.7 °C (98 °F)      Respiratory:    Respiration: 16, Pulse Oximetry: 100 %     Chest Tube Drains:          Fluids:    Intake/Output Summary (Last 24 hours) at 19 0734  Last data filed at 19 0000   Gross per 24 hour   Intake              360 ml   Output             2525 ml   Net            -2165 ml     Admit weight: Weight: 101 kg (222 lb 11.2 oz)  Current weight: Weight: 90.8 kg (200 lb 2.8 oz) (19 0400)    Labs:  Recent Labs      19   0550  19   0455  19   0315   WBC  14.4*  10.5  9.5   RBC  4.14*  3.88*  3.83*   HEMOGLOBIN  10.8*  10.1*  10.2*   HEMATOCRIT  34.2*  32.4*  32.0*   MCV  82.6  83.5  83.6   MCH  26.1*  26.0*  26.6*   MCHC  31.6*  31.2*  31.9*   RDW  41.5  42.1  42.0   PLATELETCT  321  327  317   MPV  10.3  10.2  9.9         Recent Labs      19   0550  19   0455  19   0315   SODIUM  138  139  137   POTASSIUM  4.0  4.0  4.0   CHLORIDE  105  105  105   CO2  22  25  23   GLUCOSE  132*  108*  146*   BUN  22  25*  20   CREATININE  1.19  1.50*  1.31   CALCIUM  8.7  8.6  8.6     Recent Labs      19   0550  19   0455  19   0315   INR  1.52*  1.70*  1.94*       Medications:  • warfarin  7.5 mg     • amiodarone  400 mg     • cephALEXin  500 mg     • losartan  100 mg     • enoxaparin (LOVENOX) injection  40 mg     • carvedilol  3.125 mg     • traZODone  50 mg     • metFORMIN  850 mg     • aspirin EC  81 mg     • Pharmacy  Consult Request  1 Each     • senna-docusate  2 Tab     • MD Alert...Warfarin per Pharmacy       • levothyroxine  175 mcg     • pravastatin  40 mg          Ordered Medications:    ASA - Yes    Plavix - No; contraindicated because of On Coumadin / NOAC    Post-operative Beta Blockers - Yes    Ace Inhibitor - Yes    Statin - Yes        Exam:   Review of Systems   Constitutional: Negative.    HENT: Negative.    Eyes: Negative.    Respiratory: Negative.    Cardiovascular: Negative.    Gastrointestinal: Negative.    Genitourinary: Negative.    Musculoskeletal: Negative.    Skin: Negative.    Neurological: Negative.    Endo/Heme/Allergies: Negative.    Psychiatric/Behavioral: Negative.        Physical Exam   Constitutional: He is oriented to person, place, and time. No distress.   Neck: Neck supple.   Cardiovascular: Normal rate, regular rhythm and normal heart sounds.  Exam reveals no gallop and no friction rub.    No murmur heard.  Pulmonary/Chest: Effort normal. No respiratory distress. He has decreased breath sounds in the right lower field and the left lower field. He has no wheezes. He has no rales.   Abdominal: Soft. He exhibits no distension. There is no tenderness.   Musculoskeletal: He exhibits edema.   Neurological: He is alert and oriented to person, place, and time.   Skin: Skin is warm and dry.       Quality Measures:   Quality-Core Measures   Reviewed items::  EKG reviewed, Labs reviewed, Medications reviewed and Radiology images reviewed  Beavers catheter::  No Beavers  Central line in place:  Need for access  DVT prophylaxis pharmacological::  Warfarin (Coumadin)  DVT prophylaxis - mechanical:  SCDs  Ulcer Prophylaxis::  Not indicated  Assessed for rehabilitation services:  Patient was assess for and/or received rehabilitation services during this hospitalization      Assessment/Plan:  POD 1 HDS, SR, d/c mediastinal tubes, diurese, keep beavers for strict I&O, amb, enc IS  POD 2 HDS, SR, add low dose ace, d/c  ruthann, PT/OT- mibilize as much as possible, enc IS  POD 3 HDS, SR, Diuresed well- cont, Had long discussion with patient about expectations of rehab, d/w him he had to participate with therapies in order to even be considered for rehab- pt upset, states he know he was wrong yesterday and had a bad day. He really wants to go to rehab and will start participating and trying harder with PT/OT and the nurses.  POD 4 HDS, SR, cont diuresis, amb, enc IS, planning to rehab tomorrow  POD 5 HDS, SR, neuro intact, wounds CDI, INR trending up, Amb halls with assistance from PT.  Plan:  OK to transfer to rehab.  Doing well.    POD 6 HDS, SR, neuro intact, wounds CDI, WBC up slightly.  IVP right wrist, red and hot to touch.  Ambulating halls with assistance.  Plan:  Start keflex for IVP.  Warm compresses to site.  Still OK to transfer to rehab.    POD 7  HDS, SR was afib this AM, neuro intact, wounds CDI, abdomin S/NT, fluid balance negative, wt down.  HoTN this AM.    Plan:  Dc lasix, BB, ACE, Statin, continue amio, UA, start po amiodarone, IS/ambulate. CPM.  POD 8 HDS, SR, neuro intact, wounds CDI, ambulating halls with assistance, fluid balance negative, wt stable, WBC normalized.  Plan:  Awaiting insurance auth for rehab.  CPM.  POD 9 HDS, SR, neuro intact, wounds CDI, right knee wound CDI.  Abd S/NT, + BS.  Right knee wound CDI.  Yesterday ambulated halls several time with minimal assistance.  Lives with mother so will have help.  Plan:  Dc home today with home PT/OT.     Active Hospital Problems    Diagnosis   • Cardiomyopathy (HCC) [I42.9]     Priority: High   • Mitral valve disease [I05.9]     Priority: High   • Atrial fibrillation (HCC) [I48.91]     Priority: Medium   • S/P total knee arthroplasty, right [Z96.651]     Priority: Medium   • Obstructive sleep apnea [G47.33]     Priority: Medium   • Other hyperlipidemia [E78.49]     Priority: Medium   • Hypothyroid [E03.9]     Priority: Medium   • Volume overload [E87.70]      Priority: Low

## 2019-04-22 NOTE — DISCHARGE PLANNING
Care Transition Team Discharge Planning     Anticipated Discharge Disposition: Home w/ HH.     Action: This RN CM met with pt at bedside to obtain HH choice. This RN CM faxed choice form to [facility].     Barriers to Discharge: HH acceptance.     Plan: Follow up with [facility] and treatment team.

## 2019-04-22 NOTE — DISCHARGE PLANNING
Agency/Facility Name: [facility]  Spoke To: Leticia  Outcome: Patient declined- at capacity for Medicaid patients.    Agency/Facility Name: [facility]  Outcome: At capacity for Medicaid patient's per [place]).

## 2019-04-22 NOTE — PROGRESS NOTES
Pt provided discharge education and instructions regarding medications, physical activity and follow-up appointments. All questions answered at this time. Pt assisted into his clothing for discharge. VSS, pt states pain well controlled at this time. Pt discharged via wheelchair to home accompanied by family.

## 2019-04-22 NOTE — CARE PLAN
Problem: Post Op Day 4 CABG/Heart Valve Replacement  Goal: Optimal care of the Post Op CABG/Heart Valve replacement Post Op Day 4  Outcome: PROGRESSING AS EXPECTED  Pt showered, incisions cleaned with soap and water.  Pt up in chair for all meals, and uses IS hourly while awake. Pt weighed and provided discharge instructions.

## 2019-04-22 NOTE — PROGRESS NOTES
Monitor Summary:  Rhythm-SR  HR-60s-70s  Ectopy-  Measurements- .16/.08/.36      12-Hour chart check

## 2019-04-22 NOTE — CARE PLAN
Problem: Safety  Goal: Will remain free from injury  No falls this shift. Bed alarm active and audible. Hourly rounds maintained.    Problem: Pain Management  Goal: Pain level will decrease to patient's comfort goal  Pt with more pain this evening. Relieved with rest, ice and oxy 5.    Problem: Mobility  Goal: Risk for activity intolerance will decrease  Pt ambulating well with front wheel walker. Pt able to climb 4 steps tonight.

## 2019-04-22 NOTE — DISCHARGE PLANNING
Care Transition Team Discharge Planning     Anticipated Discharge Disposition: Home w/ HH     Action: This RN CM was notified by [facility] that patient was declined by [facility] due to a full medicaid senses.    This RN CM re-faxed updated choice form to include [facility].     This RN CM spoke with [facility] G[facility] and Grant GUSMAN, their Medicaid senses is also full at this time.    Updated ADÁN Cao about patient being unable to go home to [place] [facility] with HH at this time.     Barriers to Discharge: New discharge disposition.     Plan: Follow up with pt and treatment team!

## 2019-04-22 NOTE — FACE TO FACE
Face to Face Supporting Documentation - Home Health    The encounter with this patient was in whole or in part the primary reason for home health admission.    Date of encounter:   Patient:                    MRN:                       YOB: 2021  Ottoniel Davies  [id number]  1965     Home health to see patient for:  Skilled Nursing care for assessment, interventions & education, Wound Care, Physical Therapy evaluation and treatment and Occupational therapy evaluation and treatment    Skilled need for:  Surgical Aftercare blood draws, wound care, PT for knee, medication management, vital signs, disease education.    Skilled nursing interventions to include:  Wound Care    Homebound status evidenced by:  Have a condition such that leaving his or her home is medically contraindicated. Leaving home requires a considerable and taxing effort. There is a normal inability to leave the home.    Community Physician to provide follow up care: Shane Vanegas M.D.     Optional Interventions? No      I certify the face to face encounter for this home health care referral meets the CMS requirements and the encounter/clinical assessment with the patient was, in whole, or in part, for the medical condition(s) listed above, which is the primary reason for home health care. Based on my clinical findings: the service(s) are medically necessary, support the need for home health care, and the homebound criteria are met.  I certify that this patient has had a face to face encounter by myself.  JANELLE Parikh. - NPI: [id number]

## 2019-04-22 NOTE — DISCHARGE INSTRUCTIONS
Discharge Instructions    Discharged to home by car with friend. Discharged via wheelchair, hospital escort: Yes.  Special equipment needed: N/A    Be sure to schedule a follow-up appointment with your primary care doctor or any specialists as instructed.     Discharge Plan:   Pneumococcal Vaccine Administered/Refused: Not given - Patient refused pneumococcal vaccine  Influenza Vaccine Indication: Not indicated: Previously immunized this influenza season and > 8 years of age    I understand that a diet low in cholesterol, fat, and sodium is recommended for good health. Unless I have been given specific instructions below for another diet, I accept this instruction as my diet prescription.   Other diet: Diabetic    Special Instructions:    DIVISION OF CARDIAC SURGERY DISCHARGE INSTRUCTIONS    Activity:  1. NO driving for 4 weeks after surgery. You may ride as a passenger.  2. NO lifting more than 10 pounds for 6 weeks.  3. For the next 6 weeks, keep your elbows close to your body and move within a pain-free motion when lifting, pushing or pulling.  Do not stretch both arms backwards at the same time.    4. Walk at least 4 times per day, there is no maximum.  5. Other physical activities (sex, housework, gardening, etc.) are okay after 4 weeks.  6. Continue using incentive spirometer for 2 weeks.  If you are going home on oxygen and you were not on oxygen prior to surgery, keep using until you are oxygen free.  7. Weigh yourself daily.  Call your Cardiologist for a weight gain of 2 or more pounds within 48 hours.  8.  Take all of your medications as prescribed    Incision Care:  1. SHOWER EVERYDAY-no baths. Make sure to clean your incision(s) twice daily with plain, perfume and dye-free soap.  Then pat incision(s) dry with clean towel. No creams or lotions on your incision(s).    2. If you are discharging with a Prevena bandage on your chest, you or your home health nurse may remove the bandage 7 days after surgery,  or sooner if the battery runs out or the device alarms. When the battery runs out, the bandage will lose suction and it will puff up.  To remove, slowly roll down the edges of the bandage. Throw away the entire bandage and the battery pack.  Keep the incision open to air and clean twice daily with soap and water.  3. If there is any increased redness or swelling, separation of the incision line, or thick drainage from any of your incisions, call the Cardiac Surgeons ([phone]).    4. Continue to wear your MAYTE Stockings for 4 weeks. You may take them off when you are in bed or when your legs are elevated.    General Instructions:  1. You have been referred to Cardiac Rehab.  You can start Cardiac Rehab 30 days after surgery.  If you do not have an appointment at the time of discharge call [phone] to schedule an appointment.  2. Your Primary Care Doctor typically handles home oxygen. Oxygen may be stopped when your oxygen level is consistently greater than 90.  Check with your Primary Care Doctor if you are unsure.  3. Take all of your medications (including pain medications) as prescribed.  Taking medications other than prescribed can result in serious injury.    For Patients Discharged with Narcotic Pain Medication:   1. If a refill is needed, understand that only 1 refill will be provided and you must come to the Cardiac Surgeons’ office for an appointment (72 hours’ notice is required to schedule and there are no weekend appointments).  2. If the pain medications you are discharged on are not working, you will need to bring your remaining prescription into the office in order to receive a new prescription.  3. If you were taking narcotics prior to your heart surgery, the Cardiac Surgeons will provide you with one prescription and additional medications will need to be provided by your pain management doctor.  4. Do not drink alcohol while taking narcotics.  5. Lost or stolen medications will not be refilled.   If medications are stolen, report to law enforcement.    Contact Cardiac Surgery at [phone] if you have any questions.      Dr. Martin Instructions:    Total Knee Replacement, Care After  These instructions give you information about caring for yourself after your procedure. Call your doctor if you have any problems or questions after your procedure.  Follow these instructions at home:  Medicines  · Take over-the-counter and prescription medicines only as told by your doctor.  · If you were prescribed an antibiotic medicine, take it as told by your doctor. Do not stop taking the antibiotic even if you start to feel better.  · If you were prescribed a blood thinner (anticoagulant), take it as told by your doctor.  Bathing  · Do not take baths, swim, or use a hot tub until your doctor says it is okay. You MAY take a shower, but do not soak knee.  Pat dry afterwards  Incision care and drain care  · Check your cut from surgery (incision) and your drain every day for signs of infection. Check for:  ¨ More redness, swelling, or pain.  ¨ More fluid or blood.  ¨ Warmth.  ¨ Pus or a bad smell.  · Follow instructions from your doctor about how to take care of your cut from surgery. Make sure you:  ¨ Wash your hands with soap and water before you touch your incision.  ¨ Leave skin glue, or skin tape (adhesive) strips in place. They may need to stay in place for 2 weeks or longer. If tape strips get loose and curl up, you may trim the loose edges. Do not remove tape strips completely unless your doctor says it is okay.    Managing pain, stiffness, and swelling  · If directed, put ice on your knee.  ¨ Put ice in a plastic bag.  ¨ Place a towel between your skin and the bag.  ¨ Leave the ice on for 20 minutes, 2-3 times per day.  · If directed, apply heat to the affected area as often as told by your doctor. Use the heat source that your doctor recommends, such as a moist heat pack or a heating pad.  ¨ Place a towel between  your skin and the heat source.  ¨ Leave the heat on for 20-30 minutes.  ¨ Remove the heat if your skin turns bright red. This is especially important if you are unable to feel pain, heat, or cold. You may have a greater risk of getting burned.  · Move your toes often to avoid stiffness and to lessen swelling.  · Raise (elevate) your knee above the level of your heart while you are sitting or lying down.  · Wear elastic knee support for as long as told by your doctor.  Driving  · Do not drive until your doctor says it is okay. Ask your doctor when it is safe to drive if you have a splint or brace on your knee.  · Do not drive or use heavy machinery while taking prescription pain medicine.  · Do not drive for 24 hours if you received a sedative.  Activity  · Do not lift anything that is heavier than 10 lb (4.5 kg) until your doctor says it is okay.  · Do not play contact sports until your doctor says it is okay.  · Avoid high-impact activities, including running, jumping rope, and jumping jacks.  · Avoid sitting for a long time without moving. Get up and move around at least every few hours.  · If physical therapy was prescribed, do exercises as told by your doctor.  · Return to your normal activities as told by your doctor. Ask your doctor what activities are safe for you.  Safety  · Do not use your leg to support your body weight until your doctor says that you can. Use crutches or a walker as told by your doctor.  General instructions  · Do not have any dental work done for at least 3 months after your surgery. When you do have dental work done, tell your dentist about your joint replacement.  · Do not use any tobacco products, such as cigarettes, chewing tobacco, or e-cigarettes. If you need help quitting, ask your doctor.  · Wear special socks (compression stockings) as told by your doctor.  · If you have been sent home with a knee joint motion machine (continuous passive motion machine), use it as told by your  doctor.  · Drink enough fluid to keep your pee (urine) clear or pale yellow.  · If you have been told to lose weight, follow instructions from your doctor about how to do this safely.  · Keep all follow-up visits as told by your doctor. This is important.  Contact a doctor if:  · You have more redness, swelling, or pain around your cut from surgery or your drain.  · You have more fluid or blood coming from your cut from surgery or your drain.  · Your cut from surgery or your drain area feels warm to the touch.  · You have pus or a bad smell coming from your cut from surgery or your drain.  · You have a fever.  · Your cut breaks open after your doctor removes your stitches, skin glue, or skin tape strips.  · Your new joint feels loose.  · You have knee pain that does not go away.  Get help right away if:  · You have a rash.  · You have pain in your calf or thigh.  · You have swelling in your calf or thigh.  · You have shortness of breath.  · You have trouble breathing.  · You have chest pain.  · Your ability to move your knee is getting worse.  This information is not intended to replace advice given to you by your health care provider. Make sure you discuss any questions you have with your health care provider.  Document Released: 03/11/2013 Document Revised: 08/21/2017 Document Reviewed: 11/23/2016  [employer] Patient Education © 2017 [employer]        · Is patient discharged on Warfarin / Coumadin?   Yes    You are on the blood thinner Coumadin (warfarin) and you will need your coumadin levels checked periodically. For any questions call the [facility] @ [phone]. The home health nurse will draw your blood and the coumadin clinic will call with any change to your dose.        IMPORTANT: HOW TO USE THIS INFORMATION:  This is a summary and does NOT have all possible information about this product. This information does not assure that this product is safe, effective, or appropriate for you.  "This information is not individual medical advice and does not substitute for the advice of your health care professional. Always ask your health care professional for complete information about this product and your specific health needs.      WARFARIN - ORAL (WARF-uh-rin)      COMMON BRAND NAME(S): Coumadin      WARNING:  Warfarin can cause very serious (possibly fatal) bleeding. This is more likely to occur when you first start taking this medication or if you take too much warfarin. To decrease your risk for bleeding, your doctor or other health care provider will monitor you closely and check your lab results (INR test) to make sure you are not taking too much warfarin. Keep all medical and laboratory appointments. Tell your doctor right away if you notice any signs of serious bleeding. See also Side Effects section.      USES:  This medication is used to treat blood clots (such as in deep vein thrombosis-DVT or pulmonary embolus-PE) and/or to prevent new clots from forming in your body. Preventing harmful blood clots helps to reduce the risk of a stroke or heart attack. Conditions that increase your risk of developing blood clots include a certain type of irregular heart rhythm (atrial fibrillation), heart valve replacement, recent heart attack, and certain surgeries (such as hip/knee replacement). Warfarin is commonly called a \"blood thinner,\" but the more correct term is \"anticoagulant.\" It helps to keep blood flowing smoothly in your body by decreasing the amount of certain substances (clotting proteins) in your blood.      HOW TO USE:  Read the Medication Guide provided by your pharmacist before you start taking warfarin and each time you get a refill. If you have any questions, ask your doctor or pharmacist. Take this medication by mouth with or without food as directed by your doctor or other health care professional, usually once a day. It is very important to take it exactly as directed. Do not " increase the dose, take it more frequently, or stop using it unless directed by your doctor. Dosage is based on your medical condition, laboratory tests (such as INR), and response to treatment. Your doctor or other health care provider will monitor you closely while you are taking this medication to determine the right dose for you. Use this medication regularly to get the most benefit from it. To help you remember, take it at the same time each day. It is important to eat a balanced, consistent diet while taking warfarin. Some foods can affect how warfarin works in your body and may affect your treatment and dose. Avoid sudden large increases or decreases in your intake of foods high in vitamin K (such as broccoli, cauliflower, cabbage, brussels sprouts, kale, spinach, and other green leafy vegetables, liver, green tea, certain vitamin supplements). If you are trying to lose weight, check with your doctor before you try to go on a diet. Cranberry products may also affect how your warfarin works. Limit the amount of cranberry juice (16 ounces/480 milliliters a day) or other cranberry products you may drink or eat.      SIDE EFFECTS:  Nausea, loss of appetite, or stomach/abdominal pain may occur. If any of these effects persist or worsen, tell your doctor or pharmacist promptly. Remember that your doctor has prescribed this medication because he or she has judged that the benefit to you is greater than the risk of side effects. Many people using this medication do not have serious side effects. This medication can cause serious bleeding if it affects your blood clotting proteins too much (shown by unusually high INR lab results). Even if your doctor stops your medication, this risk of bleeding can continue for up to a week. Tell your doctor right away if you have any signs of serious bleeding, including: unusual pain/swelling/discomfort, unusual/easy bruising, prolonged bleeding from cuts or gums,  persistent/frequent nosebleeds, unusually heavy/prolonged menstrual flow, pink/dark urine, coughing up blood, vomit that is bloody or looks like coffee grounds, severe headache, dizziness/fainting, unusual or persistent tiredness/weakness, bloody/black/tarry stools, chest pain, shortness of breath, difficulty swallowing. Tell your doctor right away if any of these unlikely but serious side effects occur: persistent nausea/vomiting, severe stomach/abdominal pain, yellowing eyes/skin. This drug rarely has caused very serious (possibly fatal) problems if its effects lead to small blood clots (usually at the beginning of treatment). This can lead to severe skin/tissue damage that may require surgery or amputation if left untreated. Patients with certain blood conditions (protein C or S deficiency) may be at greater risk. Get medical help right away if any of these rare but serious side effects occur: painful/red/purplish patches on the skin (such as on the toe, breast, abdomen), change in the amount of urine, vision changes, confusion, slurred speech, weakness on one side of the body. A very serious allergic reaction to this drug is rare. However, get medical help right away if you notice any symptoms of a serious allergic reaction, including: rash, itching/swelling (especially of the face/tongue/throat), severe dizziness, trouble breathing. This is not a complete list of possible side effects. If you notice other effects not listed above, contact your doctor or pharmacist. In the [place] - Call your doctor for medical advice about side effects. You may report side effects to FDA at 4-589-PEP-5352. In [place] - Call your doctor for medical advice about side effects. You may report side effects to [facility] [place] at [phone].      PRECAUTIONS:  Before taking warfarin, tell your doctor or pharmacist if you are allergic to it; or if you have any other allergies. This product may contain inactive ingredients, which can cause  allergic reactions or other problems. Talk to your pharmacist for more details. Before using this medication, tell your doctor or pharmacist your medical history, especially of: blood disorders (such as anemia, hemophilia), bleeding problems (such as bleeding of the stomach/intestines, bleeding in the brain), blood vessel disorders (such as aneurysms), recent major injury/surgery, liver disease, alcohol use, mental/mood disorders (including memory problems), frequent falls/injuries. It is important that all your doctors and dentists know that you take warfarin. Before having surgery or any medical/dental procedures, tell your doctor or dentist that you are taking this medication and about all the products you use (including prescription drugs, nonprescription drugs, and herbal products). Avoid getting injections into the muscles. If you must have an injection into a muscle (for example, a flu shot), it should be given in the arm. This way, it will be easier to check for bleeding and/or apply pressure bandages. This medication may cause stomach bleeding. Daily use of alcohol while using this medicine will increase your risk for stomach bleeding and may also affect how this medication works. Limit or avoid alcoholic beverages. If you have not been eating well, if you have an illness or infection that causes fever, vomiting, or diarrhea for more than 2 days, or if you start using any antibiotic medications, contact your doctor or pharmacist immediately because these conditions can affect how warfarin works. This medication can cause heavy bleeding. To lower the chance of getting cut, bruised, or injured, use great caution with sharp objects like safety razors and nail cutters. Use an electric razor when shaving and a soft toothbrush when brushing your teeth. Avoid activities such as contact sports. If you fall or injure yourself, especially if you hit your head, call your doctor immediately. Your doctor may need to  "check you. The Food & Drug Administration has stated that generic warfarin products are interchangeable. However, consult your doctor or pharmacist before switching warfarin products. Be careful not to take more than one medication that contains warfarin unless specifically directed by the doctor or health care provider who is monitoring your warfarin treatment. Older adults may be at greater risk for bleeding while using this drug. This medication is not recommended for use during pregnancy because of serious (possibly fatal) harm to an unborn baby. Discuss the use of reliable forms of birth control with your doctor. If you become pregnant or think you may be pregnant, tell your doctor immediately. If you are planning pregnancy, discuss a plan for managing your condition with your doctor before you become pregnant. Your doctor may switch the type of medication you use during pregnancy. Very small amounts of this medication may pass into breast milk but is unlikely to harm a nursing infant. Consult your doctor before breast-feeding.      DRUG INTERACTIONS:  Drug interactions may change how your medications work or increase your risk for serious side effects. This document does not contain all possible drug interactions. Keep a list of all the products you use (including prescription/nonprescription drugs and herbal products) and share it with your doctor and pharmacist. Do not start, stop, or change the dosage of any medicines without your doctor's approval. Warfarin interacts with many prescription, nonprescription, vitamin, and herbal products. This includes medications that are applied to the skin or inside the vagina or rectum. The interactions with warfarin usually result in an increase or decrease in the \"blood-thinning\" (anticoagulant) effect. Your doctor or other health care professional should closely monitor you to prevent serious bleeding or clotting problems. While taking warfarin, it is very important " to tell your doctor or pharmacist of any changes in medications, vitamins, or herbal products that you are taking. Some products that may interact with this drug include: capecitabine, imatinib, mifepristone. Aspirin, aspirin-like drugs (salicylates), and nonsteroidal anti-inflammatory drugs (NSAIDs such as ibuprofen, naproxen, celecoxib) may have effects similar to warfarin. These drugs may increase the risk of bleeding problems if taken during treatment with warfarin. Carefully check all prescription/nonprescription product labels (including drugs applied to the skin such as pain-relieving creams) since the products may contain NSAIDs or salicylates. Talk to your doctor about using a different medication (such as acetaminophen) to treat pain/fever. Low-dose aspirin and related drugs (such as clopidogrel, ticlopidine) should be continued if prescribed by your doctor for specific medical reasons such as heart attack or stroke prevention. Consult your doctor or pharmacist for more details. Many herbal products interact with warfarin. Tell your doctor before taking any herbal products, especially bromelains, coenzyme Q10, cranberry, danshen, dong quai, fenugreek, garlic, ginkgo biloba, ginseng, and Holland's wort, among others. This medication may interfere with a certain laboratory test to measure theophylline levels, possibly causing false test results. Make sure laboratory personnel and all your doctors know you use this drug.      OVERDOSE:  If overdose is suspected, contact a poison control center or emergency room immediately. [place] residents can call the [place] National Poison Hotline at 1-982.563.5060. [place] residents can call a provincial poison control center. Symptoms of overdose may include: bloody/black/tarry stools, pink/dark urine, unusual/prolonged bleeding.      NOTES:  Do not share this medication with others. Laboratory and/or medical tests (such as INR, complete blood count) must be performed  periodically to monitor your progress or check for side effects. Consult your doctor for more details.      MISSED DOSE:  For the best possible benefit, do not miss any doses. If you do miss a dose and remember on the same day, take it as soon as you remember. If you remember on the next day, skip the missed dose and resume your usual dosing schedule. Do not double the dose to catch up because this could increase your risk for bleeding. Keep a record of missed doses to give to your doctor or pharmacist. Contact your doctor or pharmacist if you miss 2 or more doses in a row.      STORAGE:  Store at room temperature away from light and moisture. Do not store in the bathroom. Keep all medications away from children and pets. Do not flush medications down the toilet or pour them into a drain unless instructed to do so. Properly discard this product when it is  or no longer needed. Consult your pharmacist or local waste disposal company for more details about how to safely discard your product.      MEDICAL ALERT:  Your condition and medication can cause complications in a medical emergency. For information about enrolling in MedicAlert, call 1-171.802.7844 ([place]) or 1-355.471.7085 ([place]).      Information last revised 2010 Copyright(c) 2010 [employer]             Depression / Suicide Risk    As you are discharged from this [facility] facility, it is important to learn how to keep safe from harming yourself.    Recognize the warning signs:  · Abrupt changes in personality, positive or negative- including increase in energy   · Giving away possessions  · Change in eating patterns- significant weight changes-  positive or negative  · Change in sleeping patterns- unable to sleep or sleeping all the time   · Unwillingness or inability to communicate  · Depression  · Unusual sadness, discouragement and loneliness  · Talk of wanting to die  · Neglect of personal appearance   · Rebelliousness-  reckless behavior  · Withdrawal from people/activities they love  · Confusion- inability to concentrate     If you or a loved one observes any of these behaviors or has concerns about self-harm, here's what you can do:  · Talk about it- your feelings and reasons for harming yourself  · Remove any means that you might use to hurt yourself (examples: pills, rope, extension cords, firearm)  · Get professional help from the community (Mental Health, Substance Abuse, psychological counseling)  · Do not be alone:Call your Safe Contact- someone whom you trust who will be there for you.  · Call your local CRISIS HOTLINE 019-6870 or 808-870-0670  · Call your local Children's Mobile Crisis Response Team [place] (070) 610-7575 or www.Appia  · Call the toll free National Suicide Prevention Hotlines   · National Suicide Prevention Lifeline 455-601-KPHC (1340)  · National Hope Line Network 800-SUICIDE (272-1487)

## 2019-04-22 NOTE — DISCHARGE PLANNING
Received Choice form at 0875  Agency/Facility Name: [facility]  Referral sent per Choice form @ [phone]

## 2019-04-22 NOTE — CARE PLAN
Problem: Skin Integrity  Goal: Risk for impaired skin integrity will decrease  Patient turned every two hours, skin assessed at beginning of shift, heels floated and lines, cords and drains are free from the skin.     Problem: Post Op Day 4 CABG/Heart Valve Replacement  Goal: Optimal care of the Post Op CABG/Heart Valve replacement Post Op Day 4  Outcome: PROGRESSING AS EXPECTED

## 2019-04-23 ENCOUNTER — TELEPHONE (OUTPATIENT)
Dept: VASCULAR LAB | Facility: MEDICAL CENTER | Age: 54
End: 2019-04-23

## 2019-04-23 ENCOUNTER — HOSPITAL ENCOUNTER (OUTPATIENT)
Dept: LAB | Facility: MEDICAL CENTER | Age: 54
End: 2019-04-23
Attending: CLINICAL NURSE SPECIALIST
Payer: MEDICAID

## 2019-04-23 LAB
INR PPP: 1.77 (ref 0.87–1.13)
PROTHROMBIN TIME: 20.7 SEC (ref 12–14.6)

## 2019-04-23 PROCEDURE — 36415 COLL VENOUS BLD VENIPUNCTURE: CPT

## 2019-04-23 PROCEDURE — 85610 PROTHROMBIN TIME: CPT

## 2019-04-23 NOTE — TELEPHONE ENCOUNTER
Left VM for pt regarding referral to anticoagulation. Asked pt to please call back to establish care.     Payton Hinds, SilvaD

## 2019-04-24 ENCOUNTER — ANTICOAGULATION MONITORING (OUTPATIENT)
Dept: VASCULAR LAB | Facility: MEDICAL CENTER | Age: 54
End: 2019-04-24

## 2019-04-24 DIAGNOSIS — Z79.01 CHRONIC ANTICOAGULATION: ICD-10-CM

## 2019-04-24 DIAGNOSIS — I05.9 MITRAL VALVE DISEASE: ICD-10-CM

## 2019-04-24 DIAGNOSIS — I48.91 ATRIAL FIBRILLATION, UNSPECIFIED TYPE (HCC): ICD-10-CM

## 2019-04-24 NOTE — PROGRESS NOTES
Anticoagulation Summary  As of 2019    INR goal:   2.0-3.0   TTR:   --   INR used for dosin.77! (2019)   Warfarin maintenance plan:   5 mg (5 mg x 1) every day   Weekly warfarin total:   35 mg   Plan last modified:   Monae Chin, Pharmacy Intern (2019)   Next INR check:   2019   Target end date:       Indications    Atrial fibrillation (HCC) [I48.91]  Mitral valve disease [I05.9]             Anticoagulation Episode Summary     INR check location:       Preferred lab:       Send INR reminders to:       Comments:   MVR repair      Anticoagulation Care Providers     Provider Role Specialty Phone number    Kiley Perez, A.P.N. Referring Cardiac Surgery [phone]    [facility] Responsible  [phone]        Anticoagulation Patient Findings    PCP Shane Vanegas MD   Cardiologist Kiley Perez APRN   Pt hx Patient with atrial fibrillation and S/P mitral valve repair   CHADSVASC = at least 2 (CHF, HTN)  HAS-BLED = at least 1 (HTN)  Warfarin for at least 3 months - no DOAC due to cost     Pt is new to warfarin and new to [facility]. Discussed:  ·         Indication for warfarin therapy and INR goal range.  ·         Importance of monitoring and compliance.  ·         Monitoring parameters, signs and symptoms of bleeding or clotting.  ·         Warfarin therapy, side effects, potential DDIs, OTC medications  ·         ASA - Yes   ·         DDI - ASA, Amiodarone, levothyroxine, pravastatin, cephalexin   ·         Importance of diet consistency, ie vitamin K intake, supplements  ·         Lifestyle safety, ie smoking, ETOH, hobby safety, fall   safety/prevention  ·         Procedures for missed doses or suspected missed doses,  surgeries/procedures, travel, dental work, any medication changes     Pt denies any unusual s/s of bleeding, bruising, clotting or any changes to diet or medications.     INR is SUB - therapeutic yesterday (19)  Will continue to titrate pt's  warfarin dose. Pt's been taking 5 mg daily   Pt to take warfarin as follows  BOLUS with 7.5 mg of warfarin today, and then continue with warfarin 5 mg daily.     Follow-up INR tomorrow, April 25.    Patient is interested in tele-medicine in [place]. Appointment set for April 30 at 4:15 PM     Monae Chin, Pharmacy Intern

## 2019-04-25 ENCOUNTER — HOSPITAL ENCOUNTER (OUTPATIENT)
Dept: LAB | Facility: MEDICAL CENTER | Age: 54
End: 2019-04-25
Attending: NURSE PRACTITIONER
Payer: MEDICAID

## 2019-04-25 DIAGNOSIS — I05.9 MITRAL VALVE DISEASE: ICD-10-CM

## 2019-04-25 DIAGNOSIS — I48.91 ATRIAL FIBRILLATION, UNSPECIFIED TYPE (HCC): ICD-10-CM

## 2019-04-25 LAB
INR PPP: 1.91 (ref 0.87–1.13)
PROTHROMBIN TIME: 21.9 SEC (ref 12–14.6)

## 2019-04-25 PROCEDURE — 85610 PROTHROMBIN TIME: CPT

## 2019-04-25 PROCEDURE — 36415 COLL VENOUS BLD VENIPUNCTURE: CPT

## 2019-04-26 ENCOUNTER — ANTICOAGULATION MONITORING (OUTPATIENT)
Dept: VASCULAR LAB | Facility: MEDICAL CENTER | Age: 54
End: 2019-04-26

## 2019-04-26 DIAGNOSIS — I48.91 ATRIAL FIBRILLATION, UNSPECIFIED TYPE (HCC): ICD-10-CM

## 2019-04-26 DIAGNOSIS — I05.9 MITRAL VALVE DISEASE: ICD-10-CM

## 2019-04-26 NOTE — PROGRESS NOTES
Anticoagulation Summary  As of 2019    INR goal:   2.0-3.0   TTR:   --   INR used for dosin.91! (2019)   Warfarin maintenance plan:   7.5 mg (5 mg x 1.5) every Mon, Wed, Fri; 5 mg (5 mg x 1) all other days   Weekly warfarin total:   42.5 mg   Plan last modified:   Wilian Oliva PharmD (2019)   Next INR check:   2019   Target end date:       Indications    Atrial fibrillation (HCC) [I48.91]  Mitral valve disease [I05.9]             Anticoagulation Episode Summary     INR check location:       Preferred lab:       Send INR reminders to:       Comments:   MVR repair      Anticoagulation Care Providers     Provider Role Specialty Phone number    FARIDA Parikh Referring Cardiac Surgery [phone]    [facility] Anticoagulation Services Responsible  [phone]        Anticoagulation Patient Findings    HPI:  Ottoniel Davies, on anticoagulation therapy with warfarin for MV repair.   Changes to current medical/health status since last appt: none  Denies signs/symptoms of bleeding and/or thrombosis since the last appt.    Denies any interval changes to diet  Denies any interval changes to medications since last appt.   Denies any complications or cost restrictions with current therapy.     A/P   INR  SUB-therapeutic.   Begin increased warfarin regimen    Next INR in 4 days (per pt availability)    Given we have pt's warfarin dosing for >7 days this seems appropriate and should be able to accurately provide correct dosing until follow up date.     Wilian Oliva, PharmD   CC   Dr Michael Bloch

## 2019-04-26 NOTE — Clinical Note
I think this pt was accidentally not sent to you for review.  Pt is on ASA, but had recent mitral valve repair.  I'm assuming a Estimated LOT around 7/10/19?  Wilian

## 2019-04-27 ENCOUNTER — TELEPHONE (OUTPATIENT)
Dept: VASCULAR LAB | Facility: MEDICAL CENTER | Age: 54
End: 2019-04-27

## 2019-04-27 NOTE — TELEPHONE ENCOUNTER
Pending further recs from cards we will continue with indefinite low dose asa and 3 months of anticoag with warfarin s/p mvr.    Will defer all other cv care, aside from management of warfarin dosing, to cards.    Michael Bloch, MD  Anticoagulation Clinic    Cc:      T Costa

## 2019-04-30 ENCOUNTER — ANTICOAGULATION MONITORING (OUTPATIENT)
Dept: VASCULAR LAB | Facility: MEDICAL CENTER | Age: 54
End: 2019-04-30
Payer: MEDICAID

## 2019-04-30 ENCOUNTER — NON-PROVIDER VISIT (OUTPATIENT)
Dept: MEDICAL GROUP | Facility: PHYSICIAN GROUP | Age: 54
End: 2019-04-30
Payer: MEDICAID

## 2019-04-30 ENCOUNTER — APPOINTMENT (OUTPATIENT)
Dept: VASCULAR LAB | Facility: MEDICAL CENTER | Age: 54
End: 2019-04-30
Payer: MEDICAID

## 2019-04-30 VITALS
OXYGEN SATURATION: 98 % | RESPIRATION RATE: 20 BRPM | HEART RATE: 70 BPM | SYSTOLIC BLOOD PRESSURE: 142 MMHG | DIASTOLIC BLOOD PRESSURE: 80 MMHG

## 2019-04-30 DIAGNOSIS — I05.9 MITRAL VALVE DISEASE: ICD-10-CM

## 2019-04-30 DIAGNOSIS — D68.9 COAGULATION DISORDER (HCC): ICD-10-CM

## 2019-04-30 DIAGNOSIS — I48.20 CHRONIC ATRIAL FIBRILLATION (HCC): ICD-10-CM

## 2019-04-30 LAB
INR BLD: 2.6 (ref 0.9–1.2)
INR PPP: 2.6 (ref 2–3.5)
POC PROTIME: ABNORMAL

## 2019-04-30 PROCEDURE — 85610 PROTHROMBIN TIME: CPT | Performed by: PHYSICIAN ASSISTANT

## 2019-04-30 PROCEDURE — 99212 OFFICE O/P EST SF 10 MIN: CPT | Performed by: NURSE PRACTITIONER

## 2019-04-30 NOTE — PROGRESS NOTES
OP Anticoagulation Service Note    Date: 2019  Blood Pressure: 142/80  Pulse: 70  Respiration: 20    Anticoagulation Summary  As of 2019    INR goal:   2.0-3.0   TTR:   --   INR used for dosin.60 (2019)   Warfarin maintenance plan:   7.5 mg (5 mg x 1.5) every Mon, Wed, Fri; 5 mg (5 mg x 1) all other days   Weekly warfarin total:   42.5 mg   Plan last modified:   Wilian Oliva, PharmD (2019)   Next INR check:   2019   Target end date:   7/10/2019    Indications    Atrial fibrillation (HCC) [I48.91]  Mitral valve disease [I05.9]             Anticoagulation Episode Summary     INR check location:       Preferred lab:       Send INR reminders to:       Comments:   Mitral valve repair      Anticoagulation Care Providers     Provider Role Specialty Phone number    FARIDA Parikh Referring Cardiac Surgery [phone]    [facility] Responsible  [phone]        Anticoagulation Patient Findings  Patient Findings     Negatives:   Signs/symptoms of thrombosis, Signs/symptoms of bleeding      HPI: Pt comes in today for the management of his anticoagulation therapy. He was admitted to the hospital in April for Acute CHF, valvular heart disease, acute resp failure, pulmonary edema, at fib and DEBI. He had a mitral valve repair thus is on Anticoagulation therapy. He was started on Warfarin at 5 mg daily but since this dose has been increased to accommodate his lower INR. He remains on dual therapy with ASA 81 mg daily. His Chads Vas is 2. His duration is set for 3 months but should depend on if he remains in At fib post op. His last EKG was positive for At fib. Pending cardiology recommendation will treat for 3 months.     THIS VISIT CONDUCTED WITH [employer] UTIL[employer]ING SECURE AND ENCRYPTED VIDEOCONFERENCING EQUIPMENT  ROS:    Pulm: Denies SOB, chest pain.    Card: Denies syncope, edema, palpitations.    Extremities: Denies redness, pain.    PE:    Pulm:  No SOB, even and unlabored.    Card: Normal rate and rhythm.   Extremities: No redness, or edema.     INR  is-therapeutic.    Denies signs/symptoms of bleeding and/or thrombosis.    Denies changes to diet or medications.   Follow up appt in 1 weeks     Plan:  Continue current medication regimen.       Medication: Warfarin (Coumadin)     Sunday Monday Tuesday Wednesday Thursday Friday Saturday      5 mg    7.5 mg    5 mg    7.5 mg    5 mg    7.5 mg    5 mg      1 tab(s)    1.5 tab(s)    1 tab(s)    1.5 tab(s)    1 tab(s)    1.5 tab(s)  1 tab(s)     Next Appointment: Tuesday, May 10 @ 11:15 at [facility] of the [facility].     Review all of your home medications and newly ordered medications with your doctor and / or pharmacist. Follow medication instructions as directed by your doctor and / or pharmacist. Please keep your medication list with you and share with your physician. Update the information when medications are discontinued, doses are changed, or new medications (including over-the-counter products) are added; and carry medication information at all times in the event of emergency situations.      For questions, please contact Outpatient Anticoagulation Service [phone].         Patient is on a high risk medication and therefore requires close monitoring and follow up.     CHEST guidelines recommend frequent INR monitoring at regular intervals (a few days up to a max of 12 weeks) to ensure they are on the proper dose of warfarin and not having any complications from therapy.  INRs can dramatically change over a short time period due to diet, medications, and medical conditions.   The patient instructed to go to the ER for falls with a head injury,  blood in urine or stool or any bleeding that last longer than 20 min.   The patient instructed to go to the ER for falls with a head injury,  blood in urine or stool or any bleeding that last longer than 20  min.   Education given to patient regarding instructions for taking warfarin, food/drug interactions, drug/drug interactions and signs/symptoms of bleeding or thrombosis. Discussed anticoagulation in detail including but not limited to  dosing, INR levels and safety, when to call, when to go to the ER, and what to do if he misses a dose. Discussed smoking cessation.        JANELLE Velez.

## 2019-05-02 ENCOUNTER — TELEPHONE (OUTPATIENT)
Dept: CARDIOLOGY | Facility: MEDICAL CENTER | Age: 54
End: 2019-05-02

## 2019-05-02 NOTE — TELEPHONE ENCOUNTER
Spoke with patient about upcoming appointment with [facility]-. Patient stated he has not seen a cardiologist outside of the hospital before. Patient confirmed appointment.

## 2019-05-02 NOTE — DOCUMENTATION QUERY
[facility]                                                                       Query Response Note      PATIENT:               JOSSELYN RAMOS  ACCT #:                  [id number]  MRN:                     [id number]  :                      1965  ADMIT DATE:       2019 10:20 PM  DISCH DATE:        2019 3:46 PM  RESPONDING  PROVIDER #:        [id number]           QUERY TEXT:    Congestive Heart Failure is documented in the Medical Record. Please document the type and acuity (includes probable or suspected).     NOTE:  If an appropriate response is not listed below, please respond with a new note.    The patient's Clinical Indicators include:  Acute CHF is documented in H&P, operative report, progress note and discharge summary.    PREOPERATIVE DIAGNOSES:  Acute congestive heart failure, valvular heart   disease (New York Heart Association class 3-4),     MICAH on 19 showed an EF of 45%. Patient post-operative course has been stable.    Query created by: Maribel Parikh on 2019 7:49 PM    RESPONSE TEXT:    Acute Systolic heart failure          Electronically signed by:  RENAN POMPA MD 2019 7:09 AM

## 2019-05-10 ENCOUNTER — ANTICOAGULATION VISIT (OUTPATIENT)
Dept: VASCULAR LAB | Facility: MEDICAL CENTER | Age: 54
End: 2019-05-10
Attending: INTERNAL MEDICINE
Payer: MEDICAID

## 2019-05-10 ENCOUNTER — OFFICE VISIT (OUTPATIENT)
Dept: CARDIOLOGY | Facility: MEDICAL CENTER | Age: 54
End: 2019-05-10
Payer: MEDICAID

## 2019-05-10 ENCOUNTER — OFFICE VISIT (OUTPATIENT)
Dept: SURGERY | Facility: MEDICAL CENTER | Age: 54
End: 2019-05-10
Payer: MEDICAID

## 2019-05-10 VITALS
DIASTOLIC BLOOD PRESSURE: 90 MMHG | HEIGHT: 67 IN | SYSTOLIC BLOOD PRESSURE: 160 MMHG | WEIGHT: 204 LBS | OXYGEN SATURATION: 95 % | HEART RATE: 70 BPM | BODY MASS INDEX: 32.02 KG/M2

## 2019-05-10 VITALS
OXYGEN SATURATION: 96 % | DIASTOLIC BLOOD PRESSURE: 100 MMHG | HEART RATE: 70 BPM | SYSTOLIC BLOOD PRESSURE: 140 MMHG | RESPIRATION RATE: 16 BRPM

## 2019-05-10 DIAGNOSIS — I48.91 ATRIAL FIBRILLATION, UNSPECIFIED TYPE (HCC): ICD-10-CM

## 2019-05-10 DIAGNOSIS — I05.9 MITRAL VALVE DISEASE: ICD-10-CM

## 2019-05-10 DIAGNOSIS — Z98.890 S/P MVR (MITRAL VALVE REPAIR): ICD-10-CM

## 2019-05-10 LAB
EKG IMPRESSION: NORMAL
INR PPP: 5.8 (ref 2–3.5)

## 2019-05-10 PROCEDURE — 99214 OFFICE O/P EST MOD 30 MIN: CPT | Performed by: INTERNAL MEDICINE

## 2019-05-10 PROCEDURE — 93000 ELECTROCARDIOGRAM COMPLETE: CPT | Performed by: INTERNAL MEDICINE

## 2019-05-10 PROCEDURE — 99212 OFFICE O/P EST SF 10 MIN: CPT

## 2019-05-10 PROCEDURE — 85610 PROTHROMBIN TIME: CPT

## 2019-05-10 RX ORDER — CARVEDILOL 12.5 MG/1
12.5 TABLET ORAL 2 TIMES DAILY
Qty: 30 TAB | Refills: 11 | Status: SHIPPED | OUTPATIENT
Start: 2019-05-10 | End: 2019-10-16

## 2019-05-10 RX ORDER — OXYCODONE HYDROCHLORIDE 10 MG/1
TABLET ORAL
Qty: 20 TAB | Refills: 0 | Status: SHIPPED | OUTPATIENT
Start: 2019-05-10 | End: 2019-05-24

## 2019-05-10 RX ORDER — FUROSEMIDE 20 MG/1
20 TABLET ORAL
Qty: 30 TAB | Refills: 3 | Status: SHIPPED | OUTPATIENT
Start: 2019-05-10 | End: 2019-06-19 | Stop reason: SDUPTHER
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
## 2019-05-10 NOTE — PROGRESS NOTES
CHIEF COMPLAINT: Post-op visit     PROCEDURE: MR repair by Marnie    HPI: s/p total knee and MV repair.  Patient had total knee.  Several hours after surgery developed severe SOB.  Transferred to [facility] from [place] and found to have severe MR.    /100   Pulse 70   Resp 16   SpO2 96%     PHYSICAL EXAM:  CARDIAC: S1, S2, no murmurs, gallops, rub  PULMONARY: CTA bilaterally  SKIN: no edema  INCISIONS: Sternum stable, wounds well healed    PLAN: DC from clinic.  Refill narcs one time.  Patient told only refill.      Continue coumadin for 3 months or per your cardiologists recommendations.    Overall, we are very pleased with the patient’s progress and we have instructed the patient to follow-up with us in the future should they have any concerns related to there surgery, otherwise, we will see the patient on a prn basis. The patient will continue to follow-up with you and there primary care physician. The patient has been informed that any further prescription refills should be done through there primary care physician and/or cardiologist.  They acknowledged understanding.  Thank you for allowing us to participate in the care of this very pleasant patient and please let us know if there is any way we may be of further assistance.

## 2019-05-10 NOTE — PROGRESS NOTES
Anticoagulation Summary  As of 5/10/2019    INR goal:   2.0-3.0   TTR:   0.0 % (6 d)   INR used for dosin.80! (5/10/2019)   Warfarin maintenance plan:   2.5 mg (5 mg x 0.5) every Mon; 5 mg (5 mg x 1) all other days   Weekly warfarin total:   32.5 mg   Plan last modified:   Stanislaw He PharmD (5/10/2019)   Next INR check:   2019   Target end date:   7/10/2019    Indications    Atrial fibrillation (HCC) [I48.91]  Mitral valve disease [I05.9]             Anticoagulation Episode Summary     INR check location:       Preferred lab:       Send INR reminders to:       Comments:   Mitral valve repair      Anticoagulation Care Providers     Provider Role Specialty Phone number    FARIDA Parikh Referring Cardiac Surgery [phone]    [facility] Responsible  [phone]        Anticoagulation Patient Findings      HPI:  Ottoniel Medelues seen in clinic today for follow up on anticoagulation therapy in the presence of Afib. Denies any changes to current medical/health status since last appointment. Denies any medication or diet changes. No current symptoms of bleeding or thrombosis reported.    A/P:   INR is supra-therapeutic at 5.8.   Patient was started on Amiodarone about 2 weeks ago, due to DDI with warfarin,dosing will need to be decreased.  Patient already took dose today hence HOLD warfarin for 2 days then begin ~25% decreased regimen.  INR will be monitored closely and doses will continue to be decreased as needed.    Patient lives in [place] and will like to be monitored from an outside lab, standing order provided to urvashi today    Follow up appointment in 4 day(s).    Next Appointment: Tuesday, 2019     Silva Izquierdo.D

## 2019-05-14 LAB — INR BLD: 5.8 (ref 0.9–1.2)

## 2019-05-15 ENCOUNTER — HOSPITAL ENCOUNTER (OUTPATIENT)
Dept: LAB | Facility: MEDICAL CENTER | Age: 54
End: 2019-05-15
Attending: INTERNAL MEDICINE
Payer: MEDICAID

## 2019-05-15 DIAGNOSIS — I05.9 MITRAL VALVE DISEASE: ICD-10-CM

## 2019-05-15 LAB
INR PPP: 1.88 (ref 0.87–1.13)
PROTHROMBIN TIME: 21.7 SEC (ref 12–14.6)

## 2019-05-15 PROCEDURE — 85610 PROTHROMBIN TIME: CPT

## 2019-05-15 PROCEDURE — 36415 COLL VENOUS BLD VENIPUNCTURE: CPT

## 2019-05-16 ENCOUNTER — ANTICOAGULATION MONITORING (OUTPATIENT)
Dept: VASCULAR LAB | Facility: MEDICAL CENTER | Age: 54
End: 2019-05-16

## 2019-05-16 DIAGNOSIS — I05.9 MITRAL VALVE DISEASE: ICD-10-CM

## 2019-05-16 RX ORDER — WARFARIN SODIUM 5 MG/1
2.5-5 TABLET ORAL DAILY
Qty: 30 TAB | Refills: 1 | Status: SHIPPED | OUTPATIENT
Start: 2019-05-16 | End: 2019-09-25 | Stop reason: SDUPTHER

## 2019-05-16 NOTE — PROGRESS NOTES
Anticoagulation Summary  As of 2019    INR goal:   2.0-3.0   TTR:   12.1 % (1.6 wk)   INR used for dosin.88! (5/15/2019)   Warfarin maintenance plan:   2.5 mg (5 mg x 0.5) every Mon; 5 mg (5 mg x 1) all other days   Weekly warfarin total:   32.5 mg   Plan last modified:   Silva PoolD (5/10/2019)   Next INR check:   2019   Target end date:   7/10/2019    Indications    Atrial fibrillation (HCC) [I48.91]  Mitral valve disease [I05.9]             Anticoagulation Episode Summary     INR check location:       Preferred lab:       Send INR reminders to:       Comments:   Mitral valve repair      Anticoagulation Care Providers     Provider Role Specialty Phone number    FARIDA Parikh Referring Cardiac Surgery [phone]    [facility] Responsible  [phone]        Anticoagulation Patient Findings    HPI:  Ottoniel Davies, on anticoagulation therapy with warfarin for AF.   Changes to current medical/health status since last appt: none  Denies signs/symptoms of bleeding and/or thrombosis since the last appt.    Denies any interval changes to diet  Denies any interval changes to medications since last appt.   Denies any complications or cost restrictions with current therapy.     A/P   INR  SUB-therapeutic.   Likely INR will continue to increase as pt recently held doses.     Next INR in 4 days.     Wilian Oliva, PharmD

## 2019-05-20 ENCOUNTER — HOSPITAL ENCOUNTER (OUTPATIENT)
Dept: LAB | Facility: MEDICAL CENTER | Age: 54
End: 2019-05-20
Attending: INTERNAL MEDICINE
Payer: MEDICAID

## 2019-05-20 DIAGNOSIS — I05.9 MITRAL VALVE DISEASE: ICD-10-CM

## 2019-05-20 LAB
INR PPP: 1.78 (ref 0.87–1.13)
PROTHROMBIN TIME: 20.8 SEC (ref 12–14.6)

## 2019-05-20 PROCEDURE — 36415 COLL VENOUS BLD VENIPUNCTURE: CPT

## 2019-05-20 PROCEDURE — 85610 PROTHROMBIN TIME: CPT

## 2019-05-21 ENCOUNTER — ANTICOAGULATION MONITORING (OUTPATIENT)
Dept: VASCULAR LAB | Facility: MEDICAL CENTER | Age: 54
End: 2019-05-21

## 2019-05-21 DIAGNOSIS — I05.9 MITRAL VALVE DISEASE: ICD-10-CM

## 2019-05-21 NOTE — PROGRESS NOTES
OP Telephone Anticoagulation Service Note    Date: 2019      Anticoagulation Summary  As of 2019    INR goal:   2.0-3.0   TTR:   8.5 % (2.3 wk)   INR used for dosin.78! (2019)   Warfarin maintenance plan:   2.5 mg (5 mg x 0.5) every Mon; 5 mg (5 mg x 1) all other days   Weekly warfarin total:   32.5 mg   Plan last modified:   Silva PoolD (5/10/2019)   Next INR check:   2019   Target end date:   7/10/2019    Indications    Atrial fibrillation (HCC) [I48.91]  Mitral valve disease [I05.9]             Anticoagulation Episode Summary     INR check location:       Preferred lab:       Send INR reminders to:       Comments:   Mitral valve repair      Anticoagulation Care Providers     Provider Role Specialty Phone number    FARIDA Parikh Referring Cardiac Surgery [phone]    [facility] Responsible  [phone]        Anticoagulation Patient Findings        Plan: Spoke with patient on the phone. Patient is sub therapeutic today. Confirmed dosing. No missed tablets in the last week. Patient denies any changes in medications or diet. Patient denies any signs or symptoms of bleeding or clotting. Instructed patient to call clinic if any unusual bleeding or bruising occurs. Will have pt take 7.5 mg, then 5 mg then recheck INR  Will follow-up with patient in 2 day(s).              Payton Hinds, PharmD

## 2019-05-24 ENCOUNTER — HOSPITAL ENCOUNTER (OUTPATIENT)
Dept: LAB | Facility: MEDICAL CENTER | Age: 54
End: 2019-05-24
Attending: INTERNAL MEDICINE
Payer: MEDICAID

## 2019-05-24 DIAGNOSIS — I05.9 MITRAL VALVE DISEASE: ICD-10-CM

## 2019-05-24 LAB
INR PPP: 2.2 (ref 0.87–1.13)
PROTHROMBIN TIME: 24.5 SEC (ref 12–14.6)

## 2019-05-24 PROCEDURE — 36415 COLL VENOUS BLD VENIPUNCTURE: CPT

## 2019-05-24 PROCEDURE — 85610 PROTHROMBIN TIME: CPT

## 2019-05-28 ENCOUNTER — ANTICOAGULATION MONITORING (OUTPATIENT)
Dept: VASCULAR LAB | Facility: MEDICAL CENTER | Age: 54
End: 2019-05-28

## 2019-05-28 DIAGNOSIS — I05.9 MITRAL VALVE DISEASE: ICD-10-CM

## 2019-05-28 DIAGNOSIS — I48.91 ATRIAL FIBRILLATION, UNSPECIFIED TYPE (HCC): ICD-10-CM

## 2019-05-28 NOTE — PROGRESS NOTES
Anticoagulation Summary  As of 2019    INR goal:   2.0-3.0   TTR:   16.1 % (2.9 wk)   INR used for dosin.20 (2019)   Warfarin maintenance plan:   2.5 mg (5 mg x 0.5) every Mon; 5 mg (5 mg x 1) all other days   Weekly warfarin total:   32.5 mg   No change documented:   Chelo Quintanilla Med Ass't   Plan last modified:   Stanislaw He PharmD (5/10/2019)   Next INR check:   2019   Target end date:   7/10/2019    Indications    Atrial fibrillation (HCC) [I48.91]  Mitral valve disease [I05.9]             Anticoagulation Episode Summary     INR check location:       Preferred lab:       Send INR reminders to:       Comments:   Mitral valve repair      Anticoagulation Care Providers     Provider Role Specialty Phone number    FARIDA Parikh Referring Cardiac Surgery [phone]    [facility] Responsible  [phone]        Anticoagulation Patient Findings  Patient Findings     Negatives:   Signs/symptoms of thrombosis, Signs/symptoms of bleeding, Laboratory test error suspected, Change in health, Change in alcohol use, Change in activity, Upcoming invasive procedure, Emergency department visit, Upcoming dental procedure, Missed doses, Extra doses, Change in medications, Change in diet/appetite, Hospital admission, Bruising, Other complaints        Spoke with patient to report a therapeutic INR.  Pt instructed to continue with current warfarin dosing regimen. Pt denies any s/s of bleeding, bruising, clotting or any changes to diet or medication.  Will follow up in 1 week.    Chelo Quintanilla Med Ass't  I have reviewed and concur with the above plan     Damon Redmond, SilvaD

## 2019-05-31 ENCOUNTER — HOSPITAL ENCOUNTER (OUTPATIENT)
Dept: LAB | Facility: MEDICAL CENTER | Age: 54
End: 2019-05-31
Attending: INTERNAL MEDICINE
Payer: MEDICAID

## 2019-05-31 DIAGNOSIS — I05.9 MITRAL VALVE DISEASE: ICD-10-CM

## 2019-05-31 LAB
INR PPP: 2.46 (ref 0.87–1.13)
PROTHROMBIN TIME: 27.5 SEC (ref 12–14.6)

## 2019-05-31 PROCEDURE — 85610 PROTHROMBIN TIME: CPT

## 2019-05-31 PROCEDURE — 36415 COLL VENOUS BLD VENIPUNCTURE: CPT

## 2019-06-03 ENCOUNTER — ANTICOAGULATION MONITORING (OUTPATIENT)
Dept: MEDICAL GROUP | Facility: PHYSICIAN GROUP | Age: 54
End: 2019-06-03

## 2019-06-03 DIAGNOSIS — I48.91 ATRIAL FIBRILLATION, UNSPECIFIED TYPE (HCC): ICD-10-CM

## 2019-06-03 DIAGNOSIS — I05.9 MITRAL VALVE DISEASE: ICD-10-CM

## 2019-06-03 NOTE — PROGRESS NOTES
Anticoagulation Summary  As of 6/3/2019    INR goal:   2.0-3.0   TTR:   37.7 % (3.9 wk)   INR used for dosin.46 (2019)   Warfarin maintenance plan:   2.5 mg (5 mg x 0.5) every Mon; 5 mg (5 mg x 1) all other days   Weekly warfarin total:   32.5 mg   No change documented:   Kaden Carrasquillo Ass't   Plan last modified:   Stanislaw He PharmD (5/10/2019)   Next INR check:   6/10/2019   Target end date:   7/10/2019    Indications    Atrial fibrillation (HCC) [I48.91]  Mitral valve disease [I05.9]             Anticoagulation Episode Summary     INR check location:       Preferred lab:       Send INR reminders to:       Comments:   Mitral valve repair      Anticoagulation Care Providers     Provider Role Specialty Phone number    FARIDA Parikh Referring Cardiac Surgery [phone]    [facility] Responsible  [phone]        Anticoagulation Patient Findings  Patient Findings     Negatives:   Signs/symptoms of thrombosis, Signs/symptoms of bleeding, Laboratory test error suspected, Change in health, Change in alcohol use, Change in activity, Upcoming invasive procedure, Emergency department visit, Upcoming dental procedure, Missed doses, Extra doses, Change in medications, Change in diet/appetite, Hospital admission, Bruising, Other complaints      Spoke with patient to report a therapeutic INR.  Pt instructed to continue with current warfarin dosing regimen. Pt denies any s/s of bleeding, bruising, clotting or any changes to diet or medication.  Will follow up in 1 weeks.  Kaden Carrasquillo Ass't          I have reviewed and concur with the above plan     Damon Redmond, SilvaD

## 2019-06-07 ENCOUNTER — HOSPITAL ENCOUNTER (OUTPATIENT)
Dept: LAB | Facility: MEDICAL CENTER | Age: 54
End: 2019-06-07
Attending: INTERNAL MEDICINE
Payer: MEDICAID

## 2019-06-07 DIAGNOSIS — I05.9 MITRAL VALVE DISEASE: ICD-10-CM

## 2019-06-07 LAB
INR PPP: 2.86 (ref 0.87–1.13)
PROTHROMBIN TIME: 31.1 SEC (ref 12–14.6)

## 2019-06-07 PROCEDURE — 36415 COLL VENOUS BLD VENIPUNCTURE: CPT

## 2019-06-07 PROCEDURE — 85610 PROTHROMBIN TIME: CPT

## 2019-06-10 ENCOUNTER — ANTICOAGULATION MONITORING (OUTPATIENT)
Dept: VASCULAR LAB | Facility: MEDICAL CENTER | Age: 54
End: 2019-06-10

## 2019-06-10 DIAGNOSIS — I48.91 ATRIAL FIBRILLATION, UNSPECIFIED TYPE (HCC): ICD-10-CM

## 2019-06-10 DIAGNOSIS — I05.9 MITRAL VALVE DISEASE: ICD-10-CM

## 2019-06-10 NOTE — PROGRESS NOTES
Anticoagulation Summary  As of 6/10/2019    INR goal:   2.0-3.0   TTR:   50.2 % (1.1 mo)   INR used for dosin.86 (2019)   Warfarin maintenance plan:   2.5 mg (5 mg x 0.5) every Mon; 5 mg (5 mg x 1) all other days   Weekly warfarin total:   32.5 mg   No change documented:   Chelo Quintanilla Med Ass't   Plan last modified:   Stanislaw He PharmD (5/10/2019)   Next INR check:   2019   Target end date:   7/10/2019    Indications    Atrial fibrillation (HCC) [I48.91]  Mitral valve disease [I05.9]             Anticoagulation Episode Summary     INR check location:       Preferred lab:       Send INR reminders to:       Comments:   Mitral valve repair      Anticoagulation Care Providers     Provider Role Specialty Phone number    FARIDA Parikh Referring Cardiac Surgery [phone]    [facility] Responsible  [phone]        Anticoagulation Patient Findings  Patient Findings     Negatives:   Signs/symptoms of thrombosis, Signs/symptoms of bleeding, Laboratory test error suspected, Change in health, Change in alcohol use, Change in activity, Upcoming invasive procedure, Emergency department visit, Upcoming dental procedure, Missed doses, Extra doses, Change in medications, Change in diet/appetite, Hospital admission, Bruising, Other complaints        Spoke with patient to report a therapeutic INR.  Pt instructed to continue with current warfarin dosing regimen. Pt denies any s/s of bleeding, bruising, clotting or any changes to diet or medication.  Will follow up in 1 week.    Chelo Quintanilla Med Ass't    I have reviewed and concur with the above plan     Damon Redmond, SilvaD

## 2019-06-14 ENCOUNTER — HOSPITAL ENCOUNTER (OUTPATIENT)
Dept: LAB | Facility: MEDICAL CENTER | Age: 54
End: 2019-06-14
Attending: INTERNAL MEDICINE
Payer: MEDICAID

## 2019-06-14 DIAGNOSIS — I05.9 MITRAL VALVE DISEASE: ICD-10-CM

## 2019-06-14 LAB
INR PPP: 2.37 (ref 0.87–1.13)
PROTHROMBIN TIME: 26.7 SEC (ref 12–14.6)

## 2019-06-14 PROCEDURE — 85610 PROTHROMBIN TIME: CPT

## 2019-06-14 PROCEDURE — 36415 COLL VENOUS BLD VENIPUNCTURE: CPT

## 2019-06-16 ENCOUNTER — ANTICOAGULATION MONITORING (OUTPATIENT)
Dept: VASCULAR LAB | Facility: MEDICAL CENTER | Age: 54
End: 2019-06-16

## 2019-06-16 DIAGNOSIS — I05.9 MITRAL VALVE DISEASE: ICD-10-CM

## 2019-06-16 DIAGNOSIS — I48.91 ATRIAL FIBRILLATION, UNSPECIFIED TYPE (HCC): ICD-10-CM

## 2019-06-17 ENCOUNTER — TELEPHONE (OUTPATIENT)
Dept: CARDIOLOGY | Facility: MEDICAL CENTER | Age: 54
End: 2019-06-17

## 2019-06-17 DIAGNOSIS — Z98.890 S/P RIGHT KNEE SURGERY: ICD-10-CM

## 2019-06-17 DIAGNOSIS — M79.89 SWELLING OF LIMB, RIGHT: ICD-10-CM

## 2019-06-17 NOTE — TELEPHONE ENCOUNTER
"pt requesting to increase furosemide dosage   Received: Today   Message Contents   Tati PAGETanner Buckner, Kaden Ass't  Erinn Funk R.N.   Phone Number: [phone]             [place]     Pt is requesting to increase the furosemide dosage, he states the current dosage is not working for him.   He can be reached at [phone]      Pt states unilateral edema remains in the right leg post knee surgery, left leg remains uneffected.     The furosemide 20mg PRN prescribed at last office visit 5/10 helped with edema initially. he was taking furosemide 5 days a week with 2 days off. Lately has been taking furosemide daily and petal edema continues to worsen to the point that its painful to walk. Says \"feels like my toes are going to explode\". Was wearing the compression stockings and doesn't think its helping.     Saw orthopedic surgeon recently for follow up and acknowledged the swelling was present but was told to contact his cardiology who is prescribing the lasix. Ortho also said constant swelling is uncommon after surgery and his swelling is constant. Also says that pharmacist thinks that the swelling is \"heart related\".     Denies any other symptoms of fluid overload, SOB, MORENO and says he would feel fine if not for the right leg swelling.     Would like to try increasing lasix. Currently not on potassium    To Dr. Kilpatrick     "

## 2019-06-17 NOTE — PROGRESS NOTES
Anticoagulation Summary  As of 2019    INR goal:   2.0-3.0   TTR:   58.6 % (1.4 mo)   INR used for dosin.37 (2019)   Warfarin maintenance plan:   2.5 mg (5 mg x 0.5) every Mon; 5 mg (5 mg x 1) all other days   Weekly warfarin total:   32.5 mg   No change documented:   Kaden Carrasquillo Ass't   Plan last modified:   Stanislaw He PharmD (5/10/2019)   Next INR check:   2019   Target end date:   7/10/2019    Indications    Atrial fibrillation (HCC) [I48.91]  Mitral valve disease [I05.9]             Anticoagulation Episode Summary     INR check location:       Preferred lab:       Send INR reminders to:       Comments:   Mitral valve repair      Anticoagulation Care Providers     Provider Role Specialty Phone number    FARIDA Parikh Referring Cardiac Surgery [phone]    [facility] Responsible  [phone]        Anticoagulation Patient Findings  Patient Findings     Negatives:   Signs/symptoms of thrombosis, Signs/symptoms of bleeding, Laboratory test error suspected, Change in health, Change in alcohol use, Change in activity, Upcoming invasive procedure, Emergency department visit, Upcoming dental procedure, Missed doses, Extra doses, Change in medications, Change in diet/appetite, Hospital admission, Bruising, Other complaints      Left voicemail message to report 2.37 therapeutic INR of 2.0-3.0.  Patient to continue with current warfarin dosing regimen. Requested pt contact the clinic for any change to diet or medication, and to report any signs or symptoms of bleeding, bruising or clotting.  Pt to follow up in 1 weeks.  Kaden Carrasquillo Ass't        I have reviewed and concur with the above plan     Damon Redmond, SilvaD

## 2019-06-19 RX ORDER — FUROSEMIDE 40 MG/1
40 TABLET ORAL DAILY
Qty: 30 TAB | Refills: 5 | Status: SHIPPED | OUTPATIENT
Start: 2019-06-19 | End: 2019-11-15

## 2019-06-19 NOTE — TELEPHONE ENCOUNTER
S/w Dr. Kilpatrick over phone at cath lab. Pt can get repeat BMP in 1 week.     Pt notified. He will get at [facility] in [place] during next weeks INR check.

## 2019-06-21 ENCOUNTER — HOSPITAL ENCOUNTER (OUTPATIENT)
Dept: LAB | Facility: MEDICAL CENTER | Age: 54
End: 2019-06-21
Attending: INTERNAL MEDICINE
Payer: MEDICAID

## 2019-06-21 DIAGNOSIS — I05.9 MITRAL VALVE DISEASE: ICD-10-CM

## 2019-06-21 LAB
INR PPP: 2.29 (ref 0.87–1.13)
PROTHROMBIN TIME: 26 SEC (ref 12–14.6)

## 2019-06-21 PROCEDURE — 36415 COLL VENOUS BLD VENIPUNCTURE: CPT

## 2019-06-21 PROCEDURE — 85610 PROTHROMBIN TIME: CPT

## 2019-06-24 ENCOUNTER — ANTICOAGULATION MONITORING (OUTPATIENT)
Dept: VASCULAR LAB | Facility: MEDICAL CENTER | Age: 54
End: 2019-06-24

## 2019-06-24 DIAGNOSIS — I48.91 ATRIAL FIBRILLATION, UNSPECIFIED TYPE (HCC): ICD-10-CM

## 2019-06-24 DIAGNOSIS — I05.9 MITRAL VALVE DISEASE: ICD-10-CM

## 2019-06-24 NOTE — PROGRESS NOTES
Anticoagulation Summary  As of 2019    INR goal:   2.0-3.0   TTR:   64.7 % (1.6 mo)   INR used for dosin.29 (2019)   Warfarin maintenance plan:   2.5 mg (5 mg x 0.5) every Mon; 5 mg (5 mg x 1) all other days   Weekly warfarin total:   32.5 mg   No change documented:   Kaden Carrasquillo Ass't   Plan last modified:   Stanislaw He PharmD (5/10/2019)   Next INR check:   2019   Target end date:   7/10/2019    Indications    Atrial fibrillation (HCC) [I48.91]  Mitral valve disease [I05.9]             Anticoagulation Episode Summary     INR check location:       Preferred lab:       Send INR reminders to:       Comments:   Mitral valve repair      Anticoagulation Care Providers     Provider Role Specialty Phone number    FARIDA Parikh Referring Cardiac Surgery [phone]    [facility] Anticoagulation Services Responsible  [phone]        Anticoagulation Patient Findings  Patient Findings     Negatives:   Signs/symptoms of thrombosis, Signs/symptoms of bleeding, Laboratory test error suspected, Change in health, Change in alcohol use, Change in activity, Upcoming invasive procedure, Emergency department visit, Upcoming dental procedure, Missed doses, Extra doses, Change in medications, Change in diet/appetite, Hospital admission, Bruising, Other complaints      Spoke with patient to report a therapeutic INR.  Pt instructed to continue with current warfarin dosing regimen. Pt denies any s/s of bleeding, bruising, clotting or any changes to diet or medication.  Will follow up in 2 weeks.  Kaden Carrasquillo Ass't       I have reviewed and agree with the plan  on  2019    Payton Hinds, Pharm D

## 2019-06-25 ENCOUNTER — HOSPITAL ENCOUNTER (OUTPATIENT)
Dept: RADIOLOGY | Facility: MEDICAL CENTER | Age: 54
End: 2019-06-25
Attending: INTERNAL MEDICINE
Payer: MEDICAID

## 2019-06-25 DIAGNOSIS — I48.91 ATRIAL FIBRILLATION, UNSPECIFIED TYPE (HCC): ICD-10-CM

## 2019-06-25 DIAGNOSIS — M79.89 SWELLING OF LIMB, RIGHT: ICD-10-CM

## 2019-06-25 DIAGNOSIS — Z98.890 S/P RIGHT KNEE SURGERY: ICD-10-CM

## 2019-06-25 PROCEDURE — 93971 EXTREMITY STUDY: CPT | Mod: RT

## 2019-06-25 PROCEDURE — 93971 EXTREMITY STUDY: CPT | Mod: 26 | Performed by: INTERNAL MEDICINE

## 2019-06-25 RX ORDER — AMIODARONE HYDROCHLORIDE 200 MG/1
TABLET ORAL
Qty: 30 TAB | Refills: 6 | Status: SHIPPED | OUTPATIENT
Start: 2019-06-25 | End: 2019-07-26

## 2019-07-05 ENCOUNTER — HOSPITAL ENCOUNTER (OUTPATIENT)
Dept: LAB | Facility: MEDICAL CENTER | Age: 54
End: 2019-07-05
Attending: INTERNAL MEDICINE
Payer: MEDICAID

## 2019-07-05 ENCOUNTER — ANTICOAGULATION MONITORING (OUTPATIENT)
Dept: VASCULAR LAB | Facility: MEDICAL CENTER | Age: 54
End: 2019-07-05

## 2019-07-05 DIAGNOSIS — M79.89 SWELLING OF LIMB, RIGHT: ICD-10-CM

## 2019-07-05 DIAGNOSIS — I48.91 ATRIAL FIBRILLATION, UNSPECIFIED TYPE (HCC): ICD-10-CM

## 2019-07-05 DIAGNOSIS — I05.9 MITRAL VALVE DISEASE: ICD-10-CM

## 2019-07-05 LAB
ANION GAP SERPL CALC-SCNC: 10 MMOL/L (ref 0–11.9)
BUN SERPL-MCNC: 19 MG/DL (ref 8–22)
CALCIUM SERPL-MCNC: 9.1 MG/DL (ref 8.5–10.5)
CHLORIDE SERPL-SCNC: 108 MMOL/L (ref 96–112)
CO2 SERPL-SCNC: 25 MMOL/L (ref 20–33)
CREAT SERPL-MCNC: 1.11 MG/DL (ref 0.5–1.4)
GLUCOSE SERPL-MCNC: 225 MG/DL (ref 65–99)
INR PPP: 2.42 (ref 0.87–1.13)
POTASSIUM SERPL-SCNC: 3.5 MMOL/L (ref 3.6–5.5)
PROTHROMBIN TIME: 27.2 SEC (ref 12–14.6)
SODIUM SERPL-SCNC: 143 MMOL/L (ref 135–145)

## 2019-07-05 PROCEDURE — 36415 COLL VENOUS BLD VENIPUNCTURE: CPT

## 2019-07-05 PROCEDURE — 80048 BASIC METABOLIC PNL TOTAL CA: CPT

## 2019-07-05 PROCEDURE — 85610 PROTHROMBIN TIME: CPT

## 2019-07-09 NOTE — TELEPHONE ENCOUNTER
BMP results show K-3.5.     S/w pt who reports right leg swelling has decrease and is now minimal with increase in furosemide to 40mg daily. Pain in limb has decreased as well but is still present after activity. Pt continues to be mostly sedentary due to the pain. Again recommended follow up with orthopedic but patient says they are not helpful.    Confirmed with pt that he has continued to take potassium 40meq daily as prescribed by PCP, Dr. Vanegas, with the 40mg lasix daily. Explained to pt would call back with any medication changes per Dr. Kilpatrick. Also discussed glucose results of 225. Pt agrees to discuss with his PCP.     To Dr. Kilpatrick

## 2019-07-11 RX ORDER — POTASSIUM CHLORIDE 20 MEQ/1
40 TABLET, EXTENDED RELEASE ORAL DAILY
Qty: 60 TAB | COMMUNITY
Start: 2019-07-11 | End: 2019-11-15 | Stop reason: SDUPTHER

## 2019-07-11 RX ORDER — POTASSIUM CHLORIDE 20 MEQ/1
40 TABLET, EXTENDED RELEASE ORAL DAILY
Status: SHIPPED | DISCHARGE
Start: 2019-07-11 | End: 2019-07-11 | Stop reason: SDUPTHER

## 2019-07-12 NOTE — TELEPHONE ENCOUNTER
Per conversation with Dr. Kilpatrick received verbal orders for pt to try and decrease lasix use as able and increase dietary potassium. No increase in potassium dose.     Called pt and left detailed message on voicemail regarding the above. Advised to call back with any questions or concerns.

## 2019-07-16 ENCOUNTER — TELEPHONE (OUTPATIENT)
Dept: CARDIOLOGY | Facility: MEDICAL CENTER | Age: 54
End: 2019-07-16

## 2019-07-16 NOTE — TELEPHONE ENCOUNTER
Dinana Rangel, R.N.   Phone Number: [phone]             AK/sury     Pt calling to report episode of SOB, chest tightness which occurred middle of last nite.  Pt states he began to panic, sat up and it became better.  This happens every now and then, normally at nite while laying down.     Please call pt after 1pm today, [phone]      Spoke with patient regarding above. He states he has been out of the hospital for about two months now and it has happened approximately four times since then where in the middle of the night he will wake up with extreme SOB. He does wear a CPAP for DEBI see Dr. Coughlin, pulmonologist for management of it. Pt is not sure if it is cardiac related or anxiety / panic attacks.     To AK-- please advise with any recommendations.

## 2019-07-18 NOTE — TELEPHONE ENCOUNTER
Message   Received: Today   Previous Messages      ----- Message -----   From: Alex Kilpatrick M.D.   Sent: 7/16/2019   6:25 PM   To: ELIZABETH Perla not sure what exactly going on by description. Advise to come in for appointment with APRN.     informed pt of MD recommendations. Discussed his symptoms and possible reasons for what was going on. Pt has had recent open heart surgery so explained that sleep disturbances have been documented after heart surgery but reiterated that he should do any recommended work-up to make sure its not something more serious. He continues to have LE swelling as well.     Pt scheduled to see Dr. Kilpatrick next Friday, 7/26.

## 2019-07-19 ENCOUNTER — HOSPITAL ENCOUNTER (OUTPATIENT)
Dept: LAB | Facility: MEDICAL CENTER | Age: 54
End: 2019-07-19
Attending: INTERNAL MEDICINE
Payer: MEDICAID

## 2019-07-19 DIAGNOSIS — I05.9 MITRAL VALVE DISEASE: ICD-10-CM

## 2019-07-19 LAB
INR PPP: 2.41 (ref 0.87–1.13)
PROTHROMBIN TIME: 27 SEC (ref 12–14.6)

## 2019-07-19 PROCEDURE — 85610 PROTHROMBIN TIME: CPT

## 2019-07-19 PROCEDURE — 36415 COLL VENOUS BLD VENIPUNCTURE: CPT

## 2019-07-22 ENCOUNTER — ANTICOAGULATION MONITORING (OUTPATIENT)
Dept: MEDICAL GROUP | Facility: PHYSICIAN GROUP | Age: 54
End: 2019-07-22

## 2019-07-22 DIAGNOSIS — I05.9 MITRAL VALVE DISEASE: ICD-10-CM

## 2019-07-22 DIAGNOSIS — I48.91 ATRIAL FIBRILLATION, UNSPECIFIED TYPE (HCC): ICD-10-CM

## 2019-07-22 NOTE — PROGRESS NOTES
OP Telephone Anticoagulation Service Note    Date: 2019      Anticoagulation Summary  As of 2019    INR goal:   2.0-3.0   TTR:   77.6 % (2.5 mo)   INR used for dosin.41 (2019)   Warfarin maintenance plan:   2.5 mg (5 mg x 0.5) every Mon; 5 mg (5 mg x 1) all other days   Weekly warfarin total:   32.5 mg   Plan last modified:   Stanislaw He PharmD (5/10/2019)   Next INR check:   2019   Target end date:   7/10/2019    Indications    Atrial fibrillation (HCC) [I48.91]  Mitral valve disease [I05.9]             Anticoagulation Episode Summary     INR check location:       Preferred lab:       Send INR reminders to:       Comments:   Mitral valve repair      Anticoagulation Care Providers     Provider Role Specialty Phone number    FARIDA Parikh Referring Cardiac Surgery [phone]    [facility] Antico[facility] Responsible  [phone]        Anticoagulation Patient Findings        Plan: Spoke with patient on the phone. Patient is  therapeutic today. Confirmed dosing. No missed tablets in the last week. Patient denies any changes in medications or diet. Patient denies any signs or symptoms of bleeding or clotting. Instructed patient to call clinic if any unusual bleeding or bruising occurs. Will continue dosing as outlined. Will follow-up with patient in 4 week(s). Pt has f/u with cardiology 19 to discuss LOT        Payton Hinds PharmD

## 2019-07-26 ENCOUNTER — OFFICE VISIT (OUTPATIENT)
Dept: CARDIOLOGY | Facility: MEDICAL CENTER | Age: 54
End: 2019-07-26
Payer: MEDICAID

## 2019-07-26 VITALS
WEIGHT: 207.78 LBS | BODY MASS INDEX: 32.61 KG/M2 | DIASTOLIC BLOOD PRESSURE: 98 MMHG | SYSTOLIC BLOOD PRESSURE: 154 MMHG | HEART RATE: 67 BPM | OXYGEN SATURATION: 98 % | HEIGHT: 67 IN

## 2019-07-26 DIAGNOSIS — R60.0 LOCALIZED EDEMA: ICD-10-CM

## 2019-07-26 PROCEDURE — 99214 OFFICE O/P EST MOD 30 MIN: CPT | Performed by: INTERNAL MEDICINE

## 2019-07-26 RX ORDER — CHLORTHALIDONE 25 MG/1
25 TABLET ORAL DAILY
Qty: 30 TAB | Refills: 11 | Status: SHIPPED | OUTPATIENT
Start: 2019-07-26 | End: 2019-11-15
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
## 2019-07-29 ENCOUNTER — TELEPHONE (OUTPATIENT)
Dept: CARDIOLOGY | Facility: MEDICAL CENTER | Age: 54
End: 2019-07-29

## 2019-07-29 DIAGNOSIS — R60.0 LOCALIZED EDEMA: ICD-10-CM

## 2019-07-29 NOTE — TELEPHONE ENCOUNTER
----- Message from Alex Kilpatrick M.D. sent at 7/26/2019  5:45 PM PDT -----  Can un send a coumadin clinic a note that we are stopping amiodarone and patient needs to continue coumadin.

## 2019-08-07 ENCOUNTER — TELEPHONE (OUTPATIENT)
Dept: CARDIOLOGY | Facility: MEDICAL CENTER | Age: 54
End: 2019-08-07

## 2019-08-08 NOTE — TELEPHONE ENCOUNTER
EC-ECHOCARDIOGRAM COMPLETE W/O CONT   Order: [id number]   Status:  Final result   Visible to patient:  Yes (Myron) Dx:  Localized edema          Last Resulted: 07/31/19           ----- Message from Alex Kilpatrick M.D. sent at 8/1/2019 11:57 AM PDT -----  I think 10/18 follow up is ok. It still might be related to valve.    ----- Message -----  From: Erinn Funk R.N.  Sent: 7/31/2019   4:19 PM PDT  To: Alex Kilpatrick M.D.    To Dr. Kilpatrick: follow up scheduled 10/18, reduced EF    ---------------------------------------------------------------    S/w pt and discussed echo results and recommendations per Dr. Kilpatrick to continue with follow up appointment as scheduled 10/18. Answered all questions from pt as able.     Also discussed his leg pain and swelling which he reports has almost completely resolved. He is still on both diuretics, furosemide and chlorthalidone daily. MD has recommended that he decrease lasix and take only as needed but pt is scared his swelling will return. Discussed decreased slowly, and PRN usage. Also, pt encouraged to drink more water. Pt only drinking 24 ounces a day and has had constipation.     He was advised to call with any concerns or changes in sx.

## 2019-08-19 ENCOUNTER — HOSPITAL ENCOUNTER (OUTPATIENT)
Dept: LAB | Facility: MEDICAL CENTER | Age: 54
End: 2019-08-19
Attending: INTERNAL MEDICINE
Payer: MEDICAID

## 2019-08-19 DIAGNOSIS — I05.9 MITRAL VALVE DISEASE: ICD-10-CM

## 2019-08-19 LAB
INR PPP: 2.55 (ref 0.87–1.13)
PROTHROMBIN TIME: 28.3 SEC (ref 12–14.6)

## 2019-08-19 PROCEDURE — 85610 PROTHROMBIN TIME: CPT

## 2019-08-19 PROCEDURE — 36415 COLL VENOUS BLD VENIPUNCTURE: CPT

## 2019-08-20 ENCOUNTER — ANTICOAGULATION MONITORING (OUTPATIENT)
Dept: VASCULAR LAB | Facility: MEDICAL CENTER | Age: 54
End: 2019-08-20

## 2019-08-20 DIAGNOSIS — I05.9 MITRAL VALVE DISEASE: ICD-10-CM

## 2019-08-20 DIAGNOSIS — I48.91 ATRIAL FIBRILLATION, UNSPECIFIED TYPE (HCC): ICD-10-CM

## 2019-08-20 NOTE — PROGRESS NOTES
Anticoagulation Summary  As of 2019    INR goal:   2.0-3.0   TTR:   84.0 % (3.6 mo)   INR used for dosin.55 (2019)   Warfarin maintenance plan:   2.5 mg (5 mg x 0.5) every Mon; 5 mg (5 mg x 1) all other days   Weekly warfarin total:   32.5 mg   No change documented:   Chelo Quintanilla, Med Ass't   Plan last modified:   Stanislaw He PharmD (5/10/2019)   Next INR check:   2019   Target end date:   Indefinite    Indications    Atrial fibrillation (HCC) [I48.91]  Mitral valve disease [I05.9]             Anticoagulation Episode Summary     INR check location:       Preferred lab:       Send INR reminders to:       Comments:   Mitral valve repair - indefinite anticoag per 19 note from Dr Lawson      Anticoagulation Care Providers     Provider Role Specialty Phone number    Kiley Perez, A.P.N. Referring Cardiac Surgery [phone]    [facility] Anticoagulation Services Responsible  [phone]        Anticoagulation Patient Findings  Patient Findings     Negatives:   Signs/symptoms of thrombosis, Signs/symptoms of bleeding, Laboratory test error suspected, Change in health, Change in alcohol use, Change in activity, Upcoming invasive procedure, Emergency department visit, Upcoming dental procedure, Missed doses, Extra doses, Change in medications, Change in diet/appetite, Hospital admission, Bruising, Other complaints        Left voicemail message to report therapeutic INR of 2.55.  Patient to continue with current warfarin dosing regimen. Requested pt contact the clinic for any change to diet or medication, and to report any signs or symptoms of bleeding, bruising or clotting.  Pt to follow up in 4 weeks.    Chelo COVARRUBIASRaydelta, Med Ass't    I have reviewed and am in agreement with the above stated plan on 19.  Nicholas Vences, PharmD, BCACP

## 2019-08-29 ENCOUNTER — TELEPHONE (OUTPATIENT)
Dept: CARDIOLOGY | Facility: MEDICAL CENTER | Age: 54
End: 2019-08-29

## 2019-08-29 NOTE — TELEPHONE ENCOUNTER
AK    Pt had labs ordered by PCP, and per those results Dr. Vanegas told him to stop taking chlorthalidone. He told pt to call our office. Please call pt for further details at [phone].

## 2019-08-30 NOTE — TELEPHONE ENCOUNTER
Lab results from Dr. Vanegas in media from 8/28. Of note: K- 3.2, Cr- 2.01, BUN- 31    Per pt Dr. Vanegas instructed him to stop   Chlorthalidone and continue Furosemide every other day (pt was previously instructed to take as needed). Dr. Vanegas increased potassium 20meq to 4 tablets daily.    Pt is not happy about stopping chlorthalidone because he is worried about LE edema in left leg returning. Discussed kidney function and following Dr. Vanegas's recommendations. Pt advised to increase hydration. He works outside in the sun.     To Dr. Kilpatrick

## 2019-08-30 NOTE — TELEPHONE ENCOUNTER
Erinn Funk R.N.  Ranjana Guaman, [facility] Ass't   Caller: Unspecified (Yesterday,  3:29 PM)             Could you please call him and let him know Dr. Kilpatrick's message? Thank you    Previous Messages      ----- Message -----   From: Alex Kilpatrick M.D.   Sent: 8/29/2019   8:09 PM PDT   To: ELIZABETH Choi is exactly right. His edema is not heart failure.   Diuretics making his kidneys worse.   Agree with the plan. I recommend even stopping lasix until BMP improves.        Spoke with pt and advised AK recommendations as stated. Pt understood and was instructed that if any symptoms get worse to call our office.

## 2019-09-05 ENCOUNTER — TELEPHONE (OUTPATIENT)
Dept: CARDIOLOGY | Facility: MEDICAL CENTER | Age: 54
End: 2019-09-05

## 2019-09-05 DIAGNOSIS — M79.661 PAIN IN RIGHT LOWER LEG: ICD-10-CM

## 2019-09-05 DIAGNOSIS — R60.0 EDEMA OF RIGHT LOWER EXTREMITY: ICD-10-CM

## 2019-09-05 DIAGNOSIS — M79.89 SWELLING OF LIMB, RIGHT: ICD-10-CM

## 2019-09-05 DIAGNOSIS — R60.0 LOCALIZED EDEMA: ICD-10-CM

## 2019-09-05 DIAGNOSIS — Z98.890 S/P RIGHT KNEE SURGERY: ICD-10-CM

## 2019-09-05 NOTE — TELEPHONE ENCOUNTER
AK/Bhupinder    Pt calling to discuss ongoing swelling in right foot, and kidney issues related to lasix.  Please call Ottoniel .

## 2019-09-05 NOTE — TELEPHONE ENCOUNTER
S/w Dr. Kilpatrick in clinic today as this is an ongoing issue. Per Dr. Kilpatrick, at this point referral to lymphedema clinic is most appropriate as he does not believe that the swelling is from heart failure and diuretics are damaging his kidneys and does not recommend any more diuretics.     S/w pt who confirms that swelling has returned. He is frustrated about this and is worried his leg will become more painful as it gets more swollen. Explained Dr. Kilpatrick recommendations and referral to lymphedema therapy. Explained dangers of permanent kidney damage it remains on diuretics. Pt agrees to proceed with referral.   Explained to pt if problems persist he needs to discuss treatment with PCP or orthopedic surgery that performed right knee surgery. Pt states understanding.

## 2019-09-16 ENCOUNTER — HOSPITAL ENCOUNTER (OUTPATIENT)
Dept: LAB | Facility: MEDICAL CENTER | Age: 54
End: 2019-09-16
Attending: INTERNAL MEDICINE
Payer: MEDICAID

## 2019-09-16 DIAGNOSIS — I05.9 MITRAL VALVE DISEASE: ICD-10-CM

## 2019-09-16 LAB
INR PPP: 2.51 (ref 0.87–1.13)
PROTHROMBIN TIME: 28 SEC (ref 12–14.6)

## 2019-09-16 PROCEDURE — 85610 PROTHROMBIN TIME: CPT

## 2019-09-16 PROCEDURE — 36415 COLL VENOUS BLD VENIPUNCTURE: CPT

## 2019-09-17 ENCOUNTER — ANTICOAGULATION MONITORING (OUTPATIENT)
Dept: MEDICAL GROUP | Facility: PHYSICIAN GROUP | Age: 54
End: 2019-09-17

## 2019-09-17 DIAGNOSIS — I05.9 MITRAL VALVE DISEASE: ICD-10-CM

## 2019-09-17 DIAGNOSIS — I48.91 ATRIAL FIBRILLATION, UNSPECIFIED TYPE (HCC): ICD-10-CM

## 2019-09-17 NOTE — PROGRESS NOTES
Anticoagulation Summary  As of 2019    INR goal:   2.0-3.0   TTR:   87.3 % (4.5 mo)   INR used for dosin.51 (2019)   Warfarin maintenance plan:   2.5 mg (5 mg x 0.5) every Mon; 5 mg (5 mg x 1) all other days   Weekly warfarin total:   32.5 mg   No change documented:   Chelo CARY'Milton, Med Ass't   Plan last modified:   Silva PoolD (5/10/2019)   Next INR check:   10/14/2019   Target end date:   Indefinite    Indications    Atrial fibrillation (HCC) [I48.91]  Mitral valve disease [I05.9]             Anticoagulation Episode Summary     INR check location:       Preferred lab:       Send INR reminders to:       Comments:   Mitral valve repair - indefinite anticoag per 19 note from Dr Lawson      Anticoagulation Care Providers     Provider Role Specialty Phone number    Kiley Perez, A.P.N. Referring Cardiac Surgery [phone]    [facility] Anticoagulation Services Responsible  [phone]        Anticoagulation Patient Findings  Patient Findings     Negatives:   Signs/symptoms of thrombosis, Signs/symptoms of bleeding, Laboratory test error suspected, Change in health, Change in alcohol use, Change in activity, Upcoming invasive procedure, Emergency department visit, Upcoming dental procedure, Missed doses, Extra doses, Change in medications, Change in diet/appetite, Hospital admission, Bruising, Other complaints        Left voicemail message to report therapeutic INR of 2.5.  Patient to continue with current warfarin dosing regimen. Requested pt contact the clinic for any change to diet or medication, and to report any signs or symptoms of bleeding, bruising or clotting.  Pt to follow up in 4 weeks.    Chelo CARY'Raydelta, Med Ass't     I have reviewed and concur with the above plan on 2019.  Jennifer Schneider, Clinical Pharmacist, CDE, CACP

## 2019-09-30 ENCOUNTER — TELEPHONE (OUTPATIENT)
Dept: CARDIOLOGY | Facility: PHYSICIAN GROUP | Age: 54
End: 2019-09-30

## 2019-09-30 RX ORDER — WARFARIN SODIUM 5 MG/1
TABLET ORAL
Qty: 30 TAB | Refills: 5 | Status: SHIPPED | OUTPATIENT
Start: 2019-09-30 | End: 2022-08-02
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
## 2019-10-15 ENCOUNTER — HOSPITAL ENCOUNTER (OUTPATIENT)
Dept: LAB | Facility: MEDICAL CENTER | Age: 54
End: 2019-10-15
Attending: INTERNAL MEDICINE
Payer: MEDICAID

## 2019-10-15 DIAGNOSIS — I05.9 MITRAL VALVE DISEASE: ICD-10-CM

## 2019-10-15 LAB
INR PPP: 2.25 (ref 0.87–1.13)
PROTHROMBIN TIME: 25.7 SEC (ref 12–14.6)

## 2019-10-15 PROCEDURE — 36415 COLL VENOUS BLD VENIPUNCTURE: CPT

## 2019-10-15 PROCEDURE — 85610 PROTHROMBIN TIME: CPT

## 2019-10-16 ENCOUNTER — ANTICOAGULATION MONITORING (OUTPATIENT)
Dept: VASCULAR LAB | Facility: MEDICAL CENTER | Age: 54
End: 2019-10-16

## 2019-10-16 DIAGNOSIS — I05.9 MITRAL VALVE DISEASE: ICD-10-CM

## 2019-10-16 DIAGNOSIS — I48.91 ATRIAL FIBRILLATION, UNSPECIFIED TYPE (HCC): ICD-10-CM

## 2019-10-16 RX ORDER — CARVEDILOL 25 MG/1
25 TABLET ORAL 2 TIMES DAILY WITH MEALS
COMMUNITY
End: 2019-11-15

## 2019-10-16 RX ORDER — SPIRONOLACTONE 25 MG/1
25 TABLET ORAL DAILY
COMMUNITY
End: 2019-11-15

## 2019-10-16 RX ORDER — HYDRALAZINE HYDROCHLORIDE 50 MG/1
50 TABLET, FILM COATED ORAL 3 TIMES DAILY
COMMUNITY
End: 2019-11-15 | Stop reason: SDUPTHER

## 2019-10-16 NOTE — PROGRESS NOTES
Anticoagulation Summary  As of 10/16/2019    INR goal:   2.0-3.0   TTR:   89.6 % (5.5 mo)   INR used for dosin.25 (10/15/2019)   Warfarin maintenance plan:   2.5 mg (5 mg x 0.5) every Mon; 5 mg (5 mg x 1) all other days   Weekly warfarin total:   32.5 mg   Plan last modified:   Stanislaw He PharmD (5/10/2019)   Next INR check:   2019   Target end date:   Indefinite    Indications    Atrial fibrillation (HCC) [I48.91]  Mitral valve disease [I05.9]             Anticoagulation Episode Summary     INR check location:       Preferred lab:       Send INR reminders to:       Comments:   Mitral valve repair - indefinite anticoag per 19 note from Dr Lawson      Anticoagulation Care Providers     Provider Role Specialty Phone number    FARIDA Parikh Referring Cardiac Surgery [phone]    [facility] Responsible  [phone]        Anticoagulation Patient Findings    Spoke with patient to report a therapeutic INR.    Pt instructed to continue with current warfarin dosing regimen, confirms dosing.   Pt denies any s/s of bleeding, bruising, clotting or any changes to diet or medication.    Will follow up in 6 week(s).     Dorothy Roach, SilvaD

## 2019-11-07 ENCOUNTER — HOSPITAL ENCOUNTER (OUTPATIENT)
Dept: LAB | Facility: MEDICAL CENTER | Age: 54
End: 2019-11-07
Attending: INTERNAL MEDICINE
Payer: MEDICAID

## 2019-11-07 DIAGNOSIS — I05.9 MITRAL VALVE DISEASE: ICD-10-CM

## 2019-11-07 LAB
INR PPP: 2.49 (ref 0.87–1.13)
PROTHROMBIN TIME: 27.8 SEC (ref 12–14.6)

## 2019-11-07 PROCEDURE — 36415 COLL VENOUS BLD VENIPUNCTURE: CPT

## 2019-11-07 PROCEDURE — 85610 PROTHROMBIN TIME: CPT

## 2019-11-08 ENCOUNTER — ANTICOAGULATION MONITORING (OUTPATIENT)
Dept: VASCULAR LAB | Facility: MEDICAL CENTER | Age: 54
End: 2019-11-08

## 2019-11-08 DIAGNOSIS — I05.9 MITRAL VALVE DISEASE: ICD-10-CM

## 2019-11-09 NOTE — PROGRESS NOTES
Anticoagulation Summary  As of 2019    INR goal:   2.0-3.0   TTR:   90.8 % (6.2 mo)   INR used for dosin.49 (2019)   Warfarin maintenance plan:   2.5 mg (5 mg x 0.5) every Mon; 5 mg (5 mg x 1) all other days   Weekly warfarin total:   32.5 mg   Plan last modified:   Stanislaw He PharmD (5/10/2019)   Next INR check:   1/3/2020   Target end date:   Indefinite    Indications    Atrial fibrillation (HCC) [I48.91]  Mitral valve disease [I05.9]             Anticoagulation Episode Summary     INR check location:       Preferred lab:       Send INR reminders to:       Comments:   Mitral valve repair - indefinite anticoag per 19 note from Dr Lawson      Anticoagulation Care Providers     Provider Role Specialty Phone number    FARIDA Parikh Referring Cardiac Surgery [phone]    [facility] Responsible  [phone]        Anticoagulation Patient Findings      Spoke with patient to report a therapeutic INR.    Pt instructed to continue with current warfarin dosing regimen, confirms dosing.   Pt denies any s/s of bleeding, bruising, clotting or any changes to diet or medication.    Will follow up in 8 week(s).     Yolis Saha, PharmD

## 2019-11-15 ENCOUNTER — OFFICE VISIT (OUTPATIENT)
Dept: CARDIOLOGY | Facility: PHYSICIAN GROUP | Age: 54
End: 2019-11-15
Payer: MEDICAID

## 2019-11-15 ENCOUNTER — HOSPITAL ENCOUNTER (OUTPATIENT)
Dept: LAB | Facility: MEDICAL CENTER | Age: 54
End: 2019-11-15
Attending: INTERNAL MEDICINE
Payer: MEDICAID

## 2019-11-15 VITALS
HEART RATE: 74 BPM | SYSTOLIC BLOOD PRESSURE: 132 MMHG | HEIGHT: 67 IN | WEIGHT: 215 LBS | OXYGEN SATURATION: 96 % | BODY MASS INDEX: 33.74 KG/M2 | DIASTOLIC BLOOD PRESSURE: 58 MMHG

## 2019-11-15 DIAGNOSIS — Z98.890 HISTORY OF MITRAL VALVE REPAIR: Chronic | ICD-10-CM

## 2019-11-15 DIAGNOSIS — R60.0 LOCALIZED EDEMA: ICD-10-CM

## 2019-11-15 DIAGNOSIS — I10 ESSENTIAL HYPERTENSION: ICD-10-CM

## 2019-11-15 DIAGNOSIS — Z79.01 CHRONIC ANTICOAGULATION: ICD-10-CM

## 2019-11-15 DIAGNOSIS — I48.0 PAROXYSMAL ATRIAL FIBRILLATION (HCC): Chronic | ICD-10-CM

## 2019-11-15 DIAGNOSIS — I42.8 OTHER CARDIOMYOPATHY (HCC): ICD-10-CM

## 2019-11-15 DIAGNOSIS — E78.49 OTHER HYPERLIPIDEMIA: ICD-10-CM

## 2019-11-15 DIAGNOSIS — I05.9 MITRAL VALVE DISEASE: ICD-10-CM

## 2019-11-15 LAB
ANION GAP SERPL CALC-SCNC: 13 MMOL/L (ref 0–11.9)
BUN SERPL-MCNC: 33 MG/DL (ref 8–22)
CALCIUM SERPL-MCNC: 8.5 MG/DL (ref 8.5–10.5)
CHLORIDE SERPL-SCNC: 104 MMOL/L (ref 96–112)
CO2 SERPL-SCNC: 25 MMOL/L (ref 20–33)
CREAT SERPL-MCNC: 1.43 MG/DL (ref 0.5–1.4)
GLUCOSE SERPL-MCNC: 141 MG/DL (ref 65–99)
POTASSIUM SERPL-SCNC: 3.7 MMOL/L (ref 3.6–5.5)
SODIUM SERPL-SCNC: 142 MMOL/L (ref 135–145)

## 2019-11-15 PROCEDURE — 99214 OFFICE O/P EST MOD 30 MIN: CPT | Performed by: INTERNAL MEDICINE

## 2019-11-15 PROCEDURE — 36415 COLL VENOUS BLD VENIPUNCTURE: CPT

## 2019-11-15 PROCEDURE — 80048 BASIC METABOLIC PNL TOTAL CA: CPT

## 2019-11-15 RX ORDER — TORSEMIDE 20 MG/1
20 TABLET ORAL DAILY
Qty: 180 TAB | Refills: 3 | Status: SHIPPED | OUTPATIENT
Start: 2019-11-15 | End: 2020-01-23

## 2019-11-15 RX ORDER — ISOSORBIDE MONONITRATE 60 MG/1
60 TABLET, EXTENDED RELEASE ORAL EVERY MORNING
Qty: 90 TAB | Refills: 3 | Status: SHIPPED | OUTPATIENT
Start: 2019-11-15 | End: 2022-12-05

## 2019-11-15 RX ORDER — POTASSIUM CHLORIDE 20 MEQ/1
20 TABLET, EXTENDED RELEASE ORAL 2 TIMES DAILY
Qty: 180 TAB | Refills: 3 | Status: SHIPPED | OUTPATIENT
Start: 2019-11-15 | End: 2022-02-22

## 2019-11-15 RX ORDER — CARVEDILOL 25 MG/1
25 TABLET ORAL 2 TIMES DAILY WITH MEALS
Qty: 180 TAB | Refills: 3 | Status: SHIPPED | OUTPATIENT
Start: 2019-11-15 | End: 2020-11-10

## 2019-11-15 RX ORDER — CARVEDILOL 12.5 MG/1
12.5 TABLET ORAL 2 TIMES DAILY WITH MEALS
COMMUNITY
End: 2019-11-15 | Stop reason: SDUPTHER

## 2019-11-15 RX ORDER — HYDRALAZINE HYDROCHLORIDE 100 MG/1
50 TABLET, FILM COATED ORAL 3 TIMES DAILY
Qty: 270 TAB | Refills: 3 | Status: SHIPPED | OUTPATIENT
Start: 2019-11-15 | End: 2022-04-14 | Stop reason: SDUPTHER

## 2019-11-15 ASSESSMENT — ENCOUNTER SYMPTOMS
WEAKNESS: 0
COUGH: 0
FEVER: 0
PALPITATIONS: 0
ABDOMINAL PAIN: 0
CHILLS: 0
NAUSEA: 0
FALLS: 0
CLAUDICATION: 0
FOCAL WEAKNESS: 0
DIZZINESS: 0
SHORTNESS OF BREATH: 0
BLURRED VISION: 0
BRUISES/BLEEDS EASILY: 0
SORE THROAT: 0
PND: 0

## 2019-11-15 NOTE — PATIENT INSTRUCTIONS
You should take daily torsemide and potassium    Your weight should be less than 205 at home with minimal clothes and after emptying the bladder, check this each morning     If it is over 205 you need to take an extra furosemide and potassium with lunch    If it is over 210 at home please call us    Sodium should be equal to calories avoid foods that are more double sodium to calories    Please work on increasing these foods in your diet to increase your potassium levels    Highest potassium foods  Dried figs, molasses, seaweed    High potassium foods  Dried fruits (dates, prunes), nuts, avocados, bran cereal, wheat germ, lima beans    Potassium-rich food  Vegetables-spinach, tomatoes, broccoli, winter squash, beets, carrots, cauliflower, potatoes    Fruits-bananas, cantaloupe, kiwis, oranges, mangoes    Meats-ground beef, steak, pork, veal, lamb    *Adapted: Mayra COURTNEY. Hypokalemia. [place] Journal of Medicine 1998; 339:451.      Please look into the following diets and incorporate them into your diet    LOW SALT DIET   KEEP YOUR SODIUM EQUAL TO CALORIES AND NO MORE THAN DOUBLE THE CALORIES FOR A LOW SALT DIET    FOR TREATMENT OR PREVENTION OF CORONARY ARTERY DISEASE    [facility] - [facility]    Dr. Macias's Program for Reversing Heart Disease - Hemal Ordonez's Cardiologist    [facility]    Dr Nino - [place] (book and documentary)      FOR TREATMENT OF BLOOD PRESSURE  DASH DIET - American Heart Association for treatment of HYPERTENSION    FOR TREATMENT OF BAD CHOLESTEROL/FATS  REDUCE PROCESSED SUGAR AS MUCH AS POSSIBLE  INCREASE WHOLE GRAINS/VEGETABLES    Lowering total cholesterol and LDL (bad) cholesterol:  - Eat leaner cuts of meat, or eliminate altogether if possible red meat, and frequently substitute fish or chicken.  - Limit saturated fat to no more than 7-10% of total calories no more than 10 g per day is recommended. Some sources of  saturated fat include butter, animal fats, hydrogenated vegetable fats and oils, many desserts, whole milk dairy products.  - Replaced saturated fats with polyunsaturated fats and monounsaturated fats. Foods high in monounsaturated fat include nuts, although well, canola oil, avocados, and olives.  - Limit trans fat (processed foods) and replaced with fresh fruits and vegetables  - Recommend nonfat dairy products  - Increase substantially the amount of soluble fiber intake (legumes such as beans, fruit, whole grains).  - Consider nutritional supplements: plant sterile spreads such as Benecol, fish oil,  flaxseed oil, omega-3 acids capsules 1000 mg twice a day, or viscous fiber such as Metamucil  - Attain ideal weight and regular exercise (at least 30 minutes per day of walking)    Lowering triglycerides:  - Reduce intake of simple sugar: Desserts, candy, pastries, honey, sodas, sugared cereals, yogurt, Gatorade, sports bars, canned fruit, smoothies, fruit juice, coffee drinks  - Reduced intake of refined starches: Refined Pasta  - Reduce or abstain from alcohol  - Increase omega-3 fatty acids: [place], Trout, Mackerel, [place]ring, [place] tuna and supplements  - Attain ideal weight and regular exercise (at least 30 minutes per day of walking)      Elevating HDL (good) cholesterol:  - Increase physical activity  - Seasoned foods with garlic and onions  - Increase omega-3 fatty acids and supplements as listed above  - Incorporating appropriate amounts of monounsaturated fats such as nuts, olive oil, canola oil, avocados, olives  - Stop smoking  - Attain ideal weight and regular exercise (at least 30 minutes per day of walking)
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
## 2019-11-19 ENCOUNTER — TELEPHONE (OUTPATIENT)
Dept: CARDIOLOGY | Facility: MEDICAL CENTER | Age: 54
End: 2019-11-19

## 2019-11-19 NOTE — TELEPHONE ENCOUNTER
Associated Results   Result Notes for Basic Metabolic Panel   Notes recorded by Dutch Whitmore M.D. on 11/18/2019 at 8:21 AM PST    Kidney number was a little stressed similar to prior so needs to keep the fluid off with the new medication and follow up labs in 3 weeks     Thank you     Called pt and reviewed MD recommendations.  Pt states he drinking 2 - 23.7oz per day along with coffee and a cup of juice in the am.  Pt states he just started taking medication yesterday and lost a pound.  Encourage pt to continue to monitor his weight and how he is feeling with the medications.  Pt states MD ordered lab work to be done in a month.  Reviewed latest recommendations from MD.  He verbalizes understanding and states no other concerns or questions at this time.  Pt is appreciative of information given.

## 2019-11-27 ENCOUNTER — TELEPHONE (OUTPATIENT)
Dept: CARDIOLOGY | Facility: MEDICAL CENTER | Age: 54
End: 2019-11-27

## 2019-11-28 NOTE — TELEPHONE ENCOUNTER
Not sure why we haven't seen the reoprt from [place] yet?  The echo looks good! the heart function returned back to nearly normal valve repair looks good, please let him know     Thank you

## 2019-12-02 ENCOUNTER — TELEPHONE (OUTPATIENT)
Dept: CARDIOLOGY | Facility: MEDICAL CENTER | Age: 54
End: 2019-12-02

## 2019-12-02 DIAGNOSIS — I42.8 OTHER CARDIOMYOPATHY (HCC): ICD-10-CM

## 2019-12-02 DIAGNOSIS — I05.9 MITRAL VALVE DISEASE: ICD-10-CM

## 2019-12-02 DIAGNOSIS — I48.0 PAROXYSMAL ATRIAL FIBRILLATION (HCC): Chronic | ICD-10-CM

## 2019-12-02 DIAGNOSIS — I10 ESSENTIAL HYPERTENSION: ICD-10-CM

## 2019-12-02 DIAGNOSIS — Z98.890 HISTORY OF MITRAL VALVE REPAIR: Chronic | ICD-10-CM

## 2019-12-02 NOTE — TELEPHONE ENCOUNTER
Called pt and reviewed MD recommendations.  He verbalizes understanding and states no other concerns or questions at this time.  Pt is appreciative of information given.

## 2019-12-09 ENCOUNTER — HOSPITAL ENCOUNTER (OUTPATIENT)
Dept: LAB | Facility: MEDICAL CENTER | Age: 54
End: 2019-12-09
Attending: INTERNAL MEDICINE
Payer: MEDICAID

## 2019-12-09 DIAGNOSIS — I05.9 MITRAL VALVE DISEASE: ICD-10-CM

## 2019-12-09 DIAGNOSIS — I42.8 OTHER CARDIOMYOPATHY (HCC): ICD-10-CM

## 2019-12-09 LAB
ANION GAP SERPL CALC-SCNC: 11 MMOL/L (ref 0–11.9)
BUN SERPL-MCNC: 32 MG/DL (ref 8–22)
CALCIUM SERPL-MCNC: 9.1 MG/DL (ref 8.5–10.5)
CHLORIDE SERPL-SCNC: 106 MMOL/L (ref 96–112)
CO2 SERPL-SCNC: 26 MMOL/L (ref 20–33)
CREAT SERPL-MCNC: 1.62 MG/DL (ref 0.5–1.4)
GLUCOSE SERPL-MCNC: 114 MG/DL (ref 65–99)
INR PPP: 2.46 (ref 0.87–1.13)
POTASSIUM SERPL-SCNC: 3.9 MMOL/L (ref 3.6–5.5)
PROTHROMBIN TIME: 27.6 SEC (ref 12–14.6)
SODIUM SERPL-SCNC: 143 MMOL/L (ref 135–145)

## 2019-12-09 PROCEDURE — 80048 BASIC METABOLIC PNL TOTAL CA: CPT

## 2019-12-09 PROCEDURE — 36415 COLL VENOUS BLD VENIPUNCTURE: CPT

## 2019-12-09 PROCEDURE — 85610 PROTHROMBIN TIME: CPT

## 2019-12-10 ENCOUNTER — ANTICOAGULATION MONITORING (OUTPATIENT)
Dept: VASCULAR LAB | Facility: MEDICAL CENTER | Age: 54
End: 2019-12-10

## 2019-12-10 DIAGNOSIS — I48.0 PAROXYSMAL ATRIAL FIBRILLATION (HCC): ICD-10-CM

## 2019-12-10 DIAGNOSIS — I05.9 MITRAL VALVE DISEASE: ICD-10-CM

## 2019-12-10 NOTE — PROGRESS NOTES
Anticoagulation Summary  As of 12/10/2019    INR goal:   2.0-3.0   TTR:   92.2 % (7.3 mo)   INR used for dosin.46 (2019)   Warfarin maintenance plan:   2.5 mg (5 mg x 0.5) every Mon; 5 mg (5 mg x 1) all other days   Weekly warfarin total:   32.5 mg   No change documented:   Chelo Quintanilla Med Ass't   Plan last modified:   Stanislaw He PharmD (5/10/2019)   Next INR check:   2/3/2020   Target end date:   Indefinite    Indications    Paroxysmal atrial fibrillation (HCC) [I48.0]  Mitral valve disease [I05.9]             Anticoagulation Episode Summary     INR check location:       Preferred lab:       Send INR reminders to:       Comments:   Mitral valve repair - indefinite anticoag per 19 note from Dr Lawson      Anticoagulation Care Providers     Provider Role Specialty Phone number    Kiley Perez A.P.N. Referring Cardiac Surgery [phone]    [facility] Responsible  [phone]        Anticoagulation Patient Findings  Patient Findings     Negatives:   Signs/symptoms of thrombosis, Signs/symptoms of bleeding, Laboratory test error suspected, Change in health, Change in alcohol use, Change in activity, Upcoming invasive procedure, Emergency department visit, Upcoming dental procedure, Missed doses, Extra doses, Change in medications, Change in diet/appetite, Hospital admission, Bruising, Other complaints        Spoke with patient to report a therapeutic INR.  Pt instructed to continue with current warfarin dosing regimen. Pt denies any s/s of bleeding, bruising, clotting or any changes to diet or medication.  Will follow up in 8 weeks.    Chelo Quintanilla, Med Ass't      I have reviewed and concur with the above plan     Damon Redmond, SilvaD

## 2019-12-12 ENCOUNTER — TELEPHONE (OUTPATIENT)
Dept: CARDIOLOGY | Facility: MEDICAL CENTER | Age: 54
End: 2019-12-12

## 2019-12-12 DIAGNOSIS — I42.8 OTHER CARDIOMYOPATHY (HCC): ICD-10-CM

## 2019-12-12 DIAGNOSIS — Z79.899 ON POTASSIUM WASTING DIURETIC THERAPY: ICD-10-CM

## 2019-12-12 NOTE — TELEPHONE ENCOUNTER
"Associated Results   Result Notes for Basic Metabolic Panel   Notes recorded by Dutch Whitmore M.D. on 12/11/2019 at 11:47 AM PST    Labs look okay kidney number a little stressed, not alarming but was better in the past, not sure if he is overdoing the diuretics depensds on his weight see where he is at in any case repeat chem 7 in 3 months, please let him know     Thank you     Returned pt call and reviewed MD recommendations.  Pt verbalizes understanding and states his current weight is 207lbs with goal 205lbs.  Pt states he takes 2 tabs of his diuretic at this time and states, \"I would like to get lower than 205.\"  Reassurance given that this RN will relay update to MD.  He states no other concerns or questions at this time and is appreciative of information given.    [facility] ordered and mailed to pt mailing address.    "

## 2020-01-22 ENCOUNTER — TELEPHONE (OUTPATIENT)
Dept: CARDIOLOGY | Facility: MEDICAL CENTER | Age: 55
End: 2020-01-22

## 2020-01-22 DIAGNOSIS — R60.0 LOCALIZED EDEMA: ICD-10-CM

## 2020-01-22 DIAGNOSIS — Z79.899 ON POTASSIUM WASTING DIURETIC THERAPY: ICD-10-CM

## 2020-01-22 NOTE — TELEPHONE ENCOUNTER
CW    Pt reports he's having problem with torsemide prescription. Please call pt for details at [phone].

## 2020-01-23 RX ORDER — TORSEMIDE 20 MG/1
20 TABLET ORAL 2 TIMES DAILY
Qty: 180 TAB | Refills: 2 | Status: SHIPPED | OUTPATIENT
Start: 2020-01-23 | End: 2020-10-13 | Stop reason: SDUPTHER

## 2020-02-12 ENCOUNTER — HOSPITAL ENCOUNTER (OUTPATIENT)
Dept: LAB | Facility: MEDICAL CENTER | Age: 55
End: 2020-02-12
Attending: INTERNAL MEDICINE
Payer: MEDICAID

## 2020-02-12 DIAGNOSIS — I05.9 MITRAL VALVE DISEASE: ICD-10-CM

## 2020-02-12 LAB
INR PPP: 2.39 (ref 0.87–1.13)
PROTHROMBIN TIME: 26.9 SEC (ref 12–14.6)

## 2020-02-12 PROCEDURE — 85610 PROTHROMBIN TIME: CPT

## 2020-02-12 PROCEDURE — 36415 COLL VENOUS BLD VENIPUNCTURE: CPT

## 2020-02-13 ENCOUNTER — ANTICOAGULATION MONITORING (OUTPATIENT)
Dept: MEDICAL GROUP | Facility: PHYSICIAN GROUP | Age: 55
End: 2020-02-13

## 2020-02-13 DIAGNOSIS — I48.0 PAROXYSMAL ATRIAL FIBRILLATION (HCC): ICD-10-CM

## 2020-02-13 DIAGNOSIS — I05.9 MITRAL VALVE DISEASE: ICD-10-CM

## 2020-02-13 NOTE — PROGRESS NOTES
Anticoagulation Summary  As of 2020    INR goal:   2.0-3.0   TTR:   94.0 % (9.5 mo)   INR used for dosin.39 (2020)   Warfarin maintenance plan:   2.5 mg (5 mg x 0.5) every Mon; 5 mg (5 mg x 1) all other days   Weekly warfarin total:   32.5 mg   Plan last modified:   Stanislaw He PharmD (5/10/2019)   Next INR check:   2020   Target end date:   Indefinite    Indications    Paroxysmal atrial fibrillation (HCC) [I48.0]  Mitral valve disease [I05.9]             Anticoagulation Episode Summary     INR check location:       Preferred lab:       Send INR reminders to:       Comments:   Mitral valve repair - indefinite anticoag per 19 note from Dr Lawson      Anticoagulation Care Providers     Provider Role Specialty Phone number    FARIDA Parikh Referring Cardiac Surgery [phone]    [facility] Responsible  [phone]        Anticoagulation Patient Findings        Spoke to patient on the phone.   INR  therapeutic.   Denies signs/symptoms of bleeding and/or thrombosis.   Denies changes to diet or medications.   Follow up appointment in 8 week(s).    Continue weekly warfarin dose as noted      Damon Redmond, PharmD, MS, BCACP, LCC    This note was created using voice recognition software (Dragon). The accuracy of the dictation is limited by the abilities of the software. I have reviewed the note prior to signing, however some errors in grammar and context are still possible. If you have any questions related to this note please do not hesitate to contact our office.

## 2020-03-19 ENCOUNTER — APPOINTMENT (OUTPATIENT)
Dept: CARDIOLOGY | Facility: PHYSICIAN GROUP | Age: 55
End: 2020-03-19
Payer: MEDICAID

## 2020-03-19 ENCOUNTER — HOSPITAL ENCOUNTER (OUTPATIENT)
Dept: LAB | Facility: MEDICAL CENTER | Age: 55
End: 2020-03-19
Attending: INTERNAL MEDICINE
Payer: MEDICAID

## 2020-03-19 ENCOUNTER — OFFICE VISIT (OUTPATIENT)
Dept: CARDIOLOGY | Facility: PHYSICIAN GROUP | Age: 55
End: 2020-03-19

## 2020-03-19 VITALS
OXYGEN SATURATION: 97 % | WEIGHT: 205 LBS | HEART RATE: 74 BPM | SYSTOLIC BLOOD PRESSURE: 124 MMHG | BODY MASS INDEX: 35 KG/M2 | DIASTOLIC BLOOD PRESSURE: 76 MMHG | RESPIRATION RATE: 14 BRPM | HEIGHT: 64 IN

## 2020-03-19 DIAGNOSIS — I42.8 OTHER CARDIOMYOPATHY (HCC): ICD-10-CM

## 2020-03-19 DIAGNOSIS — I48.0 PAROXYSMAL ATRIAL FIBRILLATION (HCC): Chronic | ICD-10-CM

## 2020-03-19 DIAGNOSIS — Z79.01 CHRONIC ANTICOAGULATION: ICD-10-CM

## 2020-03-19 DIAGNOSIS — Z98.890 HISTORY OF MITRAL VALVE REPAIR: Chronic | ICD-10-CM

## 2020-03-19 DIAGNOSIS — E66.9 OBESITY (BMI 30-39.9): Chronic | ICD-10-CM

## 2020-03-19 DIAGNOSIS — E78.49 OTHER HYPERLIPIDEMIA: ICD-10-CM

## 2020-03-19 DIAGNOSIS — I10 ESSENTIAL HYPERTENSION: ICD-10-CM

## 2020-03-19 LAB
ALBUMIN SERPL BCP-MCNC: 4.2 G/DL (ref 3.2–4.9)
ALBUMIN/GLOB SERPL: 1.5 G/DL
ALP SERPL-CCNC: 78 U/L (ref 30–99)
ALT SERPL-CCNC: 10 U/L (ref 2–50)
ANION GAP SERPL CALC-SCNC: 13 MMOL/L (ref 7–16)
AST SERPL-CCNC: 19 U/L (ref 12–45)
BASOPHILS # BLD AUTO: 0.4 % (ref 0–1.8)
BASOPHILS # BLD: 0.02 K/UL (ref 0–0.12)
BILIRUB SERPL-MCNC: 0.4 MG/DL (ref 0.1–1.5)
BUN SERPL-MCNC: 23 MG/DL (ref 8–22)
CALCIUM SERPL-MCNC: 9.2 MG/DL (ref 8.5–10.5)
CHLORIDE SERPL-SCNC: 104 MMOL/L (ref 96–112)
CO2 SERPL-SCNC: 23 MMOL/L (ref 20–33)
CREAT SERPL-MCNC: 1.4 MG/DL (ref 0.5–1.4)
EOSINOPHIL # BLD AUTO: 0.21 K/UL (ref 0–0.51)
EOSINOPHIL NFR BLD: 3.8 % (ref 0–6.9)
ERYTHROCYTE [DISTWIDTH] IN BLOOD BY AUTOMATED COUNT: 44.4 FL (ref 35.9–50)
GLOBULIN SER CALC-MCNC: 2.8 G/DL (ref 1.9–3.5)
GLUCOSE SERPL-MCNC: 108 MG/DL (ref 65–99)
HCT VFR BLD AUTO: 35.9 % (ref 42–52)
HGB BLD-MCNC: 11.4 G/DL (ref 14–18)
IMM GRANULOCYTES # BLD AUTO: 0.02 K/UL (ref 0–0.11)
IMM GRANULOCYTES NFR BLD AUTO: 0.4 % (ref 0–0.9)
LYMPHOCYTES # BLD AUTO: 1.01 K/UL (ref 1–4.8)
LYMPHOCYTES NFR BLD: 18.5 % (ref 22–41)
MCH RBC QN AUTO: 27.3 PG (ref 27–33)
MCHC RBC AUTO-ENTMCNC: 31.8 G/DL (ref 33.7–35.3)
MCV RBC AUTO: 85.9 FL (ref 81.4–97.8)
MONOCYTES # BLD AUTO: 0.62 K/UL (ref 0–0.85)
MONOCYTES NFR BLD AUTO: 11.4 % (ref 0–13.4)
NEUTROPHILS # BLD AUTO: 3.58 K/UL (ref 1.82–7.42)
NEUTROPHILS NFR BLD: 65.5 % (ref 44–72)
NRBC # BLD AUTO: 0 K/UL
NRBC BLD-RTO: 0 /100 WBC
PLATELET # BLD AUTO: 241 K/UL (ref 164–446)
PMV BLD AUTO: 10.1 FL (ref 9–12.9)
POTASSIUM SERPL-SCNC: 3.7 MMOL/L (ref 3.6–5.5)
PROT SERPL-MCNC: 7 G/DL (ref 6–8.2)
RBC # BLD AUTO: 4.18 M/UL (ref 4.7–6.1)
SODIUM SERPL-SCNC: 140 MMOL/L (ref 135–145)
WBC # BLD AUTO: 5.5 K/UL (ref 4.8–10.8)

## 2020-03-19 PROCEDURE — 80053 COMPREHEN METABOLIC PANEL: CPT

## 2020-03-19 PROCEDURE — 99214 OFFICE O/P EST MOD 30 MIN: CPT | Performed by: INTERNAL MEDICINE

## 2020-03-19 PROCEDURE — 85025 COMPLETE CBC W/AUTO DIFF WBC: CPT

## 2020-03-19 PROCEDURE — 36415 COLL VENOUS BLD VENIPUNCTURE: CPT

## 2020-03-19 RX ORDER — HYDRALAZINE HYDROCHLORIDE 50 MG/1
TABLET, FILM COATED ORAL 4 TIMES DAILY
COMMUNITY
Start: 2020-02-03 | End: 2020-03-19

## 2020-03-19 ASSESSMENT — ENCOUNTER SYMPTOMS
WEAKNESS: 0
PND: 0
SHORTNESS OF BREATH: 0
SORE THROAT: 0
FOCAL WEAKNESS: 0
FEVER: 0
ABDOMINAL PAIN: 0
COUGH: 0
NAUSEA: 0
CLAUDICATION: 0
BLURRED VISION: 0
PALPITATIONS: 0
FALLS: 0
CHILLS: 0
BRUISES/BLEEDS EASILY: 0
DIZZINESS: 0

## 2020-03-19 ASSESSMENT — FIBROSIS 4 INDEX: FIB4 SCORE: 0.76

## 2020-03-19 NOTE — PATIENT INSTRUCTIONS
Please work on increasing these foods in your diet to increase your potassium levels    Highest potassium foods  Dried figs, molasses, seaweed    High potassium foods  Dried fruits (dates, prunes), nuts, avocados, bran cereal, wheat germ, lima beans    Potassium-rich food  Vegetables-spinach, tomatoes, broccoli, winter squash, beets, carrots, cauliflower, potatoes    Fruits-bananas, cantaloupe, kiwis, oranges, mangoes    Meats-ground beef, steak, pork, veal, lamb    *Adapted: Mayra COURTNEY. Hypokalemia. [place] Journal of Medicine 1998; 339:451.
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
## 2020-03-23 ENCOUNTER — TELEPHONE (OUTPATIENT)
Dept: CARDIOLOGY | Facility: MEDICAL CENTER | Age: 55
End: 2020-03-23

## 2020-03-23 NOTE — TELEPHONE ENCOUNTER
Associated Results   Result Notes for Comp Metabolic Panel   Notes recorded by Dutch Whitmore M.D. on 3/20/2020 at 3:57 PM PDT    Labs look good, please let him know     Thank you     Letter printed with MD recommendations and mailed to pt mailing address.

## 2020-04-09 DIAGNOSIS — Z79.01 CHRONIC ANTICOAGULATION: ICD-10-CM

## 2020-04-23 ENCOUNTER — TELEPHONE (OUTPATIENT)
Dept: VASCULAR LAB | Facility: MEDICAL CENTER | Age: 55
End: 2020-04-23

## 2020-04-23 DIAGNOSIS — Z79.01 CHRONIC ANTICOAGULATION: ICD-10-CM

## 2020-04-23 LAB — INR PPP: 3.9 (ref 2–3.5)

## 2020-04-24 ENCOUNTER — ANTICOAGULATION MONITORING (OUTPATIENT)
Dept: VASCULAR LAB | Facility: MEDICAL CENTER | Age: 55
End: 2020-04-24

## 2020-04-24 DIAGNOSIS — I05.9 MITRAL VALVE DISEASE: ICD-10-CM

## 2020-04-24 DIAGNOSIS — I48.0 PAROXYSMAL ATRIAL FIBRILLATION (HCC): ICD-10-CM

## 2020-04-24 NOTE — PROGRESS NOTES
Anticoagulation Summary  As of 4/24/2020    INR goal:   2.0-3.0   TTR:   83.3 % (11.8 mo)   INR used for dosing:   3.90! (4/23/2020)   Warfarin maintenance plan:   2.5 mg (5 mg x 0.5) every Mon; 5 mg (5 mg x 1) all other days   Weekly warfarin total:   32.5 mg   Plan last modified:   Silva PoolD (5/10/2019)   Next INR check:   6/5/2020   Target end date:   Indefinite    Indications    Paroxysmal atrial fibrillation (HCC) [I48.0]  Mitral valve disease [I05.9]             Anticoagulation Episode Summary     INR check location:       Preferred lab:       Send INR reminders to:       Comments:   Mitral valve repair - indefinite anticoag per 7/26/19 note from Dr Lawson      Anticoagulation Care Providers     Provider Role Specialty Phone number    FARIDA Parikh Referring Cardiac Surgery [phone]    [facility] Anticoagulation Services Responsible  [phone]        Anticoagulation Patient Findings     Tried calling patient but no response. Left voicemail instructing the patient to call us if any signs of bleeding or bruising or any recent changes in diet or medications. Patient's INR is supra- therapeutic at 3.9.  Patient instructed to take decreased dose of 2.5 mg tonight then is to continue the current warfarin dosing as shown above. Follow-up in 6 week(s).    Stanislaw He, Pharm.D

## 2020-05-21 ENCOUNTER — HOSPITAL ENCOUNTER (OUTPATIENT)
Dept: LAB | Facility: MEDICAL CENTER | Age: 55
End: 2020-05-21
Attending: NURSE PRACTITIONER
Payer: MEDICAID

## 2020-05-21 DIAGNOSIS — Z79.01 CHRONIC ANTICOAGULATION: ICD-10-CM

## 2020-05-21 LAB
INR PPP: 2.61 (ref 0.87–1.13)
PROTHROMBIN TIME: 28.9 SEC (ref 12–14.6)

## 2020-05-21 PROCEDURE — 36415 COLL VENOUS BLD VENIPUNCTURE: CPT

## 2020-05-21 PROCEDURE — 85610 PROTHROMBIN TIME: CPT

## 2020-05-22 ENCOUNTER — ANTICOAGULATION MONITORING (OUTPATIENT)
Dept: VASCULAR LAB | Facility: MEDICAL CENTER | Age: 55
End: 2020-05-22

## 2020-05-22 DIAGNOSIS — I48.0 PAROXYSMAL ATRIAL FIBRILLATION (HCC): ICD-10-CM

## 2020-05-22 DIAGNOSIS — I05.9 MITRAL VALVE DISEASE: ICD-10-CM

## 2020-05-22 NOTE — PROGRESS NOTES
OP   Telephone Anticoagulation Service Note      Anticoagulation Summary  As of 2020    INR goal:   2.0-3.0   TTR:   79.4 % (1 y)   INR used for dosin.61 (2020)   Warfarin maintenance plan:   2.5 mg (5 mg x 0.5) every Mon; 5 mg (5 mg x 1) all other days   Weekly warfarin total:   32.5 mg   Plan last modified:   Stanislaw He, PharmD (5/10/2019)   Next INR check:   2020   Target end date:   Indefinite    Indications    Paroxysmal atrial fibrillation (HCC) [I48.0]  Mitral valve disease [I05.9]             Anticoagulation Episode Summary     INR check location:       Preferred lab:       Send INR reminders to:       Comments:   Mitral valve repair - indefinite anticoag per 19 note from Dr Lawson      Anticoagulation Care Providers     Provider Role Specialty Phone number    FARIDA Parikh Referring Cardiac Surgery [phone]    [facility] Anticoagulation Services Responsible  [phone]        Anticoagulation Patient Findings  Patient Findings     Positives:   Change in health    Negatives:   Signs/symptoms of thrombosis, Signs/symptoms of bleeding, Laboratory test error suspected, Change in alcohol use, Change in activity, Upcoming invasive procedure, Emergency department visit, Upcoming dental procedure, Missed doses, Extra doses, Change in medications, Change in diet/appetite, Hospital admission, Bruising, Other complaints    Comments:   Patient reports losing 20+ lbs without effort and will be discussing with PCP for testing.         Spoke with the patient on the phone today, reporting a therapeutic INR of 2.61. Confirmed the current warfarin dosing regimen and patient compliance. Patient denies any interval changes to diet and/or medications. Patient denies any signs/symptoms of bleeding or clotting. Patient did report having significant weight loss over the past month and is concerned as he was not trying and nothing changed.    Patient instructed to continue with the  current warfarin dosing regimen, and asked to follow up again in 4 weeks.    Layo AscencioD

## 2020-06-25 ENCOUNTER — HOSPITAL ENCOUNTER (OUTPATIENT)
Dept: LAB | Facility: MEDICAL CENTER | Age: 55
End: 2020-06-25
Attending: NURSE PRACTITIONER
Payer: MEDICAID

## 2020-06-25 DIAGNOSIS — Z79.01 CHRONIC ANTICOAGULATION: ICD-10-CM

## 2020-06-25 LAB
INR PPP: 1.76 (ref 0.87–1.13)
PROTHROMBIN TIME: 21.1 SEC (ref 12–14.6)

## 2020-06-25 PROCEDURE — 36415 COLL VENOUS BLD VENIPUNCTURE: CPT

## 2020-06-25 PROCEDURE — 85610 PROTHROMBIN TIME: CPT

## 2020-06-26 ENCOUNTER — ANTICOAGULATION MONITORING (OUTPATIENT)
Dept: VASCULAR LAB | Facility: MEDICAL CENTER | Age: 55
End: 2020-06-26

## 2020-06-26 DIAGNOSIS — I05.9 MITRAL VALVE DISEASE: ICD-10-CM

## 2020-06-26 DIAGNOSIS — I48.0 PAROXYSMAL ATRIAL FIBRILLATION (HCC): ICD-10-CM

## 2020-06-26 NOTE — PROGRESS NOTES
OP   Telephone Anticoagulation Service Note      Anticoagulation Summary  As of 2020    INR goal:   2.0-3.0   TTR:   78.8 % (1.1 y)   INR used for dosin.76! (2020)   Warfarin maintenance plan:   2.5 mg (5 mg x 0.5) every Mon; 5 mg (5 mg x 1) all other days   Weekly warfarin total:   32.5 mg   Plan last modified:   Silva PoolD (5/10/2019)   Next INR check:   2020   Target end date:   Indefinite    Indications    Paroxysmal atrial fibrillation (HCC) [I48.0]  Mitral valve disease [I05.9]             Anticoagulation Episode Summary     INR check location:       Preferred lab:       Send INR reminders to:       Comments:   Mitral valve repair - indefinite anticoag per 19 note from Dr Lawson      Anticoagulation Care Providers     Provider Role Specialty Phone number    FARIDA Parikh Referring Cardiac Surgery [phone]    [facility] Anticoagulation Services Responsible  [phone]        Anticoagulation Patient Findings  Patient Findings     Negatives:   Signs/symptoms of thrombosis, Signs/symptoms of bleeding, Laboratory test error suspected, Change in health, Change in alcohol use, Change in activity, Upcoming invasive procedure, Emergency department visit, Upcoming dental procedure, Missed doses, Extra doses, Change in medications, Change in diet/appetite, Hospital admission, Bruising, Other complaints         Spoke with the patient on the phone today, reporting a SUB-therapeutic INR of 1.76. Confirmed the current warfarin dosing regimen and patient compliance. Patient confirms he did miss a dose last week, but unsure which day. Patient denies any interval changes to diet and/or medications. Patient denies any signs/symptoms of bleeding or clotting.  Patient will bolus with 7.5mg TONIGHT ONLY, then will resume his current dosing regimen. Patient agrees to retest again in 3 weeks.     Layo Yi  PharmD

## 2020-08-04 ENCOUNTER — TELEPHONE (OUTPATIENT)
Dept: VASCULAR LAB | Facility: MEDICAL CENTER | Age: 55
End: 2020-08-04

## 2020-08-06 ENCOUNTER — HOSPITAL ENCOUNTER (OUTPATIENT)
Dept: LAB | Facility: MEDICAL CENTER | Age: 55
End: 2020-08-06
Attending: NURSE PRACTITIONER
Payer: MEDICAID

## 2020-08-06 DIAGNOSIS — Z79.01 CHRONIC ANTICOAGULATION: ICD-10-CM

## 2020-08-06 LAB
INR PPP: 2.8 (ref 0.87–1.13)
PROTHROMBIN TIME: 30.3 SEC (ref 12–14.6)

## 2020-08-06 PROCEDURE — 85610 PROTHROMBIN TIME: CPT

## 2020-08-06 PROCEDURE — 36415 COLL VENOUS BLD VENIPUNCTURE: CPT

## 2020-08-07 ENCOUNTER — ANTICOAGULATION MONITORING (OUTPATIENT)
Dept: VASCULAR LAB | Facility: MEDICAL CENTER | Age: 55
End: 2020-08-07

## 2020-08-07 DIAGNOSIS — I05.9 MITRAL VALVE DISEASE: ICD-10-CM

## 2020-08-07 DIAGNOSIS — I48.0 PAROXYSMAL ATRIAL FIBRILLATION (HCC): ICD-10-CM

## 2020-08-07 NOTE — PROGRESS NOTES
OP   Telephone Anticoagulation Service Note      Anticoagulation Summary  As of 2020    INR goal:  2.0-3.0   TTR:  78.6 % (1.3 y)   INR used for dosin.80 (2020)   Warfarin maintenance plan:  2.5 mg (5 mg x 0.5) every Mon; 5 mg (5 mg x 1) all other days   Weekly warfarin total:  32.5 mg   Plan last modified:  Silva PoolD (5/10/2019)   Next INR check:  2020   Target end date:  Indefinite    Indications    Paroxysmal atrial fibrillation (HCC) [I48.0]  Mitral valve disease [I05.9]             Anticoagulation Episode Summary     INR check location:      Preferred lab:      Send INR reminders to:      Comments:  Mitral valve repair - indefinite anticoag per 19 note from Dr Lawson      Anticoagulation Care Providers     Provider Role Specialty Phone number    FARIDA Parikh Referring Cardiac Surgery [phone]    [facility] Anticoagulation Services Responsible  [phone]        Anticoagulation Patient Findings  Patient Findings     Negatives:  Signs/symptoms of thrombosis, Signs/symptoms of bleeding, Laboratory test error suspected, Change in health, Change in alcohol use, Change in activity, Upcoming invasive procedure, Emergency department visit, Upcoming dental procedure, Missed doses, Extra doses, Change in medications, Change in diet/appetite, Hospital admission, Bruising, Other complaints        Spoke with the patient on the phone today, reporting a therapeutic INR of 2.8. Confirmed the current warfarin dosing regimen and patient compliance. Patient denies any interval changes to diet and/or medications. Patient denies any signs/symptoms of bleeding or clotting.  Patient does report that he has been eating less in general and has lost some weight.   Patient instructed to continue with the current warfarin dosing regimen, and asked to follow up again in 5 weeks (per pt preference).     Layo AscencioD

## 2020-10-13 DIAGNOSIS — Z79.899 ON POTASSIUM WASTING DIURETIC THERAPY: ICD-10-CM

## 2020-10-13 DIAGNOSIS — R60.0 LOCALIZED EDEMA: ICD-10-CM

## 2020-10-13 RX ORDER — TORSEMIDE 20 MG/1
20 TABLET ORAL 2 TIMES DAILY
Qty: 180 TAB | Refills: 1 | Status: SHIPPED | OUTPATIENT
Start: 2020-10-13 | End: 2021-09-07 | Stop reason: SDUPTHER

## 2020-10-16 ENCOUNTER — TELEPHONE (OUTPATIENT)
Dept: VASCULAR LAB | Facility: MEDICAL CENTER | Age: 55
End: 2020-10-16

## 2020-10-16 ENCOUNTER — HOSPITAL ENCOUNTER (OUTPATIENT)
Dept: LAB | Facility: MEDICAL CENTER | Age: 55
End: 2020-10-16
Attending: NURSE PRACTITIONER
Payer: MEDICAID

## 2020-10-16 DIAGNOSIS — Z79.01 CHRONIC ANTICOAGULATION: ICD-10-CM

## 2020-10-16 LAB
INR PPP: 1.76 (ref 0.87–1.13)
PROTHROMBIN TIME: 21 SEC (ref 12–14.6)

## 2020-10-16 PROCEDURE — 85610 PROTHROMBIN TIME: CPT

## 2020-10-16 PROCEDURE — 36415 COLL VENOUS BLD VENIPUNCTURE: CPT

## 2020-10-19 ENCOUNTER — ANTICOAGULATION MONITORING (OUTPATIENT)
Dept: MEDICAL GROUP | Facility: PHYSICIAN GROUP | Age: 55
End: 2020-10-19

## 2020-10-19 DIAGNOSIS — I48.0 PAROXYSMAL ATRIAL FIBRILLATION (HCC): ICD-10-CM

## 2020-10-19 DIAGNOSIS — I05.9 MITRAL VALVE DISEASE: ICD-10-CM

## 2020-10-19 NOTE — PROGRESS NOTES
OP   Telephone Anticoagulation Service Note      Anticoagulation Summary  As of 10/19/2020    INR goal:  2.0-3.0   TTR:  78.4 % (1.5 y)   INR used for dosin.76 (10/16/2020)   Warfarin maintenance plan:  2.5 mg (5 mg x 0.5) every Mon; 5 mg (5 mg x 1) all other days   Weekly warfarin total:  32.5 mg   Plan last modified:  Silva PoolD (5/10/2019)   Next INR check:  2020   Target end date:  Indefinite    Indications    Paroxysmal atrial fibrillation (HCC) [I48.0]  Mitral valve disease [I05.9]             Anticoagulation Episode Summary     INR check location:      Preferred lab:      Send INR reminders to:      Comments:  Mitral valve repair - indefinite anticoag per 19 note from Dr Lawson      Anticoagulation Care Providers     Provider Role Specialty Phone number    FARIDA Parikh Referring Cardiac Surgery [phone]    [facility] Anticoagulation Services Responsible  [phone]        Anticoagulation Patient Findings  Patient Findings     Positives:  Missed doses, Change in medications    Comments:  Patient started on gabapentin and a new multivitamin. Patient also missed a dose last week        Spoke with the patient on the phone today, reporting a SUB-therapeutic INR of 1.76. Confirmed the current warfarin dosing regimen and patient compliance. Patient reported that he had recently missed a dose, and in addition also started on gabapentin and a new multivitamin. Patient denies any interval changes to diet. Patient denies any signs/symptoms of bleeding or clotting.  Patient instructed to bolus with 5mg TONIGHT, then to resume his current dosing regimen, but to retest again in 2 weeks.     Layo Yi  PharmD

## 2020-10-29 ENCOUNTER — TELEPHONE (OUTPATIENT)
Dept: VASCULAR LAB | Facility: MEDICAL CENTER | Age: 55
End: 2020-10-29

## 2020-10-29 ENCOUNTER — HOSPITAL ENCOUNTER (OUTPATIENT)
Dept: LAB | Facility: MEDICAL CENTER | Age: 55
End: 2020-10-29
Attending: NURSE PRACTITIONER
Payer: MEDICAID

## 2020-10-29 DIAGNOSIS — Z79.01 CHRONIC ANTICOAGULATION: ICD-10-CM

## 2020-10-29 LAB
INR PPP: 1.82 (ref 0.87–1.13)
PROTHROMBIN TIME: 21.7 SEC (ref 12–14.6)

## 2020-10-29 PROCEDURE — 36415 COLL VENOUS BLD VENIPUNCTURE: CPT

## 2020-10-29 PROCEDURE — 85610 PROTHROMBIN TIME: CPT

## 2020-10-29 NOTE — TELEPHONE ENCOUNTER
Received call from pt that he was diagnosed with gout and his doctor wanted him to start indomethacin, but pt wanted to check with our Coumadin clinic first.     Advised pt to ask doctor about colchicine (not an NSAID) since indomethacin is an NSAID, which increases his risk of GIB while on warfarin.    Of note, pt got INR drawn today as well. Results pending.    Yolis Saha, PharmD

## 2020-11-02 ENCOUNTER — ANTICOAGULATION MONITORING (OUTPATIENT)
Dept: VASCULAR LAB | Facility: MEDICAL CENTER | Age: 55
End: 2020-11-02

## 2020-11-02 DIAGNOSIS — I05.9 MITRAL VALVE DISEASE: ICD-10-CM

## 2020-11-02 DIAGNOSIS — I48.0 PAROXYSMAL ATRIAL FIBRILLATION (HCC): ICD-10-CM

## 2020-11-02 NOTE — PROGRESS NOTES
OP   Telephone Anticoagulation Service Note      Anticoagulation Summary  As of 2020    INR goal:  2.0-3.0   TTR:  76.5 % (1.5 y)   INR used for dosin.82 (10/29/2020)   Warfarin maintenance plan:  5 mg (5 mg x 1) every day   Weekly warfarin total:  35 mg   Plan last modified:  Yodit Yi (2020)   Next INR check:  2020   Target end date:  Indefinite    Indications    Paroxysmal atrial fibrillation (HCC) [I48.0]  Mitral valve disease [I05.9]             Anticoagulation Episode Summary     INR check location:      Preferred lab:      Send INR reminders to:      Comments:  Mitral valve repair - indefinite anticoag per 19 note from Dr Lawson      Anticoagulation Care Providers     Provider Role Specialty Phone number    FARIDA Parikh Referring Cardiac Surgery [phone]    [facility] Anticoagulation Services Responsible  [phone]        Anticoagulation Patient Findings  Patient Findings     Positives:  Change in medications    Comments:  Pt stopped multivitamin and started indomethacin x 5-7 d for gout         Spoke with the patient on the phone today, reporting a SUB-therapeutic INR of 1.82. Confirmed the current warfarin dosing regimen and patient compliance. Patient reports that he had started on a multivitamin, but stopped it already. He also reports temporarily being on indomethacin for gout. Cautioned him about using this unless absolutely necessary as it does increase risk of bleeding. He repots only 2 days left on it.   Patient denies any interval changes to diet. Patient denies any signs/symptoms of bleeding or clotting.    Patient instructed to begin taking 5mg QD and retest again in 2 weeks.     Layo AscencioD

## 2020-11-09 DIAGNOSIS — I48.0 PAROXYSMAL ATRIAL FIBRILLATION (HCC): ICD-10-CM

## 2020-11-11 RX ORDER — CARVEDILOL 25 MG/1
TABLET ORAL
Qty: 180 TAB | Refills: 1 | Status: SHIPPED | OUTPATIENT
Start: 2020-11-11 | End: 2021-10-07

## 2020-12-14 ENCOUNTER — HOSPITAL ENCOUNTER (OUTPATIENT)
Dept: LAB | Facility: MEDICAL CENTER | Age: 55
End: 2020-12-14
Attending: NURSE PRACTITIONER
Payer: MEDICAID

## 2020-12-14 DIAGNOSIS — Z79.01 CHRONIC ANTICOAGULATION: ICD-10-CM

## 2020-12-14 LAB
INR PPP: 2.32 (ref 0.87–1.13)
PROTHROMBIN TIME: 26.2 SEC (ref 12–14.6)

## 2020-12-14 PROCEDURE — 85610 PROTHROMBIN TIME: CPT

## 2020-12-14 PROCEDURE — 36415 COLL VENOUS BLD VENIPUNCTURE: CPT

## 2020-12-15 ENCOUNTER — ANTICOAGULATION MONITORING (OUTPATIENT)
Dept: VASCULAR LAB | Facility: MEDICAL CENTER | Age: 55
End: 2020-12-15

## 2020-12-15 DIAGNOSIS — I05.9 MITRAL VALVE DISEASE: ICD-10-CM

## 2020-12-15 DIAGNOSIS — I48.0 PAROXYSMAL ATRIAL FIBRILLATION (HCC): ICD-10-CM

## 2021-02-01 ENCOUNTER — HOSPITAL ENCOUNTER (OUTPATIENT)
Dept: LAB | Facility: MEDICAL CENTER | Age: 56
End: 2021-02-01
Attending: NURSE PRACTITIONER
Payer: MEDICAID

## 2021-02-01 DIAGNOSIS — Z79.01 CHRONIC ANTICOAGULATION: ICD-10-CM

## 2021-02-01 LAB
INR PPP: 3.52 (ref 0.87–1.13)
PROTHROMBIN TIME: 36.4 SEC (ref 12–14.6)

## 2021-02-01 PROCEDURE — 36415 COLL VENOUS BLD VENIPUNCTURE: CPT

## 2021-02-01 PROCEDURE — 85610 PROTHROMBIN TIME: CPT

## 2021-02-02 ENCOUNTER — ANTICOAGULATION MONITORING (OUTPATIENT)
Dept: VASCULAR LAB | Facility: MEDICAL CENTER | Age: 56
End: 2021-02-02

## 2021-02-02 DIAGNOSIS — I05.9 MITRAL VALVE DISEASE: ICD-10-CM

## 2021-02-02 DIAGNOSIS — I48.0 PAROXYSMAL ATRIAL FIBRILLATION (HCC): ICD-10-CM

## 2021-02-02 NOTE — PROGRESS NOTES
OP   Telephone Anticoagulation Service Note      Anticoagulation Summary  As of 2/2/2021    INR goal:  2.0-3.0   TTR:  74.1 % (1.8 y)   INR used for dosing:  3.52 (2/1/2021)   Warfarin maintenance plan:  2.5 mg (5 mg x 0.5) every Mon; 5 mg (5 mg x 1) all other days   Weekly warfarin total:  32.5 mg   Plan last modified:  Yodit Yi (12/15/2020)   Next INR check:  2/16/2021   Target end date:  Indefinite    Indications    Paroxysmal atrial fibrillation (HCC) [I48.0]  Mitral valve disease [I05.9]             Anticoagulation Episode Summary     INR check location:      Preferred lab:      Send INR reminders to:      Comments:  Mitral valve repair - indefinite anticoag per 7/26/19 note from Dr Lawson      Anticoagulation Care Providers     Provider Role Specialty Phone number    FARIDA Parikh Referring Cardiac Surgery [phone]    [facility] Anticoagulation Services Responsible  [phone]        Anticoagulation Patient Findings     Left a message on the patient's voicemail today, informing the patient of a SUPRA-therapeutic INR of 3.52.  Instructed the patient to call the clinic with any changes to diet or medications, with any signs/sx of bleeding or clotting or with any questions or concerns.     Patient instructed to HOLD warfarin dose TONIGHT ONLY, then to resume his current dosing regimen.   Patient asked to retest again in 2 weeks.     Layo AscencioD

## 2021-02-03 ENCOUNTER — TELEPHONE (OUTPATIENT)
Dept: VASCULAR LAB | Facility: MEDICAL CENTER | Age: 56
End: 2021-02-03

## 2021-02-04 NOTE — TELEPHONE ENCOUNTER
Spoke with Ottoniel who reports starting allopurinol for gout. Pt will test INR at regularly scheduled follow up.  Jennifer Schneider, Clinical Pharmacist, CDE, CACP

## 2021-03-05 ENCOUNTER — TELEPHONE (OUTPATIENT)
Dept: VASCULAR LAB | Facility: MEDICAL CENTER | Age: 56
End: 2021-03-05

## 2021-03-06 NOTE — TELEPHONE ENCOUNTER
[facility] [place] [facility]      Pt is over due for PT/INR for warfarin monitoring. Left message for patient to have INR checked ASAP.   Clinic phone number left for any questions or concerns.    Layo Yi  PharmD

## 2021-03-16 ENCOUNTER — TELEPHONE (OUTPATIENT)
Dept: CARDIOLOGY | Facility: MEDICAL CENTER | Age: 56
End: 2021-03-16

## 2021-03-16 NOTE — TELEPHONE ENCOUNTER
CW    Pt called to check on his medical clearance. Pt states [facility] with [facility] is scheduled for Friday. Please call Pt back at [phone].    Thank you

## 2021-03-17 NOTE — TELEPHONE ENCOUNTER
Upon chart review, unable to locate clearance request in media.  Reviewed faxes received, unable to locate clearance request.      Returned pt call and reviewed findings.  He states they told pt that request was sent 2 weeks ago.  Pt states he will be having Epidural.  Explained to pt if they are requesting blood thinner hold to defer to [facility] for advise, but if they need cardiac advise to fax request to our office.  He verbalizes understanding and states he called [facility] for their advise.  He states no other concerns or questions at this time and states he will [facility].  Pt is appreciative of information given.

## 2021-03-19 ENCOUNTER — TELEPHONE (OUTPATIENT)
Dept: VASCULAR LAB | Facility: MEDICAL CENTER | Age: 56
End: 2021-03-19

## 2021-03-19 ENCOUNTER — ANTICOAGULATION VISIT (OUTPATIENT)
Dept: VASCULAR LAB | Facility: MEDICAL CENTER | Age: 56
End: 2021-03-19
Attending: INTERNAL MEDICINE
Payer: MEDICAID

## 2021-03-19 DIAGNOSIS — I05.9 MITRAL VALVE DISEASE: ICD-10-CM

## 2021-03-19 DIAGNOSIS — I48.0 PAROXYSMAL ATRIAL FIBRILLATION (HCC): ICD-10-CM

## 2021-03-19 LAB — INR PPP: 2.7 (ref 2–3.5)

## 2021-03-19 PROCEDURE — 99211 OFF/OP EST MAY X REQ PHY/QHP: CPT

## 2021-03-19 PROCEDURE — 85610 PROTHROMBIN TIME: CPT

## 2021-03-19 NOTE — PROGRESS NOTES
Anticoagulation Summary  As of 3/19/2021    INR goal:  2.0-3.0   TTR:  71.6 % (1.9 y)   INR used for dosin.70 (3/19/2021)   Warfarin maintenance plan:  2.5 mg (5 mg x 0.5) every Mon; 5 mg (5 mg x 1) all other days   Weekly warfarin total:  32.5 mg   Plan last modified:  Yodit Yi (12/15/2020)   Next INR check:  2021   Target end date:  Indefinite    Indications    Paroxysmal atrial fibrillation (HCC) [I48.0]  Mitral valve disease [I05.9]             Anticoagulation Episode Summary     INR check location:      Preferred lab:      Send INR reminders to:      Comments:  Mitral valve repair - indefinite anticoag per 19 note from Dr Lawson      Anticoagulation Care Providers     Provider Role Specialty Phone number    FARIDA Parikh Referring Cardiac Surgery [phone]    [facility] Responsible  [phone]        Anticoagulation Patient Findings      HPI:  Ottoniel Davies seen in clinic today for follow up on anticoagulation therapy in the presence of Afib.   Denies any changes to current medical/health status since last appointment.   Denies any medication or diet changes.   No current symptoms of bleeding or thrombosis reported.    Pt on antiplatelet therapy Aspirin 81mg for MV repair and must be reviewed again on  he sees cardiology.    A/P:   INR is therapeutic.   Continue current regimen.       Pt educated to contact our clinic with any changes in medications or s/s of bleeding or thrombosis. Pt is aware to seek immediate medical attention for falls, head injury or deep cuts    Follow up appointment in 4 week(s), he will go to Lab in [place], Pharm.D, BC-ACP, BC-ADM

## 2021-03-19 NOTE — TELEPHONE ENCOUNTER
Left message for pt to have INR checked  2nd call  Letter sent  Jennifer Schneider, Clinical Pharmacist, CDE, CACP

## 2021-03-19 NOTE — LETTER
Ottoniel Davies  [address] [place] [address]  [place] [address]    03/19/21    Dear Ottoniel Davies ,    We have been unsuccessful in our attempts to contact you regarding your Anticoagulation Service appointments. Warfarin is a potent blood-thinning agent that requires monitoring to ensure that the dosage is correct for your body.  If it isn't, you could develop serious, sometimes life-threatening bleeding problems or life-threatening blood clots or stroke could result.    To monitor you effectively, we need to be able to communicate with you.  This is a requirement to be followed by our Service.       If you repeatedly fail to keep your lab appointments, you are at risk of being discharged from the Anticoagulation Service.    It is extremely important that you contact the clinic as soon as possible to arrange appropriate follow up.  We are open Monday-Friday 8 am until 5 pm.  You may reach our Service at [phone].           Sincerely,           Wilian Oliva, PharmD, [facility]  Clinic Supervisor  [facility]  Outpatient Anticoagulation Service

## 2021-03-23 ENCOUNTER — ANTICOAGULATION MONITORING (OUTPATIENT)
Dept: VASCULAR LAB | Facility: MEDICAL CENTER | Age: 56
End: 2021-03-23

## 2021-03-23 DIAGNOSIS — I48.0 PAROXYSMAL ATRIAL FIBRILLATION (HCC): ICD-10-CM

## 2021-03-23 DIAGNOSIS — I05.9 MITRAL VALVE DISEASE: ICD-10-CM

## 2021-03-24 LAB — INR BLD: 2.7 (ref 0.9–1.2)

## 2021-03-25 ENCOUNTER — OFFICE VISIT (OUTPATIENT)
Dept: CARDIOLOGY | Facility: PHYSICIAN GROUP | Age: 56
End: 2021-03-25
Payer: MEDICAID

## 2021-03-25 VITALS
OXYGEN SATURATION: 94 % | HEIGHT: 67 IN | BODY MASS INDEX: 30.13 KG/M2 | SYSTOLIC BLOOD PRESSURE: 154 MMHG | DIASTOLIC BLOOD PRESSURE: 72 MMHG | HEART RATE: 91 BPM | WEIGHT: 192 LBS

## 2021-03-25 DIAGNOSIS — Z98.890 HISTORY OF MITRAL VALVE REPAIR: Chronic | ICD-10-CM

## 2021-03-25 DIAGNOSIS — Z01.818 PREOPERATIVE EXAMINATION: ICD-10-CM

## 2021-03-25 DIAGNOSIS — I05.9 MITRAL VALVE DISEASE: ICD-10-CM

## 2021-03-25 DIAGNOSIS — I48.0 PAROXYSMAL ATRIAL FIBRILLATION (HCC): ICD-10-CM

## 2021-03-25 DIAGNOSIS — I10 ESSENTIAL HYPERTENSION: ICD-10-CM

## 2021-03-25 DIAGNOSIS — Z00.00 HEALTHCARE MAINTENANCE: ICD-10-CM

## 2021-03-25 PROCEDURE — 93000 ELECTROCARDIOGRAM COMPLETE: CPT | Performed by: INTERNAL MEDICINE

## 2021-03-25 PROCEDURE — 99214 OFFICE O/P EST MOD 30 MIN: CPT | Performed by: INTERNAL MEDICINE

## 2021-03-25 RX ORDER — ALLOPURINOL 300 MG/1
300 TABLET ORAL DAILY
COMMUNITY
Start: 2021-01-28

## 2021-03-25 ASSESSMENT — FIBROSIS 4 INDEX: FIB4 SCORE: 1.45

## 2021-03-25 ASSESSMENT — ENCOUNTER SYMPTOMS
HEARTBURN: 0
BACK PAIN: 0
SYNCOPE: 0
SHORTNESS OF BREATH: 0
COUGH: 0
FEVER: 0
IRREGULAR HEARTBEAT: 0
NEAR-SYNCOPE: 0
DYSPNEA ON EXERTION: 0
PND: 0
DIARRHEA: 0
WEIGHT GAIN: 0
VOMITING: 0
WEIGHT LOSS: 0
PALPITATIONS: 0
NAUSEA: 0
ORTHOPNEA: 0
ABDOMINAL PAIN: 0
BLURRED VISION: 0
CONSTIPATION: 0
DECREASED APPETITE: 0
ALTERED MENTAL STATUS: 0
FLANK PAIN: 0
DEPRESSION: 0
CLAUDICATION: 0
DIZZINESS: 0
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
## 2021-04-07 DIAGNOSIS — Z79.01 CHRONIC ANTICOAGULATION: ICD-10-CM

## 2021-04-15 ENCOUNTER — HOSPITAL ENCOUNTER (OUTPATIENT)
Dept: LAB | Facility: MEDICAL CENTER | Age: 56
End: 2021-04-15
Attending: NURSE PRACTITIONER
Payer: MEDICAID

## 2021-04-15 DIAGNOSIS — Z79.01 CHRONIC ANTICOAGULATION: ICD-10-CM

## 2021-04-15 PROCEDURE — 36415 COLL VENOUS BLD VENIPUNCTURE: CPT

## 2021-04-15 PROCEDURE — 85610 PROTHROMBIN TIME: CPT

## 2021-04-16 ENCOUNTER — ANTICOAGULATION MONITORING (OUTPATIENT)
Dept: VASCULAR LAB | Facility: MEDICAL CENTER | Age: 56
End: 2021-04-16

## 2021-04-16 DIAGNOSIS — I05.9 MITRAL VALVE DISEASE: ICD-10-CM

## 2021-04-16 DIAGNOSIS — I48.0 PAROXYSMAL ATRIAL FIBRILLATION (HCC): ICD-10-CM

## 2021-04-16 LAB
INR PPP: 1.94 (ref 0.87–1.13)
PROTHROMBIN TIME: 22.7 SEC (ref 12–14.6)

## 2021-04-16 NOTE — PROGRESS NOTES
Anticoagulation Summary  As of 2021    INR goal:  2.0-3.0   TTR:  72.4 % (2 y)   INR used for dosin.94 (4/15/2021)   Warfarin maintenance plan:  2.5 mg (5 mg x 0.5) every Mon; 5 mg (5 mg x 1) all other days   Weekly warfarin total:  32.5 mg   Plan last modified:  Jennifer Schneider (2021)   Next INR check:  2021   Target end date:  Indefinite    Indications    Paroxysmal atrial fibrillation (HCC) [I48.0]  Mitral valve disease [I05.9]             Anticoagulation Episode Summary     INR check location:      Preferred lab:      Send INR reminders to:      Comments:  Mitral valve repair - indefinite anticoag per 19 note from Dr Lawson      Anticoagulation Care Providers     Provider Role Specialty Phone number    FARIDA Parikh Referring Cardiac Surgery [phone]    [facility] Responsible  [phone]        Anticoagulation Patient Findings          Spoke with Ottoniel to report a sub therapetuic INR of 1.94.  Continue current dosing regimen.  Follow up in 4 weeks, to reduce the risk of adverse events related to this high risk medication, warfarin.    Jennifer Schneider, Clinical Pharmacist

## 2021-06-10 ENCOUNTER — HOSPITAL ENCOUNTER (OUTPATIENT)
Dept: LAB | Facility: MEDICAL CENTER | Age: 56
End: 2021-06-10
Attending: NURSE PRACTITIONER
Payer: MEDICAID

## 2021-06-10 ENCOUNTER — HOSPITAL ENCOUNTER (OUTPATIENT)
Dept: LAB | Facility: MEDICAL CENTER | Age: 56
End: 2021-06-10
Attending: INTERNAL MEDICINE
Payer: MEDICAID

## 2021-06-10 DIAGNOSIS — I48.0 PAROXYSMAL ATRIAL FIBRILLATION (HCC): Chronic | ICD-10-CM

## 2021-06-10 PROCEDURE — 36415 COLL VENOUS BLD VENIPUNCTURE: CPT

## 2021-06-10 PROCEDURE — 85610 PROTHROMBIN TIME: CPT

## 2021-06-10 PROCEDURE — 80061 LIPID PANEL: CPT

## 2021-06-11 ENCOUNTER — ANTICOAGULATION MONITORING (OUTPATIENT)
Dept: VASCULAR LAB | Facility: MEDICAL CENTER | Age: 56
End: 2021-06-11

## 2021-06-11 DIAGNOSIS — I05.9 MITRAL VALVE DISEASE: ICD-10-CM

## 2021-06-11 DIAGNOSIS — I48.0 PAROXYSMAL ATRIAL FIBRILLATION (HCC): ICD-10-CM

## 2021-06-11 LAB
CHOLEST SERPL-MCNC: 134 MG/DL (ref 100–199)
FASTING STATUS PATIENT QL REPORTED: NORMAL
HDLC SERPL-MCNC: 30 MG/DL
INR PPP: 2.53 (ref 0.87–1.13)
LDLC SERPL CALC-MCNC: 76 MG/DL
PROTHROMBIN TIME: 26.5 SEC (ref 12–14.6)
TRIGL SERPL-MCNC: 142 MG/DL (ref 0–149)

## 2021-06-11 NOTE — PROGRESS NOTES
Anticoagulation Summary  As of 2021    INR goal:  2.0-3.0   TTR:  73.7 % (2.1 y)   INR used for dosin.53 (6/10/2021)   Warfarin maintenance plan:  2.5 mg (5 mg x 0.5) every Mon; 5 mg (5 mg x 1) all other days   Weekly warfarin total:  32.5 mg   Plan last modified:  Jennifer Schneider (2021)   Next INR check:     Target end date:  Indefinite    Indications    Paroxysmal atrial fibrillation (HCC) [I48.0]  Mitral valve disease [I05.9]             Anticoagulation Episode Summary     INR check location:      Preferred lab:      Send INR reminders to:      Comments:  Mitral valve repair - indefinite anticoag per 19 note from Dr Lawson      Anticoagulation Care Providers     Provider Role Specialty Phone number    FARIDA Parikh Referring Cardiac Surgery [phone]    [facility] Responsible  [phone]        Anticoagulation Patient Findings          Spoke with patient today regarding therapeutic INR of 2.53.  Patient denies any signs/symptoms of bruising or  any changes in diet and medications.  Instructed patient to call clinic with any questions or concerns.    PT reports nose bleeds that aren't very frequent or last more than a few minutes.     Pt is to continue with current warfarin dosing regimen.    Follow up in 4 weeks, to reduce risk of adverse events related to this high risk medication,  Warfarin.    Quiana Santiago, Pharmacy Intern

## 2021-06-14 ENCOUNTER — ANTICOAGULATION MONITORING (OUTPATIENT)
Dept: MEDICAL GROUP | Facility: PHYSICIAN GROUP | Age: 56
End: 2021-06-14

## 2021-06-14 DIAGNOSIS — I05.9 MITRAL VALVE DISEASE: ICD-10-CM

## 2021-06-14 DIAGNOSIS — I48.0 PAROXYSMAL ATRIAL FIBRILLATION (HCC): ICD-10-CM

## 2021-09-02 ENCOUNTER — TELEPHONE (OUTPATIENT)
Dept: VASCULAR LAB | Facility: MEDICAL CENTER | Age: 56
End: 2021-09-02

## 2021-09-07 ENCOUNTER — OFFICE VISIT (OUTPATIENT)
Dept: CARDIOLOGY | Facility: PHYSICIAN GROUP | Age: 56
End: 2021-09-07
Payer: MEDICAID

## 2021-09-07 VITALS
OXYGEN SATURATION: 94 % | HEART RATE: 85 BPM | WEIGHT: 189 LBS | SYSTOLIC BLOOD PRESSURE: 136 MMHG | DIASTOLIC BLOOD PRESSURE: 74 MMHG | BODY MASS INDEX: 29.66 KG/M2 | HEIGHT: 67 IN

## 2021-09-07 DIAGNOSIS — E13.9 DIABETES MELLITUS OF OTHER TYPE WITHOUT COMPLICATION, UNSPECIFIED WHETHER LONG TERM INSULIN USE (HCC): ICD-10-CM

## 2021-09-07 DIAGNOSIS — E66.9 OBESITY (BMI 30-39.9): Chronic | ICD-10-CM

## 2021-09-07 DIAGNOSIS — Z98.890 HISTORY OF MITRAL VALVE REPAIR: Chronic | ICD-10-CM

## 2021-09-07 DIAGNOSIS — I10 WHITE COAT SYNDROME WITH DIAGNOSIS OF HYPERTENSION: ICD-10-CM

## 2021-09-07 DIAGNOSIS — I42.8 OTHER CARDIOMYOPATHY (HCC): ICD-10-CM

## 2021-09-07 DIAGNOSIS — I48.0 PAROXYSMAL ATRIAL FIBRILLATION (HCC): Chronic | ICD-10-CM

## 2021-09-07 PROBLEM — E11.9 DIABETES MELLITUS (HCC): Status: ACTIVE | Noted: 2021-09-07

## 2021-09-07 PROCEDURE — 99214 OFFICE O/P EST MOD 30 MIN: CPT | Performed by: INTERNAL MEDICINE

## 2021-09-07 RX ORDER — TORSEMIDE 20 MG/1
20 TABLET ORAL
Qty: 90 TABLET | Refills: 3 | Status: SHIPPED | OUTPATIENT
Start: 2021-09-07 | End: 2022-04-14 | Stop reason: SDUPTHER

## 2021-09-07 RX ORDER — DAPAGLIFLOZIN 10 MG/1
1 TABLET, FILM COATED ORAL DAILY
Qty: 90 TABLET | Refills: 3 | Status: SHIPPED | OUTPATIENT
Start: 2021-09-07 | End: 2022-08-02

## 2021-09-07 ASSESSMENT — ENCOUNTER SYMPTOMS
WEIGHT LOSS: 0
CONSTIPATION: 0
WEIGHT GAIN: 0
ABDOMINAL PAIN: 0
IRREGULAR HEARTBEAT: 0
BACK PAIN: 0
CLAUDICATION: 0
SYNCOPE: 0
SHORTNESS OF BREATH: 0
ORTHOPNEA: 0
COUGH: 0
HEARTBURN: 0
ALTERED MENTAL STATUS: 0
PALPITATIONS: 0
BLURRED VISION: 0
DECREASED APPETITE: 0
FEVER: 0
NEAR-SYNCOPE: 0
DIZZINESS: 0
NAUSEA: 0
FLANK PAIN: 0
PND: 0
VOMITING: 0
DYSPNEA ON EXERTION: 0
DEPRESSION: 0
DIARRHEA: 0

## 2021-09-07 ASSESSMENT — FIBROSIS 4 INDEX: FIB4 SCORE: 1.45
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
## 2021-09-07 NOTE — PATIENT INSTRUCTIONS
Atrial Fibrillation  Atrial fibrillation is a type of irregular or rapid heartbeat (arrhythmia). In atrial fibrillation, the top part of the heart (atria) quivers in a chaotic pattern. This makes the heart unable to pump blood normally. Having atrial fibrillation can increase your risk for other health problems, such as:  · Blood can pool in the atria and form clots. If a clot travels to the brain, it can cause a stroke.  · The heart muscle may weaken from the irregular blood flow. This can cause heart failure.  Atrial fibrillation may start suddenly and stop on its own, or it may become a long-lasting problem.  What are the causes?  This condition is caused by some heart-related conditions or procedures, including:  · High blood pressure. This is the most common cause.  · Heart failure.  · Heart valve conditions.  · Inflammation of the sac that surrounds the heart (pericarditis).  · Heart surgery.  · Coronary artery disease.  · Certain heart rhythm disorders, such as Kaiser-Parkinson-White syndrome.  Other causes include:  · Pneumonia.  · Obstructive sleep apnea.  · Lung cancer.  · Thyroid problems, especially if the thyroid is overactive (hyperthyroidism).  · Excessive alcohol or drug use.  Sometimes, the cause of this condition is not known.  What increases the risk?  This condition is more likely to develop in:  · Older people.  · People who smoke.  · People who have diabetes mellitus.  · People who are overweight (obese).  · Athletes who exercise vigorously.  · People who have a family history.  What are the signs or symptoms?  Symptoms of this condition include:  · A feeling that your heart is beating rapidly or irregularly.  · A feeling of discomfort or pain in your chest.  · Shortness of breath.  · Sudden light-headedness or weakness.  · Getting tired easily during exercise.  In some cases, there are no symptoms.  How is this diagnosed?  Your health care provider may be able to detect atrial fibrillation when  taking your pulse. If detected, this condition may be diagnosed with:  · Electrocardiogram (ECG).  · Ambulatory cardiac monitor. This device records your heartbeats for 24 hours or more.  · Transthoracic echocardiogram (TTE) to evaluate how blood flows through your heart.  · Transesophageal echocardiogram (MICAH) to view more detailed images of your heart.  · A stress test.  · Imaging tests, such as a CT scan or chest X-ray.  · Blood tests.  How is this treated?  This condition may be treated with:  · Medicines to slow down the heart rate or bring the heart's rhythm back to normal.  · Medicines to prevent blood clots from forming.  · Electrical cardioversion. This delivers a low-energy shock to the heart to reset its rhythm.  · Ablation. This procedure destroys the part of the heart tissue that sends abnormal signals.  · Left atrial appendage occlusion/excision. This seals off a common place in the atria where blood clots can form (left atrial appendage).  The goal of treatment is to prevent blood clots from forming and to keep your heart beating at a normal rate and rhythm. Treatment depends on underlying medical conditions and how you feel when you are experiencing fibrillation.  Follow these instructions at home:  Medicines  · Take over-the counter and prescription medicines only as told by your health care provider.  · If your health care provider prescribed a blood-thinning medicine (anticoagulant), take it exactly as told. Taking too much blood-thinning medicine can cause bleeding. Taking too little can enable a blood clot to form and travel to the brain, causing a stroke.  Lifestyle         · Do not use any products that contain nicotine or tobacco, such as cigarettes and e-cigarettes. If you need help quitting, ask your health care provider.  · Do not drink beverages that contain caffeine, such as coffee, soda, and tea.  · Follow diet instructions as told by your health care provider.  · Exercise regularly as  "told by your health care provider.  · Do not drink alcohol.  General instructions  · If you have obstructive sleep apnea, manage your condition as told by your health care provider.  · Maintain a healthy weight. Do not use diet pills unless your health care provider approves. Diet pills may make heart problems worse.  · Keep all follow-up visits as told by your health care provider. This is important.  Contact a health care provider if you:  · Notice a change in the rate, rhythm, or strength of your heartbeat.  · Are taking an anticoagulant and you notice increased bruising.  · Tire more easily when you exercise or exert yourself.  · Have a sudden change in weight.  Get help right away if you have:    · Chest pain, abdominal pain, sweating, or weakness.  · Difficulty breathing.  · Blood in your vomit, stool (feces), or urine.  · Any symptoms of a stroke. \"BE FAST\" is an easy way to remember the main warning signs of a stroke:  ? B - Balance. Signs are dizziness, sudden trouble walking, or loss of balance.  ? E - Eyes. Signs are trouble seeing or a sudden change in vision.  ? F - Face. Signs are sudden weakness or numbness of the face, or the face or eyelid drooping on one side.  ? A - Arms. Signs are weakness or numbness in an arm. This happens suddenly and usually on one side of the body.  ? S - Speech. Signs are sudden trouble speaking, slurred speech, or trouble understanding what people say.  ? T - Time. Time to call emergency services. Write down what time symptoms started.  · Other signs of a stroke, such as:  ? A sudden, severe headache with no known cause.  ? Nausea or vomiting.  ? Seizure.  These symptoms may represent a serious problem that is an emergency. Do not wait to see if the symptoms will go away. Get medical help right away. Call your local emergency services (911 in the [place] Do not drive yourself to the hospital.  Summary  · Atrial fibrillation is a type of irregular or rapid heartbeat " (arrhythmia).  · Symptoms include a feeling that your heart is beating fast or irregularly. In some cases, you may not have symptoms.  · The condition is treated with medicines to slow down the heart rate or bring the heart's rhythm back to normal. You may also need blood-thinning medicines to prevent blood clots.  · Get help right away if you have symptoms or signs of a stroke.  This information is not intended to replace advice given to you by your health care provider. Make sure you discuss any questions you have with your health care provider.  Document Released: 12/18/2006 Document Revised: 02/07/2019 Document Reviewed: 02/08/2019  [employer] Patient Education © 2020 Elsevier Inc.    Dapagliflozin tablets (consider instead of torsemide)  What is this medicine?  DAPAGLIFLOZIN (DAP a gli FLOE zin) helps to treat type 2 diabetes. It helps to control blood sugar. Treatment is combined with diet and exercise. This drug may also be used to reduce the risk of going to the hospital for heart failure if you have type 2 diabetes and risk factors for heart disease.  This medicine may be used for other purposes; ask your health care provider or pharmacist if you have questions.  COMMON BRAND NAME(S): Farxiga  What should I tell my health care provider before I take this medicine?  They need to know if you have any of these conditions:  · dehydration  · diabetic ketoacidosis  · diet low in salt  · eating less due to illness, surgery, dieting, or any other reason  · having surgery  · history of pancreatitis or pancreas problems  · history of yeast infection of the penis or vagina  · if you often drink alcohol  · infections in the bladder, kidneys, or urinary tract  · kidney disease  · low blood pressure  · on hemodialysis  · problems urinating  · type 1 diabetes  · uncircumcised male  · an unusual or allergic reaction to dapagliflozin, other medicines, foods, dyes, or preservatives  · pregnant or trying to get  pregnant  · breast-feeding  How should I use this medicine?  Take this medicine by mouth with a glass of water. Follow the directions on the prescription label. You can take it with or without food. If it upsets your stomach, take it with food. Take this medicine in the morning. Take your dose at the same time each day. Do not take more often than directed. Do not stop taking except on your doctor's advice.  A special MedGuide will be given to you by the pharmacist with each prescription and refill. Be sure to read this information carefully each time.  Talk to your pediatrician regarding the use of this medicine in children. Special care may be needed.  Overdosage: If you think you have taken too much of this medicine contact a poison control center or emergency room at once.  NOTE: This medicine is only for you. Do not share this medicine with others.  What if I miss a dose?  If you miss a dose, take it as soon as you can. If it is almost time for your next dose, take only that dose. Do not take double or extra doses.  What may interact with this medicine?  Do not take this medicine with any of the following medications:  · gatifloxacin  This medicine may also interact with the following medications:  · alcohol  · certain medicines for blood pressure, heart disease  · diuretics  · insulin  · nateglinide  · pioglitazone  · quinolone antibiotics like ciprofloxacin, levofloxacin, ofloxacin  · repaglinide  · some herbal dietary supplements  · steroid medicines like prednisone or cortisone  · sulfonylureas like glimepiride, glipizide, glyburide  · thyroid medicine  This list may not describe all possible interactions. Give your health care provider a list of all the medicines, herbs, non-prescription drugs, or dietary supplements you use. Also tell them if you smoke, drink alcohol, or use illegal drugs. Some items may interact with your medicine.  What should I watch for while using this medicine?  Visit your doctor or  health care professional for regular checks on your progress.  This medicine can cause a serious condition in which there is too much acid in the blood. If you develop nausea, vomiting, stomach pain, unusual tiredness, or breathing problems, stop taking this medicine and call your doctor right away. If possible, use a ketone dipstick to check for ketones in your urine.  A test called the HbA1C (A1C) will be monitored. This is a simple blood test. It measures your blood sugar control over the last 2 to 3 months. You will receive this test every 3 to 6 months.  Learn how to check your blood sugar. Learn the symptoms of low and high blood sugar and how to manage them.  Always carry a quick-source of sugar with you in case you have symptoms of low blood sugar. Examples include hard sugar candy or glucose tablets. Make sure others know that you can choke if you eat or drink when you develop serious symptoms of low blood sugar, such as seizures or unconsciousness. They must get medical help at once.  Tell your doctor or health care professional if you have high blood sugar. You might need to change the dose of your medicine. If you are sick or exercising more than usual, you might need to change the dose of your medicine.  Do not skip meals. Ask your doctor or health care professional if you should avoid alcohol. Many nonprescription cough and cold products contain sugar or alcohol. These can affect blood sugar.  Wear a medical ID bracelet or chain, and carry a card that describes your disease and details of your medicine and dosage times.  What side effects may I notice from receiving this medicine?  Side effects that you should report to your doctor or health care professional as soon as possible:  · allergic reactions like skin rash, itching or hives, swelling of the face, lips, or tongue  · breathing problems  · dizziness  · feeling faint or lightheaded, falls  · muscle weakness  · nausea, vomiting, unusual stomach  upset or pain  · new pain or tenderness, change in skin color, sores or ulcers, or infection in legs or feet  · penile discharge, itching, or pain in men  · signs and symptoms of a genital infection, such as fever; tenderness, redness, or swelling in the genitals or area from the genitals to the back of the rectum  · signs and symptoms of low blood sugar such as feeling anxious, confusion, dizziness, increased hunger, unusually weak or tired, sweating, shakiness, cold, irritable, headache, blurred vision, fast heartbeat, loss of consciousness  · signs and symptoms of a urinary tract infection, such as fever, chills, a burning feeling when urinating, blood in the urine, back pain  · trouble passing urine or change in the amount of urine, including an urgent need to urinate more often, in larger amounts, or at night  · unusual tiredness  · vaginal discharge, itching, or odor in women  Side effects that usually do not require medical attention (report to your doctor or health care professional if they continue or are bothersome):  · mild increase in urination  · thirsty  This list may not describe all possible side effects. Call your doctor for medical advice about side effects. You may report side effects to FDA at 5-736-PRX-1476.  Where should I keep my medicine?  Keep out of the reach of children.  Store at room temperature between 15 and 30 degrees C (59 and 86 degrees F). Throw away any unused medicine after the expiration date.  NOTE: This sheet is a summary. It may not cover all possible information. If you have questions about this medicine, talk to your doctor, pharmacist, or health care provider.  © 2020 Elsevier/[employer] (2019-11-01 15:24:42)

## 2021-09-09 ENCOUNTER — DOCUMENTATION (OUTPATIENT)
Dept: VASCULAR LAB | Facility: MEDICAL CENTER | Age: 56
End: 2021-09-09

## 2021-09-09 ENCOUNTER — HOSPITAL ENCOUNTER (OUTPATIENT)
Dept: LAB | Facility: MEDICAL CENTER | Age: 56
End: 2021-09-09
Attending: NURSE PRACTITIONER
Payer: MEDICAID

## 2021-09-09 DIAGNOSIS — I48.0 PAROXYSMAL ATRIAL FIBRILLATION (HCC): Chronic | ICD-10-CM

## 2021-09-09 LAB
INR PPP: 2.05 (ref 0.87–1.13)
PROTHROMBIN TIME: 22.5 SEC (ref 12–14.6)

## 2021-09-09 PROCEDURE — 36415 COLL VENOUS BLD VENIPUNCTURE: CPT

## 2021-09-09 PROCEDURE — 85610 PROTHROMBIN TIME: CPT

## 2021-09-09 NOTE — PROGRESS NOTES
[facility]    Received CHF/DM referral from Dr. Keller on 9/8/21    Scheduled NP for 10/7    Insurance: Medicaid  PCP: non [facility]  Locations to be seen: [facility]    [facility] at [phone], fax [phone]    Yolis Saha, SilvaD

## 2021-09-10 ENCOUNTER — ANTICOAGULATION MONITORING (OUTPATIENT)
Dept: VASCULAR LAB | Facility: MEDICAL CENTER | Age: 56
End: 2021-09-10

## 2021-09-10 DIAGNOSIS — I48.0 PAROXYSMAL ATRIAL FIBRILLATION (HCC): ICD-10-CM

## 2021-09-10 DIAGNOSIS — I05.9 MITRAL VALVE DISEASE: ICD-10-CM

## 2021-09-10 NOTE — PROGRESS NOTES
OP Telephone Anticoagulation Service Note    Date: 9/10/2021      Anticoagulation Summary  As of 9/10/2021    INR goal:  2.0-3.0   TTR:  76.4 % (2.4 y)   INR used for dosin.05 (2021)   Warfarin maintenance plan:  2.5 mg (5 mg x 0.5) every Mon; 5 mg (5 mg x 1) all other days   Weekly warfarin total:  32.5 mg   Plan last modified:  Jennifer Schneider (2021)   Next INR check:  10/15/2021   Target end date:  Indefinite    Indications    Paroxysmal atrial fibrillation (HCC) [I48.0]  Mitral valve disease [I05.9]             Anticoagulation Episode Summary     INR check location:      Preferred lab:      Send INR reminders to:      Comments:  Mitral valve repair - indefinite anticoag per 19 note from Dr Lawson      Anticoagulation Care Providers     Provider Role Specialty Phone number    FARIDA Parikh Referring Thoracic Surgery (Cardiothoracic Vascular Surgery) [phone]    [facility] Anticoagulation Services Responsible  [phone]        Anticoagulation Patient Findings  Patient Findings     Positives:  Bruising    Negatives:  Signs/symptoms of thrombosis, Signs/symptoms of bleeding, Laboratory test error suspected, Change in health, Change in alcohol use, Change in activity, Upcoming invasive procedure, Emergency department visit, Upcoming dental procedure, Missed doses, Extra doses, Change in medications, Change in diet/appetite, Hospital admission, Other complaints           INR therapeutic at 2.05.  Spoke w/ pt on phone.  Verified regimen w/ pt.  Instructed pt to continue current warfarin regimen.  Bruising more often per pt, advised to monitor closely and see MD if pain/swelling occurs or increase in size.  NO changes in diet reported per pt.  NO changes in medications reported per pt.  Check INR in 3 week(s).  Instructed pt to call clinic at [phone] if there are any questions.  Pt stated understanding.     Pt is on antiplatelet therapy Aspirin 81mg for MV repair and must be  reviewed again with cardiology.    Dianna Rendon, Pharmacy Intern.

## 2021-10-07 ENCOUNTER — NON-PROVIDER VISIT (OUTPATIENT)
Dept: CARDIOLOGY | Facility: MEDICAL CENTER | Age: 56
End: 2021-10-07
Payer: MEDICAID

## 2021-10-07 VITALS
SYSTOLIC BLOOD PRESSURE: 171 MMHG | BODY MASS INDEX: 30.76 KG/M2 | WEIGHT: 196.4 LBS | DIASTOLIC BLOOD PRESSURE: 102 MMHG | HEART RATE: 79 BPM

## 2021-10-07 DIAGNOSIS — E13.9 DIABETES MELLITUS OF OTHER TYPE WITHOUT COMPLICATION, UNSPECIFIED WHETHER LONG TERM INSULIN USE (HCC): ICD-10-CM

## 2021-10-07 DIAGNOSIS — I42.8 OTHER CARDIOMYOPATHY (HCC): ICD-10-CM

## 2021-10-07 DIAGNOSIS — I10 ESSENTIAL HYPERTENSION: ICD-10-CM

## 2021-10-07 PROCEDURE — 99211 OFF/OP EST MAY X REQ PHY/QHP: CPT | Performed by: INTERNAL MEDICINE

## 2021-10-07 RX ORDER — CARVEDILOL 25 MG/1
37.5 TABLET ORAL 2 TIMES DAILY WITH MEALS
Qty: 90 TABLET | Refills: 11 | Status: SHIPPED | OUTPATIENT
Start: 2021-10-07 | End: 2022-04-14 | Stop reason: SDUPTHER

## 2021-10-07 ASSESSMENT — FIBROSIS 4 INDEX: FIB4 SCORE: 1.45

## 2021-10-07 NOTE — NON-PROVIDER
CHF Pharmacotherapy visit - Visit    Date of Service: 10/07/21    Informed written consent was given on: 10/7/21    Ottoniel Davies is here for CHF and DM    HPI  Pertinent Interval History since last visit:   • Baseline visit.    Most recent EF:  50%      Current Outpatient Medications:   •  torsemide, 20 mg, Oral, QDAY PRN  •  metformin, 500 mg, Oral, BID WITH MEALS  •  Dapagliflozin Propanediol, 1 Tablet, Oral, DAILY  •  allopurinol, 300 mg, Oral, DAILY  •  carvedilol, TAKE 1 TABLET BY MOUTH TWICE DAILY WITH MEALS  •  hydrALAZINE, 50 mg, Oral, TID  •  potassium chloride SA, 20 mEq, Oral, BID  •  isosorbide mononitrate SR, 60 mg, Oral, QAM  •  warfarin, TAKE 1/2 TO 1 TABLET BY MOUTH EVERY DAY  •  aspirin, 81 mg, Oral, DAILY  •  losartan, 100 mg, Oral, DAILY  •  levothyroxine, 300 mcg, Oral, DAILY  •  pravastatin, 40 mg, Oral, DAILY    Current Adherence to CHF Therapies:  Complete - states he will miss all doses once every few months or so.    Current CHF Medications - including dose:   • Entresto or ACE/ARB: Losartan 100 mg once daily  • Beta blocker: Carvedilol 25 mg BID  • Diuretic: Torsemide 20 mg once daily PRN - has been using daily  • Aldosterone antagonist: N/a    Current diabetes medications:  • Metformin 500 mg BID  • Farxiga 10 mg once daily  • SMFBG: Does not routinely monitor  • Most recent A1c: 6.1% (8/2021)    Current lipid medications:  • Pravastatin 40 mg once daily    There were no vitals filed for this visit.    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     Diet: common adult - practices portion size moderation    SOCIAL HISTORY  Social History     Tobacco Use   Smoking Status Former Smoker   • Packs/day: 0.25   • Years: 35.00   • Pack years: 8.75   Smokeless Tobacco Former User   • Types: Chew   Tobacco Comment    quit weeks ago        DATA  REVIEW  Lab Results   Component Value Date/Time    HBA1C 7.2 (H) 04/11/2019 05:30 PM          Lab Results   Component Value Date/Time    CHOLSTRLTOT 134 06/10/2021 11:31 AM    LDL 76 06/10/2021 11:31 AM    HDL 30 (A) 06/10/2021 11:31 AM    TRIGLYCERIDE 142 06/10/2021 11:31 AM       Lab Results   Component Value Date/Time    SODIUM 140 03/19/2020 01:16 PM    POTASSIUM 3.7 03/19/2020 01:16 PM    CHLORIDE 104 03/19/2020 01:16 PM    CO2 23 03/19/2020 01:16 PM    GLUCOSE 108 (H) 03/19/2020 01:16 PM    BUN 23 (H) 03/19/2020 01:16 PM    CREATININE 1.40 03/19/2020 01:16 PM     Lab Results   Component Value Date/Time    ALKPHOSPHAT 78 03/19/2020 01:16 PM    ASTSGOT 19 03/19/2020 01:16 PM    ALTSGPT 10 03/19/2020 01:16 PM    TBILIRUBIN 0.4 03/19/2020 01:16 PM    INR 2.05 (H) 09/09/2021 01:59 PM    ALBUMIN 4.2 03/19/2020 01:16 PM      No components found for: MICROALBUMINCREATRATIOURINE    Renal function:  Calculated creatinine clearance: ~ 60 ml/min     Other:  Immunization History   Administered Date(s) Administered   • Influenza (IM) Preservative Free - HISTORICAL DATA 09/08/2015, 09/15/2016   • Influenza Seasonal Injectable - Historical Data 10/11/2013, 09/24/2014, 08/25/2017   • Influenza Vaccine Quad Inj (Pf) 09/04/2018, 09/03/2019, 10/08/2020   • Influenza Vaccine Quad Inj (Preserved) 09/03/2019   • Influenza, Unspecified - HISTORICAL DATA 08/25/2017   • Moderna SARS-CoV-2 Vaccine 04/24/2021, 05/22/2021   • Pneumococcal polysaccharide vaccine (PPSV-23) 02/14/2014   • TD Vaccine 11/16/2016   • Tdap Vaccine 06/26/2019     Up to date on pneumococcal vaccine? Yes      Recent Imaging Studies:    None since last visit      ASSESSMENT AND PLAN  • Provided basic education on CHF, preventing exacerbation, documenting daily weights and BP, importance of medication adherence and abstinence from methamphetamine  o Pt's BP was elevated today in clinic:  - Pt states he did not take his medications prior to this appt d/t disruption in  his routine  - Pt presented to clinic w/ a kink in his neck that was extremely bothersome - advised pt to go to  or the ED for further evaluation  • Basic physiology of DMII was explained to patient as well as microvascular/macrovascular complications. The importance of increasing physical activity to improve diabetes control was discussed with the patient. Patient was also educated on changing diet and making better choices to help control blood sugar.  o BG well controlled at this time  • Pt goes to [facility] for labs - will call and request most recent results    Resulting CHF medications today (changes are bolded)  • Entresto or ACE/ARB: Losartan 100 mg once daily  • Beta blocker: Increase carvedilol to 37.5 mg BID  • Diuretic: Torsemide 20 mg once daily PRN - has been using daily  • Aldosterone antagonist: N/a    Diabetes medications (no changes)  • Continue metformin 500 mg BID  • Farxiga 10 mg once daily    Lifestyle Recommendations From Today's Visit:   • Continue to eat DASH/MED style diet.   • Continue to exercise as tolerated.     Significant changes to laboratory values since last visit that require repeat labs:  • N/a - requesting labs from [facility]    Blood Work Ordered At Today's visit:   None    Studies Ordered at Todays Visit:  None     Follow-Up:   1 months    Anil Quan, PharmD      CC:  Shane Vanegas M.D.  No ref. provider found    xMedications reconciled  xFlow sheets updated

## 2021-11-02 ENCOUNTER — TELEPHONE (OUTPATIENT)
Dept: VASCULAR LAB | Facility: MEDICAL CENTER | Age: 56
End: 2021-11-02

## 2021-11-04 ENCOUNTER — HOSPITAL ENCOUNTER (OUTPATIENT)
Dept: LAB | Facility: MEDICAL CENTER | Age: 56
End: 2021-11-04
Attending: NURSE PRACTITIONER
Payer: MEDICAID

## 2021-11-04 DIAGNOSIS — I48.0 PAROXYSMAL ATRIAL FIBRILLATION (HCC): Chronic | ICD-10-CM

## 2021-11-04 LAB
INR PPP: 1.57 (ref 0.87–1.13)
PROTHROMBIN TIME: 18.3 SEC (ref 12–14.6)

## 2021-11-04 PROCEDURE — 36415 COLL VENOUS BLD VENIPUNCTURE: CPT

## 2021-11-04 PROCEDURE — 85610 PROTHROMBIN TIME: CPT

## 2021-11-05 ENCOUNTER — ANTICOAGULATION MONITORING (OUTPATIENT)
Dept: VASCULAR LAB | Facility: MEDICAL CENTER | Age: 56
End: 2021-11-05

## 2021-11-05 DIAGNOSIS — I05.9 MITRAL VALVE DISEASE: ICD-10-CM

## 2021-11-05 DIAGNOSIS — I48.0 PAROXYSMAL ATRIAL FIBRILLATION (HCC): ICD-10-CM

## 2021-11-05 NOTE — PROGRESS NOTES
Anticoagulation Summary  As of 2021    INR goal:  2.0-3.0   TTR:  72.4 % (2.5 y)   INR used for dosin.57 (2021)   Warfarin maintenance plan:  2.5 mg (5 mg x 0.5) every Mon; 5 mg (5 mg x 1) all other days   Weekly warfarin total:  32.5 mg   Plan last modified:  Jennifer Schneider (2021)   Next INR check:  2021   Target end date:  Indefinite    Indications    Paroxysmal atrial fibrillation (HCC) [I48.0]  Mitral valve disease [I05.9]             Anticoagulation Episode Summary     INR check location:      Preferred lab:      Send INR reminders to:      Comments:  Mitral valve repair - indefinite anticoag per 19 note from Dr Lawson      Anticoagulation Care Providers     Provider Role Specialty Phone number    FARIDA Parikh Referring Thoracic Surgery (Cardiothoracic Vascular Surgery) [phone]    [facility] Anticoagulation Services Responsible  [phone]        Anticoagulation Patient Findings  Patient Findings     Negatives:  Signs/symptoms of thrombosis, Signs/symptoms of bleeding, Laboratory test error suspected, Change in health, Change in alcohol use, Change in activity, Upcoming invasive procedure, Emergency department visit, Upcoming dental procedure, Missed doses, Extra doses, Change in medications, Change in diet/appetite, Hospital admission, Bruising, Other complaints        Spoke with patient today regarding subtherapeutic INR of 1.57.  Patient denies any signs/symptoms of bruising or bleeding or any changes in diet and medications.  Instructed patient to call clinic with any questions or concerns.    Patient on ASA 81mg d/t hx of mitral valve repair, f/u with cards appropriately.    Instructed patient to bolus with 10mg X 1, then resume current warfarin regimen.  Follow up in 2-3 weeks, to reduce risk of adverse events related to this high risk medication,  Warfarin.    Nicholas Vences, PharmD, BCACP

## 2021-11-09 ENCOUNTER — HOSPITAL ENCOUNTER (OUTPATIENT)
Dept: LAB | Facility: MEDICAL CENTER | Age: 56
End: 2021-11-09
Attending: INTERNAL MEDICINE
Payer: MEDICAID

## 2021-11-09 DIAGNOSIS — E13.9 DIABETES MELLITUS OF OTHER TYPE WITHOUT COMPLICATION, UNSPECIFIED WHETHER LONG TERM INSULIN USE (HCC): ICD-10-CM

## 2021-11-09 LAB
ALBUMIN SERPL BCP-MCNC: 4.4 G/DL (ref 3.2–4.9)
ALBUMIN/GLOB SERPL: 1.8 G/DL
ALP SERPL-CCNC: 108 U/L (ref 30–99)
ALT SERPL-CCNC: 6 U/L (ref 2–50)
ANION GAP SERPL CALC-SCNC: 12 MMOL/L (ref 7–16)
AST SERPL-CCNC: 14 U/L (ref 12–45)
BILIRUB SERPL-MCNC: 0.7 MG/DL (ref 0.1–1.5)
BUN SERPL-MCNC: 26 MG/DL (ref 8–22)
CALCIUM SERPL-MCNC: 9 MG/DL (ref 8.5–10.5)
CHLORIDE SERPL-SCNC: 105 MMOL/L (ref 96–112)
CO2 SERPL-SCNC: 26 MMOL/L (ref 20–33)
CREAT SERPL-MCNC: 1.48 MG/DL (ref 0.5–1.4)
CREAT UR-MCNC: 45.11 MG/DL
EST. AVERAGE GLUCOSE BLD GHB EST-MCNC: 137 MG/DL
FASTING STATUS PATIENT QL REPORTED: NORMAL
GLOBULIN SER CALC-MCNC: 2.5 G/DL (ref 1.9–3.5)
GLUCOSE SERPL-MCNC: 115 MG/DL (ref 65–99)
HBA1C MFR BLD: 6.4 % (ref 4–5.6)
MICROALBUMIN UR-MCNC: 13.8 MG/DL
MICROALBUMIN/CREAT UR: 306 MG/G (ref 0–30)
NT-PROBNP SERPL IA-MCNC: 6349 PG/ML (ref 0–125)
POTASSIUM SERPL-SCNC: 3.1 MMOL/L (ref 3.6–5.5)
PROT SERPL-MCNC: 6.9 G/DL (ref 6–8.2)
SODIUM SERPL-SCNC: 143 MMOL/L (ref 135–145)

## 2021-11-09 PROCEDURE — 82570 ASSAY OF URINE CREATININE: CPT

## 2021-11-09 PROCEDURE — 36415 COLL VENOUS BLD VENIPUNCTURE: CPT

## 2021-11-09 PROCEDURE — 83036 HEMOGLOBIN GLYCOSYLATED A1C: CPT

## 2021-11-09 PROCEDURE — 80053 COMPREHEN METABOLIC PANEL: CPT

## 2021-11-09 PROCEDURE — 83880 ASSAY OF NATRIURETIC PEPTIDE: CPT

## 2021-11-09 PROCEDURE — 82043 UR ALBUMIN QUANTITATIVE: CPT

## 2021-11-11 ENCOUNTER — NON-PROVIDER VISIT (OUTPATIENT)
Dept: CARDIOLOGY | Facility: MEDICAL CENTER | Age: 56
End: 2021-11-11
Payer: MEDICAID

## 2021-11-11 VITALS
HEART RATE: 81 BPM | SYSTOLIC BLOOD PRESSURE: 103 MMHG | DIASTOLIC BLOOD PRESSURE: 63 MMHG | WEIGHT: 196 LBS | BODY MASS INDEX: 30.7 KG/M2

## 2021-11-11 DIAGNOSIS — Z79.01 CHRONIC ANTICOAGULATION: ICD-10-CM

## 2021-11-11 DIAGNOSIS — I42.8 OTHER CARDIOMYOPATHY (HCC): ICD-10-CM

## 2021-11-11 PROCEDURE — 99211 OFF/OP EST MAY X REQ PHY/QHP: CPT | Performed by: INTERNAL MEDICINE

## 2021-11-11 ASSESSMENT — FIBROSIS 4 INDEX: FIB4 SCORE: 1.38

## 2021-11-11 NOTE — PATIENT INSTRUCTIONS
· Increase potassium 40 mEq (2 tabs) every morning and 20 mEq every evening  · Increase carvedilol 37.5 mg (1.5 tab) twice daily

## 2021-11-11 NOTE — PROGRESS NOTES
CHF Pharmacotherapy visit - Visit    Date of Service: 11/11/21    Time In: 1400  Time Out: 1430    Informed written consent was given on: 10/7/21    Ottoniel Davies is here for CHF and DM    HPI  Pertinent Interval History since last visit:   • Pt gained 10 lbs in over week. He restarted torsemide and has been taking it daily. Fluid retention has resolved.    Most recent EF:  50% 11/2019      Current Outpatient Medications:   •  carvedilol, 37.5 mg, Oral, BID WITH MEALS, Taking  •  torsemide, 20 mg, Oral, QDAY PRN, Taking  •  metformin, 500 mg, Oral, BID WITH MEALS, Taking  •  Dapagliflozin Propanediol, 1 Tablet, Oral, DAILY, Taking  •  allopurinol, 300 mg, Oral, DAILY, Taking  •  hydrALAZINE, 50 mg, Oral, TID (Patient taking differently: 2 tabs qAM + 1 tab qPM), Taking  •  potassium chloride SA, 20 mEq, Oral, BID, Taking  •  isosorbide mononitrate SR, 60 mg, Oral, QAM, Taking  •  warfarin, TAKE 1/2 TO 1 TABLET BY MOUTH EVERY DAY, Taking  •  aspirin, 81 mg, Oral, DAILY, Taking  •  losartan, 100 mg, Oral, DAILY, Taking  •  levothyroxine, 300 mcg, Oral, DAILY, Taking  •  pravastatin, 40 mg, Oral, DAILY, Taking    Current Adherence to CHF Therapies:  Complete    Current CHF Medications - including dose:   • Entresto or ACE/ARB: Losartan 100 mg daily  • Beta blocker: Carvedilol 25 mg BID (did not increase as instructed)  • Diuretic:Torsemide 20 mg daily  • Aldosterone antagonist: none    Current diabetes medications:  • Metformin 500 mg BID  • Farxiga 10 mg daily    Vitals:    11/11/21 1427   BP: 103/63   Pulse: 81       Home BP and HR:  135/90      Change in weight: Stable    Exercise habits: no regular exercise program     Diet: common adult    SOCIAL HISTORY  Social History     Tobacco Use   Smoking Status Former Smoker   • Packs/day: 0.25   • Years: 35.00   • Pack years: 8.75   Smokeless Tobacco Former User   • Types: Chew   Tobacco Comment    quit weeks ago        DATA REVIEW  Lab Results   Component Value  Date/Time    HBA1C 6.4 (H) 11/09/2021 07:54 AM          Lab Results   Component Value Date/Time    CHOLSTRLTOT 134 06/10/2021 11:31 AM    LDL 76 06/10/2021 11:31 AM    HDL 30 (A) 06/10/2021 11:31 AM    TRIGLYCERIDE 142 06/10/2021 11:31 AM       Lab Results   Component Value Date/Time    SODIUM 143 11/09/2021 07:54 AM    POTASSIUM 3.1 (L) 11/09/2021 07:54 AM    CHLORIDE 105 11/09/2021 07:54 AM    CO2 26 11/09/2021 07:54 AM    GLUCOSE 115 (H) 11/09/2021 07:54 AM    BUN 26 (H) 11/09/2021 07:54 AM    CREATININE 1.48 (H) 11/09/2021 07:54 AM     Lab Results   Component Value Date/Time    ALKPHOSPHAT 108 (H) 11/09/2021 07:54 AM    ASTSGOT 14 11/09/2021 07:54 AM    ALTSGPT 6 11/09/2021 07:54 AM    TBILIRUBIN 0.7 11/09/2021 07:54 AM    INR 1.57 (H) 11/04/2021 01:40 PM    ALBUMIN 4.4 11/09/2021 07:54 AM      No components found for: MICROALBUMINCREATRATIOURINE    Renal function:  Calculated creatinine clearance: 68 ml/min     Other Pertinent Blood Work:     Other:  Immunization History   Administered Date(s) Administered   • Influenza (IM) Preservative Free - HISTORICAL DATA 09/08/2015, 09/15/2016   • Influenza Seasonal Injectable - Historical Data 10/11/2013, 09/24/2014, 08/25/2017   • Influenza Vaccine Quad Inj (Pf) 09/04/2018, 09/03/2019, 10/08/2020   • Influenza Vaccine Quad Inj (Preserved) 09/03/2019   • Influenza, Unspecified - HISTORICAL DATA 08/25/2017   • Moderna SARS-CoV-2 Vaccine 04/24/2021, 05/22/2021   • Pneumococcal polysaccharide vaccine (PPSV-23) 02/14/2014   • TD Vaccine 11/16/2016   • Tdap Vaccine 06/26/2019     Up to date on pneumococcal vaccine? yes      Recent Imaging Studies:    None since last visit      ASSESSMENT AND PLAN  • Pt was experiencing fluid overload and started taking torsemide daily. Fluid retention has resolved since then.  • Pt otherwise is tolerating his medication regimen and denies ADRs  • Reviewed labs:  o Pt is hypokalemic at 3.1   o Pt's a1c is at goal at 6.4%  o Renal indicies are  stable    Resulting CHF medications today (changes are bolded)  • Entresto or ACE/ARB: Losartan 100 mg daily  • Beta blocker: Increase carvedilol to 37.5 mg BID as previously instructed  • Diuretic:Torsemide 20 mg daily  • Aldosterone antagonist: none  • Increase potassium to 40 mEq qAM + 20 mEq qPM    Diabetes medications (changes are bolded)  • Continue metformin and Farxiga    Lifestyle Recommendations From Today's Visit:   • Limit Na+ intake to 2gm/daily and fluid intake to 2L/daily    Significant changes to laboratory values since last visit that require repeat labs:  • K+    Blood Work Ordered At Today's visit:   BMP    Studies Ordered at Todays Visit:  None     Follow-Up:   4 weeks via phone to go over BMP  8 weeks face to face per pt preference    Yolis Saha, PharmD      CC:  Shane Vanegas M.D.  No ref. provider found

## 2021-12-21 ENCOUNTER — TELEPHONE (OUTPATIENT)
Dept: VASCULAR LAB | Facility: MEDICAL CENTER | Age: 56
End: 2021-12-21

## 2021-12-23 ENCOUNTER — HOSPITAL ENCOUNTER (OUTPATIENT)
Dept: LAB | Facility: MEDICAL CENTER | Age: 56
End: 2021-12-23
Attending: NURSE PRACTITIONER
Payer: MEDICAID

## 2021-12-23 DIAGNOSIS — I48.0 PAROXYSMAL ATRIAL FIBRILLATION (HCC): Chronic | ICD-10-CM

## 2021-12-23 LAB
INR PPP: 1.77 (ref 0.87–1.13)
PROTHROMBIN TIME: 20.1 SEC (ref 12–14.6)

## 2021-12-23 PROCEDURE — 36415 COLL VENOUS BLD VENIPUNCTURE: CPT

## 2021-12-23 PROCEDURE — 85610 PROTHROMBIN TIME: CPT

## 2021-12-28 ENCOUNTER — ANTICOAGULATION MONITORING (OUTPATIENT)
Dept: MEDICAL GROUP | Facility: PHYSICIAN GROUP | Age: 56
End: 2021-12-28

## 2021-12-28 DIAGNOSIS — I05.9 MITRAL VALVE DISEASE: ICD-10-CM

## 2021-12-28 DIAGNOSIS — I48.0 PAROXYSMAL ATRIAL FIBRILLATION (HCC): ICD-10-CM

## 2021-12-28 NOTE — PROGRESS NOTES
Anticoagulation Summary  As of 2021    INR goal:  2.0-3.0   TTR:  68.7 % (2.6 y)   INR used for dosin.77 (2021)   Warfarin maintenance plan:  5 mg (5 mg x 1) every day   Weekly warfarin total:  35 mg   Plan last modified:  Yolis Crain PharmD (2021)   Next INR check:  2022   Target end date:  Indefinite    Indications    Paroxysmal atrial fibrillation (HCC) [I48.0]  Mitral valve disease [I05.9]             Anticoagulation Episode Summary     INR check location:      Preferred lab:      Send INR reminders to:      Comments:  Mitral valve repair - indefinite anticoag per 19 note from Dr Lawson      Anticoagulation Care Providers     Provider Role Specialty Phone number    FARIDA Parikh Referring Thoracic Surgery (Cardiothoracic Vascular Surgery) [phone]    [facility] Anticoagulation Services Responsible  [phone]        Anticoagulation Patient Findings      Left voicemail message to report a subtherapeutic INR of 1.77.    Will have pt take bolus dose of warfarin today of 7.5 mg and then   Pt to increase warfarin dosing regimen. Requested pt contact the clinic for any s/s of unusual bleeding, bruising, clotting or any changes to diet or medication.    FU INR in 2 week(s).    Yolis Crain, SilvaD

## 2022-01-13 ENCOUNTER — HOSPITAL ENCOUNTER (OUTPATIENT)
Dept: LAB | Facility: MEDICAL CENTER | Age: 57
End: 2022-01-13
Attending: INTERNAL MEDICINE
Payer: MEDICAID

## 2022-01-13 ENCOUNTER — HOSPITAL ENCOUNTER (OUTPATIENT)
Dept: LAB | Facility: MEDICAL CENTER | Age: 57
End: 2022-01-13
Attending: NURSE PRACTITIONER
Payer: MEDICAID

## 2022-01-13 DIAGNOSIS — E13.9 DIABETES MELLITUS OF OTHER TYPE WITHOUT COMPLICATION, UNSPECIFIED WHETHER LONG TERM INSULIN USE (HCC): ICD-10-CM

## 2022-01-13 DIAGNOSIS — I48.0 PAROXYSMAL ATRIAL FIBRILLATION (HCC): Chronic | ICD-10-CM

## 2022-01-13 LAB
ALBUMIN SERPL BCP-MCNC: 4.5 G/DL (ref 3.2–4.9)
ALBUMIN/GLOB SERPL: 1.6 G/DL
ALP SERPL-CCNC: 122 U/L (ref 30–99)
ALT SERPL-CCNC: 10 U/L (ref 2–50)
ANION GAP SERPL CALC-SCNC: 14 MMOL/L (ref 7–16)
AST SERPL-CCNC: 18 U/L (ref 12–45)
BILIRUB SERPL-MCNC: 0.6 MG/DL (ref 0.1–1.5)
BUN SERPL-MCNC: 18 MG/DL (ref 8–22)
CALCIUM SERPL-MCNC: 9.6 MG/DL (ref 8.5–10.5)
CHLORIDE SERPL-SCNC: 106 MMOL/L (ref 96–112)
CO2 SERPL-SCNC: 21 MMOL/L (ref 20–33)
CREAT SERPL-MCNC: 1.3 MG/DL (ref 0.5–1.4)
EST. AVERAGE GLUCOSE BLD GHB EST-MCNC: 143 MG/DL
FASTING STATUS PATIENT QL REPORTED: NORMAL
GLOBULIN SER CALC-MCNC: 2.9 G/DL (ref 1.9–3.5)
GLUCOSE SERPL-MCNC: 127 MG/DL (ref 65–99)
HBA1C MFR BLD: 6.6 % (ref 4–5.6)
INR PPP: 2.32 (ref 0.87–1.13)
POTASSIUM SERPL-SCNC: 3.4 MMOL/L (ref 3.6–5.5)
PROT SERPL-MCNC: 7.4 G/DL (ref 6–8.2)
PROTHROMBIN TIME: 24.7 SEC (ref 12–14.6)
SODIUM SERPL-SCNC: 141 MMOL/L (ref 135–145)

## 2022-01-13 PROCEDURE — 83036 HEMOGLOBIN GLYCOSYLATED A1C: CPT

## 2022-01-13 PROCEDURE — 80053 COMPREHEN METABOLIC PANEL: CPT

## 2022-01-13 PROCEDURE — 36415 COLL VENOUS BLD VENIPUNCTURE: CPT

## 2022-01-13 PROCEDURE — 85610 PROTHROMBIN TIME: CPT

## 2022-01-14 ENCOUNTER — ANTICOAGULATION MONITORING (OUTPATIENT)
Dept: VASCULAR LAB | Facility: MEDICAL CENTER | Age: 57
End: 2022-01-14

## 2022-01-14 DIAGNOSIS — I48.0 PAROXYSMAL ATRIAL FIBRILLATION (HCC): ICD-10-CM

## 2022-01-14 DIAGNOSIS — I05.9 MITRAL VALVE DISEASE: ICD-10-CM

## 2022-01-15 NOTE — PROGRESS NOTES
OP   Telephone Anticoagulation Service Note      Anticoagulation Summary  As of 2022    INR goal:  2.0-3.0   TTR:  68.5 % (2.7 y)   INR used for dosin.32 (2022)   Warfarin maintenance plan:  5 mg (5 mg x 1) every day   Weekly warfarin total:  35 mg   Plan last modified:  Yolis Crain PharmD (2021)   Next INR check:  2/10/2022   Target end date:  Indefinite    Indications    Paroxysmal atrial fibrillation (HCC) [I48.0]  Mitral valve disease [I05.9]             Anticoagulation Episode Summary     INR check location:      Preferred lab:      Send INR reminders to:      Comments:  Mitral valve repair - indefinite anticoag per 19 note from Dr Lawson      Anticoagulation Care Providers     Provider Role Specialty Phone number    FARIDA Parikh Referring Thoracic Surgery (Cardiothoracic Vascular Surgery) [phone]    [facility] Anticoagulation Services Responsible  [phone]        Anticoagulation Patient Findings    Left a message on the patient's voicemail today, informing the patient of a therapeutic INR of 2.32.   Instructed the patient to call the clinic with any changes to diet or medications, with any signs/sx of bleeding or clotting or with any questions or concerns.     Patient instructed to continue with the current warfarin dosing regimen, and asked to follow up again in 4 weeks.     Layo AscencioD

## 2022-01-31 ENCOUNTER — NON-PROVIDER VISIT (OUTPATIENT)
Dept: CARDIOLOGY | Facility: MEDICAL CENTER | Age: 57
End: 2022-01-31
Payer: MEDICAID

## 2022-01-31 VITALS
WEIGHT: 201.8 LBS | BODY MASS INDEX: 31.61 KG/M2 | DIASTOLIC BLOOD PRESSURE: 82 MMHG | SYSTOLIC BLOOD PRESSURE: 149 MMHG | HEART RATE: 89 BPM

## 2022-01-31 PROCEDURE — 99211 OFF/OP EST MAY X REQ PHY/QHP: CPT | Performed by: NURSE PRACTITIONER

## 2022-01-31 ASSESSMENT — FIBROSIS 4 INDEX: FIB4 SCORE: 1.37

## 2022-01-31 NOTE — PROGRESS NOTES
CHF Pharmacotherapy visit - Visit    Date of Service: 01/31/22    Informed written consent was given on: 10/7/21    Ottoniel Davies is here for CHF and DM    HPI  Pertinent Interval History since last visit:   • Patient's weight up 4 lbs since last visit, but reports that his home weights are right around 189 lb. Unclear how much difference in home scale vs clinic scale.   • Patient reports some SOB, but attributes this to recent chest cold. Patient denies any edema or chest pain.       Most recent EF:  50% (12/2019)      Current Outpatient Medications:   •  carvedilol, 37.5 mg, Oral, BID WITH MEALS, Taking  •  torsemide, 20 mg, Oral, QDAY PRN, Taking  •  metformin, 500 mg, Oral, BID WITH MEALS, Taking  •  Dapagliflozin Propanediol, 1 Tablet, Oral, DAILY, Taking  •  allopurinol, 300 mg, Oral, DAILY, Taking  •  hydrALAZINE, 50 mg, Oral, TID (Patient taking differently: 2 tabs qAM + 1 tab qPM), Taking  •  potassium chloride SA, 20 mEq, Oral, BID, Taking  •  isosorbide mononitrate SR, 60 mg, Oral, QAM, Taking  •  warfarin, TAKE 1/2 TO 1 TABLET BY MOUTH EVERY DAY, Taking  •  aspirin, 81 mg, Oral, DAILY, Taking  •  losartan, 100 mg, Oral, DAILY, Taking  •  levothyroxine, 300 mcg, Oral, DAILY, Taking  •  pravastatin, 40 mg, Oral, DAILY, Taking    Current Adherence to CHF Therapies:  Partial- patient admits to missing doses regularly and did not take his morning medication yet today.     Current CHF Medications - including dose:   • Entresto or ACE/ARB: Losartan 100mg QD   • Beta blocker: Carvedilol 37.5mg BID  • Diuretic:Torsemide 20mg QD   • Aldosterone antagonist: none    Diabetes:  • Medications:  o Metformin 500mg BID  o Farxiga 10mg QD   • SMFBG:  o Patient does not test BG at home regularly  • Preventative Management:  o ASA - 81mg QD  o Last Retinal Scan - due 4/2022  o Last Foot Exam - due but declined today  • Foot Exam:  o Monofilament exam - due and will follow up with podiatrist      Vitals:    01/31/22  1520   BP: 149/82   Pulse: 89       Home BP and HR:  127/78- last tested yesterday per pt.   However, the patient does not regularly test or log. Recommend the patient start testing and logging regularly and daily    Change in weight: Patient's weight is up 5 lbs from last visit but denies any fluid overload and claims his weights at home are steady at 189.     Exercise habits: sporadic irregular exercise     Diet: common adult but focuses on low carb and low salt    SOCIAL HISTORY  Social History     Tobacco Use   Smoking Status Former Smoker   • Packs/day: 0.25   • Years: 35.00   • Pack years: 8.75   Smokeless Tobacco Former User   • Types: Chew   Tobacco Comment    quit weeks ago        DATA REVIEW  Lab Results   Component Value Date/Time    HBA1C 6.6 (H) 01/13/2022 02:20 PM          Lab Results   Component Value Date/Time    CHOLSTRLTOT 134 06/10/2021 11:31 AM    LDL 76 06/10/2021 11:31 AM    HDL 30 (A) 06/10/2021 11:31 AM    TRIGLYCERIDE 142 06/10/2021 11:31 AM       Lab Results   Component Value Date/Time    SODIUM 141 01/13/2022 02:20 PM    POTASSIUM 3.4 (L) 01/13/2022 02:20 PM    CHLORIDE 106 01/13/2022 02:20 PM    CO2 21 01/13/2022 02:20 PM    GLUCOSE 127 (H) 01/13/2022 02:20 PM    BUN 18 01/13/2022 02:20 PM    CREATININE 1.30 01/13/2022 02:20 PM     Lab Results   Component Value Date/Time    ALKPHOSPHAT 122 (H) 01/13/2022 02:20 PM    ASTSGOT 18 01/13/2022 02:20 PM    ALTSGPT 10 01/13/2022 02:20 PM    TBILIRUBIN 0.6 01/13/2022 02:20 PM    INR 2.32 (H) 01/13/2022 02:20 PM    ALBUMIN 4.5 01/13/2022 02:20 PM      No components found for: MICROALBUMINCREATRATIOURINE    Renal function:  Calculated creatinine clearance: ~ 80 ml/min     Other Pertinent Blood Work:     Other:  Immunization History   Administered Date(s) Administered   • Influenza (IM) Preservative Free - HISTORICAL DATA 09/08/2015, 09/15/2016   • Influenza Seasonal Injectable - Historical Data 10/11/2013, 09/24/2014, 08/25/2017   • Influenza  Vaccine Quad Inj (Pf) 09/04/2018, 09/03/2019, 10/08/2020   • Influenza Vaccine Quad Inj (Preserved) 09/03/2019   • Influenza, Unspecified - HISTORICAL DATA 08/25/2017   • Moderna SARS-CoV-2 Vaccine 04/24/2021, 05/22/2021   • Pneumococcal polysaccharide vaccine (PPSV-23) 02/14/2014   • TD Vaccine 11/16/2016   • Tdap Vaccine 06/26/2019     Up to date on pneumococcal vaccine? Did not discuss this visit      Recent Imaging Studies:    None since last visit      ASSESSMENT AND PLAN  1. CHF  • Patient reports that he is feeling good despite not taking his medications regularly.   • Patient's recent labs reviewed from (1/13/22) and A1c currently at goal at 6.6%, hypokalemia is improving but still slightly low at 3.4, and renal functions continue to improve.   • Patient BP and HR were elevated in clinic today and patient reported that he did not take medication today. Unwilling to make any medication changes as he was well controlled at last visit and the high readings are likely due to patient's non-compliance and missing doses regularly.   • Discussed the need for complete compliance and adherence to medication regimen so we know if further changes to current regimen will be necessary.     Resulting CHF medications today (changes are bolded)  • Entresto or ACE/ARB: Losartan 100mg QD  • Beta blocker: Carvedilol 37.5mg BID  • Diuretic: Torsemide 20mg QD   • Aldosterone antagonist: none    2. DM  • Metformin 500mg BID  • Farxiga 10mg QD     Diabetes medications (changes are bolded)  • None - continue with the current meds- last A1c = 6.6% (1/13/22)      Lifestyle Recommendations From Today's Visit:   • Continue to eat DASH/MED style diet.   • Continue to exercise as tolerated.     Significant changes to laboratory values since last visit that require repeat labs:  • K+ level    Blood Work Ordered At Today's visit:   BMP    Studies Ordered at Todays Visit:  None     Follow-Up:   4 weeks    Layo Yi   PharmD        CC:  Shane Vanegas M.D.  No ref. provider found

## 2022-02-22 ENCOUNTER — TELEPHONE (OUTPATIENT)
Dept: VASCULAR LAB | Facility: MEDICAL CENTER | Age: 57
End: 2022-02-22
Payer: MEDICAID

## 2022-02-22 DIAGNOSIS — Z79.01 CHRONIC ANTICOAGULATION: ICD-10-CM

## 2022-02-22 DIAGNOSIS — E13.9 DIABETES MELLITUS OF OTHER TYPE WITHOUT COMPLICATION, UNSPECIFIED WHETHER LONG TERM INSULIN USE (HCC): ICD-10-CM

## 2022-02-22 DIAGNOSIS — I42.8 OTHER CARDIOMYOPATHY (HCC): ICD-10-CM

## 2022-02-22 RX ORDER — SPIRONOLACTONE 25 MG/1
12.5 TABLET ORAL DAILY
Qty: 30 TABLET | Refills: 3 | Status: SHIPPED | OUTPATIENT
Start: 2022-02-22 | End: 2022-02-22

## 2022-02-22 RX ORDER — SPIRONOLACTONE 25 MG/1
TABLET ORAL
Qty: 45 TABLET | Refills: 2 | Status: SHIPPED | OUTPATIENT
Start: 2022-02-22 | End: 2022-04-14 | Stop reason: SDUPTHER

## 2022-02-22 RX ORDER — METFORMIN HYDROCHLORIDE 500 MG/1
500 TABLET, EXTENDED RELEASE ORAL DAILY
Qty: 180 TABLET | Refills: 1 | Status: SHIPPED | OUTPATIENT
Start: 2022-02-22 | End: 2022-02-23

## 2022-02-22 NOTE — TELEPHONE ENCOUNTER
Received call from pt reporting his BP    Morning BP's taken before medications:    2/22  141/100  HR 65    2/21  124/79  HR 77    2/20  135/88  HR 73    Weight hasn't changed around 189-191 lbs    Since BP is still elevated, will add spironolactone 12.5 mg daily.    Pt also c/o nausea - will change metformin to ER version    Pt also overdue for INR - placed standing order    Also placed BMP for pt to get drawn prior to next appt    F/u appt in 2 weeks    Yolis Crain, PharmD

## 2022-02-23 ENCOUNTER — HOSPITAL ENCOUNTER (OUTPATIENT)
Dept: LAB | Facility: MEDICAL CENTER | Age: 57
End: 2022-02-23
Attending: NURSE PRACTITIONER
Payer: MEDICAID

## 2022-02-23 ENCOUNTER — HOSPITAL ENCOUNTER (OUTPATIENT)
Dept: LAB | Facility: MEDICAL CENTER | Age: 57
End: 2022-02-23
Attending: INTERNAL MEDICINE
Payer: MEDICAID

## 2022-02-23 DIAGNOSIS — I42.8 OTHER CARDIOMYOPATHY (HCC): ICD-10-CM

## 2022-02-23 DIAGNOSIS — Z79.01 CHRONIC ANTICOAGULATION: ICD-10-CM

## 2022-02-23 DIAGNOSIS — E13.9 DIABETES MELLITUS OF OTHER TYPE WITHOUT COMPLICATION, UNSPECIFIED WHETHER LONG TERM INSULIN USE (HCC): ICD-10-CM

## 2022-02-23 LAB
ANION GAP SERPL CALC-SCNC: 12 MMOL/L (ref 7–16)
BUN SERPL-MCNC: 20 MG/DL (ref 8–22)
CALCIUM SERPL-MCNC: 9.2 MG/DL (ref 8.5–10.5)
CHLORIDE SERPL-SCNC: 107 MMOL/L (ref 96–112)
CO2 SERPL-SCNC: 23 MMOL/L (ref 20–33)
CREAT SERPL-MCNC: 1.17 MG/DL (ref 0.5–1.4)
FASTING STATUS PATIENT QL REPORTED: NORMAL
GLUCOSE SERPL-MCNC: 123 MG/DL (ref 65–99)
INR PPP: 2.25 (ref 0.87–1.13)
POTASSIUM SERPL-SCNC: 3.5 MMOL/L (ref 3.6–5.5)
PROTHROMBIN TIME: 24.2 SEC (ref 12–14.6)
SODIUM SERPL-SCNC: 142 MMOL/L (ref 135–145)

## 2022-02-23 PROCEDURE — 85610 PROTHROMBIN TIME: CPT

## 2022-02-23 PROCEDURE — 36415 COLL VENOUS BLD VENIPUNCTURE: CPT

## 2022-02-23 PROCEDURE — 80048 BASIC METABOLIC PNL TOTAL CA: CPT

## 2022-02-23 RX ORDER — METFORMIN HYDROCHLORIDE 500 MG/1
500 TABLET, EXTENDED RELEASE ORAL 2 TIMES DAILY
Qty: 180 TABLET | Refills: 1 | Status: SHIPPED | OUTPATIENT
Start: 2022-02-23 | End: 2022-12-05

## 2022-02-23 NOTE — PROGRESS NOTES
Updated metformin rx sent to preferred pharmacy as original was sent as QD dosing instead of BID.  Nicholas Vences, PharmD, BCACP

## 2022-02-24 ENCOUNTER — ANTICOAGULATION MONITORING (OUTPATIENT)
Dept: VASCULAR LAB | Facility: MEDICAL CENTER | Age: 57
End: 2022-02-24
Payer: MEDICAID

## 2022-02-24 DIAGNOSIS — I05.9 MITRAL VALVE DISEASE: ICD-10-CM

## 2022-02-24 DIAGNOSIS — I48.0 PAROXYSMAL ATRIAL FIBRILLATION (HCC): ICD-10-CM

## 2022-02-24 NOTE — PROGRESS NOTES
OP Telephone Anticoagulation Service Note    Date: 2022      Anticoagulation Summary  As of 2022    INR goal:  2.0-3.0   TTR:  69.8 % (2.8 y)   INR used for dosin.25 (2022)   Warfarin maintenance plan:  5 mg (5 mg x 1) every day   Weekly warfarin total:  35 mg   Plan last modified:  Yolis Crain PharmD (2021)   Next INR check:  2022   Target end date:  Indefinite    Indications    Paroxysmal atrial fibrillation (HCC) [I48.0]  Mitral valve disease [I05.9]             Anticoagulation Episode Summary     INR check location:      Preferred lab:      Send INR reminders to:      Comments:  Mitral valve repair - indefinite anticoag per 19 note from Dr Lawson      Anticoagulation Care Providers     Provider Role Specialty Phone number    FARIDA Parikh Referring Thoracic Surgery (Cardiothoracic Vascular Surgery) [phone]    [facility] Responsible  [phone]        Anticoagulation Patient Findings  Patient Findings     Negatives:  Signs/symptoms of thrombosis, Signs/symptoms of bleeding, Laboratory test error suspected, Change in health, Change in alcohol use, Change in activity, Upcoming invasive procedure, Emergency department visit, Upcoming dental procedure, Missed doses, Extra doses, Change in medications, Change in diet/appetite, Hospital admission, Bruising, Other complaints            Plan: Spoke with patient on the phone.   Patient is  therapeutic today.   Confirmed dosing.     Pt on antiplatelet therapy Aspirin 81mg for MVR   Instructed patient to call clinic if any unusual bleeding or bruising occurs.   Will continue dosing as outlined.   Will follow-up with patient in 6 week(s).          Payton Hinds, PharmD

## 2022-03-10 ENCOUNTER — NON-PROVIDER VISIT (OUTPATIENT)
Dept: CARDIOLOGY | Facility: MEDICAL CENTER | Age: 57
End: 2022-03-10
Payer: MEDICAID

## 2022-03-10 VITALS
SYSTOLIC BLOOD PRESSURE: 106 MMHG | DIASTOLIC BLOOD PRESSURE: 53 MMHG | HEART RATE: 84 BPM | WEIGHT: 195 LBS | BODY MASS INDEX: 30.54 KG/M2

## 2022-03-10 PROCEDURE — 99211 OFF/OP EST MAY X REQ PHY/QHP: CPT | Performed by: INTERNAL MEDICINE

## 2022-03-10 ASSESSMENT — FIBROSIS 4 INDEX: FIB4 SCORE: 1.37

## 2022-03-10 NOTE — PROGRESS NOTES
CHF Pharmacotherapy visit - Visit    Date of Service: 03/10/22    Informed written consent was given on: 10/7/21    Ottoniel Davies is here for CHF and DM    HPI  Pertinent Interval History since last visit:   • Pt lost 5 lbs  • Pt denies SOB or swelling  • Pt has been tolerating spironolactone and metformin ER      Most recent EF:  50% (12/2019)      Current Outpatient Medications:   •  metFORMIN ER, 500 mg, Oral, BID  •  spironolactone, TAKE 1/2 TABLET BY MOUTH EVERY DAY  •  carvedilol, 37.5 mg, Oral, BID WITH MEALS  •  torsemide, 20 mg, Oral, QDAY PRN  •  Dapagliflozin Propanediol, 1 Tablet, Oral, DAILY  •  allopurinol, 300 mg, Oral, DAILY  •  hydrALAZINE, 50 mg, Oral, TID (Patient taking differently: 2 tabs qAM + 1 tab qPM)  •  isosorbide mononitrate SR, 60 mg, Oral, QAM  •  warfarin, TAKE 1/2 TO 1 TABLET BY MOUTH EVERY DAY  •  aspirin, 81 mg, Oral, DAILY  •  losartan, 100 mg, Oral, DAILY  •  levothyroxine, 300 mcg, Oral, DAILY  •  pravastatin, 40 mg, Oral, DAILY    Current Adherence to CHF Therapies:  complete    Current CHF Medications - including dose:   • Entresto or ACE/ARB: Losartan 100mg QD   • Beta blocker: Carvedilol 37.5mg BID  • Diuretic:Torsemide 20mg QD   • Aldosterone antagonist: none     Additional HTN medications  · Hydralazine 200 mg q AM and 100 mg q PM  · Isosorbide 60 mg qAM    Diabetes:  • Medications:  o Metformin ER 500mg BID  o Farxiga 10mg QD   • SMFBG:  o Patient does not test BG at home regularly  • Preventative Management:  o ASA - 81mg QD  o Last Retinal Scan - due 4/2022  o Last Foot Exam - due but declined today  • Foot Exam:  o Monofilament exam - due and will follow up with podiatrist      Vitals:    03/10/22 1507   BP: 106/53   Pulse: 84       Home BP and HR:  Pt does not regularly check even though he has a log. Re-emphasized the importance of checking his BP    Change in weight: Lost 5 lbs    Exercise habits: sporadic irregular exercise     Diet: common adult but focuses  on low carb and low salt    SOCIAL HISTORY  Social History     Tobacco Use   Smoking Status Former Smoker   • Packs/day: 0.25   • Years: 35.00   • Pack years: 8.75   Smokeless Tobacco Former User   • Types: Chew   Tobacco Comment    quit weeks ago        DATA REVIEW  Lab Results   Component Value Date/Time    HBA1C 6.6 (H) 01/13/2022 02:20 PM          Lab Results   Component Value Date/Time    CHOLSTRLTOT 134 06/10/2021 11:31 AM    LDL 76 06/10/2021 11:31 AM    HDL 30 (A) 06/10/2021 11:31 AM    TRIGLYCERIDE 142 06/10/2021 11:31 AM       Lab Results   Component Value Date/Time    SODIUM 142 02/23/2022 07:36 AM    POTASSIUM 3.5 (L) 02/23/2022 07:36 AM    CHLORIDE 107 02/23/2022 07:36 AM    CO2 23 02/23/2022 07:36 AM    GLUCOSE 123 (H) 02/23/2022 07:36 AM    BUN 20 02/23/2022 07:36 AM    CREATININE 1.17 02/23/2022 07:36 AM     Lab Results   Component Value Date/Time    ALKPHOSPHAT 122 (H) 01/13/2022 02:20 PM    ASTSGOT 18 01/13/2022 02:20 PM    ALTSGPT 10 01/13/2022 02:20 PM    TBILIRUBIN 0.6 01/13/2022 02:20 PM    INR 2.25 (H) 02/23/2022 07:36 AM    ALBUMIN 4.5 01/13/2022 02:20 PM      No components found for: MICROALBUMINCREATRATIOURINE    Renal function:  Calculated creatinine clearance: ~ 80 ml/min     Other Pertinent Blood Work:     Other:  Immunization History   Administered Date(s) Administered   • Influenza (IM) Preservative Free - HISTORICAL DATA 09/08/2015, 09/15/2016   • Influenza Seasonal Injectable - Historical Data 10/11/2013, 09/24/2014, 08/25/2017   • Influenza Vaccine Quad Inj (Pf) 09/04/2018, 09/03/2019, 10/08/2020   • Influenza Vaccine Quad Inj (Preserved) 09/03/2019   • Influenza, Unspecified - HISTORICAL DATA 08/25/2017   • Moderna SARS-CoV-2 Vaccine 04/24/2021, 05/22/2021   • Pneumococcal polysaccharide vaccine (PPSV-23) 02/14/2014   • TD Vaccine 11/16/2016   • Tdap Vaccine 06/26/2019     Up to date on pneumococcal vaccine? yes      Recent Imaging Studies:    None since last visit      ASSESSMENT  AND PLAN  1. CHF  • Pt has been tolerating spironolactone.  • His BP in office was borderline hypotensive but pt denies any symptoms of hypotension  • Re-emphasized the importance of checking BP at home and logging it    Resulting CHF medications today (changes are bolded)  • Entresto or ACE/ARB: Losartan 100mg QD  • Beta blocker: Carvedilol 37.5mg BID  • Diuretic: Torsemide 20mg QD   • Aldosterone antagonist: Spironolactone 12.5 mg daily    Additional HTN medications  · Hydralazine 200 mg q AM and 100 mg q PM  · Isosorbide 60 mg qAM    2. DM  • Metformin ER 500mg BID  • Farxiga 10mg QD     Diabetes medications (changes are bolded)  • None - continue with the current meds- last A1c = 6.6% (1/13/22)    Lifestyle Recommendations From Today's Visit:   • Continue to eat DASH/MED style diet.   • Continue to exercise as tolerated.     Significant changes to laboratory values since last visit that require repeat labs:  • K+ level    Blood Work Ordered At Today's visit:   None    Studies Ordered at Todays Visit:  None     Follow-Up:   2 weeks via phone to f/u on BP readings (PRN with pharmacy face to face pending gas prices - pt unable to afford coming to [place] as frequently)  4 weeks with cardiology in [place]    Yolis Crain, PharmD

## 2022-04-14 ENCOUNTER — OFFICE VISIT (OUTPATIENT)
Dept: CARDIOLOGY | Facility: PHYSICIAN GROUP | Age: 57
End: 2022-04-14
Payer: MEDICAID

## 2022-04-14 VITALS
RESPIRATION RATE: 16 BRPM | HEART RATE: 78 BPM | BODY MASS INDEX: 30.54 KG/M2 | WEIGHT: 194.6 LBS | DIASTOLIC BLOOD PRESSURE: 78 MMHG | SYSTOLIC BLOOD PRESSURE: 150 MMHG | OXYGEN SATURATION: 97 % | HEIGHT: 67 IN

## 2022-04-14 DIAGNOSIS — Z98.890 HISTORY OF MITRAL VALVE REPAIR: Chronic | ICD-10-CM

## 2022-04-14 DIAGNOSIS — E78.49 OTHER HYPERLIPIDEMIA: ICD-10-CM

## 2022-04-14 DIAGNOSIS — E13.9 DIABETES MELLITUS OF OTHER TYPE WITHOUT COMPLICATION, UNSPECIFIED WHETHER LONG TERM INSULIN USE (HCC): ICD-10-CM

## 2022-04-14 DIAGNOSIS — I10 WHITE COAT SYNDROME WITH DIAGNOSIS OF HYPERTENSION: ICD-10-CM

## 2022-04-14 DIAGNOSIS — I42.8 OTHER CARDIOMYOPATHY (HCC): ICD-10-CM

## 2022-04-14 DIAGNOSIS — I48.0 PAROXYSMAL ATRIAL FIBRILLATION (HCC): Chronic | ICD-10-CM

## 2022-04-14 DIAGNOSIS — I10 ESSENTIAL HYPERTENSION: ICD-10-CM

## 2022-04-14 PROCEDURE — 99215 OFFICE O/P EST HI 40 MIN: CPT | Performed by: INTERNAL MEDICINE

## 2022-04-14 RX ORDER — POTASSIUM CHLORIDE 1500 MG/1
40 TABLET, EXTENDED RELEASE ORAL DAILY
COMMUNITY
Start: 2022-03-15 | End: 2022-04-14

## 2022-04-14 RX ORDER — CARVEDILOL 25 MG/1
50 TABLET ORAL 2 TIMES DAILY WITH MEALS
Qty: 180 TABLET | Refills: 4 | Status: SHIPPED | OUTPATIENT
Start: 2022-04-14 | End: 2022-08-02

## 2022-04-14 RX ORDER — HYDRALAZINE HYDROCHLORIDE 50 MG/1
50 TABLET, FILM COATED ORAL 3 TIMES DAILY
Qty: 90 TABLET | Refills: 6 | Status: SHIPPED | OUTPATIENT
Start: 2022-04-14 | End: 2022-12-19

## 2022-04-14 RX ORDER — TORSEMIDE 20 MG/1
20 TABLET ORAL
Qty: 90 TABLET | Refills: 3 | Status: SHIPPED | OUTPATIENT
Start: 2022-04-14 | End: 2022-12-05

## 2022-04-14 RX ORDER — HYDRALAZINE HYDROCHLORIDE 50 MG/1
TABLET, FILM COATED ORAL
COMMUNITY
Start: 2022-04-13 | End: 2022-04-14

## 2022-04-14 RX ORDER — SPIRONOLACTONE 25 MG/1
25 TABLET ORAL
Qty: 90 TABLET | Refills: 3 | Status: SHIPPED | OUTPATIENT
Start: 2022-04-14 | End: 2022-08-02

## 2022-04-14 ASSESSMENT — ENCOUNTER SYMPTOMS
FEVER: 0
NEAR-SYNCOPE: 0
DEPRESSION: 0
BLURRED VISION: 0
FLANK PAIN: 0
VOMITING: 0
ABDOMINAL PAIN: 0
NAUSEA: 0
ORTHOPNEA: 0
IRREGULAR HEARTBEAT: 0
DIARRHEA: 0
DYSPNEA ON EXERTION: 0
WEIGHT GAIN: 0
PND: 0
SHORTNESS OF BREATH: 0
DECREASED APPETITE: 0
DIZZINESS: 0
BACK PAIN: 0
WEIGHT LOSS: 0
ALTERED MENTAL STATUS: 0
CLAUDICATION: 0
CONSTIPATION: 0
COUGH: 0
HEARTBURN: 0
PALPITATIONS: 0
SYNCOPE: 0

## 2022-04-14 NOTE — PATIENT INSTRUCTIONS
Please check blood pressure at home along with heart rate. Send results in two weeks.    Stop potassium. Increase spironolactone.  Increase carvedilol.  Hydralazine change to three times daily.   Blood work in two weeks.
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
## 2022-04-28 ENCOUNTER — TELEPHONE (OUTPATIENT)
Dept: VASCULAR LAB | Facility: MEDICAL CENTER | Age: 57
End: 2022-04-28
Payer: MEDICAID

## 2022-05-03 ENCOUNTER — HOSPITAL ENCOUNTER (OUTPATIENT)
Dept: LAB | Facility: MEDICAL CENTER | Age: 57
End: 2022-05-03
Attending: NURSE PRACTITIONER
Payer: MEDICAID

## 2022-05-03 DIAGNOSIS — Z79.01 CHRONIC ANTICOAGULATION: ICD-10-CM

## 2022-05-03 LAB
INR PPP: 2.98 (ref 0.87–1.13)
PROTHROMBIN TIME: 30 SEC (ref 12–14.6)

## 2022-05-03 PROCEDURE — 36415 COLL VENOUS BLD VENIPUNCTURE: CPT

## 2022-05-03 PROCEDURE — 85610 PROTHROMBIN TIME: CPT

## 2022-05-04 ENCOUNTER — ANTICOAGULATION MONITORING (OUTPATIENT)
Dept: VASCULAR LAB | Facility: MEDICAL CENTER | Age: 57
End: 2022-05-04
Payer: MEDICAID

## 2022-05-04 DIAGNOSIS — I05.9 MITRAL VALVE DISEASE: ICD-10-CM

## 2022-05-04 DIAGNOSIS — I48.0 PAROXYSMAL ATRIAL FIBRILLATION (HCC): ICD-10-CM

## 2022-05-04 RX ORDER — AMITRIPTYLINE HYDROCHLORIDE 25 MG/1
25 TABLET, FILM COATED ORAL NIGHTLY
COMMUNITY

## 2022-05-04 NOTE — PROGRESS NOTES
Anticoagulation Summary  As of 2022    INR goal:  2.0-3.0   TTR:  71.7 % (3 y)   INR used for dosin.98 (5/3/2022)   Warfarin maintenance plan:  5 mg (5 mg x 1) every day   Weekly warfarin total:  35 mg   Plan last modified:  Yolis Crain PharmD (2021)   Next INR check:  2022   Target end date:  Indefinite    Indications    Paroxysmal atrial fibrillation (HCC) [I48.0]  Mitral valve disease [I05.9]             Anticoagulation Episode Summary     INR check location:      Preferred lab:      Send INR reminders to:      Comments:  Mitral valve repair - indefinite anticoag per 19 note from Dr Lawson      Anticoagulation Care Providers     Provider Role Specialty Phone number    FARIDA Parikh Referring Thoracic Surgery (Cardiothoracic Vascular Surgery) [phone]    [facility] Responsible  [phone]          Refer to Anticoagulation Patient Findings for HPI  Patient Findings     Positives:  Change in medications (started amitriptyline 3 days ago)    Negatives:  Signs/symptoms of thrombosis, Signs/symptoms of bleeding, Laboratory test error suspected, Change in health, Change in alcohol use, Change in activity, Upcoming invasive procedure, Emergency department visit, Upcoming dental procedure, Missed doses, Extra doses, Change in diet/appetite, Hospital admission, Bruising, Other complaints          Spoke with patient to report a therapeutic INR.      Pt instructed to continue with current warfarin dosing regimen, confirms dosing.   Will follow up in 7 week(s) per pt.     Anil Quan, SilvaD, BCACP

## 2022-07-06 ENCOUNTER — TELEPHONE (OUTPATIENT)
Dept: VASCULAR LAB | Facility: MEDICAL CENTER | Age: 57
End: 2022-07-06
Payer: MEDICAID

## 2022-07-07 ENCOUNTER — HOSPITAL ENCOUNTER (OUTPATIENT)
Dept: LAB | Facility: MEDICAL CENTER | Age: 57
End: 2022-07-07
Attending: NURSE PRACTITIONER
Payer: MEDICAID

## 2022-07-07 ENCOUNTER — TELEPHONE (OUTPATIENT)
Dept: VASCULAR LAB | Facility: MEDICAL CENTER | Age: 57
End: 2022-07-07

## 2022-07-07 DIAGNOSIS — Z79.01 CHRONIC ANTICOAGULATION: ICD-10-CM

## 2022-07-07 LAB
INR PPP: 3.1 (ref 0.87–1.13)
PROTHROMBIN TIME: 30.9 SEC (ref 12–14.6)

## 2022-07-07 PROCEDURE — 85610 PROTHROMBIN TIME: CPT

## 2022-07-07 PROCEDURE — 36415 COLL VENOUS BLD VENIPUNCTURE: CPT

## 2022-07-07 NOTE — TELEPHONE ENCOUNTER
Pt called and will be starting prednisone  Pt got INR drawn today and results are pending  Will provider further dosing instructions pending INR result    Yolis Crain, PharmD

## 2022-07-08 ENCOUNTER — ANTICOAGULATION MONITORING (OUTPATIENT)
Dept: VASCULAR LAB | Facility: MEDICAL CENTER | Age: 57
End: 2022-07-08
Payer: MEDICAID

## 2022-07-08 DIAGNOSIS — I05.9 MITRAL VALVE DISEASE: ICD-10-CM

## 2022-07-08 DIAGNOSIS — I48.0 PAROXYSMAL ATRIAL FIBRILLATION (HCC): ICD-10-CM

## 2022-07-08 NOTE — PROGRESS NOTES
OP   Telephone Anticoagulation Service Note      Anticoagulation Summary  As of 7/8/2022    INR goal:  2.0-3.0   TTR:  68.6 % (3.2 y)   INR used for dosing:  3.10 (7/7/2022)   Warfarin maintenance plan:  5 mg (5 mg x 1) every day   Weekly warfarin total:  35 mg   Plan last modified:  Yolis Crain, SilvaD (12/28/2021)   Next INR check:  7/14/2022   Target end date:  Indefinite    Indications    Paroxysmal atrial fibrillation (HCC) [I48.0]  Mitral valve disease [I05.9]             Anticoagulation Episode Summary     INR check location:      Preferred lab:      Send INR reminders to:      Comments:  Mitral valve repair - indefinite anticoag per 7/26/19 note from Dr Lawson      Anticoagulation Care Providers     Provider Role Specialty Phone number    FARIDA Parikh Referring Thoracic Surgery (Cardiothoracic Vascular Surgery) [phone]    [facility] Responsible  [phone]        Anticoagulation Patient Findings  Patient Findings     Negatives:  Signs/symptoms of thrombosis, Signs/symptoms of bleeding, Laboratory test error suspected, Change in health, Change in alcohol use, Change in activity, Upcoming invasive procedure, Emergency department visit, Upcoming dental procedure, Missed doses, Extra doses, Change in medications (pt on prednisone), Change in diet/appetite, Hospital admission, Bruising, Other complaints        Spoke with the patient on the phone today, reporting a slightly SUPRA-therapeutic INR of 3.1.   Confirmed the current warfarin dosing regimen and patient compliance.  Patient is currently on prednisone taper.   Patient denies any interval changes to diet.   Patient denies any signs/symptoms of bleeding or clotting.  Patient instructed to reduce dose to 2.5mg TONIGHT, and again on Sun and 5mg ROW.  Patient will retest again in 1 week.     Layo AscencioD

## 2022-07-18 ENCOUNTER — HOSPITAL ENCOUNTER (OUTPATIENT)
Dept: LAB | Facility: MEDICAL CENTER | Age: 57
End: 2022-07-18
Attending: NURSE PRACTITIONER
Payer: MEDICAID

## 2022-07-18 DIAGNOSIS — Z79.01 CHRONIC ANTICOAGULATION: ICD-10-CM

## 2022-07-18 LAB
INR PPP: 2.61 (ref 0.87–1.13)
PROTHROMBIN TIME: 27.1 SEC (ref 12–14.6)

## 2022-07-18 PROCEDURE — 85610 PROTHROMBIN TIME: CPT

## 2022-07-18 PROCEDURE — 36415 COLL VENOUS BLD VENIPUNCTURE: CPT

## 2022-07-19 ENCOUNTER — ANTICOAGULATION MONITORING (OUTPATIENT)
Dept: VASCULAR LAB | Facility: MEDICAL CENTER | Age: 57
End: 2022-07-19
Payer: MEDICAID

## 2022-07-19 DIAGNOSIS — I48.0 PAROXYSMAL ATRIAL FIBRILLATION (HCC): ICD-10-CM

## 2022-07-19 DIAGNOSIS — I05.9 MITRAL VALVE DISEASE: ICD-10-CM

## 2022-07-19 NOTE — PROGRESS NOTES
Anticoagulation Summary  As of 2022    INR goal:  2.0-3.0   TTR:  68.7 % (3.2 y)   INR used for dosin.61 (2022)   Warfarin maintenance plan:  5 mg (5 mg x 1) every day   Weekly warfarin total:  35 mg   Plan last modified:  Yolis Crain, PharmD (2021)   Next INR check:  2022   Target end date:  Indefinite    Indications    Paroxysmal atrial fibrillation (HCC) [I48.0]  Mitral valve disease [I05.9]             Anticoagulation Episode Summary     INR check location:      Preferred lab:      Send INR reminders to:      Comments:  Mitral valve repair - indefinite anticoag per 19 note from Dr Lawson      Anticoagulation Care Providers     Provider Role Specialty Phone number    FARIDA Parikh Referring Thoracic Surgery (Cardiothoracic Vascular Surgery) [phone]    [facility] Responsible  [phone]          Refer to Anticoagulation Patient Findings for HPI  Patient Findings     Negatives:  Signs/symptoms of thrombosis, Signs/symptoms of bleeding, Laboratory test error suspected, Change in health, Change in alcohol use, Change in activity, Upcoming invasive procedure, Emergency department visit, Upcoming dental procedure, Missed doses, Extra doses, Change in medications, Change in diet/appetite, Hospital admission, Bruising, Other complaints          Spoke with patient to report a therapeutic INR.      Pt is on antiplatelet therapy.    Pt instructed to continue with current warfarin dosing regimen, confirms dosing.     Will follow up in 4 week(s).  Recommended retesting in 2 weeks, Pt preferred test again in 4 weeks.     Trinity Ragland       +++++++++++++++++++++++++++++++++++++++++++++++++++++++++++++++++++    I have reviewed and concur with the above plan.       Current Outpatient Medications:   •  amitriptyline, 25 mg, Oral, Nightly  •  spironolactone, 25 mg, Oral, QDAY  •  carvedilol, 50 mg, Oral, BID WITH MEALS  •  torsemide, 20 mg, Oral, QDAY  PRN  •  hydrALAZINE, 50 mg, Oral, TID  •  metFORMIN ER, 500 mg, Oral, BID  •  dapagliflozin propanediol, 1 Tablet, Oral, DAILY  •  allopurinol, 300 mg, Oral, DAILY  •  isosorbide mononitrate SR, 60 mg, Oral, QAM  •  warfarin, TAKE 1/2 TO 1 TABLET BY MOUTH EVERY DAY  •  aspirin, 81 mg, Oral, DAILY  •  losartan, 100 mg, Oral, DAILY  •  levothyroxine, 300 mcg, Oral, DAILY  •  pravastatin, 40 mg, Oral, DAILY    Damon Redmond, PharmD, MS, BCACP, [facility]  Phone: [phone],  Fax: [phone]  On call: [phone]

## 2022-08-01 ENCOUNTER — HOSPITAL ENCOUNTER (OUTPATIENT)
Facility: MEDICAL CENTER | Age: 57
End: 2022-08-03
Attending: STUDENT IN AN ORGANIZED HEALTH CARE EDUCATION/TRAINING PROGRAM | Admitting: STUDENT IN AN ORGANIZED HEALTH CARE EDUCATION/TRAINING PROGRAM
Payer: MEDICAID

## 2022-08-01 LAB
ABO GROUP BLD: NORMAL
ALBUMIN SERPL BCP-MCNC: 4.3 G/DL (ref 3.2–4.9)
ALBUMIN/GLOB SERPL: 1.4 G/DL
ALP SERPL-CCNC: 139 U/L (ref 30–99)
ALT SERPL-CCNC: 6 U/L (ref 2–50)
ANION GAP SERPL CALC-SCNC: 15 MMOL/L (ref 7–16)
AST SERPL-CCNC: 17 U/L (ref 12–45)
BASOPHILS # BLD AUTO: 0.2 % (ref 0–1.8)
BASOPHILS # BLD: 0.01 K/UL (ref 0–0.12)
BILIRUB SERPL-MCNC: 0.2 MG/DL (ref 0.1–1.5)
BLD GP AB SCN SERPL QL: NORMAL
BUN SERPL-MCNC: 47 MG/DL (ref 8–22)
CALCIUM SERPL-MCNC: 8.5 MG/DL (ref 8.5–10.5)
CHLORIDE SERPL-SCNC: 107 MMOL/L (ref 96–112)
CO2 SERPL-SCNC: 21 MMOL/L (ref 20–33)
CREAT SERPL-MCNC: 2.42 MG/DL (ref 0.5–1.4)
EOSINOPHIL # BLD AUTO: 0 K/UL (ref 0–0.51)
EOSINOPHIL NFR BLD: 0 % (ref 0–6.9)
ERYTHROCYTE [DISTWIDTH] IN BLOOD BY AUTOMATED COUNT: 45.4 FL (ref 35.9–50)
GFR SERPLBLD CREATININE-BSD FMLA CKD-EPI: 30 ML/MIN/1.73 M 2
GLOBULIN SER CALC-MCNC: 3.1 G/DL (ref 1.9–3.5)
GLUCOSE SERPL-MCNC: 153 MG/DL (ref 65–99)
HCT VFR BLD AUTO: 35.8 % (ref 42–52)
HGB BLD-MCNC: 11.1 G/DL (ref 14–18)
IMM GRANULOCYTES # BLD AUTO: 0.03 K/UL (ref 0–0.11)
IMM GRANULOCYTES NFR BLD AUTO: 0.6 % (ref 0–0.9)
INR PPP: 6.44 (ref 0.87–1.13)
LYMPHOCYTES # BLD AUTO: 0.46 K/UL (ref 1–4.8)
LYMPHOCYTES NFR BLD: 8.6 % (ref 22–41)
MCH RBC QN AUTO: 26.2 PG (ref 27–33)
MCHC RBC AUTO-ENTMCNC: 31 G/DL (ref 33.7–35.3)
MCV RBC AUTO: 84.6 FL (ref 81.4–97.8)
MONOCYTES # BLD AUTO: 0.27 K/UL (ref 0–0.85)
MONOCYTES NFR BLD AUTO: 5 % (ref 0–13.4)
NEUTROPHILS # BLD AUTO: 4.6 K/UL (ref 1.82–7.42)
NEUTROPHILS NFR BLD: 85.6 % (ref 44–72)
NRBC # BLD AUTO: 0 K/UL
NRBC BLD-RTO: 0 /100 WBC
PLATELET # BLD AUTO: 234 K/UL (ref 164–446)
PMV BLD AUTO: 10.2 FL (ref 9–12.9)
POTASSIUM SERPL-SCNC: 4.1 MMOL/L (ref 3.6–5.5)
PROT SERPL-MCNC: 7.4 G/DL (ref 6–8.2)
PROTHROMBIN TIME: 53.7 SEC (ref 12–14.6)
RBC # BLD AUTO: 4.23 M/UL (ref 4.7–6.1)
RH BLD: NORMAL
SODIUM SERPL-SCNC: 143 MMOL/L (ref 135–145)
WBC # BLD AUTO: 5.4 K/UL (ref 4.8–10.8)

## 2022-08-01 PROCEDURE — 86901 BLOOD TYPING SEROLOGIC RH(D): CPT

## 2022-08-01 PROCEDURE — 86850 RBC ANTIBODY SCREEN: CPT

## 2022-08-01 PROCEDURE — 700101 HCHG RX REV CODE 250: Performed by: STUDENT IN AN ORGANIZED HEALTH CARE EDUCATION/TRAINING PROGRAM

## 2022-08-01 PROCEDURE — 85025 COMPLETE CBC W/AUTO DIFF WBC: CPT

## 2022-08-01 PROCEDURE — 85610 PROTHROMBIN TIME: CPT

## 2022-08-01 PROCEDURE — 36415 COLL VENOUS BLD VENIPUNCTURE: CPT

## 2022-08-01 PROCEDURE — 86900 BLOOD TYPING SEROLOGIC ABO: CPT

## 2022-08-01 PROCEDURE — 99285 EMERGENCY DEPT VISIT HI MDM: CPT

## 2022-08-01 PROCEDURE — 80053 COMPREHEN METABOLIC PANEL: CPT

## 2022-08-01 RX ORDER — LIDOCAINE HYDROCHLORIDE AND EPINEPHRINE BITARTRATE 20; .01 MG/ML; MG/ML
20 INJECTION, SOLUTION SUBCUTANEOUS ONCE
Status: DISPENSED | OUTPATIENT
Start: 2022-08-01 | End: 2022-08-02

## 2022-08-01 RX ORDER — TRANEXAMIC ACID 100 MG/ML
3 INJECTION, SOLUTION INTRAVENOUS ONCE
Status: COMPLETED | OUTPATIENT
Start: 2022-08-01 | End: 2022-08-01

## 2022-08-01 RX ORDER — LIDOCAINE HYDROCHLORIDE AND EPINEPHRINE 10; 10 MG/ML; UG/ML
20 INJECTION, SOLUTION INFILTRATION; PERINEURAL ONCE
Status: DISCONTINUED | OUTPATIENT
Start: 2022-08-01 | End: 2022-08-01

## 2022-08-01 RX ADMIN — TRANEXAMIC ACID 300 MG: 100 INJECTION, SOLUTION INTRAVENOUS at 22:52

## 2022-08-02 PROBLEM — E78.5 HYPERLIPIDEMIA: Status: ACTIVE | Noted: 2019-04-10

## 2022-08-02 PROBLEM — N17.9 AKI (ACUTE KIDNEY INJURY) (HCC): Status: ACTIVE | Noted: 2022-08-02

## 2022-08-02 PROBLEM — R79.1 SUPRATHERAPEUTIC INR: Status: ACTIVE | Noted: 2022-08-02

## 2022-08-02 LAB
ANION GAP SERPL CALC-SCNC: 13 MMOL/L (ref 7–16)
BUN SERPL-MCNC: 47 MG/DL (ref 8–22)
CALCIUM SERPL-MCNC: 8 MG/DL (ref 8.5–10.5)
CHLORIDE SERPL-SCNC: 108 MMOL/L (ref 96–112)
CO2 SERPL-SCNC: 18 MMOL/L (ref 20–33)
CREAT SERPL-MCNC: 2.09 MG/DL (ref 0.5–1.4)
ERYTHROCYTE [DISTWIDTH] IN BLOOD BY AUTOMATED COUNT: 44.1 FL (ref 35.9–50)
GFR SERPLBLD CREATININE-BSD FMLA CKD-EPI: 36 ML/MIN/1.73 M 2
GLUCOSE SERPL-MCNC: 188 MG/DL (ref 65–99)
HCT VFR BLD AUTO: 30.8 % (ref 42–52)
HGB BLD-MCNC: 9.9 G/DL (ref 14–18)
INR PPP: 5.81 (ref 0.87–1.13)
MAGNESIUM SERPL-MCNC: 2 MG/DL (ref 1.5–2.5)
MCH RBC QN AUTO: 26.5 PG (ref 27–33)
MCHC RBC AUTO-ENTMCNC: 32.1 G/DL (ref 33.7–35.3)
MCV RBC AUTO: 82.4 FL (ref 81.4–97.8)
PHOSPHATE SERPL-MCNC: 3.5 MG/DL (ref 2.5–4.5)
PLATELET # BLD AUTO: 219 K/UL (ref 164–446)
PMV BLD AUTO: 10.1 FL (ref 9–12.9)
POTASSIUM SERPL-SCNC: 4.3 MMOL/L (ref 3.6–5.5)
PROTHROMBIN TIME: 49.7 SEC (ref 12–14.6)
RBC # BLD AUTO: 3.74 M/UL (ref 4.7–6.1)
SODIUM SERPL-SCNC: 139 MMOL/L (ref 135–145)
T4 FREE SERPL-MCNC: 2.86 NG/DL (ref 0.93–1.7)
TSH SERPL DL<=0.005 MIU/L-ACNC: <0.005 UIU/ML (ref 0.38–5.33)
WBC # BLD AUTO: 5.9 K/UL (ref 4.8–10.8)

## 2022-08-02 PROCEDURE — A9270 NON-COVERED ITEM OR SERVICE: HCPCS | Performed by: INTERNAL MEDICINE

## 2022-08-02 PROCEDURE — 85610 PROTHROMBIN TIME: CPT

## 2022-08-02 PROCEDURE — 700102 HCHG RX REV CODE 250 W/ 637 OVERRIDE(OP): Performed by: STUDENT IN AN ORGANIZED HEALTH CARE EDUCATION/TRAINING PROGRAM

## 2022-08-02 PROCEDURE — 36415 COLL VENOUS BLD VENIPUNCTURE: CPT

## 2022-08-02 PROCEDURE — 700101 HCHG RX REV CODE 250: Performed by: STUDENT IN AN ORGANIZED HEALTH CARE EDUCATION/TRAINING PROGRAM

## 2022-08-02 PROCEDURE — 99219 PR INITIAL OBSERVATION CARE,LEVL II: CPT | Performed by: STUDENT IN AN ORGANIZED HEALTH CARE EDUCATION/TRAINING PROGRAM

## 2022-08-02 PROCEDURE — 700105 HCHG RX REV CODE 258: Performed by: STUDENT IN AN ORGANIZED HEALTH CARE EDUCATION/TRAINING PROGRAM

## 2022-08-02 PROCEDURE — 80048 BASIC METABOLIC PNL TOTAL CA: CPT

## 2022-08-02 PROCEDURE — A9270 NON-COVERED ITEM OR SERVICE: HCPCS | Performed by: STUDENT IN AN ORGANIZED HEALTH CARE EDUCATION/TRAINING PROGRAM

## 2022-08-02 PROCEDURE — 700102 HCHG RX REV CODE 250 W/ 637 OVERRIDE(OP): Performed by: INTERNAL MEDICINE

## 2022-08-02 PROCEDURE — G0378 HOSPITAL OBSERVATION PER HR: HCPCS

## 2022-08-02 PROCEDURE — 84443 ASSAY THYROID STIM HORMONE: CPT

## 2022-08-02 PROCEDURE — 84439 ASSAY OF FREE THYROXINE: CPT

## 2022-08-02 PROCEDURE — 83735 ASSAY OF MAGNESIUM: CPT

## 2022-08-02 PROCEDURE — 84100 ASSAY OF PHOSPHORUS: CPT

## 2022-08-02 PROCEDURE — 85027 COMPLETE CBC AUTOMATED: CPT

## 2022-08-02 RX ORDER — PREDNISONE 10 MG/1
10-60 TABLET ORAL DAILY
Status: ON HOLD | COMMUNITY
Start: 2022-07-07 | End: 2022-08-03

## 2022-08-02 RX ORDER — HYDRALAZINE HYDROCHLORIDE 50 MG/1
50 TABLET, FILM COATED ORAL 3 TIMES DAILY
Status: DISCONTINUED | OUTPATIENT
Start: 2022-08-02 | End: 2022-08-03 | Stop reason: HOSPADM

## 2022-08-02 RX ORDER — METFORMIN HYDROCHLORIDE 500 MG/1
500 TABLET, EXTENDED RELEASE ORAL 2 TIMES DAILY
Status: DISCONTINUED | OUTPATIENT
Start: 2022-08-02 | End: 2022-08-02

## 2022-08-02 RX ORDER — LEVOTHYROXINE SODIUM 0.1 MG/1
200 TABLET ORAL DAILY
Status: DISCONTINUED | OUTPATIENT
Start: 2022-08-03 | End: 2022-08-02

## 2022-08-02 RX ORDER — POTASSIUM CHLORIDE 20 MEQ/1
20 TABLET, EXTENDED RELEASE ORAL 2 TIMES DAILY
COMMUNITY

## 2022-08-02 RX ORDER — TRANEXAMIC ACID 100 MG/ML
3 INJECTION, SOLUTION INTRAVENOUS ONCE
Status: COMPLETED | OUTPATIENT
Start: 2022-08-02 | End: 2022-08-02

## 2022-08-02 RX ORDER — LEVOTHYROXINE SODIUM 0.1 MG/1
300 TABLET ORAL DAILY
Status: DISCONTINUED | OUTPATIENT
Start: 2022-08-02 | End: 2022-08-02

## 2022-08-02 RX ORDER — SODIUM CHLORIDE 9 MG/ML
INJECTION, SOLUTION INTRAVENOUS CONTINUOUS
Status: ACTIVE | OUTPATIENT
Start: 2022-08-02 | End: 2022-08-03

## 2022-08-02 RX ORDER — ISOSORBIDE MONONITRATE 60 MG/1
60 TABLET, EXTENDED RELEASE ORAL EVERY MORNING
Status: DISCONTINUED | OUTPATIENT
Start: 2022-08-02 | End: 2022-08-03 | Stop reason: HOSPADM

## 2022-08-02 RX ORDER — CARVEDILOL 25 MG/1
50 TABLET ORAL 2 TIMES DAILY WITH MEALS
Status: DISCONTINUED | OUTPATIENT
Start: 2022-08-02 | End: 2022-08-02

## 2022-08-02 RX ORDER — CARVEDILOL 25 MG/1
25 TABLET ORAL 2 TIMES DAILY WITH MEALS
Status: DISCONTINUED | OUTPATIENT
Start: 2022-08-02 | End: 2022-08-03 | Stop reason: HOSPADM

## 2022-08-02 RX ORDER — WARFARIN SODIUM 5 MG/1
2.5-5 TABLET ORAL EVERY EVENING
COMMUNITY
End: 2023-04-18 | Stop reason: SDUPTHER

## 2022-08-02 RX ORDER — CARVEDILOL 25 MG/1
37.5 TABLET ORAL 2 TIMES DAILY WITH MEALS
Status: ON HOLD | COMMUNITY
End: 2022-12-08

## 2022-08-02 RX ORDER — SPIRONOLACTONE 25 MG/1
25 TABLET ORAL
Status: DISCONTINUED | OUTPATIENT
Start: 2022-08-02 | End: 2022-08-02

## 2022-08-02 RX ORDER — LEVOTHYROXINE SODIUM 0.12 MG/1
250 TABLET ORAL DAILY
Status: DISCONTINUED | OUTPATIENT
Start: 2022-08-03 | End: 2022-08-03 | Stop reason: HOSPADM

## 2022-08-02 RX ORDER — DAPAGLIFLOZIN 10 MG/1
10 TABLET, FILM COATED ORAL DAILY
Status: DISCONTINUED | OUTPATIENT
Start: 2022-08-02 | End: 2022-08-02

## 2022-08-02 RX ORDER — CYCLOBENZAPRINE HCL 10 MG
10 TABLET ORAL 2 TIMES DAILY PRN
COMMUNITY
Start: 2022-07-07 | End: 2022-12-05

## 2022-08-02 RX ORDER — PHYTONADIONE 5 MG/1
5 TABLET ORAL ONCE
Status: COMPLETED | OUTPATIENT
Start: 2022-08-02 | End: 2022-08-02

## 2022-08-02 RX ORDER — ACETAMINOPHEN 325 MG/1
650 TABLET ORAL EVERY 6 HOURS PRN
Status: DISCONTINUED | OUTPATIENT
Start: 2022-08-02 | End: 2022-08-03 | Stop reason: HOSPADM

## 2022-08-02 RX ORDER — PRAVASTATIN SODIUM 20 MG
40 TABLET ORAL EVERY EVENING
Status: DISCONTINUED | OUTPATIENT
Start: 2022-08-02 | End: 2022-08-03 | Stop reason: HOSPADM

## 2022-08-02 RX ORDER — AMITRIPTYLINE HYDROCHLORIDE 25 MG/1
25 TABLET, FILM COATED ORAL NIGHTLY
Status: DISCONTINUED | OUTPATIENT
Start: 2022-08-02 | End: 2022-08-03 | Stop reason: HOSPADM

## 2022-08-02 RX ADMIN — TRANEXAMIC ACID 300 MG: 100 INJECTION, SOLUTION INTRAVENOUS at 06:38

## 2022-08-02 RX ADMIN — ASPIRIN 81 MG: 81 TABLET, COATED ORAL at 05:41

## 2022-08-02 RX ADMIN — ISOSORBIDE MONONITRATE 60 MG: 60 TABLET, EXTENDED RELEASE ORAL at 05:41

## 2022-08-02 RX ADMIN — CARVEDILOL 25 MG: 25 TABLET, FILM COATED ORAL at 16:13

## 2022-08-02 RX ADMIN — SODIUM CHLORIDE: 9 INJECTION, SOLUTION INTRAVENOUS at 01:51

## 2022-08-02 RX ADMIN — HYDRALAZINE HYDROCHLORIDE 50 MG: 50 TABLET, FILM COATED ORAL at 05:41

## 2022-08-02 RX ADMIN — PHYTONADIONE 5 MG: 5 TABLET ORAL at 01:35

## 2022-08-02 RX ADMIN — SODIUM CHLORIDE: 9 INJECTION, SOLUTION INTRAVENOUS at 09:25

## 2022-08-02 RX ADMIN — PRAVASTATIN SODIUM 40 MG: 20 TABLET ORAL at 20:29

## 2022-08-02 RX ADMIN — LEVOTHYROXINE SODIUM 300 MCG: 0.1 TABLET ORAL at 05:41

## 2022-08-02 RX ADMIN — AMITRIPTYLINE HYDROCHLORIDE 25 MG: 25 TABLET, FILM COATED ORAL at 02:04

## 2022-08-02 RX ADMIN — AMITRIPTYLINE HYDROCHLORIDE 25 MG: 25 TABLET, FILM COATED ORAL at 20:29

## 2022-08-02 RX ADMIN — PHYTONADIONE 5 MG: 5 TABLET ORAL at 16:13

## 2022-08-02 ASSESSMENT — COGNITIVE AND FUNCTIONAL STATUS - GENERAL
MOBILITY SCORE: 24
DAILY ACTIVITIY SCORE: 24
SUGGESTED CMS G CODE MODIFIER MOBILITY: CH
SUGGESTED CMS G CODE MODIFIER DAILY ACTIVITY: CH

## 2022-08-02 ASSESSMENT — ENCOUNTER SYMPTOMS
RESPIRATORY NEGATIVE: 1
EYES NEGATIVE: 1
GASTROINTESTINAL NEGATIVE: 1
MUSCULOSKELETAL NEGATIVE: 1
CARDIOVASCULAR NEGATIVE: 1
PSYCHIATRIC NEGATIVE: 1
NEUROLOGICAL NEGATIVE: 1

## 2022-08-02 ASSESSMENT — LIFESTYLE VARIABLES
EVER HAD A DRINK FIRST THING IN THE MORNING TO STEADY YOUR NERVES TO GET RID OF A HANGOVER: NO
HAVE YOU EVER FELT YOU SHOULD CUT DOWN ON YOUR DRINKING: NO
TOTAL SCORE: 0
HOW MANY TIMES IN THE PAST YEAR HAVE YOU HAD 5 OR MORE DRINKS IN A DAY: 0
HAVE PEOPLE ANNOYED YOU BY CRITICIZING YOUR DRINKING: NO
AVERAGE NUMBER OF DAYS PER WEEK YOU HAVE A DRINK CONTAINING ALCOHOL: 0
TOTAL SCORE: 0
EVER FELT BAD OR GUILTY ABOUT YOUR DRINKING: NO
ALCOHOL_USE: NO
ON A TYPICAL DAY WHEN YOU DRINK ALCOHOL HOW MANY DRINKS DO YOU HAVE: 0
CONSUMPTION TOTAL: NEGATIVE
TOTAL SCORE: 0

## 2022-08-02 ASSESSMENT — PATIENT HEALTH QUESTIONNAIRE - PHQ9
SUM OF ALL RESPONSES TO PHQ9 QUESTIONS 1 AND 2: 0
1. LITTLE INTEREST OR PLEASURE IN DOING THINGS: NOT AT ALL
2. FEELING DOWN, DEPRESSED, IRRITABLE, OR HOPELESS: NOT AT ALL

## 2022-08-02 NOTE — ED NOTES
Pt given medications per mar, pt bleeding through dressing placed by ERP. MD notifed. New sheets and chux placed under pt.

## 2022-08-02 NOTE — ED PROVIDER NOTES
CHIEF COMPLAINT  Chief Complaint   Patient presents with   • Other       HPI  Ottoniel Davies is a 57 y.o. male who presents for evaluation of bleeding from a nerve ablation site.  Patient has a history of chronic back pain, received a nerve ablation by Dr. Miller today.  After being discharged home he noted some bleeding at the ablation sites.  He does have a history of a mechanical mitral valve, CABG, hypertension hyperlipidemia.  He does take Coumadin daily.  States his last INR was in the 2.5 range, normally takes 5 mg of Coumadin daily and has not adjusted his dose recently.  He denies any dizziness lightheadedness or syncope.  What is    REVIEW OF SYSTEMS  See HPI for further details. All other systems are negative.     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    SOCIAL HISTORY  Social History     Tobacco Use   • Smoking status: Former Smoker     Packs/day: 0.25     Years: 35.00     Pack years: 8.75   • Smokeless tobacco: Former User     Types: Chew   • Tobacco comment: quit weeks ago   Vaping Use   • Vaping Use: Never used   Substance and Sexual Activity   • Alcohol use: Not Currently     Comment: quit in 1994   • Drug use: No   • Sexual activity: Not on file       SURGICAL HISTORY   has a past surgical history that includes thyroidectomy; other; rhinoplasty (2007); knee replacement, total (2012); other orthopedic surgery (1976); hernia repair; replacement of mitral valve (4/13/2019); and feliz (4/13/2019).    CURRENT MEDICATIONS  Home Medications     Reviewed by Teresita Han R.N. (Registered Nurse) on 08/01/22 at 1978  Med List Status: Not Addressed   Medication Last Dose Status   allopurinol (ZYLOPRIM) 300 MG Tab  Active   amitriptyline (ELAVIL) 25 MG Tab  Active   aspirin 81 MG EC tablet  Active  "  carvedilol (COREG) 25 MG Tab  Active   Dapagliflozin Propanediol 10 MG Tab  Active   hydrALAZINE (APRESOLINE) 50 MG Tab  Active   isosorbide mononitrate SR (IMDUR) 60 MG TABLET SR 24 HR  Active   levothyroxine (SYNTHROID) 300 MCG TABS  Active   losartan (COZAAR) 100 MG TABS  Active   metFORMIN ER (GLUCOPHAGE XR) 500 MG TABLET SR 24 HR  Active   pravastatin (PRAVACHOL) 40 MG tablet  Active   spironolactone (ALDACTONE) 25 MG Tab  Active   torsemide (DEMADEX) 20 MG Tab  Active   warfarin (COUMADIN) 5 MG Tab  Active                ALLERGIES  No Known Allergies    FAMILY HISTORY  No pertinent family history    PHYSICAL EXAM   /79   Pulse 70   Temp 37.1 °C (98.7 °F) (Temporal)   Resp 20   Ht 1.702 m (5' 7\")   Wt 90.7 kg (200 lb)   SpO2 94%   BMI 31.32 kg/m²  @LINDSAY[[id number]::@   Pulse ox interpretation: I interpret this pulse ox as normal.  VITALS - vital signs documented prior to this note have been reviewed and noted,  GENERAL - awake, alert, oriented, GCS 15, no apparent distress, non-toxic  appearing  HEENT - normocephalic, atraumatic, pupils equal, sclera anicteric, mucus  membranes moist  NECK - supple, no meningismus, full active range of motion, trachea midline  CARDIOVASCULAR - regular rate/rhythm, no murmurs/gallops/rubs  PULMONARY - no respiratory distress, speaking in full sentences, clear to  auscultation bilaterally, no wheezing/ronchi/rales, no accessory muscle use  GASTROINTESTINAL - soft, non-tender, non-distended, no rebound, guarding,  or peritonitis  Back: He has a small amount of oozing from a puncture sites in his lateral lumbar spine on the left and right side.  GENITOURINARY - Deferred  NEUROLOGIC - Awake alert, normal mental status, speech fluid, cognition  normal, moves all extremities  MUSCULOSKELETAL - no obvious asymmetry or deformities present  EXTREMITIES - warm, well-perfused, no cyanosis or significant edema  DERMATOLOGIC - warm, dry, no rashes, no jaundice  PSYCHIATRIC - normal " affect, normal insight, normal concentration          LABS      Labs Reviewed   CBC WITH DIFFERENTIAL - Abnormal; Notable for the following components:       Result Value    RBC 4.23 (*)     Hemoglobin 11.1 (*)     Hematocrit 35.8 (*)     MCH 26.2 (*)     MCHC 31.0 (*)     Neutrophils-Polys 85.60 (*)     Lymphocytes 8.60 (*)     Lymphs (Absolute) 0.46 (*)     All other components within normal limits    Narrative:     Indicate which anticoagulants the patient is on:->COUMADIN   PROTHROMBIN TIME - Abnormal; Notable for the following components:    PT 53.7 (*)     INR 6.44 (*)     All other components within normal limits    Narrative:     Indicate which anticoagulants the patient is on:->COUMADIN   COMP METABOLIC PANEL - Abnormal; Notable for the following components:    Glucose 153 (*)     Bun 47 (*)     Creatinine 2.42 (*)     Alkaline Phosphatase 139 (*)     All other components within normal limits    Narrative:     Indicate which anticoagulants the patient is on:->COUMADIN   ESTIMATED GFR - Abnormal; Notable for the following components:    GFR (CKD-EPI) 30 (*)     All other components within normal limits    Narrative:     Indicate which anticoagulants the patient is on:->COUMADIN   COD (ADULT)         Pertinent Labs & Imaging studies reviewed. (See chart for details)    RADIOLOGY  No orders to display             ED COURSE/PROCEDURES      Critical care    Critical Care Procedure Note    Total critical care time: Approximately 36 minutes    Upon my assess due to a high probability of clinically significant, life threatening deterioration, secondary to supratherapeutic INR with bleeding and reversal the patient required my direct attention and intervention. This critical care time included obtaining a history; examining the patient; pulse oximetry; ordering and review of studies; arranging urgent treatment with development of a management plan; evaluation of patient's response to treatment; frequent  reassessment; and, discussions with other providers.    was exclusive of separately billable procedures and treating other patients and teaching time.    Medications   lidocaine-epinephrine 2 %-[id number] injection 20 mL (0 mL Injection Held 8/1/22 5743)   tranexamic acid (CYKLOKAPRON) 1000 MG/10ML for nasal packing 300 mg (300 mg Nasal Given by Provider 8/1/22 1190)               MEDICAL DECISION MAKING    Patient presented for evaluation of bleeding from a recent nerve ablation site in his back.  Differential included was not limited to anemia, coagulopathy, hematoma thrombocytopenia among other considerations.  Upon examination he does have some oozing from the sites.  Topical TXA and Surgicel were applied with a Tegaderm with good hemostasis.  Labs are obtained given the patient's history of anticoagulation use, were significant for an elevated INR, as well as an acute renal insufficiency.  Patient denies any prior history of kidney disease, states he has been eating and drinking and voiding well.  Given the ongoing bleeding,  supratherapeutic INR, and underlying renal insufficiency after discussion with hospitalist team decision was made to give the patient vitamin K.  Patient will be admitted for further observation of his supratherapeutic INR, and renal insufficiency.        FINAL IMPRESSION  1.  Supratherapeutic INR  2.  Renal insufficiency  3.  Postop bleed         Electronically signed by: Orlando Swift D.O., 8/2/2022 12:24 AM      Dictation Disclaimer  Please note this report has been produced using speech recognition software and  may contain errors related to that system, including errors seen in grammar,  punctuation and spelling, as well as words and phrases that may be inappropriate.  If there are any questions or concerns, please feel free to contact the dictating  physician for clarification.

## 2022-08-02 NOTE — PROGRESS NOTES
Hospital Medicine Daily Progress Note    Date of Service  8/2/2022    Chief Complaint  Ottoniel Davies is a 57 y.o. male admitted 8/1/2022 with bruising, bleeding.    Hospital Course  No notes on file    Mr. Ottoniel Davies is a 57 y.o. male with a history of mitral valve replacement and atrial fibrillation on Coumadin who presented on 8/1/2022 with bleeding, bruising.  Patient has chronic back pain and had nerve ablation the morning of presentation.  He had significant bruising and bleeding at the incision sites prompting presentation to the emergency room.  On presentation INR was 6.44.  He was given vitamin K and packed with TXA.  He was also found to have an acute kidney injury with a creatinine of 2.42.  Patient was not fasting prior to ablation procedure and states he has been eating and drinking fine.  Denies recent changes to diet, no new medications, has been on the same Coumadin regimen.  Was also found to have low TSH and high free T4.  Dose of levothyroxine was reduced.      Interval Problem Update  Patient was seen and examined at bedside.  I have personally reviewed and interpreted vitals, labs, and imaging.    8/2.  Afebrile.  Stable vitals.  On room air.  Hemoglobin dropped from 11.1 to 9.9.  Creatinine improved to 2.09.  Repeat INR 5.81.  Continue to hold Coumadin and give another dose of vitamin K.  Patient has hypothyroid but appears to be over elevated.  Decrease dose of levothyroxine.  Denies fevers, chills or chest pain, shortness of breath.  States back pain is well controlled.  Patient continues to have bleeding and soaked through his linen.    I have discussed this patient's plan of care and discharge plan at IDT rounds today with Case Management, Nursing, Nursing leadership, and other members of the IDT team.    Consultants/Specialty  None    Code Status  Full Code    Disposition  Patient is not medically cleared for discharge.   Anticipate discharge to to home with close  outpatient follow-up.  I have placed the appropriate orders for post-discharge needs.    Review of Systems  ROS     Physical Exam  Temp:  [36.7 °C (98 °F)-37.1 °C (98.7 °F)] 36.7 °C (98 °F)  Pulse:  [70-88] 81  Resp:  [16-20] 18  BP: (119-146)/(62-86) 133/75  SpO2:  [91 %-98 %] 98 %    Physical Exam    Fluids  No intake or output data in the 24 hours ending 08/02/22 0811    Laboratory  Recent Labs     08/01/22  2225   WBC 5.4   RBC 4.23*   HEMOGLOBIN 11.1*   HEMATOCRIT 35.8*   MCV 84.6   MCH 26.2*   MCHC 31.0*   RDW 45.4   PLATELETCT 234   MPV 10.2     Recent Labs     08/01/22  2225   SODIUM 143   POTASSIUM 4.1   CHLORIDE 107   CO2 21   GLUCOSE 153*   BUN 47*   CREATININE 2.42*   CALCIUM 8.5     Recent Labs     08/01/22  2225   INR 6.44*               Imaging  No orders to display        Assessment/Plan  * Supratherapeutic INR  Assessment & Plan  INR 6.44 on coumadin, taking it for mitral valve replacement. Likely precipitated by FELI/procedure .  As patient has had some bleeding at procedure site, will give one dose Vit K  Serial coag studies     FELI (acute kidney injury) (HCC)  Assessment & Plan  Likely from dehydration  Fluids  Serial BMP     Diabetes mellitus (HCC)- (present on admission)  Assessment & Plan  Continue home medications     Obesity (BMI 30-39.9)- (present on admission)  Assessment & Plan  Body mass index is 31.32 kg/m².  Counseled about diet and exercise    Paroxysmal atrial fibrillation (HCC)- (present on admission)  Assessment & Plan  INR supratherapeutic     Hyperlipidemia  Assessment & Plan  Continue pravastatin     Essential hypertension- (present on admission)  Assessment & Plan  Continue home medications  Hold losartan due to FELI     Hypothyroid- (present on admission)  Assessment & Plan  Continue synthroid        VTE prophylaxis: pharmacologic prophylaxis contraindicated due to Supratherapeutic INR    I have performed a physical exam and reviewed and updated ROS and Plan today (8/2/2022). In  review of yesterday's note (8/1/2022), there are no changes except as documented above.

## 2022-08-02 NOTE — PROGRESS NOTES
Pt alert and oriented x 4. Denies pain. Up SBA and tolerates well. Pt found to have blood saturated linens from bleeding L lower back puncture site @ 0945. Pressure dressing applied and linens changed. MD notified. Dressing CDI at this time and no evidence pf continued bleeding or bruising. Fluids infusing through R PIV. Pt resting comfortably at this time. Call light within reach. Hourly rounding in place.

## 2022-08-02 NOTE — ED NOTES
TXA admin to lower back with gauze and taped in place. Pt transported to unit via gurney by transporter in stable condition.

## 2022-08-02 NOTE — ASSESSMENT & PLAN NOTE
INR 6.44 on coumadin, taking it for mitral valve replacement. Likely precipitated by FELI/procedure .  As patient has had some bleeding at procedure site, will give one dose Vit K  Serial coag studies

## 2022-08-02 NOTE — ED TRIAGE NOTES
"Chief Complaint   Patient presents with   • Other     Patient coming in today from home as a walk-in. Patient reports he had an ablation this morning around 11am, pt reports today around 4 pm when went to sit on the toilet he noticed blood all over the toilet. Patient reports the area on his left lower back where he received the shot has been bleeding since 4pm. He was unable to control the bleeding at home and called his MD, who report him to go to . When pt went to , they told him to come to the ER. Gauze with tape placed on area.  GCS 15. VSS    /80   Pulse 79   Temp 37.1 °C (98.7 °F) (Temporal)   Ht 1.702 m (5' 7\")   Wt 90.7 kg (200 lb)   SpO2 96%   BMI 31.32 kg/m²   "

## 2022-08-02 NOTE — CARE PLAN
The patient is Watcher - Medium risk of patient condition declining or worsening    Shift Goals  Clinical Goals: Pt will achieve hemostasis at surgical incision site.  Patient Goals: Pt will have a bowel movement.    Progress made toward(s) clinical / shift goals:  Pressure dressing applied in the AM. Continues to be CDI. Hemostasis achieved.        Patient is not progressing towards the following goals:  No bowel movement for two days. Bowel protocol implemented.

## 2022-08-02 NOTE — PROGRESS NOTES
4 Eyes Skin Assessment Completed by RUTHIE Kinney and RUTHIE Grayson.    Head WDL  Ears WDL  Nose WDL  Mouth WDL  Neck WDL  Breast/Chest WDL  Shoulder Blades WDL  Spine Incision  (R) Arm/Elbow/Hand WDL  (L) Arm/Elbow/Hand WDL  Abdomen WDL  Groin WDL  Scrotum/Coccyx/Buttocks WDL  (R) Leg WDL  (L) Leg WDL  (R) Heel/Foot/Toe WDL  (L) Heel/Foot/Toe Abrasion          Devices In Places None  Interventions In Place Pillows    Possible Skin Injury No    Pictures Uploaded Into Epic N/A  Wound Consult Placed N/A  RN Wound Prevention Protocol Ordered No

## 2022-08-02 NOTE — H&P
[facility] Medicine History & Physical Note    Date of Service  8/2/2022    Primary Care Physician  Shane Vanegas M.D.    Consultants  None     Code Status  Full Code    Chief Complaint  Chief Complaint   Patient presents with   • Other       History of Presenting Illness  Ottoniel Davies is a 57 y.o. male who presented 8/1/2022 with bleeding at his incision site today.  Patient had a nerve ablation done this morning.  Shortly after the procedure, he began to have bleeding at the incision site.  He has 4 incision sites, 2 on the left side of his lower back and 2 on the right side.  The 2 on the left side began to have bruising and persisted prompting patient to come to the ED.  Denies chest pain shortness of breath dizziness nausea.     Patient has a history of mitral valve repair in 2018. On coumadin.  Is following with Coumadin clinic.    Ed, normal vital signs. Remarkable labs include BUN 47, Cr 2.42, INR 6.44.      I discussed the plan of care with patient.    Review of Systems  Review of Systems   Constitutional: Positive for malaise/fatigue.   HENT: Negative.    Eyes: Negative.    Respiratory: Negative.    Cardiovascular: Negative.    Gastrointestinal: Negative.    Genitourinary: Negative.    Musculoskeletal: Negative.    Skin: Negative.    Neurological: Negative.    Endo/Heme/Allergies: Negative.    Psychiatric/Behavioral: Negative.        Past Medical History   has a past medical history of Dandruff, H/O medullary carcinoma of thyroid (1/31/2014), History of mitral valve repair 4/2019, Hyperlipidemia, Hypertension, Hypothyroid, DEBI on CPAP, and Thyroid ca (HCC).    Surgical History   has a past surgical history that includes thyroidectomy; other; rhinoplasty (2007); knee replacement, total (2012); other orthopedic surgery (1976); hernia repair; pr replacement of mitral valve (4/13/2019); and feliz (4/13/2019).     Family History  family history includes Cancer in his mother; Diabetes in his father and  maternal grandmother; Stroke in his maternal grandmother.   Family history reviewed with patient. There is no family history that is pertinent to the chief complaint.     Social History   reports that he has quit smoking. He has a 8.75 pack-year smoking history. He has quit using smokeless tobacco.  His smokeless tobacco use included chew. He reports previous alcohol use. He reports that he does not use drugs.    Allergies  No Known Allergies    Medications  Prior to Admission Medications   Prescriptions Last Dose Informant Patient Reported? Taking?   Dapagliflozin Propanediol 10 MG Tab   No No   Sig: Take 1 Tablet by mouth every day.   allopurinol (ZYLOPRIM) 300 MG Tab   Yes No   Sig: Take 300 mg by mouth every day. TAKE 1 TABLET BY MOUTH DAILY   amitriptyline (ELAVIL) 25 MG Tab   Yes No   Sig: Take 25 mg by mouth every evening.   aspirin 81 MG EC tablet   Yes No   Sig: Take 1 Tab by mouth every day.   carvedilol (COREG) 25 MG Tab   No No   Sig: Take 2 Tablets by mouth 2 times a day with meals.   hydrALAZINE (APRESOLINE) 50 MG Tab   No No   Sig: Take 1 Tablet by mouth 3 times a day.   isosorbide mononitrate SR (IMDUR) 60 MG TABLET SR 24 HR   No No   Sig: Take 1 Tab by mouth every morning.   levothyroxine (SYNTHROID) 300 MCG TABS   No No   Sig: Take 1 Tab by mouth every day.   losartan (COZAAR) 100 MG TABS   Yes No   Sig: Take 100 mg by mouth every day.   metFORMIN ER (GLUCOPHAGE XR) 500 MG TABLET SR 24 HR   No No   Sig: Take 1 Tablet by mouth 2 times a day.   pravastatin (PRAVACHOL) 40 MG tablet   No No   Sig: Take 1 Tab by mouth every day.   spironolactone (ALDACTONE) 25 MG Tab   No No   Sig: Take 1 Tablet by mouth every day.   torsemide (DEMADEX) 20 MG Tab   No No   Sig: Take 1 Tablet by mouth 1 time a day as needed (for edema). May take up to 2 tabs a day for weight greater than 205.   warfarin (COUMADIN) 5 MG Tab   No No   Sig: TAKE 1/2 TO 1 TABLET BY MOUTH EVERY DAY      Facility-Administered Medications: None        Physical Exam  Temp:  [37.1 °C (98.7 °F)] 37.1 °C (98.7 °F)  Pulse:  [70-79] 70  Resp:  [20] 20  BP: (131-138)/(77-80) 132/79  SpO2:  [93 %-96 %] 94 %  Blood Pressure: 132/79   Temperature: 37.1 °C (98.7 °F)   Pulse: 70   Respiration: 20   Pulse Oximetry: 94 %       Physical Exam  Constitutional:       Appearance: Normal appearance. He is obese.   HENT:      Head: Normocephalic.      Nose: Nose normal.      Mouth/Throat:      Mouth: Mucous membranes are moist.   Eyes:      Pupils: Pupils are equal, round, and reactive to light.   Cardiovascular:      Rate and Rhythm: Normal rate and regular rhythm.      Comments: Vertical incision scar noted on chest  Gynecomastia noted bilaterally  Pulmonary:      Effort: Pulmonary effort is normal.      Breath sounds: Normal breath sounds.   Abdominal:      General: Abdomen is flat. Bowel sounds are normal.      Palpations: Abdomen is soft.   Musculoskeletal:      Cervical back: Normal range of motion.   Skin:     General: Skin is warm.      Comments: Area of dry blood noted at left incision scar. No active oozing/bleeding noted    Neurological:      General: No focal deficit present.      Mental Status: He is alert and oriented to person, place, and time. Mental status is at baseline.   Psychiatric:         Mood and Affect: Mood normal.         Behavior: Behavior normal.         Thought Content: Thought content normal.         Judgment: Judgment normal.         Laboratory:  Recent Labs     08/01/22 2225   WBC 5.4   RBC 4.23*   HEMOGLOBIN 11.1*   HEMATOCRIT 35.8*   MCV 84.6   MCH 26.2*   MCHC 31.0*   RDW 45.4   PLATELETCT 234   MPV 10.2     Recent Labs     08/01/22  2225   SODIUM 143   POTASSIUM 4.1   CHLORIDE 107   CO2 21   GLUCOSE 153*   BUN 47*   CREATININE 2.42*   CALCIUM 8.5     Recent Labs     08/01/22 2225   ALTSGPT 6   ASTSGOT 17   ALKPHOSPHAT 139*   TBILIRUBIN 0.2   GLUCOSE 153*     Recent Labs     08/01/22  2225   INR 6.44*     No results for input(s): NTPROBNP  in the last 72 hours.      No results for input(s): TROPONINT in the last 72 hours.    Imaging:  No orders to display       no X-Ray or EKG requiring interpretation    Assessment/Plan:  Justification for Admission Status  I anticipate this patient is appropriate for observation status at this time because Patient has supratherapeutic INR and FELI.  Given vitamin K and fluids.  Likely discharge tomorrow.  .    Supratherapeutic INR  Assessment & Plan  INR 6.44 on coumadin, taking it for mitral valve replacement. Likely precipitated by FELI/procedure .  As patient has had some bleeding at procedure site, will give one dose Vit K  Serial coag studies     FELI (acute kidney injury) (HCC)  Assessment & Plan  Likely from dehydration  Fluids  Serial BMP     Diabetes mellitus (HCC)- (present on admission)  Assessment & Plan  Continue home medications     Obesity (BMI 30-39.9)- (present on admission)  Assessment & Plan  15 minutes spent counseling patient on weight loss, nutrition, and healthy lifestyle      Paroxysmal atrial fibrillation (HCC)- (present on admission)  Assessment & Plan  INR supratherapeutic     Hyperlipidemia  Assessment & Plan  Continue pravastatin     Essential hypertension- (present on admission)  Assessment & Plan  Continue home medications  Hold losartan due to FELI     Hypothyroid- (present on admission)  Assessment & Plan  Continue synthroid         VTE prophylaxis: pharmacologic prophylaxis contraindicated due to supratherapeutic INR

## 2022-08-03 VITALS
DIASTOLIC BLOOD PRESSURE: 85 MMHG | BODY MASS INDEX: 31.39 KG/M2 | SYSTOLIC BLOOD PRESSURE: 137 MMHG | HEIGHT: 67 IN | HEART RATE: 69 BPM | RESPIRATION RATE: 18 BRPM | OXYGEN SATURATION: 92 % | TEMPERATURE: 99 F | WEIGHT: 200 LBS

## 2022-08-03 PROBLEM — R79.1 SUPRATHERAPEUTIC INR: Status: RESOLVED | Noted: 2022-08-02 | Resolved: 2022-08-03

## 2022-08-03 PROBLEM — N17.9 AKI (ACUTE KIDNEY INJURY) (HCC): Status: RESOLVED | Noted: 2022-08-02 | Resolved: 2022-08-03

## 2022-08-03 LAB
ANION GAP SERPL CALC-SCNC: 12 MMOL/L (ref 7–16)
APTT PPP: 29.6 SEC (ref 24.7–36)
BASOPHILS # BLD AUTO: 0.2 % (ref 0–1.8)
BASOPHILS # BLD: 0.01 K/UL (ref 0–0.12)
BUN SERPL-MCNC: 45 MG/DL (ref 8–22)
CALCIUM SERPL-MCNC: 7.9 MG/DL (ref 8.5–10.5)
CHLORIDE SERPL-SCNC: 110 MMOL/L (ref 96–112)
CO2 SERPL-SCNC: 16 MMOL/L (ref 20–33)
CREAT SERPL-MCNC: 1.62 MG/DL (ref 0.5–1.4)
EOSINOPHIL # BLD AUTO: 0 K/UL (ref 0–0.51)
EOSINOPHIL NFR BLD: 0 % (ref 0–6.9)
ERYTHROCYTE [DISTWIDTH] IN BLOOD BY AUTOMATED COUNT: 44.5 FL (ref 35.9–50)
GFR SERPLBLD CREATININE-BSD FMLA CKD-EPI: 48 ML/MIN/1.73 M 2
GLUCOSE SERPL-MCNC: 140 MG/DL (ref 65–99)
HCT VFR BLD AUTO: 26.9 % (ref 42–52)
HGB BLD-MCNC: 8.6 G/DL (ref 14–18)
IMM GRANULOCYTES # BLD AUTO: 0.07 K/UL (ref 0–0.11)
IMM GRANULOCYTES NFR BLD AUTO: 1.4 % (ref 0–0.9)
INR PPP: 1.86 (ref 0.87–1.13)
LYMPHOCYTES # BLD AUTO: 0.57 K/UL (ref 1–4.8)
LYMPHOCYTES NFR BLD: 11.3 % (ref 22–41)
MAGNESIUM SERPL-MCNC: 2.2 MG/DL (ref 1.5–2.5)
MCH RBC QN AUTO: 26.4 PG (ref 27–33)
MCHC RBC AUTO-ENTMCNC: 32 G/DL (ref 33.7–35.3)
MCV RBC AUTO: 82.5 FL (ref 81.4–97.8)
MONOCYTES # BLD AUTO: 0.39 K/UL (ref 0–0.85)
MONOCYTES NFR BLD AUTO: 7.7 % (ref 0–13.4)
NEUTROPHILS # BLD AUTO: 4.01 K/UL (ref 1.82–7.42)
NEUTROPHILS NFR BLD: 79.4 % (ref 44–72)
NRBC # BLD AUTO: 0 K/UL
NRBC BLD-RTO: 0 /100 WBC
PHOSPHATE SERPL-MCNC: 3.4 MG/DL (ref 2.5–4.5)
PLATELET # BLD AUTO: 184 K/UL (ref 164–446)
PMV BLD AUTO: 10.4 FL (ref 9–12.9)
POTASSIUM SERPL-SCNC: 4.1 MMOL/L (ref 3.6–5.5)
PROTHROMBIN TIME: 20.9 SEC (ref 12–14.6)
RBC # BLD AUTO: 3.26 M/UL (ref 4.7–6.1)
SODIUM SERPL-SCNC: 138 MMOL/L (ref 135–145)
WBC # BLD AUTO: 5.1 K/UL (ref 4.8–10.8)

## 2022-08-03 PROCEDURE — 85730 THROMBOPLASTIN TIME PARTIAL: CPT

## 2022-08-03 PROCEDURE — 85025 COMPLETE CBC W/AUTO DIFF WBC: CPT

## 2022-08-03 PROCEDURE — 84100 ASSAY OF PHOSPHORUS: CPT

## 2022-08-03 PROCEDURE — 83735 ASSAY OF MAGNESIUM: CPT

## 2022-08-03 PROCEDURE — 99217 PR OBSERVATION CARE DISCHARGE: CPT | Performed by: INTERNAL MEDICINE

## 2022-08-03 PROCEDURE — A9270 NON-COVERED ITEM OR SERVICE: HCPCS | Performed by: INTERNAL MEDICINE

## 2022-08-03 PROCEDURE — 700102 HCHG RX REV CODE 250 W/ 637 OVERRIDE(OP): Performed by: STUDENT IN AN ORGANIZED HEALTH CARE EDUCATION/TRAINING PROGRAM

## 2022-08-03 PROCEDURE — 700102 HCHG RX REV CODE 250 W/ 637 OVERRIDE(OP): Performed by: INTERNAL MEDICINE

## 2022-08-03 PROCEDURE — A9270 NON-COVERED ITEM OR SERVICE: HCPCS | Performed by: STUDENT IN AN ORGANIZED HEALTH CARE EDUCATION/TRAINING PROGRAM

## 2022-08-03 PROCEDURE — 80048 BASIC METABOLIC PNL TOTAL CA: CPT

## 2022-08-03 PROCEDURE — G0378 HOSPITAL OBSERVATION PER HR: HCPCS

## 2022-08-03 PROCEDURE — 36415 COLL VENOUS BLD VENIPUNCTURE: CPT

## 2022-08-03 PROCEDURE — 85610 PROTHROMBIN TIME: CPT

## 2022-08-03 PROCEDURE — 700105 HCHG RX REV CODE 258: Performed by: INTERNAL MEDICINE

## 2022-08-03 RX ORDER — SODIUM CHLORIDE 9 MG/ML
INJECTION, SOLUTION INTRAVENOUS CONTINUOUS
Status: DISCONTINUED | OUTPATIENT
Start: 2022-08-03 | End: 2022-08-03 | Stop reason: HOSPADM

## 2022-08-03 RX ADMIN — CARVEDILOL 25 MG: 25 TABLET, FILM COATED ORAL at 08:21

## 2022-08-03 RX ADMIN — HYDRALAZINE HYDROCHLORIDE 50 MG: 50 TABLET, FILM COATED ORAL at 05:21

## 2022-08-03 RX ADMIN — ISOSORBIDE MONONITRATE 60 MG: 60 TABLET, EXTENDED RELEASE ORAL at 05:21

## 2022-08-03 RX ADMIN — SODIUM CHLORIDE: 9 INJECTION, SOLUTION INTRAVENOUS at 06:32

## 2022-08-03 RX ADMIN — LEVOTHYROXINE SODIUM 250 MCG: 0.12 TABLET ORAL at 05:21

## 2022-08-03 NOTE — DISCHARGE SUMMARY
Discharge Summary    CHIEF COMPLAINT ON ADMISSION  Chief Complaint   Patient presents with   • Other       Reason for Admission  Other     Admission Date  8/1/2022    CODE STATUS  Full Code    HPI & HOSPITAL COURSE  Mr. Ottoniel Davies is a 57 y.o. male with a history of mitral valve replacement and atrial fibrillation on Coumadin who presented on 8/1/2022 with bleeding, bruising.  Patient has chronic back pain and had nerve ablation the morning of presentation.  He had significant bruising and bleeding at the incision sites prompting presentation to the emergency room.  On presentation INR was 6.44.  He was given vitamin K and packed with TXA.  He was also found to have an acute kidney injury with a creatinine of 2.42.  Patient was not fasting prior to ablation procedure and states he has been eating and drinking fine.  Denies recent changes to diet, no new medications, has been on the same Coumadin regimen.  Was also found to have low TSH and high free T4.  Patient did not want his dose of levothyroxine reduced.  He stated that his primary care had tried this multiple times in the past and he always felt very lethargic.  We will continue levothyroxine 300 mcg and he can follow-up as outpatient.  INR on day of discharge was 1.86.  Creatinine 1.62 and his baseline.  Patient is medically stable to discharged home.  Continue home dose of Coumadin.  Follow-up with primary care and [facility] [place] as outpatient.       Therefore, he is discharged in fair and stable condition to home with close outpatient follow-up.    The patient met 2-midnight criteria for an inpatient stay at the time of discharge.    Discharge Date  8/3/2022    FOLLOW UP ITEMS POST DISCHARGE  None    DISCHARGE DIAGNOSES  Principal Problem (Resolved):    Supratherapeutic INR POA: Yes  Active Problems:    Hypothyroid POA: Yes    Essential hypertension POA: Yes    Hyperlipidemia POA: Yes    Paroxysmal atrial fibrillation (HCC) (Chronic) POA: Yes     Obesity (BMI 30-39.9) (Chronic) POA: Yes    Diabetes mellitus (HCC) POA: Yes  Resolved Problems:    FELI (acute kidney injury) (HCC) POA: Yes      FOLLOW UP  Future Appointments   Date Time Provider Department Center   10/24/2022 12:20 PM Oren Keller M.D. [facility] None     Shane Vanegas M.D.  [address]  [facility] [address]  [phone]    Follow up        MEDICATIONS ON DISCHARGE     Medication List      CONTINUE taking these medications      Instructions   allopurinol 300 MG Tabs  Commonly known as: ZYLOPRIM   Take 300 mg by mouth every day. TAKE 1 TABLET BY MOUTH DAILY  Dose: 300 mg     amitriptyline 25 MG Tabs  Commonly known as: ELAVIL   Take 25 mg by mouth every evening.  Dose: 25 mg     aspirin 81 MG EC tablet   Take 1 Tab by mouth every day.  Dose: 81 mg     carvedilol 25 MG Tabs  Commonly known as: COREG   Take 37.5 mg by mouth 2 times a day with meals. 1 and 1/2 tablet = 37.5 mg  Dose: 37.5 mg     cyclobenzaprine 10 mg Tabs  Commonly known as: Flexeril   Take 10 mg by mouth 2 times a day as needed for Muscle Spasms.  Dose: 10 mg     hydrALAZINE 50 MG Tabs  Commonly known as: APRESOLINE   Take 1 Tablet by mouth 3 times a day.  Dose: 50 mg     isosorbide mononitrate SR 60 MG Tb24  Commonly known as: IMDUR   Take 1 Tab by mouth every morning.  Dose: 60 mg     levothyroxine 300 MCG Tabs  Commonly known as: SYNTHROID   Take 1 Tab by mouth every day.  Dose: 300 mcg     metFORMIN  MG Tb24  Commonly known as: GLUCOPHAGE XR   Doctor's comments: This rx was submitted by a pharmacist working under a collaborative practice agreement.  Take 1 Tablet by mouth 2 times a day.  Dose: 500 mg     potassium chloride SA 20 MEQ Tbcr  Commonly known as: Kdur   Take 40 mEq by mouth every day. 2 tablets = 40 mEq  Dose: 40 mEq     pravastatin 40 MG tablet  Commonly known as: PRAVACHOL   Take 1 Tab by mouth every day.  Dose: 40 mg     torsemide 20 MG Tabs  Commonly known as: DEMADEX   Doctor's comments: Please call for  6 month follow up  Take 1 Tablet by mouth 1 time a day as needed (for edema). May take up to 2 tabs a day for weight greater than 205.  Dose: 20 mg     warfarin 5 MG Tabs  Commonly known as: COUMADIN   Take 5 mg by mouth every day.  Dose: 5 mg        STOP taking these medications    losartan 100 MG Tabs  Commonly known as: COZAAR     predniSONE 10 MG Tabs  Commonly known as: DELTASONE            Allergies  No Known Allergies    DIET  Orders Placed This Encounter   Procedures   • Diet Order Diet: Regular     Standing Status:   Standing     Number of Occurrences:   1     Order Specific Question:   Diet:     Answer:   Regular [1]       ACTIVITY  As tolerated.  Weight bearing as tolerated    CONSULTATIONS  None    PROCEDURES  None    LABORATORY  Lab Results   Component Value Date    SODIUM 138 08/03/2022    POTASSIUM 4.1 08/03/2022    CHLORIDE 110 08/03/2022    CO2 16 (L) 08/03/2022    GLUCOSE 140 (H) 08/03/2022    BUN 45 (H) 08/03/2022    CREATININE 1.62 (H) 08/03/2022        Lab Results   Component Value Date    WBC 5.1 08/03/2022    HEMOGLOBIN 8.6 (L) 08/03/2022    HEMATOCRIT 26.9 (L) 08/03/2022    PLATELETCT 184 08/03/2022        I discussed medications and side effects with the patient.  I discussed prognosis and importance of medical compliance with the patient.  I counseled the patient about diet, exercise, weight loss, smoking cessation, and life style modifications.  All questions and concerns have been addressed.  Total time of the discharge process was 36 minutes.

## 2022-08-03 NOTE — DISCHARGE INSTRUCTIONS
Discharge Instructions per IVETH SewellO.    DIET: Diet Order Diet: Regular    ACTIVITY: As tolerated    A proper diet that is low in grease, fat, and salt, along with 30 minutes of exercise per day will lead to weight loss, and better controlled blood sugar and blood pressure.    DIAGNOSIS: Supratherapeutic INR    Follow up with your Primary Care Provider Shane Vanegas M.D. as scheduled or sooner if your symptoms persist or worsen.  Return to Emergency Room for sever chest pain, shortness of breath, signs of a stroke, or any other emergencies.      Warfarin Coagulopathy  Warfarin coagulopathy refers to bleeding that may occur as a complication of the medicine warfarin. Warfarin is a blood thinner (anticoagulant). Anticoagulants prevent dangerous blood clots. Bleeding is the most common and most serious complication of warfarin.  While taking warfarin, you will need to have blood tests (prothrombin tests, or PT tests) regularly to measure your blood clotting time. The PT test results will be reported as the International Normalized Ratio (INR). The INR tells your health care provider whether your dosage of warfarin needs to be changed. The longer it takes your blood to clot, the higher the INR. Your risk of warfarin coagulopathy increases as your INR increases.  What are the causes?  This condition may be caused by:  Taking too much warfarin (overdose).  Underlying medical conditions.  Changes to your diet.  Interactions with medicines, supplements, or alcohol.  What are the signs or symptoms?  Warfarin coagulopathy may cause bleeding from any tissue or organ. Symptoms may include:  Bleeding from the gums.  A nosebleed that is not easily stopped.  Blood in stool. This may look like bright red, dark, or black, tarry stools.  Blood in urine. This may look like pink, red, or brown urine.  Unusual bruising or bruising easily.  A cut that does not stop bleeding within 10 minutes.  Coughing up  blood.  Vomiting blood.  Feeling nauseous for longer than 1 day.  Broken blood vessels in the eye (subconjunctival hemorrhage). This may look like a bright red or dark red patch on the white part of the eye.  Abdominal or back pain with or without bruising.  Sudden, severe headache.  Sudden weakness or numbness of the face, arm, or leg, especially on one side of the body.  Sudden confusion.  Difficulty speaking (aphasia) or understanding speech.  Sudden trouble seeing out of one or both eyes.  Unexpected difficulty walking.  Dizziness.  Loss of balance or coordination.  Unusual vaginal bleeding.  Swelling or pain at an injection site.  Skin scarring due to tissue death (necrosis) of fatty tissue. This may cause pain in the waist, thighs, or buttocks. This is more common among women.  How is this diagnosed?  This condition is diagnosed after your health care provider places you on warfarin and then finds out how it affects your blood's ability to clot. Prothrombin time (PT) clotting tests are used to monitor your clotting factor. These tests also help your health care provider to find the warfarin dose that is best for you.  How is this treated?  This condition is treated with vitamin K. Vitamin K helps the blood to clot. You may receive vitamin K every 12 hours, or as needed. You may also receive donated plasma (transfusions of fresh frozen plasma). Plasma is the liquid part of blood, and contains substances that help the blood clot.  Follow these instructions at home:  Medicines  Take warfarin exactly as told by your health care provider. This ensures that you avoid bleeding or clots that could result in serious injury, pain, or disability.  Take your medicine at the same time every day. If you forget to take your dose of warfarin, take it as soon as you remember on that day. If you do not remember to take it on that day, do not take an extra dose the next day.  Contact your health care provider if you miss a dose  or take an extra dose. Do not change your dosage on your own to make up for missed or extra doses.  Talk with your health care provider or your pharmacist before starting or stopping any new medicines. Many prescription and over-the-counter medicines can interfere with warfarin. This includes over-the-counter vitamins, dietary supplements, herbal medicines, and pain medicines. Your warfarin dosage may need to be adjusted.  Eating and drinking  It is important to maintain a normal, balanced diet while taking warfarin. Avoid major changes in your diet. If you are planning to change your diet, talk with your health care provider before making changes.  Your health care provider may recommend that you work with a diet and nutrition specialist (dietitian).  Vitamin K makes warfarin less effective. It is found in many foods. Eat a consistent amount of foods that contain vitamin K. For example, you may decide to eat 2 vitamin K-containing foods each day. Your warfarin dose is set according to the amount of vitamin K in your blood.  Eat a consistent amount of foods that contain vitamin K. Vitamin K makes warfarin less effective, so eating the same amount each day enables your health care provider to set the correct dose of warfarin. You may decide to eat 2 vitamin K-containing foods each day.  Tests    Make sure to have PT tests at least once every 4-6 weeks for the entire time you are taking warfarin.  Ask your health care provider what your target INR range is. Make sure you always know your target range. If your INR is not in your target range, your health care provider may adjust your dosage.  Preventing bleeding and injury  Some common over-the-counter medicines and supplements may increase the risk of bleeding while taking warfarin. They include:  Acetaminophen.  Aspirin.  NSAIDs, such as ibuprofen or naproxen.  Vitamin E.  Avoid situations that cause bleeding. You may bleed more easily while taking warfarin. To limit  bleeding, take the following actions:  Use a softer toothbrush.  Floss with waxed floss, not unwaxed floss.  Shave with an electric razor, not with a blade.  Limit your use of sharp objects.  Avoid potentially harmful activities, such as contact sports.  General instructions  Wear or carry identification that says that you are taking warfarin.  Make sure that all health care providers, including your dentist, know that you are taking warfarin.  If you need surgery, tell your health care provider that you are taking warfarin. You may have to stop taking warfarin before your surgery.  If you plan to breastfeed or become pregnant while taking warfarin, talk with your health care provider.  Avoid alcohol, tobacco, and drugs.  If your health care provider approves, limit alcohol intake to no more than 1 drink a day for non-pregnant women and 2 drinks a day for men. One drink equals 12 oz. of beer, 5 oz. of wine, or 1½ oz. of hard liquor.  If you change the amount of nicotine, tobacco, or alcohol you use, tell your health care provider.  Keep all follow-up visits and lab visits as told by your health care provider. This is very important because warfarin is a medicine that needs to be closely monitored.  Contact a health care provider if:  You miss a dose.  You take an extra dose.  You plan to have any kind of surgery or procedure.  You are unable to take your medicine due to nausea, vomiting, or diarrhea.  You have any major changes in your diet, or you plan to make major changes in your diet.  You start or stop any over-the-counter medicine, prescription medicine, or dietary supplement.  You become pregnant, plan to become pregnant, or think you may be pregnant.  You have menstrual periods that are heavier than usual, or unusual vaginal bleeding.  You have unusual bruising.  You lose your appetite.  You have a fever.  You have diarrhea that lasts for more than 24 hours.  Get help right away if:  You develop symptoms of  an allergic reaction, such as:  Swelling of the lips, face, tongue, mouth, or throat.  Rash.  Itching.  Itchy, red, swollen areas of skin (hives).  Trouble breathing.  Chest tightness.  You have any symptoms of stroke. BEFAST is an easy way to remember the main warning signs of stroke:  B - Balance. Signs are dizziness, sudden trouble walking, or loss of balance.  E - Eye. Signs are trouble seeing or a sudden change in vision.  F - Face. Signs are sudden weakness or numbness of the face, or the face or eyelid drooping on one side.  A - Arm. Signs are weakness or numbness in an arm. This happens suddenly and usually on one side of the body.  S - Speech. Signs are trouble speaking, slurred speech, or trouble understanding speech.  T - Time. Time to call emergency services. Write down the time your symptoms started.  You have other signs of stroke, such as:  A sudden, severe headache with no known cause.  Nausea or vomiting.  Seizure.  You have signs or symptoms of a blood clot, such as:  Pain or swelling in your leg or arm.  Skin that is red or warm to the touch on your arm or leg.  Shortness of breath or difficulty breathing.  Chest pain.  Unexplained fever.  You have:  A fall or have an accident, especially if you hit your head.  Blood in your urine. Your urine may look reddish, pinkish, or tea-colored.  Blood in your stool. Your stool may be black or bright red.  Bleeding that does not stop after applying pressure to the area for 30 minutes.  Severe pain in your joints or back.  Purple or blue toes.  Skin ulcers that do not go away.  You vomit blood or cough up blood. The blood may be bright red, or it may look like coffee grounds.  These symptoms may represent a serious problem that is an emergency. Do not wait to see if the symptoms will go away. Get medical help right away. Call your local emergency services (911 in the [place] Do not drive yourself to the hospital.  Summary  Warfarin needs to be closely  monitored with blood tests. It is very important to keep all lab visits and follow-up visits with your health care provider. Make sure you know your target INR range and your warfarin dosage.  Monitor how much vitamin K you eat every day. Try to eat the same amount every day.  Wear or carry identification that says that you are taking warfarin.  Take warfarin at the same time every day. Call your health care provider if you miss a dose or if you take an extra dose. Do not change the dosage of warfarin on your own.  Know the signs and symptoms of blood clots, bleeding, and stroke. Know when to get emergency medical help.  This information is not intended to replace advice given to you by your health care provider. Make sure you discuss any questions you have with your health care provider.  Document Released: 11/26/2007 Document Revised: 11/30/2018 Document Reviewed: 03/07/2018  [employer] Patient Education © 2020 [employer]  Bleeding Precautions When on Anticoagulant Therapy, Adult  Anticoagulant therapy, also called blood thinner therapy, is medicine that helps to prevent and treat blood clots. The medicine works by stopping blood clots from forming or growing. Blood clots that form in your blood vessels can be dangerous. They can break loose and travel to the heart, lungs, or brain. This increases the risk of a heart attack, stroke, or blocked lung artery (pulmonary embolism).  Anticoagulants also increase the risk of bleeding. Try to protect yourself from cuts and other injuries that can cause bleeding. It is important to take anticoagulants exactly as told by your health care provider.  Why do I need to be on anticoagulant therapy?  You may need this medicine if you are at risk of developing a blood clot. Conditions that increase your risk of a blood clot include:  Being born with heart disease or a heart malformation (congenital heart disease).  Developing heart disease.  Having had surgery, such as valve  replacement.  Having had a serious accident or other type of severe injury (trauma).  Having certain types of cancer.  Having certain diseases that can increase blood clotting.  Having a high risk of stroke or heart attack.  Having atrial fibrillation (AF).  What are the common anticoagulant medicines?  There are several types of anticoagulant medicines. The most common types are:  Medicines that you take by mouth (oral medicines), such as:  Warfarin.  Novel oral anticoagulants (NOACs), such as:  Direct thrombin inhibitors (dabigatran).  Factor Xa inhibitors (apixaban, edoxaban, and rivaroxaban).  Injections, such as:  Unfractionated heparin.  Low molecular weight heparin.  These anticoagulants work in different ways to prevent blood clots. They also have different risks and side effects.  What do I need to remember while on anticoagulant therapy?  Taking anticoagulants  Take your medicine at the same time every day. If you forget to take your medicine, take it as soon as you remember. Do not double your dosage of medicine if you miss a whole day. Take your normal dose and call your health care provider.  Do not stop taking your medicine unless your health care provider approves. Stopping the medicine can increase your risk of developing a blood clot.  Taking other medicines  Take over-the-counter and prescriptions medicines only as told by your health care provider.  Do not take over-the-counter NSAIDs, including aspirin and ibuprofen, while you are on anticoagulant therapy. These medicines increase your risk of dangerous bleeding.  Get approval from your health care provider before you start taking any new medicines, vitamins, or herbal products. Some of these could interfere with your therapy.  General instructions  Keep all follow-up visits as told by your health care provider. This is important.  If you are pregnant or trying to get pregnant, talk with a health care provider about anticoagulants. Some of these  medicines are not safe to take during pregnancy.  Tell all health care providers, including your dentist, that you are on anticoagulant therapy. It is especially important to tell providers before you have any surgery, medical procedures, or dental work done.  What precautions should I take?    Be very careful when using knives, scissors, or other sharp objects.  Use an electric razor instead of a blade.  Do not use toothpicks.  Use a soft-bristled toothbrush. Brush your teeth gently.  Always wear shoes outdoors and wear slippers indoors.  Be careful when cutting your fingernails and toenails.  Place bath mats in the bathroom. If possible, install handrails as well.  Wear gloves while you do yard work.  Wear your seat belt.  Prevent falls by removing loose rugs and extension cords from areas where you walk. Use a cane or walker if you need it.  Avoid constipation by:  Drinking enough fluid to keep your urine clear or pale yellow.  Eating foods that are high in fiber, such as fresh fruits and vegetables, whole grains, and beans.  Limiting foods that are high in fat and processed sugars, such as fried and sweet foods.  Do not play contact sports or participate in other activities that have a high risk for injury.  What other precautions are important if on warfarin therapy?  If you are taking a type of anticoagulant called warfarin, make sure you:  Work with a diet and nutrition specialist (dietitian) to make an eating plan. Do not make any sudden changes to your diet after you have started your eating plan.  Do not drink alcohol. It can interfere with your medicine and increase your risk of an injury that causes bleeding.  Get regular blood tests as told by your health care provider.  What are some questions to ask my health care provider?  Why do I need anticoagulant therapy?  What is the best anticoagulant therapy for my condition?  How long will I need anticoagulant therapy?  What are the side effects of  anticoagulant therapy?  When should I take my medicine? What should I do if I forget to take it?  Will I need to have regular blood tests?  Do I need to change my diet? Are there foods or drinks that I should avoid?  What activities are safe for me?  What should I do if I want to get pregnant?  Contact a health care provider if:  You miss a dose of medicine:  And you are not sure what to do.  For more than one day.  You have:  Menstrual bleeding that is heavier than normal.  Bloody or brown urine.  Easy bruising.  Black and tarry stool or bright red stool.  Side effects from your medicine.  You feel weak or dizzy.  You become pregnant.  Get help right away if:  You have bleeding that will not stop within 20 minutes from:  The nose.  The gums.  A cut on the skin.  You have a severe headache or stomachache.  You vomit or cough up blood.  You fall or hit your head.  Summary  Anticoagulant therapy, also called blood thinner therapy, is medicine that helps to prevent and treat blood clots.  Anticoagulants work in different ways to prevent blood clots. They also have different risks and side effects.  Talk with your health care provider about any precautions that you should take while on anticoagulant therapy.  This information is not intended to replace advice given to you by your health care provider. Make sure you discuss any questions you have with your health care provider.  Document Released: 11/28/2016 Document Revised: 04/08/2020 Document Reviewed: 03/06/2018  [employer] Patient Education © 2020 [employer]  Dehydration, Adult    Dehydration is when there is not enough fluid or water in your body. This happens when you lose more fluids than you take in. Dehydration can range from mild to very bad. It should be treated right away to keep it from getting very bad.  Symptoms of mild dehydration may include:  Thirst.  Dry lips.  Slightly dry mouth.  Dry, warm skin.  Dizziness.  Symptoms of moderate dehydration may  include:  Very dry mouth.  Muscle cramps.  Dark pee (urine). Pee may be the color of tea.  Your body making less pee.  Your eyes making fewer tears.  Heartbeat that is uneven or faster than normal (palpitations).  Headache.  Light-headedness, especially when you stand up from sitting.  Fainting (syncope).  Symptoms of very bad dehydration may include:  Changes in skin, such as:  Cold and clammy skin.  Blotchy (mottled) or pale skin.  Skin that does not quickly return to normal after being lightly pinched and let go (poor skin turgor).  Changes in body fluids, such as:  Feeling very thirsty.  Your eyes making fewer tears.  Not sweating when body temperature is high, such as in hot weather.  Your body making very little pee.  Changes in vital signs, such as:  Weak pulse.  Pulse that is more than 100 beats a minute when you are sitting still.  Fast breathing.  Low blood pressure.  Other changes, such as:  Sunken eyes.  Cold hands and feet.  Confusion.  Lack of energy (lethargy).  Trouble waking up from sleep.  Short-term weight loss.  Unconsciousness.  Follow these instructions at home:    If told by your doctor, drink an ORS:  Make an ORS by using instructions on the package.  Start by drinking small amounts, about ½ cup (120 mL) every 5-10 minutes.  Slowly drink more until you have had the amount that your doctor said to have.  Drink enough clear fluid to keep your pee clear or pale yellow. If you were told to drink an ORS, finish the ORS first, then start slowly drinking clear fluids. Drink fluids such as:  Water. Do not drink only water by itself. Doing that can make the salt (sodium) level in your body get too low (hyponatremia).  Ice chips.  Fruit juice that you have added water to (diluted).  Low-calorie sports drinks.  Avoid:  Alcohol.  Drinks that have a lot of sugar. These include high-calorie sports drinks, fruit juice that does not have water added, and soda.  Caffeine.  Foods that are greasy or have a lot  of fat or sugar.  Take over-the-counter and prescription medicines only as told by your doctor.  Do not take salt tablets. Doing that can make the salt level in your body get too high (hypernatremia).  Eat foods that have minerals (electrolytes). Examples include bananas, oranges, potatoes, tomatoes, and spinach.  Keep all follow-up visits as told by your doctor. This is important.  Contact a doctor if:  You have belly (abdominal) pain that:  Gets worse.  Stays in one area (localizes).  You have a rash.  You have a stiff neck.  You get angry or annoyed more easily than normal (irritability).  You are more sleepy than normal.  You have a harder time waking up than normal.  You feel:  Weak.  Dizzy.  Very thirsty.  You have peed (urinated) only a small amount of very dark pee during 6-8 hours.  Get help right away if:  You have symptoms of very bad dehydration.  You cannot drink fluids without throwing up (vomiting).  Your symptoms get worse with treatment.  You have a fever.  You have a very bad headache.  You are throwing up or having watery poop (diarrhea) and it:  Gets worse.  Does not go away.  You have blood or something green (bile) in your throw-up.  You have blood in your poop (stool). This may cause poop to look black and tarry.  You have not peed in 6-8 hours.  You pass out (faint).  Your heart rate when you are sitting still is more than 100 beats a minute.  You have trouble breathing.  This information is not intended to replace advice given to you by your health care provider. Make sure you discuss any questions you have with your health care provider.  Document Released: 10/14/2010 Document Revised: 11/30/2018 Document Reviewed: 02/10/2017  Elsevier Patient Education © 2020 [employer]

## 2022-08-03 NOTE — CARE PLAN
The patient is Stable - Low risk of patient condition declining or worsening    Shift Goals  Clinical Goals: Monitor surgical site  Patient Goals: Sleep    Progress made toward(s) clinical / shift goals:     Problem: Knowledge Deficit - Standard  Goal: Patient and family/care givers will demonstrate understanding of plan of care, disease process/condition, diagnostic tests and medications  Outcome: Progressing     Problem: Wound/ / Incision Healing  Goal: Patient's wound/surgical incision will decrease in size and heals properly  Outcome: Progressing

## 2022-08-10 ENCOUNTER — HOSPITAL ENCOUNTER (OUTPATIENT)
Dept: LAB | Facility: MEDICAL CENTER | Age: 57
End: 2022-08-10
Attending: NURSE PRACTITIONER
Payer: MEDICAID

## 2022-08-10 DIAGNOSIS — Z79.01 CHRONIC ANTICOAGULATION: ICD-10-CM

## 2022-08-10 LAB
INR PPP: 1.76 (ref 0.87–1.13)
PROTHROMBIN TIME: 20.1 SEC (ref 12–14.6)

## 2022-08-10 PROCEDURE — 36415 COLL VENOUS BLD VENIPUNCTURE: CPT

## 2022-08-10 PROCEDURE — 85610 PROTHROMBIN TIME: CPT

## 2022-08-11 ENCOUNTER — ANTICOAGULATION MONITORING (OUTPATIENT)
Dept: VASCULAR LAB | Facility: MEDICAL CENTER | Age: 57
End: 2022-08-11
Payer: MEDICAID

## 2022-08-11 DIAGNOSIS — I48.0 PAROXYSMAL ATRIAL FIBRILLATION (HCC): ICD-10-CM

## 2022-08-11 DIAGNOSIS — I05.9 MITRAL VALVE DISEASE: ICD-10-CM

## 2022-08-12 NOTE — PROGRESS NOTES
Anticoagulation Summary  As of 2022      INR goal:  2.0-3.0   TTR:  67.5 % (3.3 y)   INR used for dosin.76 (8/10/2022)   Warfarin maintenance plan:  5 mg (5 mg x 1) every day   Weekly warfarin total:  35 mg   Plan last modified:  Silva ResendezD (2021)   Next INR check:  2022   Target end date:  Indefinite    Indications    Paroxysmal atrial fibrillation (HCC) [I48.0]  Mitral valve disease [I05.9]                 Anticoagulation Episode Summary       INR check location:      Preferred lab:      Send INR reminders to:      Comments:  Mitral valve repair - indefinite anticoag per 19 note from Dr Lawson          Anticoagulation Care Providers       Provider Role Specialty Phone number    FARIDA Parikh Referring Thoracic Surgery (Cardiothoracic Vascular Surgery) [phone]    [facility] Responsible  [phone]          Anticoagulation Patient Findings    Left voicemail message to report a subtherapeutic INR of 1.76.  Requested pt contact the clinic for any s/s of unusual bleeding, bruising, clotting or any changes to diet or medication.   Patient looks to have been admitted last week for excessive bleeding/bruising at incision site of nerve ablation done 22.  Warfarin was reversed with vitamin K while inpatient.    Pt is on ASA 81mg  as antiplatelet therapy for MVR and must be reviewed again on with cards.    Instructed patient to bolus with 7.5mg X 1, then resume current warfarin regimen.    FU 4 days.  Nicholas Vences, SilvaD, BCACP

## 2022-08-15 ENCOUNTER — DOCUMENTATION (OUTPATIENT)
Dept: VASCULAR LAB | Facility: MEDICAL CENTER | Age: 57
End: 2022-08-15
Payer: MEDICAID

## 2022-08-16 NOTE — PROGRESS NOTES
"Patient called today to state he was started on oral prednisone and \"a couple eye drops\".  He is due for INR tomorrow.  Recommended to continue current dosing for now and recheck INR at lab tomorrow.   He voices understanding of this plan.  Nicholas Vences, PharmD, BCACP    "

## 2022-08-19 ENCOUNTER — HOSPITAL ENCOUNTER (OUTPATIENT)
Dept: LAB | Facility: MEDICAL CENTER | Age: 57
End: 2022-08-19
Attending: NURSE PRACTITIONER
Payer: MEDICAID

## 2022-08-19 DIAGNOSIS — Z79.01 CHRONIC ANTICOAGULATION: ICD-10-CM

## 2022-08-19 LAB
INR PPP: 2.95 (ref 0.87–1.13)
PROTHROMBIN TIME: 29.7 SEC (ref 12–14.6)

## 2022-08-19 PROCEDURE — 85610 PROTHROMBIN TIME: CPT

## 2022-08-19 PROCEDURE — 36415 COLL VENOUS BLD VENIPUNCTURE: CPT

## 2022-08-22 ENCOUNTER — ANTICOAGULATION MONITORING (OUTPATIENT)
Dept: MEDICAL GROUP | Facility: PHYSICIAN GROUP | Age: 57
End: 2022-08-22
Payer: MEDICAID

## 2022-08-22 DIAGNOSIS — I48.0 PAROXYSMAL ATRIAL FIBRILLATION (HCC): ICD-10-CM

## 2022-08-22 DIAGNOSIS — I05.9 MITRAL VALVE DISEASE: ICD-10-CM

## 2022-08-22 NOTE — PROGRESS NOTES
Anticoagulation Summary  As of 2022      INR goal:  2.0-3.0   TTR:  67.6 % (3.3 y)   INR used for dosin.95 (2022)   Warfarin maintenance plan:  5 mg (5 mg x 1) every day   Weekly warfarin total:  35 mg   Plan last modified:  Yolis Crain, PharmD (2021)   Next INR check:  2022   Target end date:  Indefinite    Indications    Paroxysmal atrial fibrillation (HCC) [I48.0]  Mitral valve disease [I05.9]                 Anticoagulation Episode Summary       INR check location:      Preferred lab:      Send INR reminders to:      Comments:  Mitral valve repair - indefinite anticoag per 19 note from Dr Lawson          Anticoagulation Care Providers       Provider Role Specialty Phone number    FARIDA Parikh Referring Thoracic Surgery (Cardiothoracic Vascular Surgery) [phone]    [facility] Anticoagulation Services Responsible  [phone]            Refer to Anticoagulation Patient Findings for HPI      Spoke with patient  to report a therapeutic INR.      Pt is on antiplatelet therapy Aspirin 81mg for MVR and must be reviewed again on by cards.    Pt instructed to continue with current warfarin dosing regimen, confirms dosing.   Will follow up in 4 days.     Garfield Martin PhT

## 2022-08-30 DIAGNOSIS — I48.0 PAROXYSMAL ATRIAL FIBRILLATION (HCC): ICD-10-CM

## 2022-08-30 DIAGNOSIS — Z79.01 CHRONIC ANTICOAGULATION: ICD-10-CM

## 2022-08-31 ENCOUNTER — HOSPITAL ENCOUNTER (OUTPATIENT)
Dept: LAB | Facility: MEDICAL CENTER | Age: 57
End: 2022-08-31
Attending: NURSE PRACTITIONER
Payer: MEDICAID

## 2022-08-31 DIAGNOSIS — Z79.01 CHRONIC ANTICOAGULATION: ICD-10-CM

## 2022-08-31 DIAGNOSIS — I48.0 PAROXYSMAL ATRIAL FIBRILLATION (HCC): ICD-10-CM

## 2022-08-31 LAB
INR PPP: 4.37 (ref 0.87–1.13)
PROTHROMBIN TIME: 40.1 SEC (ref 12–14.6)

## 2022-08-31 PROCEDURE — 85610 PROTHROMBIN TIME: CPT

## 2022-08-31 PROCEDURE — 36415 COLL VENOUS BLD VENIPUNCTURE: CPT

## 2022-09-01 ENCOUNTER — ANTICOAGULATION MONITORING (OUTPATIENT)
Dept: VASCULAR LAB | Facility: MEDICAL CENTER | Age: 57
End: 2022-09-01
Payer: MEDICAID

## 2022-09-01 DIAGNOSIS — I05.9 MITRAL VALVE DISEASE: ICD-10-CM

## 2022-09-01 DIAGNOSIS — I48.0 PAROXYSMAL ATRIAL FIBRILLATION (HCC): ICD-10-CM

## 2022-09-01 NOTE — PROGRESS NOTES
Anticoagulation Summary  As of 2022      INR goal:  2.0-3.0   TTR:  67.0 % (3.3 y)   INR used for dosin.37 (2022)   Warfarin maintenance plan:  5 mg (5 mg x 1) every day   Weekly warfarin total:  35 mg   Plan last modified:  Silva ResendezD (2021)   Next INR check:  2022   Target end date:  Indefinite    Indications    Paroxysmal atrial fibrillation (HCC) [I48.0]  Mitral valve disease [I05.9]                 Anticoagulation Episode Summary       INR check location:      Preferred lab:      Send INR reminders to:      Comments:  Mitral valve repair - indefinite anticoag per 19 note from Dr Lawson          Anticoagulation Care Providers       Provider Role Specialty Phone number    FARIDA Parikh Referring Thoracic Surgery (Cardiothoracic Vascular Surgery) [phone]    [facility] Responsible  [phone]            Refer to Anticoagulation Patient Findings for HPI  Patient Findings       Positives:  Signs/symptoms of bleeding (epistaxis lasting a few mins)    Negatives:  Signs/symptoms of thrombosis, Laboratory test error suspected, Change in health, Change in alcohol use, Change in activity, Upcoming invasive procedure, Emergency department visit, Upcoming dental procedure, Missed doses, Extra doses, Change in medications, Change in diet/appetite, Hospital admission, Bruising, Other complaints            Spoke with pt.  INR is SUPRA therapeutic.     Pt verifies warfarin weekly dosing.     Will have pt HOLD today, take 2.5 mg tomorrow, and then continue on w/ his current regimen.    Repeat INR in 1 week(s).     Anil Quan, PharmD, BCACP

## 2022-09-07 ENCOUNTER — HOSPITAL ENCOUNTER (OUTPATIENT)
Dept: LAB | Facility: MEDICAL CENTER | Age: 57
End: 2022-09-07
Attending: NURSE PRACTITIONER
Payer: MEDICAID

## 2022-09-07 DIAGNOSIS — Z79.01 CHRONIC ANTICOAGULATION: ICD-10-CM

## 2022-09-07 DIAGNOSIS — I48.0 PAROXYSMAL ATRIAL FIBRILLATION (HCC): ICD-10-CM

## 2022-09-07 LAB
INR PPP: 3 (ref 0.87–1.13)
PROTHROMBIN TIME: 30.1 SEC (ref 12–14.6)

## 2022-09-07 PROCEDURE — 36415 COLL VENOUS BLD VENIPUNCTURE: CPT

## 2022-09-07 PROCEDURE — 85610 PROTHROMBIN TIME: CPT

## 2022-09-08 ENCOUNTER — ANTICOAGULATION MONITORING (OUTPATIENT)
Dept: VASCULAR LAB | Facility: MEDICAL CENTER | Age: 57
End: 2022-09-08
Payer: MEDICAID

## 2022-09-08 DIAGNOSIS — I05.9 MITRAL VALVE DISEASE: ICD-10-CM

## 2022-09-08 DIAGNOSIS — I48.0 PAROXYSMAL ATRIAL FIBRILLATION (HCC): ICD-10-CM

## 2022-09-08 NOTE — PROGRESS NOTES
Anticoagulation Summary  As of 9/8/2022      INR goal:  2.0-3.0   TTR:  66.6 % (3.3 y)   INR used for dosing:  3.00 (9/7/2022)   Warfarin maintenance plan:  5 mg (5 mg x 1) every day   Weekly warfarin total:  35 mg   Plan last modified:  Yolis Crain, PharmD (12/28/2021)   Next INR check:  9/19/2022   Target end date:  Indefinite    Indications    Paroxysmal atrial fibrillation (HCC) [I48.0]  Mitral valve disease [I05.9]                 Anticoagulation Episode Summary       INR check location:      Preferred lab:      Send INR reminders to:      Comments:  Mitral valve repair - indefinite anticoag per 7/26/19 note from Dr Lawson          Anticoagulation Care Providers       Provider Role Specialty Phone number    FARIDA Parikh Referring Thoracic Surgery (Cardiothoracic Vascular Surgery) [phone]    [facility] Responsible  [phone]            Refer to Anticoagulation Patient Findings for HPI  Patient Findings       Positives:  Signs/symptoms of thrombosis (Both feet are swollen one foot is more swollen the other), Signs/symptoms of bleeding (Patient reports toenails turning black especially on the swollen foot)    Negatives:  Laboratory test error suspected, Change in health, Change in alcohol use, Change in activity, Upcoming invasive procedure, Emergency department visit, Upcoming dental procedure, Missed doses, Extra doses, Change in medications, Change in diet/appetite, Hospital admission, Bruising, Other complaints            Spoke with patient to report a therapeutic INR.    Patient confirms current dosing regimen.    Pt is on antiplatelet therapy Aspirin 81mg for mitral valve repair and must be reviewed again on per cards.    Patient reported bilateral swelling and a couple toe nails turned black on the foot that is more swollen. Due to the bilateral swelling and the black toenails it was recommended to the patient that he should be evaluated at the ED or at least at  a urgent care facility. The patient was made known of the risks of possible complications and was highly encouraged to seek immediate medical attention.     Pt instructed to continue with current warfarin dosing regimen.  Will follow up in 1 week(s).     Trinity Garrett   Discussed with Prasad Salgado    I agree with the above plan.  Layo Yi PharmD

## 2022-09-09 ENCOUNTER — OFFICE VISIT (OUTPATIENT)
Dept: URGENT CARE | Facility: PHYSICIAN GROUP | Age: 57
End: 2022-09-09
Payer: MEDICAID

## 2022-09-09 VITALS
DIASTOLIC BLOOD PRESSURE: 62 MMHG | HEIGHT: 67 IN | HEART RATE: 69 BPM | RESPIRATION RATE: 16 BRPM | BODY MASS INDEX: 33.74 KG/M2 | WEIGHT: 215 LBS | SYSTOLIC BLOOD PRESSURE: 126 MMHG | TEMPERATURE: 97 F | OXYGEN SATURATION: 98 %

## 2022-09-09 DIAGNOSIS — M79.604 PAIN AND SWELLING OF RIGHT LOWER EXTREMITY: ICD-10-CM

## 2022-09-09 DIAGNOSIS — I99.8 ISCHEMIA OF TOE: ICD-10-CM

## 2022-09-09 DIAGNOSIS — M79.89 PAIN AND SWELLING OF RIGHT LOWER EXTREMITY: ICD-10-CM

## 2022-09-09 PROCEDURE — 99204 OFFICE O/P NEW MOD 45 MIN: CPT | Performed by: NURSE PRACTITIONER

## 2022-09-09 ASSESSMENT — ENCOUNTER SYMPTOMS
FEVER: 0
SENSORY CHANGE: 1
CONSTITUTIONAL NEGATIVE: 1
CARDIOVASCULAR NEGATIVE: 1

## 2022-09-09 ASSESSMENT — VISUAL ACUITY: OU: 1

## 2022-09-09 ASSESSMENT — FIBROSIS 4 INDEX: FIB4 SCORE: 2.23
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
## 2022-09-20 ENCOUNTER — HOSPITAL ENCOUNTER (OUTPATIENT)
Dept: LAB | Facility: MEDICAL CENTER | Age: 57
End: 2022-09-20
Attending: INTERNAL MEDICINE
Payer: MEDICAID

## 2022-09-20 ENCOUNTER — HOSPITAL ENCOUNTER (OUTPATIENT)
Dept: LAB | Facility: MEDICAL CENTER | Age: 57
End: 2022-09-20
Attending: NURSE PRACTITIONER
Payer: MEDICAID

## 2022-09-20 DIAGNOSIS — Z79.01 CHRONIC ANTICOAGULATION: ICD-10-CM

## 2022-09-20 DIAGNOSIS — I48.0 PAROXYSMAL ATRIAL FIBRILLATION (HCC): ICD-10-CM

## 2022-09-20 LAB
ANION GAP SERPL CALC-SCNC: 14 MMOL/L (ref 7–16)
BUN SERPL-MCNC: 37 MG/DL (ref 8–22)
CALCIUM SERPL-MCNC: 8.6 MG/DL (ref 8.5–10.5)
CHLORIDE SERPL-SCNC: 103 MMOL/L (ref 96–112)
CO2 SERPL-SCNC: 23 MMOL/L (ref 20–33)
CREAT SERPL-MCNC: 2.65 MG/DL (ref 0.5–1.4)
GFR SERPLBLD CREATININE-BSD FMLA CKD-EPI: 27 ML/MIN/1.73 M 2
GLUCOSE SERPL-MCNC: 131 MG/DL (ref 65–99)
INR PPP: 6.21 (ref 0.87–1.13)
POTASSIUM SERPL-SCNC: 4.1 MMOL/L (ref 3.6–5.5)
PROTHROMBIN TIME: 52.3 SEC (ref 12–14.6)
SODIUM SERPL-SCNC: 140 MMOL/L (ref 135–145)

## 2022-09-20 PROCEDURE — 80048 BASIC METABOLIC PNL TOTAL CA: CPT

## 2022-09-20 PROCEDURE — 36415 COLL VENOUS BLD VENIPUNCTURE: CPT

## 2022-09-20 PROCEDURE — 85610 PROTHROMBIN TIME: CPT

## 2022-09-21 ENCOUNTER — ANTICOAGULATION MONITORING (OUTPATIENT)
Dept: VASCULAR LAB | Facility: MEDICAL CENTER | Age: 57
End: 2022-09-21
Payer: MEDICAID

## 2022-09-21 DIAGNOSIS — I05.9 MITRAL VALVE DISEASE: ICD-10-CM

## 2022-09-21 DIAGNOSIS — I48.0 PAROXYSMAL ATRIAL FIBRILLATION (HCC): ICD-10-CM

## 2022-09-21 NOTE — PROGRESS NOTES
Anticoagulation Summary  As of 2022      INR goal:  2.0-3.0   TTR:  65.9 % (3.4 y)   INR used for dosin.21 (2022)   Warfarin maintenance plan:  5 mg (5 mg x 1) every day   Weekly warfarin total:  35 mg   Plan last modified:  Yolis Crain, PharmD (2021)   Next INR check:     Target end date:  Indefinite    Indications    Paroxysmal atrial fibrillation (HCC) [I48.0]  Mitral valve disease [I05.9]                 Anticoagulation Episode Summary       INR check location:      Preferred lab:      Send INR reminders to:      Comments:  Mitral valve repair - indefinite anticoag per 19 note from Dr Lawson          Anticoagulation Care Providers       Provider Role Specialty Phone number    FARIDA Parikh Referring Thoracic Surgery (Cardiothoracic Vascular Surgery) [phone]    [facility] Anticoagulation Services Responsible  [phone]          Anticoagulation Patient Findings          Left voicemail message to report a SUPRAtherapeutic INR of 6.21.  Requested pt contact the clinic for any s/s of unusual bleeding, bruising, clotting or any changes to diet or medication.     Pt is to hold with current warfarin dosing regimen until the INR is within therapeutic range.    Unable to verify antiplatelet reason for taking aspirin with patient will verify when in contact with patient.        FU in 2 days after next INR taken by patient    Jaswant Frausto PhT

## 2022-09-23 ENCOUNTER — ANTICOAGULATION MONITORING (OUTPATIENT)
Dept: VASCULAR LAB | Facility: MEDICAL CENTER | Age: 57
End: 2022-09-23
Payer: MEDICAID

## 2022-09-23 DIAGNOSIS — I48.0 PAROXYSMAL ATRIAL FIBRILLATION (HCC): ICD-10-CM

## 2022-09-23 DIAGNOSIS — I05.9 MITRAL VALVE DISEASE: ICD-10-CM

## 2022-09-23 NOTE — PROGRESS NOTES
Called pt regarding overdue INR - he states he was unable to go to the lab today.    Given the above, instructed pt to HOLD x 1 more day and to then resume his current warfarin dosing regimen.    Pt to f/u in clinic on 9/26.    Advised pt to go to the ED for any bleeding concerns prior to f/u. No current issues noted.     Anil Quan, SilvaD, BCACP

## 2022-09-26 ENCOUNTER — ANTICOAGULATION VISIT (OUTPATIENT)
Dept: VASCULAR LAB | Facility: MEDICAL CENTER | Age: 57
End: 2022-09-26
Attending: INTERNAL MEDICINE
Payer: MEDICAID

## 2022-09-26 VITALS — SYSTOLIC BLOOD PRESSURE: 123 MMHG | DIASTOLIC BLOOD PRESSURE: 77 MMHG | HEART RATE: 79 BPM

## 2022-09-26 DIAGNOSIS — I48.0 PAROXYSMAL ATRIAL FIBRILLATION (HCC): ICD-10-CM

## 2022-09-26 DIAGNOSIS — D68.69 SECONDARY HYPERCOAGULABLE STATE (HCC): ICD-10-CM

## 2022-09-26 DIAGNOSIS — I05.9 MITRAL VALVE DISEASE: ICD-10-CM

## 2022-09-26 LAB
INR BLD: 2.4 (ref 0.9–1.2)
INR PPP: 2.4 (ref 2–3.5)

## 2022-09-26 PROCEDURE — 99212 OFFICE O/P EST SF 10 MIN: CPT | Performed by: NURSE PRACTITIONER

## 2022-09-26 PROCEDURE — 85610 PROTHROMBIN TIME: CPT

## 2022-09-26 NOTE — PROGRESS NOTES
Anticoagulation Summary  As of 2022      INR goal:  2.0-3.0   TTR:  65.7 % (3.4 y)   INR used for dosin.40 (2022)   Warfarin maintenance plan:  2.5 mg (5 mg x 0.5) every Tue, Thu; 5 mg (5 mg x 1) all other days   Weekly warfarin total:  30 mg   Plan last modified:  LYDIA MayesP.NTanner (2022)   Next INR check:  2022   Target end date:  Indefinite    Indications    Mitral valve disease [I05.9]  Paroxysmal atrial fibrillation (HCC) [I48.0]  Secondary hypercoagulable state (HCC) [D68.69]                 Anticoagulation Episode Summary       INR check location:      Preferred lab:      Send INR reminders to:      Comments:  Mitral valve repair - indefinite anticoag per 19 note from Dr Lawson          Anticoagulation Care Providers       Provider Role Specialty Phone number    LYDIA ParikhPJOHN Referring Thoracic Surgery (Cardiothoracic Vascular Surgery) [phone]    [facility] Anticoagulation Services Responsible  [phone]                  Refer to Patient Findings for HPI:  Patient Findings       Positives:  Change in health, Other complaints    Negatives:  Signs/symptoms of thrombosis, Signs/symptoms of bleeding, Laboratory test error suspected, Change in alcohol use, Change in activity, Upcoming invasive procedure, Emergency department visit, Upcoming dental procedure, Missed doses, Extra doses, Change in medications, Change in diet/appetite, Hospital admission, Bruising    Comments:  Pt reports being more tired lately. Is trying to stay hydrated. His most recent creatinine is 2.65. He will call his PCP's office today to discuss.            Vitals:    22 1325   BP: 123/77   Pulse: 79       Verified current warfarin dosing schedule.    Medications reconciled   Pt is on ASA 81mg as antiplatelet therapy for MV repair and must be reviewed again on 10/24/22.    Education given to patient regarding instructions for taking warfarin, food/drug interactions (keep vitamin k  intake consistent), drug/drug interactions (avoid NSAIDs, ASA, notify us of any medication changes) and signs/symptoms of bleeding or thrombosis/stroke. Discussed anticoagulation in detail including dosing, INR levels and safety. Answered his questions. Given a warfarin education booklet.      A/P   INR  -therapeutic. INR down from 6.21 last week. Will have patient resume warfarin tonight but at a lower regimen.    Warfarin dosing recommendation: Take 2.5 mg on Tuesdays and Thursdays, 5 mg all other days.    Pt educated to contact our clinic with any changes in medications or s/s of bleeding or thrombosis. Pt is aware to seek immediate medical attention for falls, head injury or deep cuts.    Follow up appointment on Friday.    LYDIA MayesP.JONATHAN.

## 2022-09-30 ENCOUNTER — HOSPITAL ENCOUNTER (OUTPATIENT)
Dept: LAB | Facility: MEDICAL CENTER | Age: 57
End: 2022-09-30
Attending: NURSE PRACTITIONER
Payer: MEDICAID

## 2022-09-30 DIAGNOSIS — Z79.01 CHRONIC ANTICOAGULATION: ICD-10-CM

## 2022-09-30 DIAGNOSIS — I48.0 PAROXYSMAL ATRIAL FIBRILLATION (HCC): ICD-10-CM

## 2022-09-30 LAB
INR PPP: 2.14 (ref 0.87–1.13)
PROTHROMBIN TIME: 23.3 SEC (ref 12–14.6)

## 2022-09-30 PROCEDURE — 85610 PROTHROMBIN TIME: CPT

## 2022-09-30 PROCEDURE — 36415 COLL VENOUS BLD VENIPUNCTURE: CPT

## 2022-10-03 ENCOUNTER — ANTICOAGULATION MONITORING (OUTPATIENT)
Dept: CARDIOLOGY | Facility: MEDICAL CENTER | Age: 57
End: 2022-10-03
Payer: MEDICAID

## 2022-10-03 DIAGNOSIS — I48.0 PAROXYSMAL ATRIAL FIBRILLATION (HCC): ICD-10-CM

## 2022-10-03 DIAGNOSIS — I05.9 MITRAL VALVE DISEASE: ICD-10-CM

## 2022-10-03 DIAGNOSIS — D68.69 SECONDARY HYPERCOAGULABLE STATE (HCC): ICD-10-CM

## 2022-10-04 NOTE — PROGRESS NOTES
Anticoagulation Summary  As of 10/3/2022      INR goal:  2.0-3.0   TTR:  65.8 % (3.4 y)   INR used for dosin.14 (2022)   Warfarin maintenance plan:  2.5 mg (5 mg x 0.5) every Tue, Thu; 5 mg (5 mg x 1) all other days   Weekly warfarin total:  30 mg   Plan last modified:  Gayatri Granados ATannerP.NTanner (2022)   Next INR check:  10/10/2022   Target end date:  Indefinite    Indications    Mitral valve disease [I05.9]  Paroxysmal atrial fibrillation (HCC) [I48.0]  Secondary hypercoagulable state (HCC) [D68.69]                 Anticoagulation Episode Summary       INR check location:      Preferred lab:      Send INR reminders to:      Comments:  Mitral valve repair - indefinite anticoag per 19 note from Dr Lawson          Anticoagulation Care Providers       Provider Role Specialty Phone number    Kiley Perez ATannerP.NTanner Referring Thoracic Surgery (Cardiothoracic Vascular Surgery) [phone]    [facility] Anticoagulation Services Responsible  [phone]            Refer to Anticoagulation Patient Findings for HPI  Patient Findings       Negatives:  Signs/symptoms of thrombosis, Signs/symptoms of bleeding, Laboratory test error suspected, Change in health, Change in alcohol use, Change in activity, Upcoming invasive procedure, Emergency department visit, Upcoming dental procedure, Missed doses, Extra doses, Change in medications, Change in diet/appetite, Hospital admission, Bruising, Other complaints            Spoke with patient to report a therapeutic INR.      Pt instructed to continue with current warfarin dosing regimen, confirms dosing.   Will follow up in 1.5 week(s).     Anil Quan, PharmD, BCACP

## 2022-10-19 ENCOUNTER — TELEPHONE (OUTPATIENT)
Dept: CARDIOLOGY | Facility: MEDICAL CENTER | Age: 57
End: 2022-10-19
Payer: MEDICAID

## 2022-10-19 NOTE — TELEPHONE ENCOUNTER
LVM for pt in regards to lab work that was ordered at previous OV with [facility]. Office number given for call back if pt has any questions or has had lab work done outside of [facility]. Pt has follow up appointment scheduled with [facility] on 10/24/22.

## 2022-11-03 ENCOUNTER — HOSPITAL ENCOUNTER (OUTPATIENT)
Dept: LAB | Facility: MEDICAL CENTER | Age: 57
End: 2022-11-03
Attending: NURSE PRACTITIONER
Payer: MEDICAID

## 2022-11-03 DIAGNOSIS — I48.0 PAROXYSMAL ATRIAL FIBRILLATION (HCC): ICD-10-CM

## 2022-11-03 DIAGNOSIS — Z79.01 CHRONIC ANTICOAGULATION: ICD-10-CM

## 2022-11-03 LAB
INR PPP: 4.47 (ref 0.87–1.13)
PROTHROMBIN TIME: 40.8 SEC (ref 12–14.6)

## 2022-11-03 PROCEDURE — 85610 PROTHROMBIN TIME: CPT

## 2022-11-03 PROCEDURE — 36415 COLL VENOUS BLD VENIPUNCTURE: CPT

## 2022-11-04 ENCOUNTER — ANTICOAGULATION MONITORING (OUTPATIENT)
Dept: MEDICAL GROUP | Facility: PHYSICIAN GROUP | Age: 57
End: 2022-11-04
Payer: MEDICAID

## 2022-11-04 DIAGNOSIS — I05.9 MITRAL VALVE DISEASE: ICD-10-CM

## 2022-11-04 DIAGNOSIS — D68.69 SECONDARY HYPERCOAGULABLE STATE (HCC): ICD-10-CM

## 2022-11-04 DIAGNOSIS — I48.0 PAROXYSMAL ATRIAL FIBRILLATION (HCC): ICD-10-CM

## 2022-11-04 NOTE — PROGRESS NOTES
Anticoagulation Summary  As of 2022      INR goal:  2.0-3.0   TTR:  65.1 % (3.5 y)   INR used for dosin.47 (11/3/2022)   Warfarin maintenance plan:  2.5 mg (5 mg x 0.5) every Tue, Thu; 5 mg (5 mg x 1) all other days   Weekly warfarin total:  30 mg   Plan last modified:  LYDIA MayesP.NTanner (2022)   Next INR check:  2022   Target end date:  Indefinite    Indications    Mitral valve disease [I05.9]  Paroxysmal atrial fibrillation (HCC) [I48.0]  Secondary hypercoagulable state (HCC) [D68.69]                 Anticoagulation Episode Summary       INR check location:      Preferred lab:      Send INR reminders to:      Comments:  Mitral valve repair - indefinite anticoag per 19 note from Dr Lawson          Anticoagulation Care Providers       Provider Role Specialty Phone number    LYDIA ParikhP.TAMMI Referring Thoracic Surgery (Cardiothoracic Vascular Surgery) [phone]    [facility] Anticoagulation Services Responsible  [phone]            Refer to Anticoagulation Patient Findings for HPI  Patient Findings       Positives:  Extra doses    Negatives:  Signs/symptoms of thrombosis, Signs/symptoms of bleeding, Laboratory test error suspected, Change in health, Change in alcohol use, Change in activity, Upcoming invasive procedure, Emergency department visit, Upcoming dental procedure, Missed doses, Change in medications, Change in diet/appetite, Hospital admission, Bruising, Other complaints    Comments:  Patient was taking 1 tab (5mg) QD instead of 0.5 tabs on Tues/Thurs.            Spoke with patient.  INR is supra therapeutic.     Pt verifies warfarin weekly dosing.     Pt is on antiplatelet therapy Aspirin 81mg for MVR     Will have pt hold 2 doses and then go to current regimen. Patient was taking 5mg QD since the last INR. Advised patient the current regimen is 5mg QD except for Tuesday and Thursday where it is 2.5mg.    Repeat INR in 1 week(s).     Brian Ochoa,  JarrodT    Discussed plan with Anil, SilvaD, BCACP

## 2022-11-10 ENCOUNTER — HOSPITAL ENCOUNTER (OUTPATIENT)
Dept: LAB | Facility: MEDICAL CENTER | Age: 57
End: 2022-11-10
Attending: NURSE PRACTITIONER
Payer: MEDICAID

## 2022-11-10 ENCOUNTER — HOSPITAL ENCOUNTER (OUTPATIENT)
Dept: LAB | Facility: MEDICAL CENTER | Age: 57
End: 2022-11-10
Attending: INTERNAL MEDICINE
Payer: MEDICAID

## 2022-11-10 DIAGNOSIS — Z79.01 CHRONIC ANTICOAGULATION: ICD-10-CM

## 2022-11-10 DIAGNOSIS — I48.0 PAROXYSMAL ATRIAL FIBRILLATION (HCC): ICD-10-CM

## 2022-11-10 LAB
25(OH)D3 SERPL-MCNC: 24 NG/ML (ref 30–100)
ALBUMIN SERPL BCP-MCNC: 4.1 G/DL (ref 3.2–4.9)
ANISOCYTOSIS BLD QL SMEAR: ABNORMAL
APPEARANCE UR: CLEAR
BASOPHILS # BLD AUTO: 0 % (ref 0–1.8)
BASOPHILS # BLD: 0 K/UL (ref 0–0.12)
BILIRUB UR QL STRIP.AUTO: NEGATIVE
BUN SERPL-MCNC: 30 MG/DL (ref 8–22)
CALCIUM SERPL-MCNC: 8.2 MG/DL (ref 8.5–10.5)
CHLORIDE SERPL-SCNC: 109 MMOL/L (ref 96–112)
CO2 SERPL-SCNC: 21 MMOL/L (ref 20–33)
COLOR UR: YELLOW
CREAT SERPL-MCNC: 1.51 MG/DL (ref 0.5–1.4)
CREAT UR-MCNC: 16.21 MG/DL
EOSINOPHIL # BLD AUTO: 0.28 K/UL (ref 0–0.51)
EOSINOPHIL NFR BLD: 5.9 % (ref 0–6.9)
ERYTHROCYTE [DISTWIDTH] IN BLOOD BY AUTOMATED COUNT: 50.8 FL (ref 35.9–50)
FERRITIN SERPL-MCNC: 48.1 NG/ML (ref 22–322)
FOLATE SERPL-MCNC: 11.1 NG/ML
GFR SERPLBLD CREATININE-BSD FMLA CKD-EPI: 53 ML/MIN/1.73 M 2
GLUCOSE SERPL-MCNC: 119 MG/DL (ref 65–99)
GLUCOSE UR STRIP.AUTO-MCNC: NEGATIVE MG/DL
HCT VFR BLD AUTO: 29.1 % (ref 42–52)
HGB BLD-MCNC: 8.7 G/DL (ref 14–18)
INR PPP: 2.53 (ref 0.87–1.13)
IRON SATN MFR SERPL: 8 % (ref 15–55)
IRON SERPL-MCNC: 23 UG/DL (ref 50–180)
KETONES UR STRIP.AUTO-MCNC: NEGATIVE MG/DL
LEUKOCYTE ESTERASE UR QL STRIP.AUTO: NEGATIVE
LYMPHOCYTES # BLD AUTO: 0.64 K/UL (ref 1–4.8)
LYMPHOCYTES NFR BLD: 13.6 % (ref 22–41)
MANUAL DIFF BLD: NORMAL
MCH RBC QN AUTO: 25.2 PG (ref 27–33)
MCHC RBC AUTO-ENTMCNC: 29.9 G/DL (ref 33.7–35.3)
MCV RBC AUTO: 84.3 FL (ref 81.4–97.8)
MICRO URNS: NORMAL
MICROCYTES BLD QL SMEAR: ABNORMAL
MONOCYTES # BLD AUTO: 0.2 K/UL (ref 0–0.85)
MONOCYTES NFR BLD AUTO: 4.2 % (ref 0–13.4)
MORPHOLOGY BLD-IMP: NORMAL
NEUTROPHILS # BLD AUTO: 3.59 K/UL (ref 1.82–7.42)
NEUTROPHILS NFR BLD: 76.3 % (ref 44–72)
NITRITE UR QL STRIP.AUTO: NEGATIVE
NRBC # BLD AUTO: 0 K/UL
NRBC BLD-RTO: 0 /100 WBC
PH UR STRIP.AUTO: 6.5 [PH] (ref 5–8)
PHOSPHATE SERPL-MCNC: 4.5 MG/DL (ref 2.5–4.5)
PLATELET # BLD AUTO: 194 K/UL (ref 164–446)
PLATELET BLD QL SMEAR: NORMAL
PMV BLD AUTO: 11.4 FL (ref 9–12.9)
POTASSIUM SERPL-SCNC: 4.1 MMOL/L (ref 3.6–5.5)
PROT UR QL STRIP: NEGATIVE MG/DL
PROT UR-MCNC: 5 MG/DL (ref 0–15)
PROT/CREAT UR: 308 MG/G (ref 15–68)
PROTHROMBIN TIME: 26.5 SEC (ref 12–14.6)
PTH-INTACT SERPL-MCNC: 179 PG/ML (ref 14–72)
RBC # BLD AUTO: 3.45 M/UL (ref 4.7–6.1)
RBC BLD AUTO: PRESENT
RBC UR QL AUTO: NEGATIVE
SODIUM SERPL-SCNC: 142 MMOL/L (ref 135–145)
SP GR UR STRIP.AUTO: 1.01
TIBC SERPL-MCNC: 305 UG/DL (ref 250–450)
UIBC SERPL-MCNC: 282 UG/DL (ref 110–370)
URATE SERPL-MCNC: 6 MG/DL (ref 2.5–8.3)
UROBILINOGEN UR STRIP.AUTO-MCNC: 0.2 MG/DL
VIT B12 SERPL-MCNC: 420 PG/ML (ref 211–911)
WBC # BLD AUTO: 4.7 K/UL (ref 4.8–10.8)

## 2022-11-10 PROCEDURE — 82570 ASSAY OF URINE CREATININE: CPT

## 2022-11-10 PROCEDURE — 83540 ASSAY OF IRON: CPT

## 2022-11-10 PROCEDURE — 81003 URINALYSIS AUTO W/O SCOPE: CPT

## 2022-11-10 PROCEDURE — 80069 RENAL FUNCTION PANEL: CPT

## 2022-11-10 PROCEDURE — 84156 ASSAY OF PROTEIN URINE: CPT

## 2022-11-10 PROCEDURE — 85610 PROTHROMBIN TIME: CPT

## 2022-11-10 PROCEDURE — 85007 BL SMEAR W/DIFF WBC COUNT: CPT

## 2022-11-10 PROCEDURE — 82607 VITAMIN B-12: CPT

## 2022-11-10 PROCEDURE — 82306 VITAMIN D 25 HYDROXY: CPT

## 2022-11-10 PROCEDURE — 36415 COLL VENOUS BLD VENIPUNCTURE: CPT

## 2022-11-10 PROCEDURE — 83970 ASSAY OF PARATHORMONE: CPT

## 2022-11-10 PROCEDURE — 84550 ASSAY OF BLOOD/URIC ACID: CPT

## 2022-11-10 PROCEDURE — 85025 COMPLETE CBC W/AUTO DIFF WBC: CPT

## 2022-11-10 PROCEDURE — 82728 ASSAY OF FERRITIN: CPT

## 2022-11-10 PROCEDURE — 82746 ASSAY OF FOLIC ACID SERUM: CPT

## 2022-11-10 PROCEDURE — 83550 IRON BINDING TEST: CPT

## 2022-11-11 ENCOUNTER — ANTICOAGULATION MONITORING (OUTPATIENT)
Dept: VASCULAR LAB | Facility: MEDICAL CENTER | Age: 57
End: 2022-11-11
Payer: MEDICAID

## 2022-11-11 DIAGNOSIS — I05.9 MITRAL VALVE DISEASE: ICD-10-CM

## 2022-11-11 DIAGNOSIS — D68.69 SECONDARY HYPERCOAGULABLE STATE (HCC): ICD-10-CM

## 2022-11-11 DIAGNOSIS — I48.0 PAROXYSMAL ATRIAL FIBRILLATION (HCC): Chronic | ICD-10-CM

## 2022-11-11 NOTE — PROGRESS NOTES
Anticoagulation Summary  As of 2022      INR goal:  2.0-3.0   TTR:  64.9 % (3.5 y)   INR used for dosin.53 (11/10/2022)   Warfarin maintenance plan:  2.5 mg (5 mg x 0.5) every Tue, Thu; 5 mg (5 mg x 1) all other days; Starting 2022   Weekly warfarin total:  30 mg   Plan last modified:  FARIDA Mayes (2022)   Next INR check:  2022   Target end date:  Indefinite    Indications    Mitral valve disease [I05.9]  Paroxysmal atrial fibrillation (HCC) [I48.0]  Secondary hypercoagulable state (HCC) [D68.69]                 Anticoagulation Episode Summary       INR check location:      Preferred lab:      Send INR reminders to:      Comments:  Mitral valve repair - indefinite anticoag per 19 note from Dr Lawson          Anticoagulation Care Providers       Provider Role Specialty Phone number    FARIDA Parikh Referring Thoracic Surgery (Cardiothoracic Vascular Surgery) [phone]    [facility] Anticoagulation Services Responsible  [phone]            Refer to Anticoagulation Patient Findings for HPI  Patient Findings       Positives:  Change in diet/appetite    Negatives:  Signs/symptoms of thrombosis, Signs/symptoms of bleeding, Laboratory test error suspected, Change in health, Change in alcohol use, Change in activity, Upcoming invasive procedure, Emergency department visit, Upcoming dental procedure, Missed doses, Extra doses, Change in medications, Hospital admission, Bruising, Other complaints    Comments:  Planning to eat salads with cranberry twice a week. Salad is spring mix so darker greens.            Spoke with patient  to report a therapeutic INR.      Pt is NOT on antiplatelet therapy Aspirin 81mg for MV repair and must be reviewed again with cards.    Pt instructed to continue with current warfarin dosing regimen, confirms dosing.     Will follow up in 1 week(s). Patient changing diet so closer follow-up     Trinity Ponce    Discussed with Anil  PharmD, BCACP

## 2022-11-29 ENCOUNTER — ANTICOAGULATION MONITORING (OUTPATIENT)
Dept: VASCULAR LAB | Facility: MEDICAL CENTER | Age: 57
End: 2022-11-29
Payer: MEDICAID

## 2022-11-29 ENCOUNTER — TELEPHONE (OUTPATIENT)
Dept: VASCULAR LAB | Facility: MEDICAL CENTER | Age: 57
End: 2022-11-29
Payer: MEDICAID

## 2022-11-29 DIAGNOSIS — I05.9 MITRAL VALVE DISEASE: ICD-10-CM

## 2022-11-29 DIAGNOSIS — I48.0 PAROXYSMAL ATRIAL FIBRILLATION (HCC): Chronic | ICD-10-CM

## 2022-11-29 DIAGNOSIS — D68.69 SECONDARY HYPERCOAGULABLE STATE (HCC): ICD-10-CM

## 2022-11-29 NOTE — PROGRESS NOTES
S/w pt - he was started on 10 days of amoxicillin and 5 days of dexamethasone on Sunday.    These 2 medications can raise his INR d/t DDI w/ warfarin. Pt already took his dose today, will have pt skip tomorrow's dose then continue regimen. F/u INR in 2 days.    Yolis Crain, PharmD

## 2022-11-29 NOTE — TELEPHONE ENCOUNTER
----- Message from Yodit Yi sent at 11/29/2022  8:11 AM PST -----  Patient requesting a call back in regards to couple new medications he is taking  Please call back [phone]

## 2022-11-29 NOTE — TELEPHONE ENCOUNTER
S/w pt - he was started on 10 days of amoxicillin and 5 days of dexamethasone on Sunday    See anticoag encounter for details    Yolis Crain, PharmD

## 2022-12-02 ENCOUNTER — HOSPITAL ENCOUNTER (OUTPATIENT)
Dept: LAB | Facility: MEDICAL CENTER | Age: 57
End: 2022-12-02
Attending: NURSE PRACTITIONER
Payer: MEDICAID

## 2022-12-02 DIAGNOSIS — Z79.01 CHRONIC ANTICOAGULATION: ICD-10-CM

## 2022-12-02 DIAGNOSIS — I48.0 PAROXYSMAL ATRIAL FIBRILLATION (HCC): ICD-10-CM

## 2022-12-02 LAB
INR PPP: 2.93 (ref 0.87–1.13)
PROTHROMBIN TIME: 29.6 SEC (ref 12–14.6)

## 2022-12-02 PROCEDURE — 85610 PROTHROMBIN TIME: CPT

## 2022-12-02 PROCEDURE — 36415 COLL VENOUS BLD VENIPUNCTURE: CPT

## 2022-12-05 ENCOUNTER — HOSPITAL ENCOUNTER (INPATIENT)
Facility: MEDICAL CENTER | Age: 57
LOS: 3 days | DRG: 291 | End: 2022-12-08
Attending: EMERGENCY MEDICINE | Admitting: STUDENT IN AN ORGANIZED HEALTH CARE EDUCATION/TRAINING PROGRAM
Payer: MEDICAID

## 2022-12-05 ENCOUNTER — APPOINTMENT (OUTPATIENT)
Dept: RADIOLOGY | Facility: MEDICAL CENTER | Age: 57
DRG: 291 | End: 2022-12-05
Attending: EMERGENCY MEDICINE
Payer: MEDICAID

## 2022-12-05 ENCOUNTER — APPOINTMENT (OUTPATIENT)
Dept: RADIOLOGY | Facility: MEDICAL CENTER | Age: 57
DRG: 291 | End: 2022-12-05
Payer: MEDICAID

## 2022-12-05 DIAGNOSIS — N28.89 RENAL MASS: ICD-10-CM

## 2022-12-05 DIAGNOSIS — I50.31 ACUTE DIASTOLIC CHF (CONGESTIVE HEART FAILURE) (HCC): ICD-10-CM

## 2022-12-05 DIAGNOSIS — E87.70 HYPERVOLEMIA, UNSPECIFIED HYPERVOLEMIA TYPE: ICD-10-CM

## 2022-12-05 DIAGNOSIS — D64.9 ANEMIA, UNSPECIFIED TYPE: ICD-10-CM

## 2022-12-05 DIAGNOSIS — R06.00 DYSPNEA, UNSPECIFIED TYPE: ICD-10-CM

## 2022-12-05 PROBLEM — D50.9 MICROCYTIC ANEMIA: Status: ACTIVE | Noted: 2022-12-05

## 2022-12-05 PROBLEM — R93.429 ABNORMAL CT SCAN, KIDNEY: Status: ACTIVE | Noted: 2022-12-05

## 2022-12-05 PROBLEM — R53.82 CHRONIC FATIGUE: Status: ACTIVE | Noted: 2022-12-05

## 2022-12-05 PROBLEM — K92.1 MELENA: Status: ACTIVE | Noted: 2022-12-05

## 2022-12-05 PROBLEM — E78.2 MIXED HYPERLIPIDEMIA: Status: ACTIVE | Noted: 2019-04-10

## 2022-12-05 LAB
ABO GROUP BLD: NORMAL
ALBUMIN SERPL BCP-MCNC: 3.5 G/DL (ref 3.2–4.9)
ALBUMIN/GLOB SERPL: 1.4 G/DL
ALP SERPL-CCNC: 98 U/L (ref 30–99)
ALT SERPL-CCNC: 21 U/L (ref 2–50)
ANION GAP SERPL CALC-SCNC: 10 MMOL/L (ref 7–16)
ANISOCYTOSIS BLD QL SMEAR: ABNORMAL
APTT PPP: 35.9 SEC (ref 24.7–36)
AST SERPL-CCNC: 22 U/L (ref 12–45)
BASOPHILS # BLD AUTO: 0 % (ref 0–1.8)
BASOPHILS # BLD: 0 K/UL (ref 0–0.12)
BILIRUB SERPL-MCNC: 0.3 MG/DL (ref 0.1–1.5)
BLD GP AB SCN SERPL QL: NORMAL
BUN SERPL-MCNC: 48 MG/DL (ref 8–22)
CALCIUM SERPL-MCNC: 8 MG/DL (ref 8.5–10.5)
CHLORIDE SERPL-SCNC: 106 MMOL/L (ref 96–112)
CO2 SERPL-SCNC: 21 MMOL/L (ref 20–33)
CREAT SERPL-MCNC: 1.08 MG/DL (ref 0.5–1.4)
DACRYOCYTES BLD QL SMEAR: NORMAL
EKG IMPRESSION: NORMAL
EOSINOPHIL # BLD AUTO: 0.32 K/UL (ref 0–0.51)
EOSINOPHIL NFR BLD: 1.8 % (ref 0–6.9)
ERYTHROCYTE [DISTWIDTH] IN BLOOD BY AUTOMATED COUNT: 49.3 FL (ref 35.9–50)
FERRITIN SERPL-MCNC: 102 NG/ML (ref 22–322)
GFR SERPLBLD CREATININE-BSD FMLA CKD-EPI: 79 ML/MIN/1.73 M 2
GLOBULIN SER CALC-MCNC: 2.5 G/DL (ref 1.9–3.5)
GLUCOSE SERPL-MCNC: 118 MG/DL (ref 65–99)
HCT VFR BLD AUTO: 34.3 % (ref 42–52)
HGB BLD-MCNC: 10.7 G/DL (ref 14–18)
INR PPP: 3.07 (ref 0.87–1.13)
IRON SATN MFR SERPL: 8 % (ref 15–55)
IRON SERPL-MCNC: 25 UG/DL (ref 50–180)
LIPASE SERPL-CCNC: 100 U/L (ref 11–82)
LYMPHOCYTES # BLD AUTO: 1.6 K/UL (ref 1–4.8)
LYMPHOCYTES NFR BLD: 9.1 % (ref 22–41)
MANUAL DIFF BLD: NORMAL
MCH RBC QN AUTO: 25.1 PG (ref 27–33)
MCHC RBC AUTO-ENTMCNC: 31.2 G/DL (ref 33.7–35.3)
MCV RBC AUTO: 80.5 FL (ref 81.4–97.8)
METAMYELOCYTES NFR BLD MANUAL: 1.8 %
MICROCYTES BLD QL SMEAR: ABNORMAL
MONOCYTES # BLD AUTO: 1.28 K/UL (ref 0–0.85)
MONOCYTES NFR BLD AUTO: 7.3 % (ref 0–13.4)
MORPHOLOGY BLD-IMP: NORMAL
MYELOCYTES NFR BLD MANUAL: 0.9 %
NEUTROPHILS # BLD AUTO: 13.92 K/UL (ref 1.82–7.42)
NEUTROPHILS NFR BLD: 79.1 % (ref 44–72)
NRBC # BLD AUTO: 0 K/UL
NRBC BLD-RTO: 0 /100 WBC
NT-PROBNP SERPL IA-MCNC: 1762 PG/ML (ref 0–125)
OVALOCYTES BLD QL SMEAR: NORMAL
PLATELET # BLD AUTO: 169 K/UL (ref 164–446)
PLATELET BLD QL SMEAR: NORMAL
PMV BLD AUTO: 10.5 FL (ref 9–12.9)
POIKILOCYTOSIS BLD QL SMEAR: NORMAL
POTASSIUM SERPL-SCNC: 4.9 MMOL/L (ref 3.6–5.5)
PROCALCITONIN SERPL-MCNC: 0.18 NG/ML
PROT SERPL-MCNC: 6 G/DL (ref 6–8.2)
PROTHROMBIN TIME: 30.7 SEC (ref 12–14.6)
RBC # BLD AUTO: 4.26 M/UL (ref 4.7–6.1)
RBC BLD AUTO: PRESENT
RH BLD: NORMAL
SODIUM SERPL-SCNC: 137 MMOL/L (ref 135–145)
T4 FREE SERPL-MCNC: 1.54 NG/DL (ref 0.93–1.7)
TIBC SERPL-MCNC: 322 UG/DL (ref 250–450)
TROPONIN T SERPL-MCNC: 38 NG/L (ref 6–19)
TSH SERPL DL<=0.005 MIU/L-ACNC: <0.005 UIU/ML (ref 0.38–5.33)
UIBC SERPL-MCNC: 297 UG/DL (ref 110–370)
WBC # BLD AUTO: 17.6 K/UL (ref 4.8–10.8)

## 2022-12-05 PROCEDURE — 93005 ELECTROCARDIOGRAM TRACING: CPT

## 2022-12-05 PROCEDURE — 85007 BL SMEAR W/DIFF WBC COUNT: CPT

## 2022-12-05 PROCEDURE — 700102 HCHG RX REV CODE 250 W/ 637 OVERRIDE(OP)

## 2022-12-05 PROCEDURE — 96376 TX/PRO/DX INJ SAME DRUG ADON: CPT

## 2022-12-05 PROCEDURE — 93005 ELECTROCARDIOGRAM TRACING: CPT | Performed by: EMERGENCY MEDICINE

## 2022-12-05 PROCEDURE — 36415 COLL VENOUS BLD VENIPUNCTURE: CPT

## 2022-12-05 PROCEDURE — 83880 ASSAY OF NATRIURETIC PEPTIDE: CPT

## 2022-12-05 PROCEDURE — 700117 HCHG RX CONTRAST REV CODE 255: Performed by: EMERGENCY MEDICINE

## 2022-12-05 PROCEDURE — 700111 HCHG RX REV CODE 636 W/ 250 OVERRIDE (IP): Performed by: EMERGENCY MEDICINE

## 2022-12-05 PROCEDURE — A9270 NON-COVERED ITEM OR SERVICE: HCPCS

## 2022-12-05 PROCEDURE — 84484 ASSAY OF TROPONIN QUANT: CPT

## 2022-12-05 PROCEDURE — 85730 THROMBOPLASTIN TIME PARTIAL: CPT

## 2022-12-05 PROCEDURE — 83550 IRON BINDING TEST: CPT

## 2022-12-05 PROCEDURE — 83540 ASSAY OF IRON: CPT

## 2022-12-05 PROCEDURE — 71045 X-RAY EXAM CHEST 1 VIEW: CPT

## 2022-12-05 PROCEDURE — 74174 CTA ABD&PLVS W/CONTRAST: CPT

## 2022-12-05 PROCEDURE — 86901 BLOOD TYPING SEROLOGIC RH(D): CPT

## 2022-12-05 PROCEDURE — 83690 ASSAY OF LIPASE: CPT

## 2022-12-05 PROCEDURE — 96374 THER/PROPH/DIAG INJ IV PUSH: CPT

## 2022-12-05 PROCEDURE — 86850 RBC ANTIBODY SCREEN: CPT

## 2022-12-05 PROCEDURE — 770020 HCHG ROOM/CARE - TELE (206)

## 2022-12-05 PROCEDURE — 80053 COMPREHEN METABOLIC PANEL: CPT

## 2022-12-05 PROCEDURE — 86900 BLOOD TYPING SEROLOGIC ABO: CPT

## 2022-12-05 PROCEDURE — 84439 ASSAY OF FREE THYROXINE: CPT

## 2022-12-05 PROCEDURE — A9270 NON-COVERED ITEM OR SERVICE: HCPCS | Performed by: STUDENT IN AN ORGANIZED HEALTH CARE EDUCATION/TRAINING PROGRAM

## 2022-12-05 PROCEDURE — 700111 HCHG RX REV CODE 636 W/ 250 OVERRIDE (IP): Performed by: STUDENT IN AN ORGANIZED HEALTH CARE EDUCATION/TRAINING PROGRAM

## 2022-12-05 PROCEDURE — 99223 1ST HOSP IP/OBS HIGH 75: CPT | Performed by: STUDENT IN AN ORGANIZED HEALTH CARE EDUCATION/TRAINING PROGRAM

## 2022-12-05 PROCEDURE — 99285 EMERGENCY DEPT VISIT HI MDM: CPT

## 2022-12-05 PROCEDURE — 82728 ASSAY OF FERRITIN: CPT

## 2022-12-05 PROCEDURE — 85610 PROTHROMBIN TIME: CPT

## 2022-12-05 PROCEDURE — 85025 COMPLETE CBC W/AUTO DIFF WBC: CPT

## 2022-12-05 PROCEDURE — 700102 HCHG RX REV CODE 250 W/ 637 OVERRIDE(OP): Performed by: STUDENT IN AN ORGANIZED HEALTH CARE EDUCATION/TRAINING PROGRAM

## 2022-12-05 PROCEDURE — 84145 PROCALCITONIN (PCT): CPT

## 2022-12-05 PROCEDURE — 84443 ASSAY THYROID STIM HORMONE: CPT

## 2022-12-05 RX ORDER — AMITRIPTYLINE HYDROCHLORIDE 10 MG/1
25 TABLET, FILM COATED ORAL NIGHTLY
Status: DISCONTINUED | OUTPATIENT
Start: 2022-12-05 | End: 2022-12-08 | Stop reason: HOSPADM

## 2022-12-05 RX ORDER — WARFARIN SODIUM 5 MG/1
5 TABLET ORAL
Status: DISCONTINUED | OUTPATIENT
Start: 2022-12-06 | End: 2022-12-06

## 2022-12-05 RX ORDER — FUROSEMIDE 10 MG/ML
20 INJECTION INTRAMUSCULAR; INTRAVENOUS
Status: DISCONTINUED | OUTPATIENT
Start: 2022-12-05 | End: 2022-12-07

## 2022-12-05 RX ORDER — LEVOTHYROXINE SODIUM 0.1 MG/1
300 TABLET ORAL DAILY
Status: DISCONTINUED | OUTPATIENT
Start: 2022-12-05 | End: 2022-12-07

## 2022-12-05 RX ORDER — AMOXICILLIN 500 MG/1
500 CAPSULE ORAL 3 TIMES DAILY
Status: ON HOLD | COMMUNITY
Start: 2022-11-27 | End: 2022-12-08

## 2022-12-05 RX ORDER — AMOXICILLIN 250 MG
2 CAPSULE ORAL 2 TIMES DAILY
Status: DISCONTINUED | OUTPATIENT
Start: 2022-12-05 | End: 2022-12-08 | Stop reason: HOSPADM

## 2022-12-05 RX ORDER — LABETALOL HYDROCHLORIDE 5 MG/ML
10 INJECTION, SOLUTION INTRAVENOUS EVERY 4 HOURS PRN
Status: DISCONTINUED | OUTPATIENT
Start: 2022-12-05 | End: 2022-12-08 | Stop reason: HOSPADM

## 2022-12-05 RX ORDER — ACETAMINOPHEN 325 MG/1
650 TABLET ORAL EVERY 6 HOURS PRN
Status: DISCONTINUED | OUTPATIENT
Start: 2022-12-05 | End: 2022-12-08 | Stop reason: HOSPADM

## 2022-12-05 RX ORDER — BISACODYL 10 MG
10 SUPPOSITORY, RECTAL RECTAL
Status: DISCONTINUED | OUTPATIENT
Start: 2022-12-05 | End: 2022-12-08 | Stop reason: HOSPADM

## 2022-12-05 RX ORDER — CHOLECALCIFEROL (VITAMIN D3) 125 MCG
5 CAPSULE ORAL NIGHTLY PRN
Status: DISCONTINUED | OUTPATIENT
Start: 2022-12-05 | End: 2022-12-08 | Stop reason: HOSPADM

## 2022-12-05 RX ORDER — ISOSORBIDE MONONITRATE 30 MG/1
30 TABLET, EXTENDED RELEASE ORAL EVERY MORNING
Status: DISCONTINUED | OUTPATIENT
Start: 2022-12-05 | End: 2022-12-08 | Stop reason: HOSPADM

## 2022-12-05 RX ORDER — PRAVASTATIN SODIUM 20 MG
40 TABLET ORAL DAILY
Status: DISCONTINUED | OUTPATIENT
Start: 2022-12-05 | End: 2022-12-08 | Stop reason: HOSPADM

## 2022-12-05 RX ORDER — ISOSORBIDE MONONITRATE 30 MG/1
30 TABLET, EXTENDED RELEASE ORAL EVERY MORNING
COMMUNITY
End: 2022-12-19

## 2022-12-05 RX ORDER — POLYETHYLENE GLYCOL 3350 17 G/17G
1 POWDER, FOR SOLUTION ORAL
Status: DISCONTINUED | OUTPATIENT
Start: 2022-12-05 | End: 2022-12-08 | Stop reason: HOSPADM

## 2022-12-05 RX ORDER — GLIPIZIDE 5 MG/1
5 TABLET ORAL
COMMUNITY

## 2022-12-05 RX ORDER — ALLOPURINOL 300 MG/1
300 TABLET ORAL DAILY
Status: DISCONTINUED | OUTPATIENT
Start: 2022-12-05 | End: 2022-12-08 | Stop reason: HOSPADM

## 2022-12-05 RX ORDER — HYDRALAZINE HYDROCHLORIDE 50 MG/1
50 TABLET, FILM COATED ORAL 3 TIMES DAILY
Status: DISCONTINUED | OUTPATIENT
Start: 2022-12-05 | End: 2022-12-08 | Stop reason: HOSPADM

## 2022-12-05 RX ORDER — WARFARIN SODIUM 2.5 MG/1
2.5 TABLET ORAL
Status: DISCONTINUED | OUTPATIENT
Start: 2022-12-05 | End: 2022-12-06

## 2022-12-05 RX ORDER — FUROSEMIDE 10 MG/ML
20 INJECTION INTRAMUSCULAR; INTRAVENOUS ONCE
Status: COMPLETED | OUTPATIENT
Start: 2022-12-05 | End: 2022-12-05

## 2022-12-05 RX ADMIN — Medication 5 MG: at 23:27

## 2022-12-05 RX ADMIN — FUROSEMIDE 20 MG: 10 INJECTION, SOLUTION INTRAMUSCULAR; INTRAVENOUS at 20:12

## 2022-12-05 RX ADMIN — LEVOTHYROXINE SODIUM 300 MCG: 0.1 TABLET ORAL at 17:21

## 2022-12-05 RX ADMIN — WARFARIN SODIUM 2.5 MG: 2.5 TABLET ORAL at 22:58

## 2022-12-05 RX ADMIN — IOHEXOL 100 ML: 350 INJECTION, SOLUTION INTRAVENOUS at 13:25

## 2022-12-05 RX ADMIN — AMITRIPTYLINE HYDROCHLORIDE 25 MG: 10 TABLET, FILM COATED ORAL at 21:17

## 2022-12-05 RX ADMIN — PRAVASTATIN SODIUM 40 MG: 20 TABLET ORAL at 17:23

## 2022-12-05 RX ADMIN — HYDRALAZINE HYDROCHLORIDE 50 MG: 50 TABLET, FILM COATED ORAL at 17:22

## 2022-12-05 RX ADMIN — ALLOPURINOL 300 MG: 300 TABLET ORAL at 17:21

## 2022-12-05 RX ADMIN — HYDRALAZINE HYDROCHLORIDE 50 MG: 50 TABLET, FILM COATED ORAL at 20:12

## 2022-12-05 RX ADMIN — ISOSORBIDE MONONITRATE 30 MG: 30 TABLET, EXTENDED RELEASE ORAL at 21:16

## 2022-12-05 RX ADMIN — CARVEDILOL 37.5 MG: 25 TABLET, FILM COATED ORAL at 17:21

## 2022-12-05 RX ADMIN — FUROSEMIDE 20 MG: 10 INJECTION, SOLUTION INTRAMUSCULAR; INTRAVENOUS at 14:09

## 2022-12-05 ASSESSMENT — COGNITIVE AND FUNCTIONAL STATUS - GENERAL
SUGGESTED CMS G CODE MODIFIER MOBILITY: CI
MOBILITY SCORE: 23
CLIMB 3 TO 5 STEPS WITH RAILING: A LITTLE
SUGGESTED CMS G CODE MODIFIER DAILY ACTIVITY: CH
DAILY ACTIVITIY SCORE: 24

## 2022-12-05 ASSESSMENT — FIBROSIS 4 INDEX
FIB4 SCORE: 2.11
FIB4 SCORE: 1.68

## 2022-12-05 ASSESSMENT — PATIENT HEALTH QUESTIONNAIRE - PHQ9
6. FEELING BAD ABOUT YOURSELF - OR THAT YOU ARE A FAILURE OR HAVE LET YOURSELF OR YOUR FAMILY DOWN: NOT AL ALL
7. TROUBLE CONCENTRATING ON THINGS, SUCH AS READING THE NEWSPAPER OR WATCHING TELEVISION: NOT AT ALL
9. THOUGHTS THAT YOU WOULD BE BETTER OFF DEAD, OR OF HURTING YOURSELF: NOT AT ALL
3. TROUBLE FALLING OR STAYING ASLEEP OR SLEEPING TOO MUCH: MORE THAN HALF THE DAYS
4. FEELING TIRED OR HAVING LITTLE ENERGY: MORE THAN HALF THE DAYS
1. LITTLE INTEREST OR PLEASURE IN DOING THINGS: SEVERAL DAYS
SUM OF ALL RESPONSES TO PHQ QUESTIONS 1-9: 5
5. POOR APPETITE OR OVEREATING: NOT AT ALL
8. MOVING OR SPEAKING SO SLOWLY THAT OTHER PEOPLE COULD HAVE NOTICED. OR THE OPPOSITE, BEING SO FIGETY OR RESTLESS THAT YOU HAVE BEEN MOVING AROUND A LOT MORE THAN USUAL: NOT AT ALL
2. FEELING DOWN, DEPRESSED, IRRITABLE, OR HOPELESS: NOT AT ALL
SUM OF ALL RESPONSES TO PHQ9 QUESTIONS 1 AND 2: 1

## 2022-12-05 ASSESSMENT — CHA2DS2 SCORE
VASCULAR DISEASE: YES
DIABETES: YES
HYPERTENSION: YES
SEX: MALE
AGE 65 TO 74: NO
CHF OR LEFT VENTRICULAR DYSFUNCTION: YES
PRIOR STROKE OR TIA OR THROMBOEMBOLISM: NO
AGE 75 OR GREATER: NO
CHA2DS2 VASC SCORE: 4

## 2022-12-05 ASSESSMENT — LIFESTYLE VARIABLES
CONSUMPTION TOTAL: NEGATIVE
ON A TYPICAL DAY WHEN YOU DRINK ALCOHOL HOW MANY DRINKS DO YOU HAVE: 0
EVER FELT BAD OR GUILTY ABOUT YOUR DRINKING: NO
ALCOHOL_USE: NO
EVER HAD A DRINK FIRST THING IN THE MORNING TO STEADY YOUR NERVES TO GET RID OF A HANGOVER: NO
AVERAGE NUMBER OF DAYS PER WEEK YOU HAVE A DRINK CONTAINING ALCOHOL: 0
TOTAL SCORE: 0
HOW MANY TIMES IN THE PAST YEAR HAVE YOU HAD 5 OR MORE DRINKS IN A DAY: 0
HAVE PEOPLE ANNOYED YOU BY CRITICIZING YOUR DRINKING: NO
HAVE YOU EVER FELT YOU SHOULD CUT DOWN ON YOUR DRINKING: NO

## 2022-12-05 ASSESSMENT — ENCOUNTER SYMPTOMS
BLOOD IN STOOL: 0
SHORTNESS OF BREATH: 1
NAUSEA: 0
HEADACHES: 0
FEVER: 0
ROS GI COMMENTS: POSITIVE FOR DARK STOOL
PND: 1
CHILLS: 0
COUGH: 0
DIARRHEA: 0
ORTHOPNEA: 1
DIZZINESS: 0
SORE THROAT: 0
VOMITING: 0
ABDOMINAL PAIN: 0

## 2022-12-05 ASSESSMENT — PAIN DESCRIPTION - PAIN TYPE: TYPE: ACUTE PAIN

## 2022-12-05 NOTE — ED PROVIDER NOTES
ED Provider Note    Primary care provider: Shane Vanegas M.D.  Means of arrival: private vehicle  History obtained from: patient  History limited by: none    CHIEF COMPLAINT  Chief Complaint   Patient presents with    Insomnia     Pt states he has not had any sleep in over two weeks.     Shortness of Breath     Pt states he is short of breath at baseline, but it has been getting increasingly worse over the past 2 weeks.     Melena     Black, tarry stools for over three months.        HPI  Ottoniel Davies is a 57 y.o. male who presents to the Emergency Department for evaluation of shortness of breath and difficulty sleeping.  Patient reports that for the last 2 weeks he has been having difficulty sleeping as anytime he lies flat he feels short of breath.  He states that he has tried adjusting his CPAP without relief of his symptoms.  He states that last night he became so short of breath he felt almost as if he was suffocating, therefore he came into the emergency department for further evaluation today.  He denies fevers, chills, cough.  He has a history of CHF and has been compliant with his torsemide.    Furthermore patient reports months of dark stools.  Patient reports that he intermittently over the last 3 months will have dark stools.  He had some blood work done by his primary care physician and was found to be anemic, he is supposed to be following up with gastroenterology for this.  He last had a colonoscopy approximately 9 years ago which was unremarkable.  Denies abdominal pain, nausea or vomiting.    Per record review patient has a history of mitral valve repair is on Coumadin.  He also has a history of congestive heart failure.    REVIEW OF SYSTEMS  Review of Systems   Constitutional:  Negative for chills and fever.   Respiratory:  Positive for shortness of breath. Negative for cough.    Cardiovascular:  Negative for chest pain.   Gastrointestinal:  Negative for abdominal pain, blood in stool,  nausea and vomiting.        Positive for dark stool   All other systems reviewed and are negative.      PAST MEDICAL HISTORY   has a past medical history of Cancer (HCC), Cataract, Congestive heart failure (HCC), Dandruff, H/O medullary carcinoma of thyroid (2014), Heart murmur, Heart valve disease, History of mitral valve repair 2019, Hyperlipidemia, Hypertension, Hypothyroid, DEBI on CPAP, and Thyroid ca (HCC).    SURGICAL HISTORY   has a past surgical history that includes thyroidectomy; other; rhinoplasty (); knee replacement, total (); other orthopedic surgery (); hernia repair; replacement of mitral valve (2019); and feliz (2019).    SOCIAL HISTORY  Social History     Tobacco Use    Smoking status: Former     Packs/day: 0.25     Years: 35.00     Pack years: 8.75     Types: Cigarettes     Start date: 1977     Quit date: 2013     Years since quittin.3    Smokeless tobacco: Former     Types: Chew    Tobacco comments:     quit weeks ago   Vaping Use    Vaping Use: Never used   Substance Use Topics    Alcohol use: Not Currently     Comment: quit in     Drug use: Yes     Types: Inhaled     Comment: marijuana      Social History     Substance and Sexual Activity   Drug Use Yes    Types: Inhaled    Comment: marijuana       FAMILY HISTORY  Family History   Problem Relation Age of Onset    Cancer Mother         breast/skin ca    Diabetes Father     Diabetes Maternal Grandmother     Stroke Maternal Grandmother     Lung Disease Neg Hx     Heart Disease Neg Hx        CURRENT MEDICATIONS  Home Medications       Reviewed by Trinity Schultz (Pharmacy Tech) on 22 at 1414  Med List Status: Complete     Medication Last Dose Status   allopurinol (ZYLOPRIM) 300 MG Tab 2022 Active   amitriptyline (ELAVIL) 25 MG Tab 2022 Active   amoxicillin (AMOXIL) 500 MG Cap 2022 Active   aspirin 81 MG EC tablet 2022 Active   carvedilol (COREG) 25 MG Tab 2022 Active  "  glipiZIDE (GLUCOTROL) 5 MG Tab 12/4/2022 Active   hydrALAZINE (APRESOLINE) 50 MG Tab 12/4/2022 Active   isosorbide mononitrate SR (IMDUR) 30 MG TABLET SR 24 HR 12/4/2022 Active   levothyroxine (SYNTHROID) 300 MCG TABS 12/4/2022 Active   potassium chloride SA (KDUR) 20 MEQ Tab CR 12/4/2022 Active   pravastatin (PRAVACHOL) 40 MG tablet 12/4/2022 Active   warfarin (COUMADIN) 5 MG Tab 12/4/2022 Active                    ALLERGIES  No Known Allergies    PHYSICAL EXAM  VITAL SIGNS: BP (!) 172/104   Pulse 78   Temp 36.6 °C (97.9 °F) (Temporal)   Resp 16   Ht 1.676 m (5' 6\")   Wt 100 kg (221 lb 1.9 oz)   SpO2 97%   BMI 35.69 kg/m²   Vitals reviewed by myself.  Physical Exam  Nursing note and vitals reviewed.  Constitutional: Well-developed and well-nourished. No acute distress.   HENT: Head is normocephalic and atraumatic.  Eyes: extra-ocular movements intact  Cardiovascular: Regular rate and  regular rhythm. No murmur heard.  Pulmonary/Chest: Breath sounds normal.  Bibasilar Rales  Abdominal: Soft and non-tender. No distention.    Rectal: Brown stool noted in the rectal vault with no blood, guaiac negative  Musculoskeletal: Extremities exhibit normal range of motion without edema or tenderness.   Neurological: Awake and alert  Skin: Skin is warm and dry. No rash.         DIAGNOSTIC STUDIES /  LABS  Labs Reviewed   COMP METABOLIC PANEL - Abnormal; Notable for the following components:       Result Value    Glucose 118 (*)     Bun 48 (*)     Calcium 8.0 (*)     All other components within normal limits    Narrative:     Indicate which anticoagulants the patient is on:->UNKNOWN   CBC WITH DIFFERENTIAL - Abnormal; Notable for the following components:    WBC 17.6 (*)     RBC 4.26 (*)     Hemoglobin 10.7 (*)     Hematocrit 34.3 (*)     MCV 80.5 (*)     MCH 25.1 (*)     MCHC 31.2 (*)     Neutrophils-Polys 79.10 (*)     Lymphocytes 9.10 (*)     Neutrophils (Absolute) 13.92 (*)     Monos (Absolute) 1.28 (*)     All other " components within normal limits    Narrative:     Indicate which anticoagulants the patient is on:->UNKNOWN   LIPASE - Abnormal; Notable for the following components:    Lipase 100 (*)     All other components within normal limits    Narrative:     Indicate which anticoagulants the patient is on:->UNKNOWN   PROTHROMBIN TIME - Abnormal; Notable for the following components:    PT 30.7 (*)     INR 3.07 (*)     All other components within normal limits    Narrative:     Indicate which anticoagulants the patient is on:->UNKNOWN   PROBRAIN NATRIURETIC PEPTIDE, NT - Abnormal; Notable for the following components:    NT-proBNP 1762 (*)     All other components within normal limits    Narrative:     Indicate which anticoagulants the patient is on:->UNKNOWN   TROPONIN - Abnormal; Notable for the following components:    Troponin T 38 (*)     All other components within normal limits    Narrative:     Indicate which anticoagulants the patient is on:->UNKNOWN   COD (ADULT)   APTT    Narrative:     Indicate which anticoagulants the patient is on:->UNKNOWN   ESTIMATED GFR    Narrative:     Indicate which anticoagulants the patient is on:->UNKNOWN   DIFFERENTIAL MANUAL    Narrative:     Indicate which anticoagulants the patient is on:->UNKNOWN   PERIPHERAL SMEAR REVIEW    Narrative:     Indicate which anticoagulants the patient is on:->UNKNOWN   PLATELET ESTIMATE    Narrative:     Indicate which anticoagulants the patient is on:->UNKNOWN   MORPHOLOGY    Narrative:     Indicate which anticoagulants the patient is on:->UNKNOWN   PROCALCITONIN       All labs reviewed by me.    EKG Interpretation:  Interpreted by myself    12 Lead EKG interpreted by me to show:  EKG at 9:47 AM: Normal sinus rhythm, heart rate 75, normal axis, normal intervals, , , QTc 460, no acute ST-T segment changes, no evidence of acute arrhythmia or ischemia  My impression of this EKG: Does not indicate ischemia or arrhythmia at this  time.    RADIOLOGY  CTA ABDOMEN PELVIS W & W/O POST PROCESS   Final Result      1.  No CT evidence of active arterial gastrointestinal bleeding.   2.  Mild peripancreatic fat stranding, nonspecific, possibly sequela of pancreatitis. No pancreatic or peripancreatic fluid collections.   3.  Solid exophytic lesion in the lower pole of the right kidney, concerning for RCC. Two hyperdense lesions in the left kidney, not definitively cystic. Further evaluation with contrast-enhanced renal protocol MRI is advised.   4.  Cardiomegaly.            DX-CHEST-PORTABLE (1 VIEW)   Final Result      Cardiomegaly and mild interstitial edema.         EC-ECHOCARDIOGRAM COMPLETE W/O CONT    (Results Pending)   MR-ABDOMEN-WITH & W/O    (Results Pending)     The radiologist's interpretation of all radiological studies have been reviewed by me.        COURSE & MEDICAL DECISION MAKING  Nursing notes, VS, PMSFHx reviewed in chart.    Patient is a 57-year-old male who comes in for evaluation of shortness of breath and dark stools.  Differential diagnosis includes CHF exacerbation, fluid overload, anemia, GI bleed.  Diagnostic work-up includes labs, EKG, chest x-ray and CT of the abdomen.    Patient's initial vitals are notable for hypertension, otherwise unremarkable.  Rectal exam notable for brown stool which is guaiac negative, I feel acute GI bleed is unlikely at this time given the normal rectal exam.  Labs returned and patient is anemic with a hemoglobin of 10.7 but this is improved from prior hemoglobin of 8.7.  CT of the abdomen returns and demonstrates no evidence of acute intra-abdominal bleeding.  Patient is advised that he will need to have anemia and dark stools followed up by gastroenterology and he is amenable to this plan.  CT was notable for incidental right renal mass which he is advised will need further work-up outpatient as malignancy cannot be ruled out.  Regarding his shortness of breath chest x-ray returns and  demonstrates cardiomegaly with interstitial edema.  EKG returns demonstrates no evidence of acute arrhythmia or ischemia.  Troponin and BNP are elevated consistent with fluid overload resulting in elevated troponin.  Patient will be hospitalized for ongoing fluid overload.  I started diuresis with Lasix.  Discussed the case with Dr. Briceño who has accepted patient for hospitalization.  Patient is in guarded condition.    FINAL IMPRESSION  1. Dyspnea, unspecified type    2. Hypervolemia, unspecified hypervolemia type    3. Anemia, unspecified type    4. Renal mass

## 2022-12-05 NOTE — ED TRIAGE NOTES
"Chief Complaint   Patient presents with    Insomnia     Pt states he has not had any sleep in over two weeks.     Shortness of Breath     Pt states he is short of breath at baseline, but it has been getting increasingly worse over the past 2 weeks.     Melena     Black, tarry stools for over three months.      Pt ambulatory to triage with above complaints. Pt has history of mitral valve repair in 2018 and has been on coumadin. Since this procedure, pt states he has has slight shortness of breath at baseline, but it has been getting worse. Pt states he was wearing his C-pap last night and had an episode of severe hyperventilation/shortness of breath. Pt denies chest pain. Pt had a GI appointment today to follow-up about melena and lab work. Pt denies abdominal pain and blood in emesis. Pt states he a history of HTN, but did not take his medications this morning.     Protocol ordered. Pt educated on triage process, placed back in lobby, and instructed to inform staff of any changes.     BP (!) 172/104   Pulse 78   Temp 36.6 °C (97.9 °F) (Temporal)   Resp 16   Ht 1.676 m (5' 6\")   Wt 100 kg (221 lb 1.9 oz)   SpO2 97%   BMI 35.69 kg/m²     "

## 2022-12-05 NOTE — H&P
Hospital Medicine History & Physical Note    Date of Service  12/5/2022    Primary Care Physician  Shane Vanegas M.D.    Code Status  Full Code    Chief Complaint  Chief Complaint   Patient presents with    Insomnia     Pt states he has not had any sleep in over two weeks.     Shortness of Breath     Pt states he is short of breath at baseline, but it has been getting increasingly worse over the past 2 weeks.     Melena     Black, tarry stools for over three months.        History of Presenting Illness  Ottoniel Davies is a 57 y.o. male who presented 12/5/2022 with SOB. PMH of hypertension, s/p mitral valve repair on warfarin, hypothyroidism secondary to thyroidectomy due to thyroid cancer in 1989, hyperlipidemia, DEBI on CPAP.  Comes in complaining of difficulty sleeping due to the shortness of breath, says he wakes up at night feeling very short of breath, unable to lie flat, dyspnea on exertion.  Symptoms have been worsening for a few months now.    Is also been having intermittent black stools for the past 3 months, he is on warfarin and is following with GI for this but has not seen them yet.  History of thyroid cancer 1989 s/p thyroidectomy, said cancer returned and he got whole body radiation in 1990.  Currently on levothyroxine.  Says he is compliant with medications.    In the ED afebrile, hemodynamically stable.  Labs show WBC of 17.6    I discussed the plan of care with patient, bedside RN, and edp .    Review of Systems  Review of Systems   Constitutional:  Positive for malaise/fatigue. Negative for chills and fever.   HENT:  Negative for sore throat.    Respiratory:  Positive for shortness of breath. Negative for cough.    Cardiovascular:  Positive for orthopnea, leg swelling and PND. Negative for chest pain.   Gastrointestinal:  Positive for melena. Negative for abdominal pain, diarrhea, nausea and vomiting.   Genitourinary:  Negative for dysuria and urgency.   Neurological:  Negative for  dizziness and headaches.   All other systems reviewed and are negative.    Past Medical History   has a past medical history of Cancer (HCC), Cataract, Congestive heart failure (HCC), Dandruff, H/O medullary carcinoma of thyroid (01/31/2014), Heart murmur, Heart valve disease, History of mitral valve repair 4/2019, Hyperlipidemia, Hypertension, Hypothyroid, DEBI on CPAP, and Thyroid ca (HCC).    Surgical History   has a past surgical history that includes thyroidectomy; other; rhinoplasty (2007); knee replacement, total (2012); other orthopedic surgery (1976); hernia repair; pr replacement of mitral valve (4/13/2019); and feliz (4/13/2019).     Family History  family history includes Cancer in his mother; Diabetes in his father and maternal grandmother; Stroke in his maternal grandmother.   Family history reviewed with patient. There is no family history that is pertinent to the chief complaint.     Social History   reports that he quit smoking about 9 years ago. He started smoking about 45 years ago. He has a 8.75 pack-year smoking history. He has quit using smokeless tobacco.  His smokeless tobacco use included chew. He reports that he does not currently use alcohol. He reports current drug use. Drug: Inhaled.    Allergies  No Known Allergies    Medications  Prior to Admission Medications   Prescriptions Last Dose Informant Patient Reported? Taking?   allopurinol (ZYLOPRIM) 300 MG Tab 12/4/2022 at unknown Patient Yes No   Sig: Take 300 mg by mouth every day.   amitriptyline (ELAVIL) 25 MG Tab 12/4/2022 at hs Patient Yes No   Sig: Take 25 mg by mouth every evening.   amoxicillin (AMOXIL) 500 MG Cap 12/5/2022 at am  Yes No   Sig: Take 500 mg by mouth 3 times a day. FOR 10 DAYS   aspirin 81 MG EC tablet 12/5/2022 at am Patient Yes No   Sig: Take 1 Tab by mouth every day.   carvedilol (COREG) 25 MG Tab 12/4/2022 at hs Patient Yes No   Sig: Take 37.5 mg by mouth 2 times a day with meals. 1 and 1/2 tablet = 37.5 mg    glipiZIDE (GLUCOTROL) 5 MG Tab 12/4/2022 at unknown  Yes Yes   Sig: Take 5 mg by mouth every day.   hydrALAZINE (APRESOLINE) 50 MG Tab 12/4/2022 at hs Patient No No   Sig: Take 1 Tablet by mouth 3 times a day.   isosorbide mononitrate SR (IMDUR) 30 MG TABLET SR 24 HR 12/4/2022 at am  Yes Yes   Sig: Take 30 mg by mouth every morning.   levothyroxine (SYNTHROID) 300 MCG TABS 12/4/2022 at am Patient No No   Sig: Take 1 Tab by mouth every day.   potassium chloride SA (KDUR) 20 MEQ Tab CR 12/4/2022 at hs Patient Yes No   Sig: Take 20 mEq by mouth 2 times a day. 2 tablets = 40 mEq   pravastatin (PRAVACHOL) 40 MG tablet 12/4/2022 at unknown Patient No No   Sig: Take 1 Tab by mouth every day.   warfarin (COUMADIN) 5 MG Tab 12/4/2022 at pm Patient Yes No   Sig: Take 2.5-5 mg by mouth every evening. 2.5 mg on Tuesday and Thursday   5 mg all other days      Facility-Administered Medications: None       Physical Exam  Temp:  [36.6 °C (97.9 °F)] 36.6 °C (97.9 °F)  Pulse:  [71-78] 78  Resp:  [16-20] 20  BP: (139-172)/() 150/84  SpO2:  [95 %-97 %] 95 %  Blood Pressure: (!) 161/89   Temperature: 36.6 °C (97.9 °F)   Pulse: 71   Respiration: 20   Pulse Oximetry: 95 %       Physical Exam  Constitutional:       Appearance: Normal appearance. He is obese.   HENT:      Head: Normocephalic and atraumatic.      Mouth/Throat:      Mouth: Mucous membranes are moist.      Pharynx: Oropharynx is clear. No oropharyngeal exudate or posterior oropharyngeal erythema.   Eyes:      General: No scleral icterus.  Cardiovascular:      Rate and Rhythm: Normal rate and regular rhythm.      Pulses: Normal pulses.      Heart sounds: Normal heart sounds. No murmur heard.  Pulmonary:      Effort: Pulmonary effort is normal. No respiratory distress.      Breath sounds: Rales present. No wheezing.   Abdominal:      Palpations: Abdomen is soft.      Tenderness: There is no abdominal tenderness.   Musculoskeletal:         General: No swelling or  tenderness. Normal range of motion.      Cervical back: Normal range of motion.   Skin:     General: Skin is warm and dry.      Coloration: Skin is pale.   Neurological:      General: No focal deficit present.      Mental Status: He is alert and oriented to person, place, and time. Mental status is at baseline.   Psychiatric:         Mood and Affect: Mood normal.       Laboratory:  Recent Labs     12/05/22  1030   WBC 17.6*   RBC 4.26*   HEMOGLOBIN 10.7*   HEMATOCRIT 34.3*   MCV 80.5*   MCH 25.1*   MCHC 31.2*   RDW 49.3   PLATELETCT 169   MPV 10.5     Recent Labs     12/05/22  1030   SODIUM 137   POTASSIUM 4.9   CHLORIDE 106   CO2 21   GLUCOSE 118*   BUN 48*   CREATININE 1.08   CALCIUM 8.0*     Recent Labs     12/05/22  1030   ALTSGPT 21   ASTSGOT 22   ALKPHOSPHAT 98   TBILIRUBIN 0.3   LIPASE 100*   GLUCOSE 118*     Recent Labs     12/05/22  1030   APTT 35.9   INR 3.07*     Recent Labs     12/05/22  1030   NTPROBNP 1762*         Recent Labs     12/05/22  1030   TROPONINT 38*       Imaging:  CTA ABDOMEN PELVIS W & W/O POST PROCESS   Final Result      1.  No CT evidence of active arterial gastrointestinal bleeding.   2.  Mild peripancreatic fat stranding, nonspecific, possibly sequela of pancreatitis. No pancreatic or peripancreatic fluid collections.   3.  Solid exophytic lesion in the lower pole of the right kidney, concerning for RCC. Two hyperdense lesions in the left kidney, not definitively cystic. Further evaluation with contrast-enhanced renal protocol MRI is advised.   4.  Cardiomegaly.            DX-CHEST-PORTABLE (1 VIEW)   Final Result      Cardiomegaly and mild interstitial edema.         EC-ECHOCARDIOGRAM COMPLETE W/O CONT    (Results Pending)   MR-ABDOMEN-WITH & W/O    (Results Pending)       X-Ray:  I have personally reviewed the images and compared with prior images. and My impression is: interstitial edema  EKG:  I have personally reviewed the images and compared with prior images. and My impression  is: nsr    Assessment/Plan:  Justification for Admission Status  I anticipate this patient will require at least two midnights for appropriate medical management, necessitating inpatient admission because volume overload and suspected chf exacerbation. Also complaining of melena and onw arfarin  for valve repalcement, needs close monitoring and repeated labs      * Acute diastolic CHF (congestive heart failure) (HCC)- (present on admission)  Assessment & Plan  Crackles on lung exam.  Imaging shows interstitial edema  BNP 1700  Previous echo 2019 showed EF of 55 to 60%, diastolic dysfunction  IV diuresis  I/O, daily weight, cardiac diet, fluid and salt restriction  Repeat echo    Abnormal CT scan, kidney- (present on admission)  Assessment & Plan  Concern for RCC, MRI ordered per radiology recommendation    Chronic fatigue- (present on admission)  Assessment & Plan  Fatigue for several months now, multifactorial:  -On warfarin following mitral valve repair and has been having intermittent melena for several months now and anemia which is contributing to his fatigue.  Needs GI follow-up  -History of CHF and has been complaining of orthopnea and PND.  He is in acute volume overload, IV diuresis and echocardiogram ordered  -Hypothyroidism following thyroid cancer.  Every single previous TSH lab was very low, likely noncompliant or taking medication correctly.  Repeat labs ordered, this will need to be addressed  -Lesion seen on CT scan concerning for renal cancer, MRI ordered  -History of recurrence of thyroid cancer in 1990 and had whole-body radiation, if he continues to have symptoms of dyspnea following treatment of above causes will likely need high-resolution CT of chest to evaluate for radiation-induced pneumonitis    Postoperative hypothyroidism- (present on admission)  Assessment & Plan  Continue levothyroxine.  Every previous TSH lab has been low, patient says he is compliant with medication  Repeat labs  ordered, if low will like every other time medication compliance/timing of when he is taking the medication will need to be addressed with patient by day team    Microcytic anemia- (present on admission)  Assessment & Plan  Intermittent melena for several months now, last occurred was about 10 days ago  Hemoglobin improved compared to baseline, needs outpatient follow-up with GI  Iron studies ordered    History of mitral valve repair 4/2019- (present on admission)  Assessment & Plan  On warfarin.  Echo ordered    Mixed hyperlipidemia- (present on admission)  Assessment & Plan  Continue statin    Melena- (present on admission)  Assessment & Plan  Last occurred about 10 days ago  Patient on warfarin for mitral valve repair.  Needs outpatient GI follow-up    Type 2 diabetes mellitus without complication, without long-term current use of insulin (HCC)- (present on admission)  Assessment & Plan  Well-controlled, not hypoglycemic.  Hold home glipizide, start sliding scale if needed    DEBI on CPAP- (present on admission)  Assessment & Plan  Compliant with CPAP    Essential hypertension- (present on admission)  Assessment & Plan  Continue carvedilol, hydralazine, will be on IV furosemide      VTE prophylaxis: therapeutic anticoagulation with warfarin

## 2022-12-06 ENCOUNTER — APPOINTMENT (OUTPATIENT)
Dept: CARDIOLOGY | Facility: MEDICAL CENTER | Age: 57
DRG: 291 | End: 2022-12-06
Attending: STUDENT IN AN ORGANIZED HEALTH CARE EDUCATION/TRAINING PROGRAM
Payer: MEDICAID

## 2022-12-06 LAB
ANION GAP SERPL CALC-SCNC: 12 MMOL/L (ref 7–16)
ANISOCYTOSIS BLD QL SMEAR: ABNORMAL
BASOPHILS # BLD AUTO: 0 % (ref 0–1.8)
BASOPHILS # BLD: 0 K/UL (ref 0–0.12)
BUN SERPL-MCNC: 48 MG/DL (ref 8–22)
CALCIUM SERPL-MCNC: 7.8 MG/DL (ref 8.5–10.5)
CHLORIDE SERPL-SCNC: 99 MMOL/L (ref 96–112)
CO2 SERPL-SCNC: 24 MMOL/L (ref 20–33)
CREAT SERPL-MCNC: 1.47 MG/DL (ref 0.5–1.4)
DACRYOCYTES BLD QL SMEAR: NORMAL
EOSINOPHIL # BLD AUTO: 0.38 K/UL (ref 0–0.51)
EOSINOPHIL NFR BLD: 2.6 % (ref 0–6.9)
ERYTHROCYTE [DISTWIDTH] IN BLOOD BY AUTOMATED COUNT: 50.1 FL (ref 35.9–50)
GFR SERPLBLD CREATININE-BSD FMLA CKD-EPI: 54 ML/MIN/1.73 M 2
GLUCOSE SERPL-MCNC: 148 MG/DL (ref 65–99)
HCT VFR BLD AUTO: 31.4 % (ref 42–52)
HGB BLD-MCNC: 9.7 G/DL (ref 14–18)
INR PPP: 2.27 (ref 0.87–1.13)
LV EJECT FRACT  99904: 65
LV EJECT FRACT MOD 2C 99903: 61.68
LV EJECT FRACT MOD 4C 99902: 64.49
LV EJECT FRACT MOD BP 99901: 64.5
LYMPHOCYTES # BLD AUTO: 0.64 K/UL (ref 1–4.8)
LYMPHOCYTES NFR BLD: 4.3 % (ref 22–41)
MANUAL DIFF BLD: NORMAL
MCH RBC QN AUTO: 25.1 PG (ref 27–33)
MCHC RBC AUTO-ENTMCNC: 30.9 G/DL (ref 33.7–35.3)
MCV RBC AUTO: 81.1 FL (ref 81.4–97.8)
METAMYELOCYTES NFR BLD MANUAL: 1.7 %
MICROCYTES BLD QL SMEAR: ABNORMAL
MONOCYTES # BLD AUTO: 0.25 K/UL (ref 0–0.85)
MONOCYTES NFR BLD AUTO: 1.7 % (ref 0–13.4)
MORPHOLOGY BLD-IMP: NORMAL
MYELOCYTES NFR BLD MANUAL: 0.9 %
NEUTROPHILS # BLD AUTO: 13.01 K/UL (ref 1.82–7.42)
NEUTROPHILS NFR BLD: 85.3 % (ref 44–72)
NEUTS BAND NFR BLD MANUAL: 2.6 % (ref 0–10)
NRBC # BLD AUTO: 0 K/UL
NRBC BLD-RTO: 0 /100 WBC
NT-PROBNP SERPL IA-MCNC: 1828 PG/ML (ref 0–125)
OVALOCYTES BLD QL SMEAR: NORMAL
PLATELET # BLD AUTO: 151 K/UL (ref 164–446)
PLATELET BLD QL SMEAR: NORMAL
PMV BLD AUTO: 10.9 FL (ref 9–12.9)
POIKILOCYTOSIS BLD QL SMEAR: NORMAL
POTASSIUM SERPL-SCNC: 4.2 MMOL/L (ref 3.6–5.5)
PROMYELOCYTES NFR BLD MANUAL: 0.9 %
PROTHROMBIN TIME: 24.4 SEC (ref 12–14.6)
RBC # BLD AUTO: 3.87 M/UL (ref 4.7–6.1)
RBC BLD AUTO: PRESENT
SODIUM SERPL-SCNC: 135 MMOL/L (ref 135–145)
WBC # BLD AUTO: 14.8 K/UL (ref 4.8–10.8)

## 2022-12-06 PROCEDURE — A9270 NON-COVERED ITEM OR SERVICE: HCPCS | Performed by: STUDENT IN AN ORGANIZED HEALTH CARE EDUCATION/TRAINING PROGRAM

## 2022-12-06 PROCEDURE — 99233 SBSQ HOSP IP/OBS HIGH 50: CPT | Performed by: HOSPITALIST

## 2022-12-06 PROCEDURE — 85007 BL SMEAR W/DIFF WBC COUNT: CPT

## 2022-12-06 PROCEDURE — 36415 COLL VENOUS BLD VENIPUNCTURE: CPT

## 2022-12-06 PROCEDURE — 93306 TTE W/DOPPLER COMPLETE: CPT | Mod: 26 | Performed by: INTERNAL MEDICINE

## 2022-12-06 PROCEDURE — 85610 PROTHROMBIN TIME: CPT

## 2022-12-06 PROCEDURE — 700102 HCHG RX REV CODE 250 W/ 637 OVERRIDE(OP): Performed by: STUDENT IN AN ORGANIZED HEALTH CARE EDUCATION/TRAINING PROGRAM

## 2022-12-06 PROCEDURE — A9270 NON-COVERED ITEM OR SERVICE: HCPCS | Performed by: HOSPITALIST

## 2022-12-06 PROCEDURE — 93306 TTE W/DOPPLER COMPLETE: CPT

## 2022-12-06 PROCEDURE — 700102 HCHG RX REV CODE 250 W/ 637 OVERRIDE(OP): Performed by: HOSPITALIST

## 2022-12-06 PROCEDURE — 94660 CPAP INITIATION&MGMT: CPT

## 2022-12-06 PROCEDURE — 80048 BASIC METABOLIC PNL TOTAL CA: CPT

## 2022-12-06 PROCEDURE — 700117 HCHG RX CONTRAST REV CODE 255: Performed by: STUDENT IN AN ORGANIZED HEALTH CARE EDUCATION/TRAINING PROGRAM

## 2022-12-06 PROCEDURE — 83880 ASSAY OF NATRIURETIC PEPTIDE: CPT

## 2022-12-06 PROCEDURE — 700111 HCHG RX REV CODE 636 W/ 250 OVERRIDE (IP): Performed by: STUDENT IN AN ORGANIZED HEALTH CARE EDUCATION/TRAINING PROGRAM

## 2022-12-06 PROCEDURE — 85025 COMPLETE CBC W/AUTO DIFF WBC: CPT

## 2022-12-06 PROCEDURE — 770020 HCHG ROOM/CARE - TELE (206)

## 2022-12-06 RX ORDER — WARFARIN SODIUM 5 MG/1
5 TABLET ORAL
Status: DISCONTINUED | OUTPATIENT
Start: 2022-12-06 | End: 2022-12-08 | Stop reason: HOSPADM

## 2022-12-06 RX ORDER — WARFARIN SODIUM 2.5 MG/1
2.5 TABLET ORAL
Status: DISCONTINUED | OUTPATIENT
Start: 2022-12-07 | End: 2022-12-08 | Stop reason: HOSPADM

## 2022-12-06 RX ADMIN — LEVOTHYROXINE SODIUM 300 MCG: 0.1 TABLET ORAL at 05:20

## 2022-12-06 RX ADMIN — PRAVASTATIN SODIUM 40 MG: 20 TABLET ORAL at 05:20

## 2022-12-06 RX ADMIN — HUMAN ALBUMIN MICROSPHERES AND PERFLUTREN 3 ML: 10; .22 INJECTION, SOLUTION INTRAVENOUS at 11:06

## 2022-12-06 RX ADMIN — HYDRALAZINE HYDROCHLORIDE 50 MG: 50 TABLET, FILM COATED ORAL at 07:30

## 2022-12-06 RX ADMIN — FUROSEMIDE 20 MG: 10 INJECTION, SOLUTION INTRAMUSCULAR; INTRAVENOUS at 16:15

## 2022-12-06 RX ADMIN — AMITRIPTYLINE HYDROCHLORIDE 25 MG: 10 TABLET, FILM COATED ORAL at 21:02

## 2022-12-06 RX ADMIN — CARVEDILOL 37.5 MG: 25 TABLET, FILM COATED ORAL at 17:25

## 2022-12-06 RX ADMIN — CARVEDILOL 37.5 MG: 25 TABLET, FILM COATED ORAL at 07:30

## 2022-12-06 RX ADMIN — WARFARIN SODIUM 5 MG: 5 TABLET ORAL at 16:15

## 2022-12-06 RX ADMIN — ISOSORBIDE MONONITRATE 30 MG: 30 TABLET, EXTENDED RELEASE ORAL at 05:21

## 2022-12-06 RX ADMIN — HYDRALAZINE HYDROCHLORIDE 50 MG: 50 TABLET, FILM COATED ORAL at 16:15

## 2022-12-06 RX ADMIN — ASPIRIN 81 MG: 81 TABLET, COATED ORAL at 05:19

## 2022-12-06 RX ADMIN — FUROSEMIDE 20 MG: 10 INJECTION, SOLUTION INTRAMUSCULAR; INTRAVENOUS at 05:21

## 2022-12-06 RX ADMIN — ALLOPURINOL 300 MG: 300 TABLET ORAL at 05:20

## 2022-12-06 ASSESSMENT — ENCOUNTER SYMPTOMS
COUGH: 0
DIZZINESS: 0
HEADACHES: 0
SHORTNESS OF BREATH: 0
ABDOMINAL PAIN: 0
BACK PAIN: 0
EYE DISCHARGE: 0
SPEECH CHANGE: 0
FEVER: 0
PALPITATIONS: 0
STRIDOR: 0
DIARRHEA: 0
NERVOUS/ANXIOUS: 0
VOMITING: 0
DEPRESSION: 0
NAUSEA: 0
CHILLS: 0

## 2022-12-06 ASSESSMENT — FIBROSIS 4 INDEX
FIB4 SCORE: 1.88
FIB4 SCORE: 1.88

## 2022-12-06 ASSESSMENT — PAIN DESCRIPTION - PAIN TYPE: TYPE: ACUTE PAIN

## 2022-12-06 NOTE — PROGRESS NOTES
Meds requested from pharmacy still awaiting tube. Available scheduled meds given. Denies pain, VSS.     Report given to Elly HENDRICKS

## 2022-12-06 NOTE — ASSESSMENT & PLAN NOTE
Continue carvedilol, hydralazine, will be on IV furosemide  Monitor vitals  Vitals:    12/07/22 1526   BP: 126/75   Pulse: 75   Resp: 18   Temp: 36.9 °C (98.4 °F)   SpO2: 95%

## 2022-12-06 NOTE — PROGRESS NOTES
Report received from Connor. CNA settling pt to room. No needs at this time. RN will come back for assessment and to medicate. Call light within reach.

## 2022-12-06 NOTE — PROGRESS NOTES
Inpatient Anticoagulation Service Note  Date: 12/6/2022  Reason for Anticoagulation: Atrial Fibrillation, Mitral Valve Repair   [id number] VASc Score: 4  HAS-BLED Score: 2   Hemoglobin Value: (!) 9.7  Hematocrit Value: (!) 31.4  Lab Platelet Value: (!) 151  Target INR: 2.0 to 3.0  INR from last 7 days       Date/Time INR Value    12/06/22 0031 2.27    12/05/22 1030 3.07          Dose from last 7 days       Date/Time Dose (mg)    12/06/22 1413 5    12/05/22 1850 2.5          Average Dose (mg): TBD (Home Dose: 2.5 mg Tu/Th  + 5 mg all other days per [facility])  Significant Interactions: Antiplatelet Medications  Bridge Therapy: No   Reversal Agent Administered: Not Applicable  Comments: INR at goal range. Noted labile INR. Noted bleed concerns. H/H low. PLT low. Warfarin interactions noted. Will give warfarin 5 mg today. INR with AM labs. Likely to need dose adjusmtents.    Plan:  warfarin 5 mg 12/6/22  Education Material Provided?: No (chronic warfarin patient)  Pharmacist suggested discharge dosing: warfarin 2.5 mg Mo/We/Fr and 5 mg all other days    Aguilar Marrero, PharmD

## 2022-12-06 NOTE — ASSESSMENT & PLAN NOTE
Well-controlled, not hypoglycemic.  Hold home glipizide, start sliding scale if needed  A1c:6.6 in past 11 months

## 2022-12-06 NOTE — ASSESSMENT & PLAN NOTE
Intermittent melena for several months now, last occurred was about 10 days ago  Hemoglobin improved compared to baseline, needs outpatient follow-up with GI  Iron studies ordered  12/7 Hgb:9.6<9.7  May have a component of renal failure and chronic blood loss

## 2022-12-06 NOTE — PROGRESS NOTES
4 Eyes Skin Assessment Completed by RUTHIE Sanabria and RUTHIE Dawson.    Head WDL  Ears WDL  Nose WDL  Mouth WDL  Neck WDL  Breast/Chest WDL  Shoulder Blades WDL Skin tag on R mid back   Spine WDL  (R) Arm/Elbow/Hand WDL  (L) Arm/Elbow/Hand WDL  Abdomen WDL  Groin WDL  Scrotum/Coccyx/Buttocks WDL  (R) Leg WDL  (L) Leg WDL  (R) Heel/Foot/Toe Redness and Blanching dry / cracked  (L) Heel/Foot/Toe Redness and Blanching dry/cracked          Devices In Places Tele Box      Interventions In Place Pillows    Possible Skin Injury No    Pictures Uploaded Into Epic N/A  Wound Consult Placed N/A  RN Wound Prevention Protocol Ordered No

## 2022-12-06 NOTE — ASSESSMENT & PLAN NOTE
Improved with diuresis.  Initial BNP 1700  Previous echo 2019 showed EF of 55 to 60%, diastolic dysfunction  IV diuresis lasix 20mg IV daily and 12/7 change to oral  I/O, daily weight, cardiac diet, fluid and salt restriction  12/6 echo:  CONCLUSIONS  Normal left ventricular size, thickness, and systolic function.  Mildly dilated right ventricle.  Known mitral valve repair which is functioning normally with   appropriate transvalvular gradient.  Trace/Mild mitral regurgitation.

## 2022-12-06 NOTE — CARE PLAN
"The patient is Stable - Low risk of patient condition declining or worsening    Shift Goals  Clinical Goals: Monitor for pain, pending MRI  Patient Goals: Get sleep  Family Goals: n/a    Progress made toward(s) clinical / shift goals: Pt was able to sleep and is \"feeling much better\" this morning     Patient is not progressing towards the following goals: pending MRI       Problem: Knowledge Deficit - Standard  Goal: Patient and family/care givers will demonstrate understanding of plan of care, disease process/condition, diagnostic tests and medications  12/6/2022 0546 by Elly Garcias R.N.  Outcome: Progressing  Note: Discuss and review POC with patient/family. Re-educate as needed.   12/6/2022 0545 by Elly Garcias R.N.  Outcome: Progressing  Note: Discuss and review POC with patient/family. Re-educate as needed.      "

## 2022-12-06 NOTE — PROGRESS NOTES
Hospital Medicine Daily Progress Note    Date of Service  12/6/2022    Chief Complaint  Black stools over 3 months and SOB    Hospital Course  Ottoniel Davies is a 57 y.o. male w/ hx of HTN, MVR, anticoagulated on warfarin, DEBI on CPAP, hypothyroid w/ hx of thyroid cancer 1989, and DLD.  He was admitted 12/5/2022 with shortness of breath with a Hgb:10.7, wbc:17.6. Admitted with a concern of heart failure with BNP 1700    Interval Problem Update  12/6/22: wbc:14.8 <17.6, Hgb:9.7, Cr:1.47, BNP:1828, INR:2.27.  Awake and alert.  Off oxygen and talking in full sentence speech.  Aware of kidney lesions.      I have discussed this patient's plan of care and discharge plan at IDT rounds today with Case Management, Nursing, Nursing leadership, and other members of the IDT team.    Code Status  Full Code    Disposition  Patient is medically cleared for discharge.   Anticipate discharge to to home with close outpatient follow-up.  I have placed the appropriate orders for post-discharge needs.    Review of Systems  Review of Systems   Constitutional:  Negative for chills and fever.   Eyes:  Negative for discharge.   Respiratory:  Negative for cough, shortness of breath and stridor.    Cardiovascular:  Negative for chest pain, palpitations and leg swelling.   Gastrointestinal:  Negative for abdominal pain, diarrhea, nausea and vomiting.   Genitourinary:  Negative for dysuria and hematuria.   Musculoskeletal:  Negative for back pain and joint pain.   Skin:  Negative for rash.   Neurological:  Negative for dizziness, speech change and headaches.   Psychiatric/Behavioral:  Negative for depression. The patient is not nervous/anxious.       Physical Exam  Temp:  [36.3 °C (97.3 °F)-37.1 °C (98.8 °F)] 36.9 °C (98.4 °F)  Pulse:  [69-77] 76  Resp:  [17-19] 18  BP: ()/(59-73) 107/59  SpO2:  [94 %-97 %] 95 %    Physical Exam  Vitals reviewed.   Constitutional:       Appearance: Normal appearance. He is obese. He is not  diaphoretic.   HENT:      Head: Normocephalic and atraumatic.      Nose: Nose normal.      Mouth/Throat:      Mouth: Mucous membranes are moist.      Pharynx: No oropharyngeal exudate.   Eyes:      General: No scleral icterus.        Right eye: No discharge.         Left eye: No discharge.      Extraocular Movements: Extraocular movements intact.      Conjunctiva/sclera: Conjunctivae normal.   Cardiovascular:      Rate and Rhythm: Normal rate and regular rhythm.      Pulses:           Radial pulses are 2+ on the right side and 2+ on the left side.        Dorsalis pedis pulses are 2+ on the right side and 2+ on the left side.      Heart sounds: No murmur heard.  Pulmonary:      Effort: Pulmonary effort is normal. No respiratory distress.      Breath sounds: Normal breath sounds. No wheezing or rales.   Abdominal:      General: Bowel sounds are normal. There is no distension.      Palpations: Abdomen is soft.      Tenderness: There is no abdominal tenderness.   Musculoskeletal:         General: No swelling or tenderness.      Cervical back: No tenderness. No muscular tenderness.      Right lower leg: Edema (trace ankle/foot bilateral) present.      Left lower leg: Edema present.   Lymphadenopathy:      Cervical: No cervical adenopathy.   Skin:     Coloration: Skin is not jaundiced or pale.   Neurological:      General: No focal deficit present.      Mental Status: He is alert and oriented to person, place, and time. Mental status is at baseline.      Cranial Nerves: No cranial nerve deficit.   Psychiatric:         Mood and Affect: Mood normal.         Behavior: Behavior normal.       Fluids    Intake/Output Summary (Last 24 hours) at 12/6/2022 1935  Last data filed at 12/6/2022 1800  Gross per 24 hour   Intake 1520 ml   Output 2600 ml   Net -1080 ml       Laboratory  Recent Labs     12/05/22  1030 12/06/22  0031   WBC 17.6* 14.8*   RBC 4.26* 3.87*   HEMOGLOBIN 10.7* 9.7*   HEMATOCRIT 34.3* 31.4*   MCV 80.5* 81.1*    MCH 25.1* 25.1*   MCHC 31.2* 30.9*   RDW 49.3 50.1*   PLATELETCT 169 151*   MPV 10.5 10.9     Recent Labs     12/05/22  1030 12/06/22  0031   SODIUM 137 135   POTASSIUM 4.9 4.2   CHLORIDE 106 99   CO2 21 24   GLUCOSE 118* 148*   BUN 48* 48*   CREATININE 1.08 1.47*   CALCIUM 8.0* 7.8*     Recent Labs     12/05/22  1030 12/06/22  0031   APTT 35.9  --    INR 3.07* 2.27*               Imaging  EC-ECHOCARDIOGRAM COMPLETE W/ CONT   Final Result      CTA ABDOMEN PELVIS W & W/O POST PROCESS   Final Result      1.  No CT evidence of active arterial gastrointestinal bleeding.   2.  Mild peripancreatic fat stranding, nonspecific, possibly sequela of pancreatitis. No pancreatic or peripancreatic fluid collections.   3.  Solid exophytic lesion in the lower pole of the right kidney, concerning for RCC. Two hyperdense lesions in the left kidney, not definitively cystic. Further evaluation with contrast-enhanced renal protocol MRI is advised.   4.  Cardiomegaly.            DX-CHEST-PORTABLE (1 VIEW)   Final Result      Cardiomegaly and mild interstitial edema.         MR-ABDOMEN-WITH & W/O    (Results Pending)        Assessment/Plan  * Acute diastolic CHF (congestive heart failure) (HCC)- (present on admission)  Assessment & Plan  Crackles on lung exam.  Imaging shows interstitial edema  BNP 1700  Previous echo 2019 showed EF of 55 to 60%, diastolic dysfunction  IV diuresis lasix 20mg BID IV  I/O, daily weight, cardiac diet, fluid and salt restriction  12/6 echo:  CONCLUSIONS  Normal left ventricular size, thickness, and systolic function.  Mildly dilated right ventricle.  Known mitral valve repair which is functioning normally with   appropriate transvalvular gradient.  Trace/Mild mitral regurgitation.    Abnormal CT scan, kidney- (present on admission)  Assessment & Plan  Concern for RCC, MRI ordered per radiology recommendation    Chronic fatigue- (present on admission)  Assessment & Plan  Fatigue for several months now,  multifactorial:  -On warfarin following mitral valve repair and has been having intermittent melena for several months now and anemia which is contributing to his fatigue.  Needs GI follow-up  -History of CHF and has been complaining of orthopnea and PND.  He is in acute volume overload, IV diuresis and echocardiogram ordered  -Hypothyroidism following thyroid cancer.  Every single previous TSH lab was very low, likely noncompliant or taking medication correctly.  Repeat labs ordered, this will need to be addressed  -Lesion seen on CT scan concerning for renal cancer, MRI ordered  -History of recurrence of thyroid cancer in 1990 and had whole-body radiation, if he continues to have symptoms of dyspnea following treatment of above causes will likely need high-resolution CT of chest to evaluate for radiation-induced pneumonitis    Melena- (present on admission)  Assessment & Plan  Last occurred about 10 days ago  Patient on warfarin for mitral valve repair.  Needs outpatient GI follow-up    Microcytic anemia- (present on admission)  Assessment & Plan  Intermittent melena for several months now, last occurred was about 10 days ago  Hemoglobin improved compared to baseline, needs outpatient follow-up with GI  Iron studies ordered  12/6 Hgb:9.7  May have a component of renal failure and chronic blood loss    Type 2 diabetes mellitus without complication, without long-term current use of insulin (HCC)- (present on admission)  Assessment & Plan  Well-controlled, not hypoglycemic.  Hold home glipizide, start sliding scale if needed  A1c:6.6 in past 11 months    History of mitral valve repair 4/2019- (present on admission)  Assessment & Plan  On warfarin.    12/6 Echo with preserved EF w/ no significant valvular abnormality    Mixed hyperlipidemia- (present on admission)  Assessment & Plan  Continue statin    DEBI on CPAP- (present on admission)  Assessment & Plan  Compliant with CPAP    Essential hypertension- (present on  admission)  Assessment & Plan  Continue carvedilol, hydralazine, will be on IV furosemide  Monitor vitals  Vitals:    12/06/22 1923   BP: 107/59   Pulse: 76   Resp: 18   Temp: 36.9 °C (98.4 °F)   SpO2: 95%         Postoperative hypothyroidism- (present on admission)  Assessment & Plan  Continue levothyroxine.  Every previous TSH lab has been low, patient says he is compliant with medication  12/5 TSH:<0.005 (lacks thyroids), normal FT4       VTE prophylaxis: therapeutic anticoagulation with warfarin    I have performed a physical exam and reviewed and updated ROS and Plan today (12/6/2022). In review of yesterday's note (12/5/2022), there are no changes except as documented above.

## 2022-12-06 NOTE — ASSESSMENT & PLAN NOTE
Fatigue for several months now, multifactorial:  -On warfarin following mitral valve repair and has been having intermittent melena for several months now and anemia which is contributing to his fatigue.  Needs GI follow-up  -History of CHF and has been complaining of orthopnea and PND.  He is in acute volume overload, IV diuresis and echocardiogram ordered  -Hypothyroidism following thyroid cancer.  Every single previous TSH lab was very low, likely noncompliant or taking medication correctly.  Repeat labs ordered, this will need to be addressed  -Lesion seen on CT scan concerning for renal cancer, MRI ordered  -History of recurrence of thyroid cancer in 1990 and had whole-body radiation, if he continues to have symptoms of dyspnea following treatment of above causes will likely need high-resolution CT of chest to evaluate for radiation-induced pneumonitis

## 2022-12-06 NOTE — ASSESSMENT & PLAN NOTE
Concern for RCC, MRI ordered per radiology recommendation  Monitoring renal function.  If needs a biopsy I have recommend he follow up outpatient with IR and urology.

## 2022-12-06 NOTE — ASSESSMENT & PLAN NOTE
Continue levothyroxine.  Every previous TSH lab has been low, patient says he is compliant with medication  12/5 TSH:<0.005 (lacks thyroids), 12/7 Elevated Free thyroxine:1.72  Decrease levothyroxine from 300 to 250mcg daily.  May need something inbetween these doses.  6 week follow up Free thyroxine

## 2022-12-06 NOTE — PROGRESS NOTES
Inpatient Anticoagulation Service Note    Date: 12/5/2022    Reason for Anticoagulation: Mitral Valve Repair, Atrial Fibrillation   Target INR: 2.0 to 3.0  [id number] VASc Score: 4  HAS-BLED Score: 2   Hemoglobin Value: (!) 10.7  Hematocrit Value: (!) 34.3  Lab Platelet Value: 169    INR from last 7 days       Date/Time INR Value    12/05/22 1030 3.07          Dose from last 7 days       Date/Time Dose (mg)    12/05/22 1850 2.5          Average Dose (mg): 3.9 (2.5mg MWF and 5mg all other days)  Significant Interactions: Antiplatelet Medications  Bridge Therapy: No (If less than 5 days and overlap therapy discontinued -- document reason (i.e. Bleed Risk))    (If still on overlap therapy, if No -- document reason (i.e. Bleed Risk))    Reversal Agent Administered: Not Applicable  Comments: Patient held dose on 11/30 and INR is still slightly supratherapeutic.     Plan:  Decrease dose from 2.5mg Tue & Thurs and 5mg all other days to 2.5mg MWF and 5mg all other days  Education Material Provided?: No  Pharmacist suggested discharge dosing: TBD. Monitor INR and adjust dose if needed.     Mega Deshpande, PharmD, BCPS

## 2022-12-06 NOTE — ASSESSMENT & PLAN NOTE
Last occurred about 10 days ago  Patient on warfarin for mitral valve repair.  Needs outpatient GI follow-up and states he has a near future outpatient colonoscopy scheduled.

## 2022-12-07 ENCOUNTER — APPOINTMENT (OUTPATIENT)
Dept: RADIOLOGY | Facility: MEDICAL CENTER | Age: 57
DRG: 291 | End: 2022-12-07
Attending: HOSPITALIST
Payer: MEDICAID

## 2022-12-07 ENCOUNTER — PATIENT OUTREACH (OUTPATIENT)
Dept: SCHEDULING | Facility: IMAGING CENTER | Age: 57
End: 2022-12-07
Payer: MEDICAID

## 2022-12-07 LAB
ANION GAP SERPL CALC-SCNC: 9 MMOL/L (ref 7–16)
BUN SERPL-MCNC: 48 MG/DL (ref 8–22)
CALCIUM SERPL-MCNC: 7.7 MG/DL (ref 8.5–10.5)
CHLORIDE SERPL-SCNC: 99 MMOL/L (ref 96–112)
CO2 SERPL-SCNC: 26 MMOL/L (ref 20–33)
CREAT SERPL-MCNC: 1.44 MG/DL (ref 0.5–1.4)
ERYTHROCYTE [DISTWIDTH] IN BLOOD BY AUTOMATED COUNT: 50.8 FL (ref 35.9–50)
GFR SERPLBLD CREATININE-BSD FMLA CKD-EPI: 56 ML/MIN/1.73 M 2
GLUCOSE SERPL-MCNC: 123 MG/DL (ref 65–99)
HCT VFR BLD AUTO: 30.6 % (ref 42–52)
HGB BLD-MCNC: 9.6 G/DL (ref 14–18)
INR PPP: 1.81 (ref 0.87–1.13)
MCH RBC QN AUTO: 25.4 PG (ref 27–33)
MCHC RBC AUTO-ENTMCNC: 31.4 G/DL (ref 33.7–35.3)
MCV RBC AUTO: 81 FL (ref 81.4–97.8)
PLATELET # BLD AUTO: 149 K/UL (ref 164–446)
PMV BLD AUTO: 11.2 FL (ref 9–12.9)
POTASSIUM SERPL-SCNC: 3.9 MMOL/L (ref 3.6–5.5)
PROTHROMBIN TIME: 20.5 SEC (ref 12–14.6)
RBC # BLD AUTO: 3.78 M/UL (ref 4.7–6.1)
SODIUM SERPL-SCNC: 134 MMOL/L (ref 135–145)
T4 FREE SERPL-MCNC: 1.72 NG/DL (ref 0.93–1.7)
TSH SERPL DL<=0.005 MIU/L-ACNC: <0.005 UIU/ML (ref 0.38–5.33)
WBC # BLD AUTO: 11.8 K/UL (ref 4.8–10.8)

## 2022-12-07 PROCEDURE — 80048 BASIC METABOLIC PNL TOTAL CA: CPT

## 2022-12-07 PROCEDURE — 700111 HCHG RX REV CODE 636 W/ 250 OVERRIDE (IP): Performed by: STUDENT IN AN ORGANIZED HEALTH CARE EDUCATION/TRAINING PROGRAM

## 2022-12-07 PROCEDURE — 85610 PROTHROMBIN TIME: CPT

## 2022-12-07 PROCEDURE — 700117 HCHG RX CONTRAST REV CODE 255: Performed by: HOSPITALIST

## 2022-12-07 PROCEDURE — A9270 NON-COVERED ITEM OR SERVICE: HCPCS | Performed by: HOSPITALIST

## 2022-12-07 PROCEDURE — 770020 HCHG ROOM/CARE - TELE (206)

## 2022-12-07 PROCEDURE — 700102 HCHG RX REV CODE 250 W/ 637 OVERRIDE(OP): Performed by: HOSPITALIST

## 2022-12-07 PROCEDURE — 99232 SBSQ HOSP IP/OBS MODERATE 35: CPT | Performed by: HOSPITALIST

## 2022-12-07 PROCEDURE — A9576 INJ PROHANCE MULTIPACK: HCPCS | Performed by: HOSPITALIST

## 2022-12-07 PROCEDURE — 84439 ASSAY OF FREE THYROXINE: CPT

## 2022-12-07 PROCEDURE — 700102 HCHG RX REV CODE 250 W/ 637 OVERRIDE(OP): Performed by: STUDENT IN AN ORGANIZED HEALTH CARE EDUCATION/TRAINING PROGRAM

## 2022-12-07 PROCEDURE — 84443 ASSAY THYROID STIM HORMONE: CPT

## 2022-12-07 PROCEDURE — 85027 COMPLETE CBC AUTOMATED: CPT

## 2022-12-07 PROCEDURE — 74183 MRI ABD W/O CNTR FLWD CNTR: CPT

## 2022-12-07 PROCEDURE — A9270 NON-COVERED ITEM OR SERVICE: HCPCS | Performed by: STUDENT IN AN ORGANIZED HEALTH CARE EDUCATION/TRAINING PROGRAM

## 2022-12-07 RX ORDER — LEVOTHYROXINE SODIUM 0.12 MG/1
250 TABLET ORAL DAILY
Status: DISCONTINUED | OUTPATIENT
Start: 2022-12-08 | End: 2022-12-08 | Stop reason: HOSPADM

## 2022-12-07 RX ORDER — FUROSEMIDE 20 MG/1
20 TABLET ORAL
Status: DISCONTINUED | OUTPATIENT
Start: 2022-12-08 | End: 2022-12-08 | Stop reason: HOSPADM

## 2022-12-07 RX ORDER — FUROSEMIDE 10 MG/ML
20 INJECTION INTRAMUSCULAR; INTRAVENOUS
Status: DISCONTINUED | OUTPATIENT
Start: 2022-12-08 | End: 2022-12-07

## 2022-12-07 RX ADMIN — HYDRALAZINE HYDROCHLORIDE 50 MG: 50 TABLET, FILM COATED ORAL at 20:33

## 2022-12-07 RX ADMIN — PRAVASTATIN SODIUM 40 MG: 20 TABLET ORAL at 04:27

## 2022-12-07 RX ADMIN — ASPIRIN 81 MG: 81 TABLET, COATED ORAL at 04:27

## 2022-12-07 RX ADMIN — HYDRALAZINE HYDROCHLORIDE 50 MG: 50 TABLET, FILM COATED ORAL at 15:36

## 2022-12-07 RX ADMIN — AMITRIPTYLINE HYDROCHLORIDE 25 MG: 10 TABLET, FILM COATED ORAL at 20:33

## 2022-12-07 RX ADMIN — CARVEDILOL 37.5 MG: 25 TABLET, FILM COATED ORAL at 08:19

## 2022-12-07 RX ADMIN — HYDRALAZINE HYDROCHLORIDE 50 MG: 50 TABLET, FILM COATED ORAL at 08:19

## 2022-12-07 RX ADMIN — FUROSEMIDE 20 MG: 10 INJECTION, SOLUTION INTRAMUSCULAR; INTRAVENOUS at 04:26

## 2022-12-07 RX ADMIN — GADOTERIDOL 20 ML: 279.3 INJECTION, SOLUTION INTRAVENOUS at 16:41

## 2022-12-07 RX ADMIN — CARVEDILOL 37.5 MG: 25 TABLET, FILM COATED ORAL at 16:58

## 2022-12-07 RX ADMIN — LEVOTHYROXINE SODIUM 300 MCG: 0.1 TABLET ORAL at 04:26

## 2022-12-07 RX ADMIN — WARFARIN SODIUM 2.5 MG: 2.5 TABLET ORAL at 16:58

## 2022-12-07 RX ADMIN — ALLOPURINOL 300 MG: 300 TABLET ORAL at 04:27

## 2022-12-07 ASSESSMENT — PAIN DESCRIPTION - PAIN TYPE: TYPE: ACUTE PAIN

## 2022-12-07 ASSESSMENT — ENCOUNTER SYMPTOMS
BACK PAIN: 0
COUGH: 0
BLOOD IN STOOL: 0
SHORTNESS OF BREATH: 0
DIZZINESS: 0
ABDOMINAL PAIN: 0
WEIGHT LOSS: 0
DIARRHEA: 0
EYE DISCHARGE: 0
SPEECH CHANGE: 0
FEVER: 0
NERVOUS/ANXIOUS: 0
NAUSEA: 0
SORE THROAT: 0

## 2022-12-07 ASSESSMENT — FIBROSIS 4 INDEX: FIB4 SCORE: 1.9

## 2022-12-07 NOTE — PROGRESS NOTES
Assumed care of patient at bedside report from day shift RN. Updated on POC. Patient currently A & O x 4; on RA; up self; Call light within reach. Whiteboard updated. Fall precautions in place. Bed locked and in lowest position. All questions answered. No other needs indicated at this time.

## 2022-12-07 NOTE — PROGRESS NOTES
Hospital Medicine Daily Progress Note    Date of Service  12/7/2022    Chief Complaint  Black stools over 3 months and SOB    Hospital Course  Ottoniel Davies is a 57 y.o. male w/ hx of HTN, MVR, anticoagulated on warfarin, DEBI on CPAP, hypothyroid w/ hx of thyroid cancer 1989, and DLD.  He was admitted 12/5/2022 with shortness of breath with a Hgb:10.7, wbc:17.6. Admitted with a concern of heart failure with BNP 1700    Interval Problem Update  12/7/22: Walking hallway.  Alert with clear fluent speech and no distress.  Await MRI kidney. He discussed he was scheduling an outpatient colonoscopy in the next week with GI.  I alerted him he needs to monitor his renal function closely with being NPO and on bowel prep.     I have discussed this patient's plan of care and discharge plan at IDT rounds today with Case Management, Nursing, Nursing leadership, and other members of the IDT team.    Code Status  Full Code    Disposition  Patient is medically cleared for discharge.   Anticipate discharge to to home with close outpatient follow-up.  I have placed the appropriate orders for post-discharge needs.    Review of Systems  Review of Systems   Constitutional:  Negative for fever and weight loss.   HENT:  Negative for sore throat.    Eyes:  Negative for discharge.   Respiratory:  Negative for cough and shortness of breath.    Cardiovascular:  Negative for chest pain and leg swelling.   Gastrointestinal:  Negative for abdominal pain, blood in stool, diarrhea, melena and nausea.   Genitourinary:  Negative for dysuria and hematuria.   Musculoskeletal:  Negative for back pain and joint pain.   Skin:  Negative for rash.   Neurological:  Negative for dizziness and speech change.   Psychiatric/Behavioral:  The patient is not nervous/anxious.       Physical Exam  Temp:  [36.8 °C (98.2 °F)-37.1 °C (98.8 °F)] 36.9 °C (98.4 °F)  Pulse:  [69-76] 75  Resp:  [18] 18  BP: ()/(59-83) 126/75  SpO2:  [92 %-97 %] 95 %    Physical  Exam  Vitals reviewed.   Constitutional:       Appearance: Normal appearance. He is obese. He is not diaphoretic.   HENT:      Head: Normocephalic and atraumatic.      Nose: Nose normal.      Mouth/Throat:      Mouth: Mucous membranes are moist.      Pharynx: No oropharyngeal exudate.   Eyes:      General: No scleral icterus.        Right eye: No discharge.         Left eye: No discharge.      Extraocular Movements: Extraocular movements intact.      Conjunctiva/sclera: Conjunctivae normal.   Cardiovascular:      Rate and Rhythm: Normal rate and regular rhythm.      Pulses:           Radial pulses are 2+ on the right side and 2+ on the left side.        Dorsalis pedis pulses are 2+ on the right side and 2+ on the left side.      Heart sounds: No murmur heard.  Pulmonary:      Effort: Pulmonary effort is normal. No respiratory distress.      Breath sounds: Normal breath sounds. No wheezing or rales.   Abdominal:      General: Bowel sounds are normal. There is no distension.      Palpations: Abdomen is soft.      Tenderness: There is no abdominal tenderness.   Musculoskeletal:         General: No swelling or tenderness.      Cervical back: Neck supple. No tenderness. No muscular tenderness.      Right lower leg: No edema.      Left lower leg: No edema.   Skin:     Coloration: Skin is not jaundiced or pale.   Neurological:      General: No focal deficit present.      Mental Status: He is alert and oriented to person, place, and time. Mental status is at baseline.      Cranial Nerves: No cranial nerve deficit.      Motor: No weakness.   Psychiatric:         Mood and Affect: Mood normal.         Behavior: Behavior normal.       Fluids    Intake/Output Summary (Last 24 hours) at 12/7/2022 1551  Last data filed at 12/7/2022 0819  Gross per 24 hour   Intake 840 ml   Output 3150 ml   Net -2310 ml       Laboratory  Recent Labs     12/05/22  1030 12/06/22  0031 12/07/22  0006   WBC 17.6* 14.8* 11.8*   RBC 4.26* 3.87* 3.78*    HEMOGLOBIN 10.7* 9.7* 9.6*   HEMATOCRIT 34.3* 31.4* 30.6*   MCV 80.5* 81.1* 81.0*   MCH 25.1* 25.1* 25.4*   MCHC 31.2* 30.9* 31.4*   RDW 49.3 50.1* 50.8*   PLATELETCT 169 151* 149*   MPV 10.5 10.9 11.2     Recent Labs     12/05/22  1030 12/06/22  0031 12/07/22  0006   SODIUM 137 135 134*   POTASSIUM 4.9 4.2 3.9   CHLORIDE 106 99 99   CO2 21 24 26   GLUCOSE 118* 148* 123*   BUN 48* 48* 48*   CREATININE 1.08 1.47* 1.44*   CALCIUM 8.0* 7.8* 7.7*     Recent Labs     12/05/22  1030 12/06/22  0031 12/07/22  0006   APTT 35.9  --   --    INR 3.07* 2.27* 1.81*               Imaging  EC-ECHOCARDIOGRAM COMPLETE W/ CONT   Final Result      CTA ABDOMEN PELVIS W & W/O POST PROCESS   Final Result      1.  No CT evidence of active arterial gastrointestinal bleeding.   2.  Mild peripancreatic fat stranding, nonspecific, possibly sequela of pancreatitis. No pancreatic or peripancreatic fluid collections.   3.  Solid exophytic lesion in the lower pole of the right kidney, concerning for RCC. Two hyperdense lesions in the left kidney, not definitively cystic. Further evaluation with contrast-enhanced renal protocol MRI is advised.   4.  Cardiomegaly.            DX-CHEST-PORTABLE (1 VIEW)   Final Result      Cardiomegaly and mild interstitial edema.         MR-ABDOMEN-WITH & W/O    (Results Pending)        Assessment/Plan  * Acute diastolic CHF (congestive heart failure) (HCC)- (present on admission)  Assessment & Plan  Improved with diuresis.  Initial BNP 1700  Previous echo 2019 showed EF of 55 to 60%, diastolic dysfunction  IV diuresis lasix 20mg IV daily and 12/7 change to oral  I/O, daily weight, cardiac diet, fluid and salt restriction  12/6 echo:  CONCLUSIONS  Normal left ventricular size, thickness, and systolic function.  Mildly dilated right ventricle.  Known mitral valve repair which is functioning normally with   appropriate transvalvular gradient.  Trace/Mild mitral regurgitation.    Abnormal CT scan, kidney- (present on  admission)  Assessment & Plan  Concern for RCC, MRI ordered per radiology recommendation  Monitoring renal function.  If needs a biopsy I have recommend he follow up outpatient with IR and urology.    Chronic fatigue- (present on admission)  Assessment & Plan  Fatigue for several months now, multifactorial:  -On warfarin following mitral valve repair and has been having intermittent melena for several months now and anemia which is contributing to his fatigue.  Needs GI follow-up  -History of CHF and has been complaining of orthopnea and PND.  He is in acute volume overload, IV diuresis and echocardiogram ordered  -Hypothyroidism following thyroid cancer.  Every single previous TSH lab was very low, likely noncompliant or taking medication correctly.  Repeat labs ordered, this will need to be addressed  -Lesion seen on CT scan concerning for renal cancer, MRI ordered  -History of recurrence of thyroid cancer in 1990 and had whole-body radiation, if he continues to have symptoms of dyspnea following treatment of above causes will likely need high-resolution CT of chest to evaluate for radiation-induced pneumonitis    Melena- (present on admission)  Assessment & Plan  Last occurred about 10 days ago  Patient on warfarin for mitral valve repair.  Needs outpatient GI follow-up and states he has a near future outpatient colonoscopy scheduled.    Microcytic anemia- (present on admission)  Assessment & Plan  Intermittent melena for several months now, last occurred was about 10 days ago  Hemoglobin improved compared to baseline, needs outpatient follow-up with GI  Iron studies ordered  12/7 Hgb:9.6<9.7  May have a component of renal failure and chronic blood loss    Type 2 diabetes mellitus without complication, without long-term current use of insulin (HCC)- (present on admission)  Assessment & Plan  Well-controlled, not hypoglycemic.  Hold home glipizide, start sliding scale if needed  A1c:6.6 in past 11  months    History of mitral valve repair 4/2019- (present on admission)  Assessment & Plan  On warfarin.    12/6 Echo with preserved EF w/ no significant valvular abnormality    Mixed hyperlipidemia- (present on admission)  Assessment & Plan  Continue statin    DEBI on CPAP- (present on admission)  Assessment & Plan  Compliant with CPAP    Essential hypertension- (present on admission)  Assessment & Plan  Continue carvedilol, hydralazine, will be on IV furosemide  Monitor vitals  Vitals:    12/07/22 1526   BP: 126/75   Pulse: 75   Resp: 18   Temp: 36.9 °C (98.4 °F)   SpO2: 95%         Postoperative hypothyroidism- (present on admission)  Assessment & Plan  Continue levothyroxine.  Every previous TSH lab has been low, patient says he is compliant with medication  12/5 TSH:<0.005 (lacks thyroids), 12/7 Elevated Free thyroxine:1.72  Decrease levothyroxine from 300 to 250mcg daily.  May need something inbetween these doses.  6 week follow up Free thyroxine       VTE prophylaxis: therapeutic anticoagulation with warfarin    I have performed a physical exam and reviewed and updated ROS and Plan today (12/7/2022). In review of yesterday's note (12/6/2022), there are no changes except as documented above.

## 2022-12-07 NOTE — PROGRESS NOTES
Inpatient Anticoagulation Service Note  Date: 12/7/2022  Reason for Anticoagulation: Atrial Fibrillation, Mitral Valve Repair   [id number] VASc Score: 4  HAS-BLED Score: 2   Hemoglobin Value: (!) 9.6  Hematocrit Value: (!) 30.6  Lab Platelet Value: (!) 149  Target INR: 2.0 to 3.0  INR from last 7 days       Date/Time INR Value    12/07/22 0006 1.81    12/06/22 0031 2.27    12/05/22 1030 3.07          Dose from last 7 days       Date/Time Dose (mg)    12/07/22 0847 2.5    12/06/22 1413 5    12/05/22 1850 2.5          Average Dose (mg): TBD (Home Dose: 2.5 mg Tu/Th + 5 mg all other days per [facility])  Significant Interactions: Antiplatelet Medications  Bridge Therapy: No  Reversal Agent Administered: Not Applicable  Comments: INR below goal range. Noted labile INR. Noted bleed concerns. H/H low. PLT low. Warfarin interactions noted. Will give warfarin 2.5 mg today. INR with AM labs. Likely to need dose adjusmtents.    Plan:  warfarin 2.5 mg 12/7/22  Education Material Provided?: No (chronic warfarin patient)  Pharmacist suggested discharge dosing: warfarin 2.5 mg Mo/We/Fr and 5 mg all other days    Aguilar Marrero, PharmD

## 2022-12-08 VITALS
HEART RATE: 74 BPM | HEIGHT: 66 IN | WEIGHT: 209.44 LBS | SYSTOLIC BLOOD PRESSURE: 117 MMHG | TEMPERATURE: 97.5 F | DIASTOLIC BLOOD PRESSURE: 66 MMHG | OXYGEN SATURATION: 95 % | RESPIRATION RATE: 18 BRPM | BODY MASS INDEX: 33.66 KG/M2

## 2022-12-08 PROBLEM — K92.1 MELENA: Status: RESOLVED | Noted: 2022-12-05 | Resolved: 2022-12-08

## 2022-12-08 PROBLEM — I50.31 ACUTE DIASTOLIC CHF (CONGESTIVE HEART FAILURE) (HCC): Status: RESOLVED | Noted: 2022-12-05 | Resolved: 2022-12-08

## 2022-12-08 PROBLEM — N28.89 RENAL MASS, RIGHT: Status: ACTIVE | Noted: 2022-12-08

## 2022-12-08 LAB
ANION GAP SERPL CALC-SCNC: 13 MMOL/L (ref 7–16)
BUN SERPL-MCNC: 51 MG/DL (ref 8–22)
CALCIUM SERPL-MCNC: 7.8 MG/DL (ref 8.5–10.5)
CHLORIDE SERPL-SCNC: 103 MMOL/L (ref 96–112)
CO2 SERPL-SCNC: 23 MMOL/L (ref 20–33)
CREAT SERPL-MCNC: 1.55 MG/DL (ref 0.5–1.4)
ERYTHROCYTE [DISTWIDTH] IN BLOOD BY AUTOMATED COUNT: 51.8 FL (ref 35.9–50)
GFR SERPLBLD CREATININE-BSD FMLA CKD-EPI: 51 ML/MIN/1.73 M 2
GLUCOSE SERPL-MCNC: 127 MG/DL (ref 65–99)
HCT VFR BLD AUTO: 31.3 % (ref 42–52)
HGB BLD-MCNC: 9.8 G/DL (ref 14–18)
INR PPP: 1.78 (ref 0.87–1.13)
MCH RBC QN AUTO: 25.3 PG (ref 27–33)
MCHC RBC AUTO-ENTMCNC: 31.3 G/DL (ref 33.7–35.3)
MCV RBC AUTO: 80.7 FL (ref 81.4–97.8)
PLATELET # BLD AUTO: 156 K/UL (ref 164–446)
PMV BLD AUTO: 11 FL (ref 9–12.9)
POTASSIUM SERPL-SCNC: 3.7 MMOL/L (ref 3.6–5.5)
PROTHROMBIN TIME: 20.3 SEC (ref 12–14.6)
RBC # BLD AUTO: 3.88 M/UL (ref 4.7–6.1)
SODIUM SERPL-SCNC: 139 MMOL/L (ref 135–145)
WBC # BLD AUTO: 11.2 K/UL (ref 4.8–10.8)

## 2022-12-08 PROCEDURE — 80048 BASIC METABOLIC PNL TOTAL CA: CPT

## 2022-12-08 PROCEDURE — A9270 NON-COVERED ITEM OR SERVICE: HCPCS | Performed by: STUDENT IN AN ORGANIZED HEALTH CARE EDUCATION/TRAINING PROGRAM

## 2022-12-08 PROCEDURE — 700102 HCHG RX REV CODE 250 W/ 637 OVERRIDE(OP): Performed by: STUDENT IN AN ORGANIZED HEALTH CARE EDUCATION/TRAINING PROGRAM

## 2022-12-08 PROCEDURE — 85027 COMPLETE CBC AUTOMATED: CPT

## 2022-12-08 PROCEDURE — A9270 NON-COVERED ITEM OR SERVICE: HCPCS | Performed by: HOSPITALIST

## 2022-12-08 PROCEDURE — 99239 HOSP IP/OBS DSCHRG MGMT >30: CPT | Performed by: HOSPITALIST

## 2022-12-08 PROCEDURE — 700102 HCHG RX REV CODE 250 W/ 637 OVERRIDE(OP): Performed by: HOSPITALIST

## 2022-12-08 PROCEDURE — 85610 PROTHROMBIN TIME: CPT

## 2022-12-08 RX ORDER — FUROSEMIDE 20 MG/1
20 TABLET ORAL DAILY
Qty: 30 TABLET | Refills: 1 | Status: SHIPPED | OUTPATIENT
Start: 2022-12-09 | End: 2022-12-08

## 2022-12-08 RX ORDER — CARVEDILOL 12.5 MG/1
37.5 TABLET ORAL 2 TIMES DAILY WITH MEALS
Qty: 60 TABLET | Refills: 3 | Status: SHIPPED | OUTPATIENT
Start: 2022-12-08 | End: 2022-12-19

## 2022-12-08 RX ADMIN — HYDRALAZINE HYDROCHLORIDE 50 MG: 50 TABLET, FILM COATED ORAL at 08:02

## 2022-12-08 RX ADMIN — FUROSEMIDE 20 MG: 20 TABLET ORAL at 05:03

## 2022-12-08 RX ADMIN — ALLOPURINOL 300 MG: 300 TABLET ORAL at 05:03

## 2022-12-08 RX ADMIN — PRAVASTATIN SODIUM 40 MG: 20 TABLET ORAL at 05:03

## 2022-12-08 RX ADMIN — ISOSORBIDE MONONITRATE 30 MG: 30 TABLET, EXTENDED RELEASE ORAL at 05:03

## 2022-12-08 RX ADMIN — CARVEDILOL 37.5 MG: 25 TABLET, FILM COATED ORAL at 08:02

## 2022-12-08 RX ADMIN — LEVOTHYROXINE SODIUM 250 MCG: 0.12 TABLET ORAL at 05:03

## 2022-12-08 RX ADMIN — ASPIRIN 81 MG: 81 TABLET, COATED ORAL at 05:03

## 2022-12-08 NOTE — DISCHARGE PLANNING
Case Management Discharge Planning    Admission Date: 12/5/2022  GMLOS: 3.9  ALOS: 3    6-Clicks ADL Score: 24  6-Clicks Mobility Score: 23      Anticipated Discharge Dispo: Discharge Disposition: Discharged to home/self care (01)    DME Needed: No    Action(s) Taken: Transport Arranged     Escalations Completed: None    Medically Clear: No    Next Steps: RNCM received a Voalte message from Celeste Deshpande stating patient wanted to talk with CM about Medicaid transportation.  RNCM met with patient at the bedside, answered questions and provided resources.  No other CM needs identified at this time.  RNCM will continue to follow for discharge planning needs.      Barriers to Discharge: None

## 2022-12-08 NOTE — DISCHARGE SUMMARY
Discharge Summary    CHIEF COMPLAINT ON ADMISSION  Chief Complaint   Patient presents with    Insomnia     Pt states he has not had any sleep in over two weeks.     Shortness of Breath     Pt states he is short of breath at baseline, but it has been getting increasingly worse over the past 2 weeks.     Melena     Black, tarry stools for over three months.        Reason for Admission  SOB     Admission Date  12/5/2022    CODE STATUS  Full Code    HPI & HOSPITAL COURSE  Ottoniel Davies is a 57 y.o. male w/ hx of HTN, MVR, anticoagulated on warfarin, DEBI on CPAP, hypothyroid w/ hx of thyroid cancer 1989, and DLD.  He was admitted 12/5/2022 with shortness of breath with a Hgb:10.7, wbc:17.6. Admitted with a concern of heart failure with BNP 1700. He was given diuresis w/ lasix and had improvement and was taken off oxygen.  He was ambulatory in the hallways on room air.  He had on his CT scan concern of abnormal renal masses and a dedicated MRI was performed which showed a concern of possible renal cell cancer.  Diuretics were stopped due to worsening renal function and patient instructed to monitor outpatient weight and edema closely and use lasix prn.  I have called [facility] and Urology want the patient to follow up outpatient for evaluation.  Patient aware and has phone number to call Dr Dutch Magallanes at [facility].  Patient has follow up with gastroenterology for a concern of chronic blood loss that was being arrange by the patient prior to his admit.      Therefore, he is discharged in good and stable condition to home with close outpatient follow-up.    The patient met 2-midnight criteria for an inpatient stay at the time of discharge.    Discharge Date  12/8/2022    FOLLOW UP ITEMS POST DISCHARGE  None    DISCHARGE DIAGNOSES  Principal Problem (Resolved):    Acute diastolic CHF (congestive heart failure) (HCC) POA: Yes  Active Problems:    Postoperative hypothyroidism POA: Yes    Essential  hypertension POA: Yes    DEBI on CPAP (Chronic) POA: Yes    Mixed hyperlipidemia POA: Yes    History of mitral valve repair 4/2019 (Chronic) POA: Yes    Type 2 diabetes mellitus without complication, without long-term current use of insulin (HCC) POA: Yes    Microcytic anemia POA: Yes    Chronic fatigue POA: Yes    Abnormal CT scan, kidney POA: Yes    Renal mass, right POA: Unknown  Resolved Problems:    Melena POA: Yes      FOLLOW UP  Future Appointments   Date Time Provider Department Center   1/9/2023 12:20 PM Oren Keller M.D. [facility] None     Shane Vanegas M.D.  [address]  [facility] [address]  [phone]    Go on 12/13/2022  Please go to your follow up appointment with Shane Vanegas M.D. on Tuesday December 13,2022 at 10:00am.    Heart Failure Education  Please access the [facility] My HF Guide/Heart Failure Interactive Workbook:   [email or web address]  Follow up  Please visit this website as soon as possible for information on how to manage your heart failure at home. Thank you, [facility] Failure [facility]    [facility]  [address]  [facility] [address]  [phone]    Schedule an appointment as soon as possible for a visit in 2 week(s)  Dr Dutch Magalalnes for follow up of abnormal kidney mass on your recent CT and MRI here at [facility].      MEDICATIONS ON DISCHARGE     Medication List        CHANGE how you take these medications        Instructions   carvedilol 12.5 MG Tabs  What changed:   medication strength  additional instructions  Commonly known as: COREG   Take 3 Tablets by mouth 2 times a day with meals.  Dose: 37.5 mg            CONTINUE taking these medications        Instructions   allopurinol 300 MG Tabs  Commonly known as: ZYLOPRIM   Take 300 mg by mouth every day.  Dose: 300 mg     amitriptyline 25 MG Tabs  Commonly known as: ELAVIL   Take 25 mg by mouth every evening.  Dose: 25 mg     aspirin 81 MG EC tablet   Take 1 Tab by mouth every day.  Dose: 81  mg     glipiZIDE 5 MG Tabs  Commonly known as: GLUCOTROL   Take 5 mg by mouth every day.  Dose: 5 mg     hydrALAZINE 50 MG Tabs  Commonly known as: APRESOLINE   Take 1 Tablet by mouth 3 times a day.  Dose: 50 mg     isosorbide mononitrate SR 30 MG Tb24  Commonly known as: IMDUR   Take 30 mg by mouth every morning.  Dose: 30 mg     levothyroxine 300 MCG Tabs  Commonly known as: SYNTHROID   Take 1 Tab by mouth every day.  Dose: 300 mcg     potassium chloride SA 20 MEQ Tbcr  Commonly known as: Kdur   Take 20 mEq by mouth 2 times a day. 2 tablets = 40 mEq  Dose: 20 mEq     pravastatin 40 MG tablet  Commonly known as: PRAVACHOL   Take 1 Tab by mouth every day.  Dose: 40 mg     warfarin 5 MG Tabs  Commonly known as: COUMADIN   Take 2.5-5 mg by mouth every evening. 2.5 mg on Tuesday and Thursday   5 mg all other days  Dose: 2.5-5 mg            STOP taking these medications      amoxicillin 500 MG Caps  Commonly known as: AMOXIL              Allergies  No Known Allergies    DIET  Orders Placed This Encounter   Procedures    Diet Order Diet: 2 Gram Sodium; Fluid modifications: (optional): 1500 ml Fluid Restriction     Standing Status:   Standing     Number of Occurrences:   1     Order Specific Question:   Diet:     Answer:   2 Gram Sodium [7]     Order Specific Question:   Fluid modifications: (optional)     Answer:   1500 ml Fluid Restriction [9]       ACTIVITY  As tolerated.  Weight bearing as tolerated        LABORATORY  Lab Results   Component Value Date    SODIUM 139 12/08/2022    POTASSIUM 3.7 12/08/2022    CHLORIDE 103 12/08/2022    CO2 23 12/08/2022    GLUCOSE 127 (H) 12/08/2022    BUN 51 (H) 12/08/2022    CREATININE 1.55 (H) 12/08/2022        Lab Results   Component Value Date    WBC 11.2 (H) 12/08/2022    HEMOGLOBIN 9.8 (L) 12/08/2022    HEMATOCRIT 31.3 (L) 12/08/2022    PLATELETCT 156 (L) 12/08/2022      MR-ABDOMEN-WITH & W/O   Final Result   Addendum (preliminary) 1 of 1   Addendum:      4. There is  splenomegaly.   5. Simple hepatic and splenic cyst meeting no further follow-up imaging.      Final         1.  There is a 1.6 cm exophytic lesion in the right lateral lower pole kidney which demonstrates peripheral and internal nodular enhancement. This is suspicious for renal cell carcinoma. Bosniak 4   2.  There is a nonspecific 1.0 cm exophytic right superior pole lesion with a slightly indeterminate MRI appearance as discussed above. Bosniak 2F   3.  There are multiple bilateral benign cysts largest in the lateral left kidney with a peripheral rim of proteinaceous debris or hemorrhagic rim. None of these demonstrate definitive internal enhancement. Bosniak 1 and Bosniak 2                     EC-ECHOCARDIOGRAM COMPLETE W/ CONT   Final Result      CTA ABDOMEN PELVIS W & W/O POST PROCESS   Final Result      1.  No CT evidence of active arterial gastrointestinal bleeding.   2.  Mild peripancreatic fat stranding, nonspecific, possibly sequela of pancreatitis. No pancreatic or peripancreatic fluid collections.   3.  Solid exophytic lesion in the lower pole of the right kidney, concerning for RCC. Two hyperdense lesions in the left kidney, not definitively cystic. Further evaluation with contrast-enhanced renal protocol MRI is advised.   4.  Cardiomegaly.            DX-CHEST-PORTABLE (1 VIEW)   Final Result      Cardiomegaly and mild interstitial edema.               Total time of the discharge process exceeds 31 minutes.

## 2022-12-08 NOTE — DISCHARGE INSTRUCTIONS
Discharge Instructions per Jaswant Lugo D.O.      DIET: healthy balanced diet    ACTIVITY: as tolerates    DIAGNOSIS: Renal mass.      Return to ER if needed            HF Patient Discharge Instructions  Monitor your weight daily, and maintain a weight chart, to track your weight changes.   Activity as tolerated, unless your Doctor has ordered otherwise. Other activity order: n/a.  Follow a low fat, low cholesterol, low salt diet unless instructed otherwise by your Doctor. Read the labels on the back of food products and track your intake of fat, cholesterol and salt.   Fluid Restriction No. If a Fluid Restriction has been ordered by your Doctor, measure fluids with a measuring cup to ensure that you are not exceeding the restriction.   No smoking.  Oxygen No. If your Doctor has ordered that you wear Oxygen at home, it is important to wear it as ordered.  Did you receive an explanation from staff on the importance of taking each of your medications and why it is necessary to keep taking them unless your doctor says to stop? Yes  Were all of your questions answered about how to manage your heart failure and what to do if you have increased signs and symptoms after you go home? Yes  Do you feel like your heart failure care team involved you in the care treatment plan and allowed you to make decisions regarding your care while in the hospital and addressed any discharge needs you might have? Yes    See the educational handout provided at discharge for more information on monitoring your daily weight, activity and diet. This also explains more about Heart Failure, symptoms of a flare-up and some of the tests that you have undergone.     Warning Signs of a Flare-Up include:  Swelling in the ankles or lower legs.  Shortness of breath, while at rest, or while doing normal activities.   Shortness of breath at night when in bed, or coughing in bed.   Requiring more pillows to sleep at night, or needing to sit up at  night to sleep.  Feeling weak, dizzy or fatigued.     When to call your Doctor:  Call [facility] seven days a week from 8:00 a.m. to 8:00 p.m. for medical questions [phone].  Call your Primary Care Physician or Cardiologist if:   You experience any pain radiating to your jaw or neck.  You have any difficulty breathing.  You experience weight gain of 3 lbs in a day or 5 lbs in a week.   You feel any palpitations or irregular heartbeats.  You become dizzy or lose consciousness.   If you have had an angiogram or had a pacemaker or AICD placed, and experience:  Bleeding, drainage or swelling at the surgical / puncture site.  Fever greater than 100.0 F  Shock from internal defibrillator.  Cool and / or numb extremities.     Please access the AHA My HF Guide/Heart Failure Interactive Workbook:   [email or web address]      Heart Failure Action Plan  A heart failure action plan helps you understand what to do when you have symptoms of heart failure. Follow the plan that was created by you and your health care provider. Review your plan each time you visit your health care provider.  Red zone  These signs and symptoms mean you should get medical help right away:  You have trouble breathing when resting.  You have a dry cough that is getting worse.  You have swelling or pain in your legs or abdomen that is getting worse.  You suddenly gain more than 2-3 lb (0.9-1.4 kg) in a day, or more than 5 lb (2.3 kg) in one week. This amount may be more or less depending on your condition.  You have trouble staying awake or you feel confused.  You have chest pain.  You do not have an appetite.  You pass out.  If you experience any of these symptoms:  Call your local emergency services (911 in the [place] right away or seek help at the emergency department of the nearest hospital.  Yellow zone  These signs and symptoms mean your condition may be getting worse and you should make some changes:  You have  trouble breathing when you are active or you need to sleep with extra pillows.  You have swelling in your legs or abdomen.  You gain 2-3 lb (0.9-1.4 kg) in one day, or 5 lb (2.3 kg) in one week. This amount may be more or less depending on your condition.  You get tired easily.  You have trouble sleeping.  You have a dry cough.  If you experience any of these symptoms:  Contact your health care provider within the next day.  Your health care provider may adjust your medicines.  Green zone  These signs mean you are doing well and can continue what you are doing:  You do not have shortness of breath.  You have very little swelling or no new swelling.  Your weight is stable (no gain or loss).  You have a normal activity level.  You do not have chest pain or any other new symptoms.  Follow these instructions at home:  Take over-the-counter and prescription medicines only as told by your health care provider.  Weigh yourself daily. Your target weight is __________ lb (__________ kg).  Call your health care provider if you gain more than __________ lb (__________ kg) in a day, or more than __________ lb (__________ kg) in one week.  Eat a heart-healthy diet. Work with a diet and nutrition specialist (dietitian) to create an eating plan that is best for you.  Keep all follow-up visits as told by your health care provider. This is important.  Where to find more information  American Heart Association: www.heart.org  Summary  Follow the action plan that was created by you and your health care provider.  Get help right away if you have any symptoms in the Red zone.  This information is not intended to replace advice given to you by your health care provider. Make sure you discuss any questions you have with your health care provider.  Document Released: 01/27/2018 Document Revised: 11/30/2018 Document Reviewed: 01/27/2018  Elsevier Patient Education © 2020 [employer] Inc.

## 2022-12-08 NOTE — PROGRESS NOTES
Patient to be discharged today - patient aware and agreeable to plan. D/c instructions reviewed with patient - ?'s/concerns answered.   Heart failure education provided

## 2022-12-08 NOTE — PROGRESS NOTES
Inpatient Anticoagulation Service Note    Date: 12/8/2022    Reason for Anticoagulation: Atrial Fibrillation, Mitral Valve Repair   Target INR: 2.0 to 3.0  [id number] VASc Score: 4  HAS-BLED Score: 2   Hemoglobin Value: (!) 9.8  Hematocrit Value: (!) 31.3  Lab Platelet Value: (!) 156    INR from last 7 days       Date/Time INR Value    12/08/22 0045 1.78    12/07/22 0006 1.81    12/06/22 0031 2.27    12/05/22 1030 3.07          Dose from last 7 days       Date/Time Dose (mg)    12/08/22 1310 5    12/07/22 0847 2.5    12/06/22 1413 5    12/05/22 1850 2.5          Average Dose (mg):  (Home Dose: 2.5 mg Tu/Th + 5 mg all other days per [facility])  Significant Interactions: Antiplatelet Medications  Bridge Therapy: No     Reversal Agent Administered: Not Applicable    Comments: INR decreased slightly. All warfarin doses charted as given. Will continue the pt's home warfarin dosing for now and check an INR tomorrow. Will adjust dosing if needed.    Plan:    Education Material Provided?: No (chronic warfarin patient)  Pharmacist suggested discharge dosing: warfarin 2.5 mg Tu,Th and 5 mg all other days. Pt will need a follow up INR within 2-3 days if discharge     Sal Marrero, PharmD

## 2022-12-08 NOTE — CARE PLAN
The patient is Watcher - Medium risk of patient condition declining or worsening    Shift Goals  Clinical Goals: Diurese, safety  Patient Goals: Rest  Family Goals: N/A    Progress made toward(s) clinical / shift goals:      Problem: Care Map:  Day of Discharge Optimal Outcome for the Heart Failure Patient  Goal: Day of Discharge:  Optimal Care of the heart failure patient  Outcome: Progressing  Intervention: Start Heart Failure education booklet, provide writing utensils and notepad for questions  Note: Provided and discussed education booklet  Intervention: Instruct patient to write down weights on symptom tracker daily  Note: Education provided  Intervention: Provide and explain the Stoplight Tool  Note: Education provided  Intervention: Patient has hospital specific Helath Failure Education booklet at discharge  Note: Booklet provided     Problem: Knowledge Deficit - Standard  Goal: Patient and family/care givers will demonstrate understanding of plan of care, disease process/condition, diagnostic tests and medications  Outcome: Progressing  Note: Patient updated on plan of care. All questions answered. Patient verbalized understanding. No further questions at this time.         Patient is not progressing towards the following goals:

## 2022-12-08 NOTE — HEART FAILURE PROGRAM
Patient of Dr. Keller in clinic. HF exacerbation.    Discharge Order Quality Check-Up - HFpEF    Patient has the below appointments which appropriate for now- no discharge plan or therapy notes.    HF Education Documented? No    Where we stand right now on HFpEF MEASURES per review of most recent notes and discharge med rec.    Anticoagulation for atrial arrhythmia, if applicable: n/a AC for valve repair  Glycemic control for DM + HF: home glipizide and pravastatin- only pravastatin in house at this time  Lipid lowering medication for DM + HF: see above  Pneumococcal vaccine: previous  Influenza vaccine for the current flu season: Sept 2022  Documentation of nicotine cessation counseling: quit 9 y/ a per h/p  Referral to disease management program specializing in heart failure care: looks like patient follows with Dr. Keller mostly in [place].   Valve team alert: n/a  HF Clinic RN alert: pending if pt willing to come to HF clinic  AHA Interactive HF education prompt placed in f/u section: yes    Thank you, Elly Cardio RN Navigator [phone]

## 2022-12-09 ENCOUNTER — TELEPHONE (OUTPATIENT)
Dept: VASCULAR LAB | Facility: MEDICAL CENTER | Age: 57
End: 2022-12-09
Payer: MEDICAID

## 2022-12-09 ENCOUNTER — ANTICOAGULATION VISIT (OUTPATIENT)
Dept: VASCULAR LAB | Facility: MEDICAL CENTER | Age: 57
End: 2022-12-09
Attending: INTERNAL MEDICINE
Payer: MEDICAID

## 2022-12-09 DIAGNOSIS — I48.0 PAROXYSMAL ATRIAL FIBRILLATION (HCC): Chronic | ICD-10-CM

## 2022-12-09 DIAGNOSIS — D68.69 SECONDARY HYPERCOAGULABLE STATE (HCC): ICD-10-CM

## 2022-12-09 DIAGNOSIS — I05.9 MITRAL VALVE DISEASE: ICD-10-CM

## 2022-12-09 LAB
INR BLD: 1.6 (ref 0.9–1.2)
INR PPP: 1.6 (ref 2–3.5)

## 2022-12-09 PROCEDURE — 99212 OFFICE O/P EST SF 10 MIN: CPT

## 2022-12-09 PROCEDURE — 85610 PROTHROMBIN TIME: CPT

## 2022-12-09 NOTE — TELEPHONE ENCOUNTER
Called pt regarding overdue INR - no answer. LVM asking pt to c/b or reschedule ASAP.    Anil Quan, SilvaD, BCACP

## 2022-12-09 NOTE — PROGRESS NOTES
Anticoagulation Summary  As of 2022      INR goal:  2.0-3.0   TTR:  65.4 % (3.6 y)   INR used for dosin.60 (2022)   Warfarin maintenance plan:  2.5 mg (5 mg x 0.5) every Tue, Thu; 5 mg (5 mg x 1) all other days; Starting 2022   Weekly warfarin total:  30 mg   Plan last modified:  FARIDA Mayes (2022)   Next INR check:  2022   Target end date:  Indefinite    Indications    Mitral valve disease [I05.9]  Paroxysmal atrial fibrillation (HCC) [I48.0]  Secondary hypercoagulable state (HCC) [D68.69]                 Anticoagulation Episode Summary       INR check location:      Preferred lab:      Send INR reminders to:      Comments:  Mitral valve repair - indefinite anticoag per 19 note from Dr Lawson          Anticoagulation Care Providers       Provider Role Specialty Phone number    FARIDA Parikh Referring Thoracic Surgery (Cardiothoracic Vascular Surgery) [phone]    [facility] Responsible  [phone]                  Refer to Patient Findings for HPI:  Patient Findings       Positives:  Signs/symptoms of bleeding (melena prior admission x a few months off and on. No current issues since DC from hospital.), Upcoming invasive procedure (upcoming colonoscopy & EGD on ), Emergency department visit, Change in medications (carvedilol dose increased), Hospital admission (d/t insomnia, SOB, melena)    Negatives:  Signs/symptoms of thrombosis, Laboratory test error suspected, Change in health, Change in alcohol use, Change in activity, Upcoming dental procedure, Missed doses, Extra doses, Change in diet/appetite, Bruising, Other complaints            There were no vitals filed for this visit.    Verified current warfarin dosing schedule.    Medications reconciled   Pt is on antiplatelet therapy Aspirin 81mg for MV repair and must be reviewed again with cards.    A/P   INR  SUB-therapeutic.     Warfarin dosing recommendation: Instructed pt  to BOLUS x 1 dose w/ 7.5 mg today and to then continue on w/ his current regimen.    In regard to upcoming procedures - pt to hold x 5 days prior. Instructed pt to BOLUS x 2 doses post-op w/ 7.5 mg at the discretion of operating provider and to otherwise continue on w/ his current regimen.    CHADSVASc: 2 (HTN, DM) - no bridging required.    Pt educated to contact our clinic with any changes in medications or s/s of bleeding or thrombosis. Pt is aware to seek immediate medical attention for falls, head injury or deep cuts.    Follow up appointment in 1.5 week(s).    Anil Quan, PharmD, BCACP

## 2022-12-12 ENCOUNTER — TELEPHONE (OUTPATIENT)
Dept: CARDIOLOGY | Facility: MEDICAL CENTER | Age: 57
End: 2022-12-12
Payer: MEDICAID

## 2022-12-12 ENCOUNTER — HOSPITAL ENCOUNTER (OUTPATIENT)
Dept: LAB | Facility: MEDICAL CENTER | Age: 57
End: 2022-12-12
Attending: PHYSICIAN ASSISTANT
Payer: MEDICAID

## 2022-12-12 LAB
ALBUMIN SERPL BCP-MCNC: 3.5 G/DL (ref 3.2–4.9)
ALBUMIN/GLOB SERPL: 1.4 G/DL
ALP SERPL-CCNC: 93 U/L (ref 30–99)
ALT SERPL-CCNC: 10 U/L (ref 2–50)
ANION GAP SERPL CALC-SCNC: 9 MMOL/L (ref 7–16)
AST SERPL-CCNC: 11 U/L (ref 12–45)
BASOPHILS # BLD AUTO: 1.2 % (ref 0–1.8)
BASOPHILS # BLD: 0.1 K/UL (ref 0–0.12)
BILIRUB SERPL-MCNC: 0.3 MG/DL (ref 0.1–1.5)
BUN SERPL-MCNC: 25 MG/DL (ref 8–22)
CALCIUM SERPL-MCNC: 8.4 MG/DL (ref 8.5–10.5)
CHLORIDE SERPL-SCNC: 110 MMOL/L (ref 96–112)
CO2 SERPL-SCNC: 20 MMOL/L (ref 20–33)
CREAT SERPL-MCNC: 1.24 MG/DL (ref 0.5–1.4)
EOSINOPHIL # BLD AUTO: 0.17 K/UL (ref 0–0.51)
EOSINOPHIL NFR BLD: 2 % (ref 0–6.9)
ERYTHROCYTE [DISTWIDTH] IN BLOOD BY AUTOMATED COUNT: 51.2 FL (ref 35.9–50)
FASTING STATUS PATIENT QL REPORTED: NORMAL
FERRITIN SERPL-MCNC: 76.5 NG/ML (ref 22–322)
FOLATE SERPL-MCNC: 10 NG/ML
GFR SERPLBLD CREATININE-BSD FMLA CKD-EPI: 67 ML/MIN/1.73 M 2
GLOBULIN SER CALC-MCNC: 2.5 G/DL (ref 1.9–3.5)
GLUCOSE SERPL-MCNC: 127 MG/DL (ref 65–99)
HCT VFR BLD AUTO: 29.2 % (ref 42–52)
HGB BLD-MCNC: 8.9 G/DL (ref 14–18)
IMM GRANULOCYTES # BLD AUTO: 0.09 K/UL (ref 0–0.11)
IMM GRANULOCYTES NFR BLD AUTO: 1 % (ref 0–0.9)
IRON SATN MFR SERPL: 9 % (ref 15–55)
IRON SERPL-MCNC: 26 UG/DL (ref 50–180)
LYMPHOCYTES # BLD AUTO: 0.79 K/UL (ref 1–4.8)
LYMPHOCYTES NFR BLD: 9.1 % (ref 22–41)
MCH RBC QN AUTO: 25.3 PG (ref 27–33)
MCHC RBC AUTO-ENTMCNC: 30.5 G/DL (ref 33.7–35.3)
MCV RBC AUTO: 83 FL (ref 81.4–97.8)
MONOCYTES # BLD AUTO: 1.11 K/UL (ref 0–0.85)
MONOCYTES NFR BLD AUTO: 12.8 % (ref 0–13.4)
NEUTROPHILS # BLD AUTO: 6.4 K/UL (ref 1.82–7.42)
NEUTROPHILS NFR BLD: 73.9 % (ref 44–72)
NRBC # BLD AUTO: 0 K/UL
NRBC BLD-RTO: 0 /100 WBC
PLATELET # BLD AUTO: 149 K/UL (ref 164–446)
PMV BLD AUTO: 11 FL (ref 9–12.9)
POTASSIUM SERPL-SCNC: 4.4 MMOL/L (ref 3.6–5.5)
PROT SERPL-MCNC: 6 G/DL (ref 6–8.2)
RBC # BLD AUTO: 3.52 M/UL (ref 4.7–6.1)
SODIUM SERPL-SCNC: 139 MMOL/L (ref 135–145)
T3FREE SERPL-MCNC: 2.26 PG/ML (ref 2–4.4)
T4 SERPL-MCNC: 6.4 UG/DL (ref 4–12)
TIBC SERPL-MCNC: 299 UG/DL (ref 250–450)
TSH SERPL DL<=0.005 MIU/L-ACNC: <0.005 UIU/ML (ref 0.38–5.33)
UIBC SERPL-MCNC: 273 UG/DL (ref 110–370)
VIT B12 SERPL-MCNC: 610 PG/ML (ref 211–911)
WBC # BLD AUTO: 8.7 K/UL (ref 4.8–10.8)

## 2022-12-12 PROCEDURE — 36415 COLL VENOUS BLD VENIPUNCTURE: CPT

## 2022-12-12 PROCEDURE — 83550 IRON BINDING TEST: CPT

## 2022-12-12 PROCEDURE — 84443 ASSAY THYROID STIM HORMONE: CPT

## 2022-12-12 PROCEDURE — 82746 ASSAY OF FOLIC ACID SERUM: CPT

## 2022-12-12 PROCEDURE — 84481 FREE ASSAY (FT-3): CPT

## 2022-12-12 PROCEDURE — 85025 COMPLETE CBC W/AUTO DIFF WBC: CPT

## 2022-12-12 PROCEDURE — 82607 VITAMIN B-12: CPT

## 2022-12-12 PROCEDURE — 84436 ASSAY OF TOTAL THYROXINE: CPT

## 2022-12-12 PROCEDURE — 80053 COMPREHEN METABOLIC PANEL: CPT

## 2022-12-12 PROCEDURE — 82728 ASSAY OF FERRITIN: CPT

## 2022-12-12 PROCEDURE — 83540 ASSAY OF IRON: CPT

## 2022-12-12 NOTE — TELEPHONE ENCOUNTER
Received surgical clearance from [facility] for howard to have EGD and colonoscopy with deep propofol sedation scheduled on 12/16/22 with Dr. Mraiano Woody.   F: [phone]    Scanned into [employer].     JM: Please advise on risk stratification. Will defer warfarin recommendations to anticoagulation clinic. Thanks!    Anticoagulation Clinic: Please notify [facility] of Warfarin recommendations. Thanks!

## 2022-12-13 NOTE — TELEPHONE ENCOUNTER
VR    Caller: [facility]    Office Name, phone number, fax number: [facility] / [place]. [phone]  [phone]  Fax: [phone]    Fax clearance to [phone]    Procedure Name: Colonoscopy    Procedure Scheduled Date: 12-    Callback Number: [phone]  x.[phone]

## 2022-12-14 ENCOUNTER — ANTICOAGULATION MONITORING (OUTPATIENT)
Dept: VASCULAR LAB | Facility: MEDICAL CENTER | Age: 57
End: 2022-12-14
Payer: MEDICAID

## 2022-12-14 DIAGNOSIS — I05.9 MITRAL VALVE DISEASE: ICD-10-CM

## 2022-12-14 DIAGNOSIS — I48.0 PAROXYSMAL ATRIAL FIBRILLATION (HCC): Chronic | ICD-10-CM

## 2022-12-14 DIAGNOSIS — D68.69 SECONDARY HYPERCOAGULABLE STATE (HCC): ICD-10-CM

## 2022-12-14 NOTE — PROGRESS NOTES
Patient called today stating that colonoscopy has been rescheduled for 1-27-23.  Instructed him to bolus with two doses of warfarin, then return to current dosing regimen.  Follow up INR in five days.  Nicholas Vences, PharmD, BCACP

## 2022-12-16 NOTE — TELEPHONE ENCOUNTER
Letter drafted and faxed to [phone]  Confirmation status received  Scan to [employer]   ------------------  Not active on [employer]

## 2022-12-16 NOTE — TELEPHONE ENCOUNTER
Patient is moderate to high risk of cardiovascular complications for low to moderate risk surgery or procedures.  he is medically optimized based on his last in person assessment at discharge, and if his symptoms have not changed, surgery should proceed without further cardiac work-up.  Aspirin should be continued throughout the surgical period if possible due to his known history of coronary artery disease, however if surgically necessary, this medication is ok to stop prior to surgery.    Coumadin to be managed by coumadin clinic.

## 2022-12-19 ENCOUNTER — OFFICE VISIT (OUTPATIENT)
Dept: CARDIOLOGY | Facility: MEDICAL CENTER | Age: 57
End: 2022-12-19
Payer: MEDICAID

## 2022-12-19 ENCOUNTER — ANTICOAGULATION VISIT (OUTPATIENT)
Dept: VASCULAR LAB | Facility: MEDICAL CENTER | Age: 57
End: 2022-12-19
Attending: INTERNAL MEDICINE
Payer: MEDICAID

## 2022-12-19 VITALS
SYSTOLIC BLOOD PRESSURE: 152 MMHG | HEIGHT: 67 IN | DIASTOLIC BLOOD PRESSURE: 80 MMHG | BODY MASS INDEX: 34.37 KG/M2 | HEART RATE: 90 BPM | RESPIRATION RATE: 18 BRPM | OXYGEN SATURATION: 95 % | WEIGHT: 219 LBS

## 2022-12-19 DIAGNOSIS — E66.9 OBESITY (BMI 30-39.9): ICD-10-CM

## 2022-12-19 DIAGNOSIS — I48.91 HYPERCOAGULABILITY DUE TO ATRIAL FIBRILLATION (HCC): ICD-10-CM

## 2022-12-19 DIAGNOSIS — I05.9 MITRAL VALVE DISEASE: ICD-10-CM

## 2022-12-19 DIAGNOSIS — R06.09 DYSPNEA ON EXERTION: ICD-10-CM

## 2022-12-19 DIAGNOSIS — D68.69 SECONDARY HYPERCOAGULABLE STATE (HCC): ICD-10-CM

## 2022-12-19 DIAGNOSIS — D68.69 HYPERCOAGULABILITY DUE TO ATRIAL FIBRILLATION (HCC): ICD-10-CM

## 2022-12-19 DIAGNOSIS — I10 HTN (HYPERTENSION), MALIGNANT: ICD-10-CM

## 2022-12-19 DIAGNOSIS — E13.9 DIABETES MELLITUS OF OTHER TYPE WITHOUT COMPLICATION, UNSPECIFIED WHETHER LONG TERM INSULIN USE (HCC): ICD-10-CM

## 2022-12-19 DIAGNOSIS — I48.0 PAROXYSMAL ATRIAL FIBRILLATION (HCC): ICD-10-CM

## 2022-12-19 DIAGNOSIS — R06.02 SHORTNESS OF BREATH: ICD-10-CM

## 2022-12-19 DIAGNOSIS — Z98.890 HISTORY OF MITRAL VALVE REPAIR: ICD-10-CM

## 2022-12-19 DIAGNOSIS — Z79.01 CHRONIC ANTICOAGULATION: ICD-10-CM

## 2022-12-19 DIAGNOSIS — I48.0 PAROXYSMAL ATRIAL FIBRILLATION (HCC): Chronic | ICD-10-CM

## 2022-12-19 DIAGNOSIS — I50.30 ACC/AHA STAGE C HEART FAILURE WITH PRESERVED EJECTION FRACTION (HCC): ICD-10-CM

## 2022-12-19 DIAGNOSIS — Z79.899 HIGH RISK MEDICATION USE: ICD-10-CM

## 2022-12-19 DIAGNOSIS — I51.89 LEFT VENTRICULAR DIASTOLIC DYSFUNCTION, NYHA CLASS 3: ICD-10-CM

## 2022-12-19 DIAGNOSIS — E87.6 HYPOKALEMIA: ICD-10-CM

## 2022-12-19 LAB
EKG IMPRESSION: NORMAL
INR PPP: 2 (ref 2–3.5)

## 2022-12-19 PROCEDURE — 93000 ELECTROCARDIOGRAM COMPLETE: CPT | Mod: 59 | Performed by: INTERNAL MEDICINE

## 2022-12-19 PROCEDURE — 99211 OFF/OP EST MAY X REQ PHY/QHP: CPT

## 2022-12-19 PROCEDURE — 85610 PROTHROMBIN TIME: CPT

## 2022-12-19 PROCEDURE — 94618 PULMONARY STRESS TESTING: CPT | Performed by: INTERNAL MEDICINE

## 2022-12-19 PROCEDURE — 99215 OFFICE O/P EST HI 40 MIN: CPT | Mod: 25 | Performed by: INTERNAL MEDICINE

## 2022-12-19 RX ORDER — FUROSEMIDE 40 MG/1
40 TABLET ORAL SEE ADMIN INSTRUCTIONS
Qty: 30 TABLET | Refills: 3 | Status: SHIPPED | OUTPATIENT
Start: 2022-12-19 | End: 2023-01-09 | Stop reason: SDUPTHER

## 2022-12-19 RX ORDER — ISOSORBIDE DINITRATE 20 MG/1
20 TABLET ORAL 3 TIMES DAILY
Qty: 270 TABLET | Refills: 4 | Status: SHIPPED | OUTPATIENT
Start: 2022-12-19

## 2022-12-19 RX ORDER — METOPROLOL TARTRATE 100 MG/1
100 TABLET ORAL 2 TIMES DAILY
Qty: 180 TABLET | Refills: 4 | Status: SHIPPED | OUTPATIENT
Start: 2022-12-19 | End: 2023-03-02

## 2022-12-19 RX ORDER — HYDRALAZINE HYDROCHLORIDE 100 MG/1
100 TABLET, FILM COATED ORAL 3 TIMES DAILY
Qty: 270 TABLET | Refills: 4 | Status: SHIPPED | OUTPATIENT
Start: 2022-12-19

## 2022-12-19 ASSESSMENT — MINNESOTA LIVING WITH HEART FAILURE QUESTIONNAIRE (MLHF)
MAKING YOU FEEL DEPRESSED: 0
MAKING YOU SHORT OF BREATH: 3
FEELING LIKE A BURDEN TO FAMILY AND FRIENDS: 0
EATING LESS FOODS YOU LIKE: 0
MAKING YOU STAY IN A HOSPITAL: 3
TOTAL_SCORE: 30
DIFFICULTY WITH RECREATIONAL PASTIMES, SPORTS, HOBBIES: 3
DIFFICULTY WORKING TO EARN A LIVING: 0
LOSS OF SELF CONTROL IN YOUR LIFE: 0
WALKING ABOUT OR CLIMBING STAIRS DIFFICULT: 3
WORKING AROUND THE HOUSE OR YARD DIFFICULT: 3
DIFFICULTY SLEEPING WELL AT NIGHT: 0
TIRED, FATIGUED OR LOW ON ENERGY: 3
SWELLING IN ANKLES OR LEGS: 3
GIVING YOU SIDE EFFECTS FROM TREATMENTS: 0
DIFFICULTY GOING AWAY FROM HOME: 3
COSTING YOU MONEY FOR MEDICAL CARE: 0
DIFFICULTY WITH SEXUAL ACTIVITIES: 0
DIFFICULTY TO CONCENTRATE OR REMEMBERING THINGS: 0
DIFFICULTY SOCIALIZING WITH FAMILY OR FRIENDS: 3
HAVING TO SIT OR LIE DOWN DURING THE DAY: 3
MAKING YOU WORRY: 0

## 2022-12-19 ASSESSMENT — ENCOUNTER SYMPTOMS
FEVER: 0
MEMORY LOSS: 0
BLURRED VISION: 0
BRUISES/BLEEDS EASILY: 0
DIAPHORESIS: 0
MYALGIAS: 0
ABDOMINAL PAIN: 0
PALPITATIONS: 0
HEADACHES: 0
DIZZINESS: 0
SHORTNESS OF BREATH: 1
DEPRESSION: 0
FALLS: 0
DOUBLE VISION: 0
SENSORY CHANGE: 0
COUGH: 0

## 2022-12-19 ASSESSMENT — 6 MINUTE WALK TEST (6MWT): TOTAL DISTANCE WALKED (METERS): 256

## 2022-12-19 ASSESSMENT — FIBROSIS 4 INDEX: FIB4 SCORE: 1.38
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
## 2022-12-19 NOTE — PROGRESS NOTES
Anticoagulation Summary  As of 2022      INR goal:  2.0-3.0   TTR:  65.0 % (3.6 y)   INR used for dosin.00 (2022)   Warfarin maintenance plan:  2.5 mg (5 mg x 0.5) every Tue, Thu; 5 mg (5 mg x 1) all other days; Starting 2022   Weekly warfarin total:  30 mg   Plan last modified:  FARIDA Mayes (2022)   Next INR check:  1/3/2023   Target end date:  Indefinite    Indications    Mitral valve disease [I05.9]  Paroxysmal atrial fibrillation (HCC) [I48.0]  Secondary hypercoagulable state (HCC) [D68.69]                 Anticoagulation Episode Summary       INR check location:      Preferred lab:      Send INR reminders to:      Comments:  Mitral valve repair - indefinite anticoag per 19 note from Dr Lawson          Anticoagulation Care Providers       Provider Role Specialty Phone number    FARIDA Parikh Referring Thoracic Surgery (Cardiothoracic Vascular Surgery) [phone]    [facility] Responsible  [phone]                  Refer to Patient Findings for HPI:       pt declined vitals    Verified current warfarin dosing schedule.    Medications reconciled   Pt is on 81 mg ASA for valve repair, will be evaluated on upcoming cardiology apt.       A/P   INR  therapeutic.     Warfarin dosing recommendation: Pt is to continue with current warfarin dosing regimen.     Pt educated to contact our clinic with any changes in medications or s/s of bleeding or thrombosis. Pt is aware to seek immediate medical attention for falls, head injury or deep cuts.    Follow up appointment in 2 week(s).    Wilian Oliva, PharmD

## 2022-12-19 NOTE — PATIENT INSTRUCTIONS
We will increase hydralazine to 100 mg p.o. 3 times a day.    We will stop carvedilol and start Metoprolol 100 mg BID.    We will stop Imdur and replace it with Isordil 20 mg p.o. 3 times a day.

## 2022-12-20 LAB — INR BLD: 2 (ref 0.9–1.2)

## 2023-01-03 ENCOUNTER — APPOINTMENT (OUTPATIENT)
Dept: VASCULAR LAB | Facility: MEDICAL CENTER | Age: 58
End: 2023-01-03
Payer: MEDICAID

## 2023-01-03 ENCOUNTER — NON-PROVIDER VISIT (OUTPATIENT)
Dept: MEDICAL GROUP | Facility: PHYSICIAN GROUP | Age: 58
End: 2023-01-03
Payer: MEDICAID

## 2023-01-03 ENCOUNTER — ANTICOAGULATION MONITORING (OUTPATIENT)
Dept: VASCULAR LAB | Facility: MEDICAL CENTER | Age: 58
End: 2023-01-03

## 2023-01-03 VITALS
OXYGEN SATURATION: 91 % | TEMPERATURE: 98.3 F | RESPIRATION RATE: 12 BRPM | HEART RATE: 79 BPM | DIASTOLIC BLOOD PRESSURE: 62 MMHG | SYSTOLIC BLOOD PRESSURE: 130 MMHG

## 2023-01-03 DIAGNOSIS — I05.9 MITRAL VALVE DISEASE: ICD-10-CM

## 2023-01-03 DIAGNOSIS — I48.0 PAROXYSMAL ATRIAL FIBRILLATION (HCC): Chronic | ICD-10-CM

## 2023-01-03 DIAGNOSIS — Z79.01 CHRONIC ANTICOAGULATION: ICD-10-CM

## 2023-01-03 DIAGNOSIS — D68.69 SECONDARY HYPERCOAGULABLE STATE (HCC): ICD-10-CM

## 2023-01-03 LAB
INR BLD: 3.2 (ref 0.9–1.2)
INR PPP: 3.2 (ref 2–3.5)
POC PROTIME: ABNORMAL

## 2023-01-03 PROCEDURE — 99213 OFFICE O/P EST LOW 20 MIN: CPT

## 2023-01-03 PROCEDURE — 85610 PROTHROMBIN TIME: CPT | Performed by: FAMILY MEDICINE

## 2023-01-03 PROCEDURE — 99999 PR NO CHARGE: CPT | Performed by: NURSE PRACTITIONER

## 2023-01-03 NOTE — PROGRESS NOTES
This evaluation was conducted via Telemed using secure and encrypted videoconferencing technology. The patient was physically located at [facility] in [place], [place]. The patient was presented by a medical professional at the originating site.   The patient's identity was confirmed and verbal consent was obtained for this telemedicine encounter.      OP Anticoagulation Service Note    Date: 1/3/2023       Anticoagulation Summary  As of 1/3/2023      INR goal:  2.0-3.0   TTR:  65.1 % (3.7 y)   INR used for dosing:  3.20 (1/3/2023)   Warfarin maintenance plan:  2.5 mg (5 mg x 0.5) every Tue, Thu; 5 mg (5 mg x 1) all other days   Weekly warfarin total:  30 mg   Plan last modified:  FARIDA Mayse (9/26/2022)   Next INR check:  1/10/2023   Target end date:  Indefinite    Indications    Mitral valve disease [I05.9]  Paroxysmal atrial fibrillation (HCC) [I48.0]  Secondary hypercoagulable state (HCC) [D68.69]                 Anticoagulation Episode Summary       INR check location:      Preferred lab:      Send INR reminders to:      Comments:  Mitral valve repair - indefinite anticoag per 7/26/19 note from Dr Lawson          Anticoagulation Care Providers       Provider Role Specialty Phone number    FARIDA Parikh Referring Thoracic Surgery (Cardiothoracic Vascular Surgery) [phone]    [facility]ticoagulation [facility] Responsible  [phone]          Anticoagulation Patient Findings  Patient Findings       Positives:  Upcoming invasive procedure (EGD schedule 1/27/23)    Negatives:  Signs/symptoms of thrombosis, Signs/symptoms of bleeding, Laboratory test error suspected, Change in health, Change in alcohol use, Change in activity, Emergency department visit, Upcoming dental procedure, Missed doses, Extra doses, Change in medications, Change in diet/appetite, Hospital admission, Bruising, Other complaints            THIS VISIT CONDUCTED WITH [employer] UTILIZING SECURE AND  ENCRYPTED VIDEOCONFERENCING EQUIPMENT  ROS:    Pulm: Denies SOB, chest pain.    Card: Denies syncope, edema, palpitations.    Extremities: Denies redness, pain.     PE:    Pulm: No SOB, even and unlabored.    Card: Normal rate and rhythm.    Extremities: No redness, or edema.     INR  supra-therapeutic.    Denies signs/symptoms of bleeding and/or thrombosis.    Denies changes to diet or medications.   Follow up appt in 1 weeks     Plan:  hold tomorrow 1/4/2023 dose, then follow normal dosing regimen    Medication: Warfarin (Coumadin)     Sunday Monday Tuesday Wednesday Thursday Friday Saturday      5 mg    5 mg    2.5 mg    5 mg    2.5 mg    5 mg    5 mg      1 tab(s)    1 tab(s)    1/2 tab(s)    1 tab(s)    1/2 tab(s)    1 tab(s)  1 tab(s)     Next Appointment: Tuesday, 1/10/2023 @2:15pm  And after EGD with GI on 1/31/2023 @ 1:30pm      Pt is on antiplatelet therapy Aspirin 81mg for MV repair and must reviewed again with cards    In regard to upcoming procedures - pt to hold coumadin x 5 days prior. Instructed pt to BOLUS x 2 doses post-op w/ 7.5 mg (1.5 tablets) at the discretion of operating provider and to otherwise continue on w/ his current regimen.      Review all of your home medications and newly ordered medications with your doctor and / or pharmacist. Follow medication instructions as directed by your doctor and / or pharmacist. Please keep your medication list with you and share with your physician. Update the information when medications are discontinued, doses are changed, or new medications (including over-the-counter products) are added; and carry medication information at all times in the event of emergency situations.      The patient is on a high risk medication and is supra-therapeudic. This increases the patients risk of bleeding and therefore requires frequent monitoring and follow up.      CHEST guidelines recommend frequent INR monitoring at regular intervals (a few days up to a  max of 12 weeks) to ensure they are on the proper dose of warfarin and not having any complications from therapy.  INRs can dramatically change over a short time period due to diet, medications, and medical conditions.   The patient instructed to go to the ER for falls with a head injury,  blood in urine or stool or any bleeding that last longer than 20 min.   For questions, please contact Outpatient Anticoagulation Service [phone].     ARIA Recinos.

## 2023-01-09 ENCOUNTER — OFFICE VISIT (OUTPATIENT)
Dept: CARDIOLOGY | Facility: PHYSICIAN GROUP | Age: 58
End: 2023-01-09
Payer: MEDICAID

## 2023-01-09 VITALS
WEIGHT: 221 LBS | HEART RATE: 72 BPM | RESPIRATION RATE: 14 BRPM | HEIGHT: 67 IN | OXYGEN SATURATION: 97 % | DIASTOLIC BLOOD PRESSURE: 70 MMHG | BODY MASS INDEX: 34.69 KG/M2 | SYSTOLIC BLOOD PRESSURE: 126 MMHG

## 2023-01-09 DIAGNOSIS — I48.0 PAROXYSMAL ATRIAL FIBRILLATION (HCC): Chronic | ICD-10-CM

## 2023-01-09 DIAGNOSIS — I10 ESSENTIAL HYPERTENSION: ICD-10-CM

## 2023-01-09 DIAGNOSIS — I42.8 OTHER CARDIOMYOPATHY (HCC): ICD-10-CM

## 2023-01-09 DIAGNOSIS — Z01.810 PREOPERATIVE CARDIOVASCULAR EXAMINATION: ICD-10-CM

## 2023-01-09 DIAGNOSIS — D68.69 SECONDARY HYPERCOAGULABLE STATE (HCC): ICD-10-CM

## 2023-01-09 DIAGNOSIS — E78.2 MIXED HYPERLIPIDEMIA: ICD-10-CM

## 2023-01-09 DIAGNOSIS — Z98.890 HISTORY OF MITRAL VALVE REPAIR: Chronic | ICD-10-CM

## 2023-01-09 PROCEDURE — 99215 OFFICE O/P EST HI 40 MIN: CPT | Performed by: INTERNAL MEDICINE

## 2023-01-09 RX ORDER — POTASSIUM CHLORIDE 1500 MG/1
TABLET, EXTENDED RELEASE ORAL
COMMUNITY
Start: 2022-12-30 | End: 2023-01-09

## 2023-01-09 RX ORDER — LOSARTAN POTASSIUM 100 MG/1
100 TABLET ORAL DAILY
COMMUNITY

## 2023-01-09 RX ORDER — FUROSEMIDE 40 MG/1
40 TABLET ORAL DAILY
Qty: 90 TABLET | Refills: 4 | Status: SHIPPED | OUTPATIENT
Start: 2023-01-09

## 2023-01-09 ASSESSMENT — ENCOUNTER SYMPTOMS
FLANK PAIN: 0
PND: 0
DIZZINESS: 0
BLURRED VISION: 0
DIARRHEA: 0
SHORTNESS OF BREATH: 0
DECREASED APPETITE: 0
NEAR-SYNCOPE: 0
WEIGHT GAIN: 0
BACK PAIN: 0
CLAUDICATION: 0
DYSPNEA ON EXERTION: 0
NAUSEA: 0
ABDOMINAL PAIN: 0
PALPITATIONS: 0
IRREGULAR HEARTBEAT: 0
SYNCOPE: 0
ORTHOPNEA: 0
DEPRESSION: 0
CONSTIPATION: 0
HEARTBURN: 0
WEIGHT LOSS: 0
ALTERED MENTAL STATUS: 0
FEVER: 0
VOMITING: 0
COUGH: 0

## 2023-01-09 ASSESSMENT — FIBROSIS 4 INDEX: FIB4 SCORE: 1.38
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
## 2023-01-10 ENCOUNTER — APPOINTMENT (OUTPATIENT)
Dept: VASCULAR LAB | Facility: MEDICAL CENTER | Age: 58
End: 2023-01-10
Payer: MEDICAID

## 2023-01-10 ENCOUNTER — NON-PROVIDER VISIT (OUTPATIENT)
Dept: MEDICAL GROUP | Facility: PHYSICIAN GROUP | Age: 58
End: 2023-01-10
Payer: MEDICAID

## 2023-01-10 ENCOUNTER — ANTICOAGULATION MONITORING (OUTPATIENT)
Dept: VASCULAR LAB | Facility: MEDICAL CENTER | Age: 58
End: 2023-01-10

## 2023-01-10 VITALS
TEMPERATURE: 98.9 F | SYSTOLIC BLOOD PRESSURE: 122 MMHG | OXYGEN SATURATION: 95 % | DIASTOLIC BLOOD PRESSURE: 76 MMHG | RESPIRATION RATE: 14 BRPM | HEART RATE: 73 BPM

## 2023-01-10 DIAGNOSIS — D68.69 SECONDARY HYPERCOAGULABLE STATE (HCC): ICD-10-CM

## 2023-01-10 DIAGNOSIS — I48.0 PAROXYSMAL ATRIAL FIBRILLATION (HCC): Chronic | ICD-10-CM

## 2023-01-10 DIAGNOSIS — Z79.01 CHRONIC ANTICOAGULATION: ICD-10-CM

## 2023-01-10 DIAGNOSIS — I05.9 MITRAL VALVE DISEASE: ICD-10-CM

## 2023-01-10 LAB
INR BLD: NORMAL (ref 0.9–1.2)
INR PPP: 2.4 (ref 2–3.5)
POC PROTIME: NORMAL

## 2023-01-10 PROCEDURE — 85610 PROTHROMBIN TIME: CPT | Performed by: FAMILY MEDICINE

## 2023-01-10 PROCEDURE — 99213 OFFICE O/P EST LOW 20 MIN: CPT

## 2023-01-10 NOTE — PROGRESS NOTES
This evaluation was conducted via Telemed using secure and encrypted videoconferencing technology. The patient was physically located at [facility] in [place], [place]. The patient was presented by a medical professional at the originating site.   The patient's identity was confirmed and verbal consent was obtained for this telemedicine encounter.      OP Anticoagulation Service Note    Date: 1/10/2023       Anticoagulation Summary  As of 1/10/2023      INR goal:  2.0-3.0   TTR:  65.2 % (3.7 y)   INR used for dosin.40 (1/10/2023)   Warfarin maintenance plan:  2.5 mg (5 mg x 0.5) every Tue, Thu; 5 mg (5 mg x 1) all other days   Weekly warfarin total:  30 mg   Plan last modified:  FARIDA Mayes (2022)   Next INR check:  2023   Target end date:  Indefinite    Indications    Mitral valve disease [I05.9]  Paroxysmal atrial fibrillation (HCC) [I48.0]  Secondary hypercoagulable state (HCC) [D68.69]                 Anticoagulation Episode Summary       INR check location:      Preferred lab:      Send INR reminders to:      Comments:  Mitral valve repair - indefinite anticoag per 19 note from Dr Lawson          Anticoagulation Care Providers       Provider Role Specialty Phone number    LYDIA ParikhPJOHN Referring Thoracic Surgery (Cardiothoracic Vascular Surgery) [phone]    [facility] Anticoagulation Services Responsible  [phone]          Anticoagulation Patient Findings  Patient Findings       Positives:  Signs/symptoms of bleeding (nosebleeds), Upcoming invasive procedure (EGD and colonoscopy 23)    Negatives:  Signs/symptoms of thrombosis, Laboratory test error suspected, Change in health, Change in alcohol use, Change in activity, Emergency department visit, Upcoming dental procedure, Missed doses, Extra doses, Change in medications, Change in diet/appetite, Hospital admission, Bruising, Other complaints            THIS VISIT CONDUCTED WITH [employer]  UTILIZING SECURE AND ENCRYPTED VIDEOCONFERENCING EQUIPMENT  ROS:    Pulm: Denies SOB, chest pain.    Card: Denies syncope, edema, palpitations.    Extremities: Denies redness, pain.     PE:    Pulm: No SOB, even and unlabored.    Card: Normal rate and rhythm.    Extremities: No redness, or edema.     INR  is-therapeutic.    Denies signs/symptoms of bleeding and/or thrombosis.    Denies changes to diet or medications.   Follow up appt in 2 weeks     Plan:  continue with current dosing regimen    Medication: Warfarin (Coumadin)    Has previous clearance for EGD/colonoscopy.  Reviewed with pt.      Next Appointment: Tuesday, 1/31/23 @ 1:30     Pt is on asa 81 for mvr    Review all of your home medications and newly ordered medications with your doctor and / or pharmacist. Follow medication instructions as directed by your doctor and / or pharmacist. Please keep your medication list with you and share with your physician. Update the information when medications are discontinued, doses are changed, or new medications (including over-the-counter products) are added; and carry medication information at all times in the event of emergency situations.       Patient is on a high risk medication and therefore requires close monitoring and follow up.     CHEST guidelines recommend frequent INR monitoring at regular intervals (a few days up to a max of 12 weeks) to ensure they are on the proper dose of warfarin and not having any complications from therapy.  INRs can dramatically change over a short time period due to diet, medications, and medical conditions.   The patient instructed to go to the ER for falls with a head injury,  blood in urine or stool or any bleeding that last longer than 20 min.   For questions, please contact Outpatient Anticoagulation Service [phone].     ARIA Recinos.

## 2023-01-31 ENCOUNTER — NON-PROVIDER VISIT (OUTPATIENT)
Dept: MEDICAL GROUP | Facility: PHYSICIAN GROUP | Age: 58
End: 2023-01-31
Payer: MEDICAID

## 2023-01-31 ENCOUNTER — ANTICOAGULATION MONITORING (OUTPATIENT)
Dept: VASCULAR LAB | Facility: MEDICAL CENTER | Age: 58
End: 2023-01-31

## 2023-01-31 ENCOUNTER — APPOINTMENT (OUTPATIENT)
Dept: VASCULAR LAB | Facility: MEDICAL CENTER | Age: 58
End: 2023-01-31
Payer: MEDICAID

## 2023-01-31 VITALS
TEMPERATURE: 98 F | SYSTOLIC BLOOD PRESSURE: 136 MMHG | RESPIRATION RATE: 12 BRPM | OXYGEN SATURATION: 96 % | DIASTOLIC BLOOD PRESSURE: 80 MMHG | HEART RATE: 68 BPM

## 2023-01-31 DIAGNOSIS — I48.0 PAROXYSMAL ATRIAL FIBRILLATION (HCC): Chronic | ICD-10-CM

## 2023-01-31 DIAGNOSIS — I05.9 MITRAL VALVE DISEASE: ICD-10-CM

## 2023-01-31 DIAGNOSIS — Z79.01 CHRONIC ANTICOAGULATION: ICD-10-CM

## 2023-01-31 DIAGNOSIS — D68.69 SECONDARY HYPERCOAGULABLE STATE (HCC): ICD-10-CM

## 2023-01-31 LAB
INR BLD: 1.9 (ref 0.9–1.2)
INR PPP: 1.9 (ref 2–3.5)
LOT NUMBER   180167: ABNORMAL
POC PROTIME: 1.9
POCT INT CON NEG: NEGATIVE
POCT INT CON POS: POSITIVE

## 2023-01-31 PROCEDURE — 85610 PROTHROMBIN TIME: CPT | Performed by: FAMILY MEDICINE

## 2023-01-31 PROCEDURE — 99213 OFFICE O/P EST LOW 20 MIN: CPT

## 2023-01-31 PROCEDURE — 99999 PR NO CHARGE: CPT | Performed by: NURSE PRACTITIONER

## 2023-01-31 NOTE — PROGRESS NOTES
This evaluation was conducted via Telemed using secure and encrypted videoconferencing technology. The patient was physically located at [facility] in [place], [place]. The patient was presented by a medical professional at the originating site.   The patient's identity was confirmed and verbal consent was obtained for this telemedicine encounter.      OP Anticoagulation Service Note    Date: 2023       Anticoagulation Summary  As of 2023      INR goal:  2.0-3.0   TTR:  65.4 % (3.7 y)   INR used for dosin.90 (2023)   Warfarin maintenance plan:  2.5 mg (5 mg x 0.5) every Tue, Thu; 5 mg (5 mg x 1) all other days   Weekly warfarin total:  30 mg   Plan last modified:  FARIDA Mayes (2022)   Next INR check:  2023   Target end date:  Indefinite    Indications    Mitral valve disease [I05.9]  Paroxysmal atrial fibrillation (HCC) [I48.0]  Secondary hypercoagulable state (HCC) [D68.69]                 Anticoagulation Episode Summary       INR check location:      Preferred lab:      Send INR reminders to:      Comments:  Mitral valve repair - indefinite anticoag per 19 note from Dr Lawson          Anticoagulation Care Providers       Provider Role Specialty Phone number    FARIDA Parikh Referring Thoracic Surgery (Cardiothoracic Vascular Surgery) [phone]    [facility] Anticoagulation Services Responsible  [phone]          Anticoagulation Patient Findings  Patient Findings       Negatives:  Signs/symptoms of thrombosis, Signs/symptoms of bleeding, Laboratory test error suspected, Change in health, Change in alcohol use, Change in activity, Upcoming invasive procedure, Emergency department visit, Upcoming dental procedure, Missed doses, Extra doses, Change in medications, Change in diet/appetite, Hospital admission, Bruising, Other complaints            THIS VISIT CONDUCTED WITH [employer] UTILIZING SECURE AND ENCRYPTED VIDEOCONFERENCING  EQUIPMENT  ROS:    Pulm: Denies SOB, chest pain.    Card: Denies syncope, edema, palpitations.    Extremities: Denies redness, pain.     PE:    Pulm: No SOB, even and unlabored.    Card: Normal rate and rhythm.    Extremities: No redness, or edema.     INR  sub-therapeutic.    Denies signs/symptoms of bleeding and/or thrombosis.    Denies changes to diet or medications.   Follow up appt in 1 weeks     Plan:  take full tablet in total today (pt takes in am, take 1/2 tablet tonight) and follow normal dosing regimen.    Medication: Warfarin (Coumadin)        Next Appointment: Tuesday, 2/7/2023 @ 3:15     Pt is on antiplatelet therapy Aspirin 81mg for mvr     Review all of your home medications and newly ordered medications with your doctor and / or pharmacist. Follow medication instructions as directed by your doctor and / or pharmacist. Please keep your medication list with you and share with your physician. Update the information when medications are discontinued, doses are changed, or new medications (including over-the-counter products) are added; and carry medication information at all times in the event of emergency situations.      The patient is on a high risk medication and is sub- therapeutic. This could lead to clot formation or risk of stroke. Therefore this patient requires close monitoring and follow up.      CHEST guidelines recommend frequent INR monitoring at regular intervals (a few days up to a max of 12 weeks) to ensure they are on the proper dose of warfarin and not having any complications from therapy.  INRs can dramatically change over a short time period due to diet, medications, and medical conditions.   The patient instructed to go to the ER for falls with a head injury,  blood in urine or stool or any bleeding that last longer than 20 min.   For questions, please contact Outpatient Anticoagulation Service [phone].     ARIA Recinos.

## 2023-02-07 ENCOUNTER — APPOINTMENT (OUTPATIENT)
Dept: VASCULAR LAB | Facility: MEDICAL CENTER | Age: 58
End: 2023-02-07
Payer: MEDICAID

## 2023-02-07 ENCOUNTER — ANTICOAGULATION MONITORING (OUTPATIENT)
Dept: VASCULAR LAB | Facility: MEDICAL CENTER | Age: 58
End: 2023-02-07

## 2023-02-07 ENCOUNTER — NON-PROVIDER VISIT (OUTPATIENT)
Dept: MEDICAL GROUP | Facility: PHYSICIAN GROUP | Age: 58
End: 2023-02-07
Payer: MEDICAID

## 2023-02-07 VITALS
RESPIRATION RATE: 12 BRPM | TEMPERATURE: 98.2 F | SYSTOLIC BLOOD PRESSURE: 130 MMHG | OXYGEN SATURATION: 97 % | HEART RATE: 69 BPM | DIASTOLIC BLOOD PRESSURE: 84 MMHG

## 2023-02-07 DIAGNOSIS — I05.9 MITRAL VALVE DISEASE: ICD-10-CM

## 2023-02-07 DIAGNOSIS — D68.69 SECONDARY HYPERCOAGULABLE STATE (HCC): ICD-10-CM

## 2023-02-07 DIAGNOSIS — I48.0 PAROXYSMAL ATRIAL FIBRILLATION (HCC): Chronic | ICD-10-CM

## 2023-02-07 DIAGNOSIS — Z79.01 CHRONIC ANTICOAGULATION: ICD-10-CM

## 2023-02-07 LAB
INR BLD: 2.9 (ref 0.9–1.2)
INR PPP: 2.9 (ref 2–3.5)
LOT NUMBER   180167: ABNORMAL
POC PROTIME: 2.9
POCT INT CON NEG: NEGATIVE
POCT INT CON POS: POSITIVE

## 2023-02-07 PROCEDURE — 85610 PROTHROMBIN TIME: CPT | Performed by: FAMILY MEDICINE

## 2023-02-07 PROCEDURE — 99999 PR NO CHARGE: CPT | Performed by: NURSE PRACTITIONER

## 2023-02-07 PROCEDURE — 99213 OFFICE O/P EST LOW 20 MIN: CPT

## 2023-02-07 NOTE — PROGRESS NOTES
This evaluation was conducted via Telemed using secure and encrypted videoconferencing technology. The patient was physically located at [facility] in [place], [place]. The patient was presented by a medical professional at the originating site.   The patient's identity was confirmed and verbal consent was obtained for this telemedicine encounter.      OP Anticoagulation Service Note    Date: 2023       Anticoagulation Summary  As of 2023      INR goal:  2.0-3.0   TTR:  65.5 % (3.8 y)   INR used for dosin.90 (2023)   Warfarin maintenance plan:  2.5 mg (5 mg x 0.5) every Tue, Thu; 5 mg (5 mg x 1) all other days   Weekly warfarin total:  30 mg   Plan last modified:  FARIDA Mayes (2022)   Next INR check:  2023   Target end date:  Indefinite    Indications    Mitral valve disease [I05.9]  Paroxysmal atrial fibrillation (HCC) [I48.0]  Secondary hypercoagulable state (HCC) [D68.69]                 Anticoagulation Episode Summary       INR check location:      Preferred lab:      Send INR reminders to:      Comments:  Mitral valve repair - indefinite anticoag per 19 note from Dr Lawson          Anticoagulation Care Providers       Provider Role Specialty Phone number    FARIDA Parikh Referring Thoracic Surgery (Cardiothoracic Vascular Surgery) [phone]    [facility] Anticoagulation Services Responsible  [phone]          Anticoagulation Patient Findings  Patient Findings       Negatives:  Signs/symptoms of thrombosis, Signs/symptoms of bleeding, Laboratory test error suspected, Change in health, Change in alcohol use, Change in activity, Upcoming invasive procedure, Emergency department visit, Upcoming dental procedure, Missed doses, Extra doses, Change in medications, Change in diet/appetite, Hospital admission, Bruising, Other complaints            THIS VISIT CONDUCTED WITH [employer] UTILIZING SECURE AND ENCRYPTED VIDEOCONFERENCING  EQUIPMENT  ROS:    Pulm: Denies SOB, chest pain.    Card: Denies syncope, edema, palpitations.    Extremities: Denies redness, pain.     PE:    Pulm: No SOB, even and unlabored.    Card: Normal rate and rhythm.    Extremities: No redness, or edema.     INR  is-therapeutic.    Denies signs/symptoms of bleeding and/or thrombosis.    Denies changes to diet or medications.   Follow up appt in 2 weeks     Plan:  continue with current dosing regimen    Medication: Warfarin (Coumadin)        Next Appointment: Tuesday, 2/21/2023 @2:15     Pt is on on antiplatelet therapy Aspirin 81mg for MVR     Review all of your home medications and newly ordered medications with your doctor and / or pharmacist. Follow medication instructions as directed by your doctor and / or pharmacist. Please keep your medication list with you and share with your physician. Update the information when medications are discontinued, doses are changed, or new medications (including over-the-counter products) are added; and carry medication information at all times in the event of emergency situations.       Patient is on a high risk medication and therefore requires close monitoring and follow up.     CHEST guidelines recommend frequent INR monitoring at regular intervals (a few days up to a max of 12 weeks) to ensure they are on the proper dose of warfarin and not having any complications from therapy.  INRs can dramatically change over a short time period due to diet, medications, and medical conditions.   The patient instructed to go to the ER for falls with a head injury,  blood in urine or stool or any bleeding that last longer than 20 min.   For questions, please contact Outpatient Anticoagulation Service [phone].     ARIA Recinos.

## 2023-02-21 ENCOUNTER — HOSPITAL ENCOUNTER (OUTPATIENT)
Dept: LAB | Facility: MEDICAL CENTER | Age: 58
End: 2023-02-21
Attending: INTERNAL MEDICINE
Payer: MEDICAID

## 2023-02-21 ENCOUNTER — ANTICOAGULATION MONITORING (OUTPATIENT)
Dept: VASCULAR LAB | Facility: MEDICAL CENTER | Age: 58
End: 2023-02-21

## 2023-02-21 ENCOUNTER — NON-PROVIDER VISIT (OUTPATIENT)
Dept: MEDICAL GROUP | Facility: PHYSICIAN GROUP | Age: 58
End: 2023-02-21
Payer: MEDICAID

## 2023-02-21 ENCOUNTER — APPOINTMENT (OUTPATIENT)
Dept: VASCULAR LAB | Facility: MEDICAL CENTER | Age: 58
End: 2023-02-21
Attending: INTERNAL MEDICINE
Payer: MEDICAID

## 2023-02-21 VITALS
RESPIRATION RATE: 12 BRPM | SYSTOLIC BLOOD PRESSURE: 120 MMHG | TEMPERATURE: 98 F | HEART RATE: 69 BPM | OXYGEN SATURATION: 92 % | DIASTOLIC BLOOD PRESSURE: 62 MMHG

## 2023-02-21 DIAGNOSIS — D68.69 SECONDARY HYPERCOAGULABLE STATE (HCC): ICD-10-CM

## 2023-02-21 DIAGNOSIS — Z79.01 CHRONIC ANTICOAGULATION: ICD-10-CM

## 2023-02-21 DIAGNOSIS — I48.0 PAROXYSMAL ATRIAL FIBRILLATION (HCC): Chronic | ICD-10-CM

## 2023-02-21 DIAGNOSIS — I42.8 OTHER CARDIOMYOPATHY (HCC): ICD-10-CM

## 2023-02-21 DIAGNOSIS — I05.9 MITRAL VALVE DISEASE: ICD-10-CM

## 2023-02-21 LAB
ALBUMIN SERPL BCP-MCNC: 4 G/DL (ref 3.2–4.9)
ALBUMIN/GLOB SERPL: 1.4 G/DL
ALP SERPL-CCNC: 110 U/L (ref 30–99)
ALT SERPL-CCNC: 6 U/L (ref 2–50)
ANION GAP SERPL CALC-SCNC: 13 MMOL/L (ref 7–16)
AST SERPL-CCNC: 12 U/L (ref 12–45)
BILIRUB SERPL-MCNC: 0.4 MG/DL (ref 0.1–1.5)
BUN SERPL-MCNC: 23 MG/DL (ref 8–22)
CALCIUM ALBUM COR SERPL-MCNC: 9 MG/DL (ref 8.5–10.5)
CALCIUM SERPL-MCNC: 9 MG/DL (ref 8.5–10.5)
CHLORIDE SERPL-SCNC: 107 MMOL/L (ref 96–112)
CO2 SERPL-SCNC: 23 MMOL/L (ref 20–33)
CREAT SERPL-MCNC: 1.41 MG/DL (ref 0.5–1.4)
FASTING STATUS PATIENT QL REPORTED: NORMAL
GFR SERPLBLD CREATININE-BSD FMLA CKD-EPI: 57 ML/MIN/1.73 M 2
GLOBULIN SER CALC-MCNC: 2.9 G/DL (ref 1.9–3.5)
GLUCOSE SERPL-MCNC: 124 MG/DL (ref 65–99)
INR BLD: 3.8 (ref 0.9–1.2)
INR PPP: 3.8 (ref 2–3.5)
LOT NUMBER   180167: ABNORMAL
NT-PROBNP SERPL IA-MCNC: 1621 PG/ML (ref 0–125)
POC PROTIME: 3.8
POCT INT CON NEG: NEGATIVE
POCT INT CON POS: POSITIVE
POTASSIUM SERPL-SCNC: 3.6 MMOL/L (ref 3.6–5.5)
PROT SERPL-MCNC: 6.9 G/DL (ref 6–8.2)
SODIUM SERPL-SCNC: 143 MMOL/L (ref 135–145)

## 2023-02-21 PROCEDURE — 80053 COMPREHEN METABOLIC PANEL: CPT

## 2023-02-21 PROCEDURE — 99999 PR NO CHARGE: CPT | Performed by: NURSE PRACTITIONER

## 2023-02-21 PROCEDURE — 36415 COLL VENOUS BLD VENIPUNCTURE: CPT

## 2023-02-21 PROCEDURE — 83880 ASSAY OF NATRIURETIC PEPTIDE: CPT

## 2023-02-21 PROCEDURE — 85610 PROTHROMBIN TIME: CPT | Performed by: FAMILY MEDICINE

## 2023-02-21 PROCEDURE — 99213 OFFICE O/P EST LOW 20 MIN: CPT

## 2023-02-21 NOTE — PROGRESS NOTES
This evaluation was conducted via Telemed using secure and encrypted videoconferencing technology. The patient was physically located at [facility] in [place], [place]. The patient was presented by a medical professional at the originating site.   The patient's identity was confirmed and verbal consent was obtained for this telemedicine encounter.      OP Anticoagulation Service Note    Date: 2/21/2023  Blood Pressure: 120/62  Pulse: 69  Respiration: 12    Anticoagulation Summary  As of 2/21/2023      INR goal:  2.0-3.0   TTR:  65.0 % (3.8 y)   INR used for dosing:  3.80 (2/21/2023)   Warfarin maintenance plan:  2.5 mg (5 mg x 0.5) every Tue, Thu; 5 mg (5 mg x 1) all other days   Weekly warfarin total:  30 mg   Plan last modified:  FARIDA Mayes (9/26/2022)   Next INR check:  2/23/2023   Target end date:  Indefinite    Indications    Mitral valve disease [I05.9]  Paroxysmal atrial fibrillation (HCC) [I48.0]  Secondary hypercoagulable state (HCC) [D68.69]                 Anticoagulation Episode Summary       INR check location:      Preferred lab:      Send INR reminders to:      Comments:  Mitral valve repair - indefinite anticoag per 7/26/19 note from Dr Lawson          Anticoagulation Care Providers       Provider Role Specialty Phone number    FARIDA Parikh Referring Thoracic Surgery (Cardiothoracic Vascular Surgery) [phone]    [facility] Anticoagulation Services Responsible  [phone]          Anticoagulation Patient Findings  Patient Findings       Negatives:  Signs/symptoms of thrombosis, Signs/symptoms of bleeding, Laboratory test error suspected, Change in health, Change in alcohol use, Change in activity, Upcoming invasive procedure, Emergency department visit, Upcoming dental procedure, Missed doses, Extra doses, Change in medications, Change in diet/appetite, Hospital admission, Bruising, Other complaints            THIS VISIT CONDUCTED WITH [employer]LE[employer]  UTILIZING SECURE AND ENCRYPTED VIDEOCONFERENCING EQUIPMENT  ROS:    Pulm: Denies SOB, chest pain.    Card: Denies syncope, edema, palpitations.    Extremities: Denies redness, pain.     PE:    Pulm: No SOB, even and unlabored.    Card: Normal rate and rhythm.    Extremities: No redness, or edema.     INR  supra-therapeutic.    Denies signs/symptoms of bleeding and/or thrombosis.    Denies changes to diet or medications.   Follow up appt in 1 weeks     Plan:  hold tonights dose 2/21  Follow normal dosing regimen rest of week  Go to lab on Thursday 2/23 to have INR checked   Will call with results and instructions on Friday.     Medication: Warfarin (Coumadin)        Next Appointment: Tuesday, 2/28/23 @3:15       Pt is  on antiplatelet therapy Aspirin 81mg for MVR     Review all of your home medications and newly ordered medications with your doctor and / or pharmacist. Follow medication instructions as directed by your doctor and / or pharmacist. Please keep your medication list with you and share with your physician. Update the information when medications are discontinued, doses are changed, or new medications (including over-the-counter products) are added; and carry medication information at all times in the event of emergency situations.      The patient is on a high risk medication and is supra-therapeudic. This increases the patients risk of bleeding and therefore requires frequent monitoring and follow up.     CHEST guidelines recommend frequent INR monitoring at regular intervals (a few days up to a max of 12 weeks) to ensure they are on the proper dose of warfarin and not having any complications from therapy.  INRs can dramatically change over a short time period due to diet, medications, and medical conditions.   The patient is instructed to go to the ER for falls with a head injury,  blood in urine or stool or any bleeding that last longer than 20 min.   For questions, please contact Outpatient  Anticoagulation Service [phone].     ARIA Recinos.

## 2023-02-23 ENCOUNTER — HOSPITAL ENCOUNTER (OUTPATIENT)
Dept: LAB | Facility: MEDICAL CENTER | Age: 58
End: 2023-02-23
Attending: NURSE PRACTITIONER
Payer: MEDICAID

## 2023-02-23 DIAGNOSIS — I48.0 PAROXYSMAL ATRIAL FIBRILLATION (HCC): ICD-10-CM

## 2023-02-23 DIAGNOSIS — Z79.01 CHRONIC ANTICOAGULATION: ICD-10-CM

## 2023-02-23 LAB
INR PPP: 2.32 (ref 0.87–1.13)
INR PPP: 2.32 (ref 2–3.5)
PROTHROMBIN TIME: 24.8 SEC (ref 12–14.6)

## 2023-02-23 PROCEDURE — 36415 COLL VENOUS BLD VENIPUNCTURE: CPT

## 2023-02-23 PROCEDURE — 85610 PROTHROMBIN TIME: CPT

## 2023-02-24 ENCOUNTER — ANTICOAGULATION MONITORING (OUTPATIENT)
Dept: VASCULAR LAB | Facility: MEDICAL CENTER | Age: 58
End: 2023-02-24
Payer: MEDICAID

## 2023-02-24 ENCOUNTER — ANTICOAGULATION MONITORING (OUTPATIENT)
Dept: MEDICAL GROUP | Facility: PHYSICIAN GROUP | Age: 58
End: 2023-02-24
Payer: MEDICAID

## 2023-02-24 DIAGNOSIS — I48.0 PAROXYSMAL ATRIAL FIBRILLATION (HCC): Chronic | ICD-10-CM

## 2023-02-24 DIAGNOSIS — I05.9 MITRAL VALVE DISEASE: ICD-10-CM

## 2023-02-24 DIAGNOSIS — D68.69 SECONDARY HYPERCOAGULABLE STATE (HCC): ICD-10-CM

## 2023-02-24 NOTE — PROGRESS NOTES
Anticoagulation Summary  As of 2023      INR goal:  2.0-3.0   TTR:  65.0 % (3.8 y)   INR used for dosin.32 (2023)   Warfarin maintenance plan:  2.5 mg (5 mg x 0.5) every Tue, Thu; 5 mg (5 mg x 1) all other days   Weekly warfarin total:  30 mg   Plan last modified:  LYDIA MayesP.NTanner (2022)   Next INR check:  2023   Target end date:  Indefinite    Indications    Mitral valve disease [I05.9]  Paroxysmal atrial fibrillation (HCC) [I48.0]  Secondary hypercoagulable state (HCC) [D68.69]                 Anticoagulation Episode Summary       INR check location:      Preferred lab:      Send INR reminders to:      Comments:  Mitral valve repair - indefinite anticoag per 19 note from Dr Lawson          Anticoagulation Care Providers       Provider Role Specialty Phone number    FARIDA Parikh Referring Thoracic Surgery (Cardiothoracic Vascular Surgery) [phone]    [facility] Anticoagulation Services Responsible  [phone]            Refer to Anticoagulation Patient Findings for HPI      Spoke with patient  to report a therapeutic INR.      Pt is on antiplatelet therapy Aspirin 81mg for MVR .    Pt instructed to continue with current warfarin dosing regimen, confirms dosing.   Will follow up in 1 week(s).     SALVADOR Recinos,

## 2023-02-24 NOTE — PROGRESS NOTES
OP Anticoagulation Service Note    Date: 2023    Anticoagulation Summary  As of 2023      INR goal:  2.0-3.0   TTR:  65.0 % (3.8 y)   INR used for dosin.32 (2023)   Warfarin maintenance plan:  2.5 mg (5 mg x 0.5) every Tue, Thu; 5 mg (5 mg x 1) all other days   Weekly warfarin total:  30 mg   Plan last modified:  LYDIA MayesPJOHN (2022)   Next INR check:  3/9/2023   Target end date:  Indefinite    Indications    Mitral valve disease [I05.9]  Paroxysmal atrial fibrillation (HCC) [I48.0]  Secondary hypercoagulable state (HCC) [D68.69]                 Anticoagulation Episode Summary       INR check location:      Preferred lab:      Send INR reminders to:      Comments:  Mitral valve repair - indefinite anticoag per 19 note from Dr Lawson          Anticoagulation Care Providers       Provider Role Specialty Phone number    FARIDA Parikh Referring Thoracic Surgery (Cardiothoracic Vascular Surgery) [phone]    [facility] Anticoagulation Services Responsible  [phone]          Anticoagulation Patient Findings        Patient's preferred phone number:  [phone]        HPI:   The reason for today's call is to prevent morbidity and mortality from a blood clot and/or stroke and to reduce the risk of bleeding while on a anticoagulant.     PCP:  Shane Vanegas M.D.  [address]  [facility] [address]    Assessment:     INR  therapeutic.     Lab Results   Component Value Date/Time    BUN 23 (H) 2023 07:37 AM    CREATININE 1.41 (H) 2023 07:37 AM     Lab Results   Component Value Date/Time    HEMOGLOBIN 8.9 (L) 2022 09:10 AM    HEMATOCRIT 29.2 (L) 2022 09:10 AM    PLATELETCT 149 (L) 2022 09:10 AM    ALKPHOSPHAT 110 (H) 2023 07:37 AM    ASTSGOT 12 2023 07:37 AM    ALTSGPT 6 2023 07:37 AM          Current Outpatient Medications:     Ferrous Sulfate (IRON PO), 65 mg, Oral, BID    losartan, 100 mg, Oral, DAILY    furosemide, 40  mg, Oral, DAILY    metoprolol tartrate, 100 mg, Oral, BID    hydrALAZINE, 100 mg, Oral, TID    isosorbide dinitrate, 20 mg, Oral, TID    glipiZIDE, 5 mg, Oral, QDAY    potassium chloride SA, 20 mEq, Oral, BID    warfarin, 2.5-5 mg, Oral, Q EVENING    amitriptyline, 25 mg, Oral, Nightly    allopurinol, 300 mg, Oral, DAILY    aspirin, 81 mg, Oral, DAILY    levothyroxine, 300 mcg, Oral, DAILY    pravastatin, 40 mg, Oral, DAILY      Plan:     Continue the same warfarin dose, as noted above.   Chronic anemia, last addressed 12/5/2022      Follow-up:     As seen above      Additional information discussed with patient:     Asked patient to please call the anticoagulation clinic if they have any signs/symptoms of bleeding and/or thrombosis or any changes to diet or medications.      National recommendations regarding anticoagulation therapy:     The CHEST guidelines recommends frequent INR monitoring at regular intervals (a few days up to a max of 12 weeks) to ensure patients are on the proper dose of warfarin, and patients are not having any complications from therapy.  INRs can dramatically change over a short time period due to diet, medications, and medical conditions.       Damon Redmond, PharmD, MS, BCACP, LCC  [facility]  Phone: [phone]  Fax: [phone]  On call: [phone]  General scheduling/information [phone]  For emergencies please dial 911  Please do not use [facility] for urgent matters, call the phone numbers listed above.    This note was created using voice recognition software (Dragon). The accuracy of the dictation is limited by the abilities of the software. I have reviewed the note prior to signing, however some errors in grammar and context are still possible. If you have any questions related to this note please do not hesitate to contact our office.     ADDENDUM 2/24: Patient returned call to clinic - he could not hear voicemail that was left. Gave  patient instructions as above. Patient will return to [facility] clinic on 2/28/23.

## 2023-02-28 ENCOUNTER — ANTICOAGULATION MONITORING (OUTPATIENT)
Dept: VASCULAR LAB | Facility: MEDICAL CENTER | Age: 58
End: 2023-02-28

## 2023-02-28 ENCOUNTER — NON-PROVIDER VISIT (OUTPATIENT)
Dept: MEDICAL GROUP | Facility: PHYSICIAN GROUP | Age: 58
End: 2023-02-28
Payer: MEDICAID

## 2023-02-28 ENCOUNTER — APPOINTMENT (OUTPATIENT)
Dept: VASCULAR LAB | Facility: MEDICAL CENTER | Age: 58
End: 2023-02-28
Payer: MEDICAID

## 2023-02-28 VITALS
OXYGEN SATURATION: 97 % | RESPIRATION RATE: 12 BRPM | DIASTOLIC BLOOD PRESSURE: 60 MMHG | TEMPERATURE: 97.7 F | HEART RATE: 75 BPM | SYSTOLIC BLOOD PRESSURE: 122 MMHG

## 2023-02-28 DIAGNOSIS — Z79.01 CHRONIC ANTICOAGULATION: ICD-10-CM

## 2023-02-28 DIAGNOSIS — I05.9 MITRAL VALVE DISEASE: ICD-10-CM

## 2023-02-28 DIAGNOSIS — I48.0 PAROXYSMAL ATRIAL FIBRILLATION (HCC): Chronic | ICD-10-CM

## 2023-02-28 DIAGNOSIS — D68.69 SECONDARY HYPERCOAGULABLE STATE (HCC): ICD-10-CM

## 2023-02-28 LAB
INR BLD: 2.5 (ref 0.9–1.2)
INR PPP: 2.5 (ref 2–3.5)
LOT NUMBER   180167: ABNORMAL
POC PROTIME: 2.5
POCT INT CON NEG: NEGATIVE
POCT INT CON POS: POSITIVE

## 2023-02-28 PROCEDURE — 85610 PROTHROMBIN TIME: CPT | Performed by: FAMILY MEDICINE

## 2023-02-28 PROCEDURE — 99213 OFFICE O/P EST LOW 20 MIN: CPT

## 2023-02-28 NOTE — PROGRESS NOTES
This evaluation was conducted via Telemed using secure and encrypted videoconferencing technology. The patient was physically located at [facility] in [place], [place]. The patient was presented by a medical professional at the originating site.   The patient's identity was confirmed and verbal consent was obtained for this telemedicine encounter.      OP Anticoagulation Service Note    Date: 2023  Blood Pressure: 122/60  Pulse: 75  Respiration: 12    Anticoagulation Summary  As of 2023      INR goal:  2.0-3.0   TTR:  65.1 % (3.8 y)   INR used for dosin.50 (2023)   Warfarin maintenance plan:  2.5 mg (5 mg x 0.5) every Tue, Thu; 5 mg (5 mg x 1) all other days   Weekly warfarin total:  30 mg   Plan last modified:  FARIDA Mayes (2022)   Next INR check:  3/14/2023   Target end date:  Indefinite    Indications    Mitral valve disease [I05.9]  Paroxysmal atrial fibrillation (HCC) [I48.0]  Secondary hypercoagulable state (HCC) [D68.69]                 Anticoagulation Episode Summary       INR check location:      Preferred lab:      Send INR reminders to:      Comments:  Mitral valve repair - indefinite anticoag per 19 note from Dr Lawson          Anticoagulation Care Providers       Provider Role Specialty Phone number    FARIDA Parikh Referring Thoracic Surgery (Cardiothoracic Vascular Surgery) [phone]    [facility] Anticoagulation Services Responsible  [phone]          Anticoagulation Patient Findings  Patient Findings       Negatives:  Signs/symptoms of thrombosis, Signs/symptoms of bleeding, Laboratory test error suspected, Change in health, Change in alcohol use, Change in activity, Upcoming invasive procedure, Emergency department visit, Upcoming dental procedure, Missed doses, Extra doses, Change in medications, Change in diet/appetite, Hospital admission, Bruising, Other complaints            THIS VISIT CONDUCTED WITH PRESENTER [employer]IA TELEMEDICINE  UTILIZING SECURE AND ENCRYPTED VIDEOCONFERENCING EQUIPMENT  ROS:    Pulm: Denies SOB, chest pain.    Card: Denies syncope, edema, palpitations.    Extremities: Denies redness, pain.     PE:    Pulm: No SOB, even and unlabored.    Card: Normal rate and rhythm.    Extremities: No redness, or edema.     INR  is-therapeutic.    Denies signs/symptoms of bleeding and/or thrombosis.    Denies changes to diet or medications.   Follow up appt in 2 weeks     Plan:  continue current dosing regimen    Medication: Warfarin (Coumadin)        Next Appointment: Tuesday, 3/14/2023 @1:45       Pt is on asa 81 for mvr.    Review all of your home medications and newly ordered medications with your doctor and / or pharmacist. Follow medication instructions as directed by your doctor and / or pharmacist. Please keep your medication list with you and share with your physician. Update the information when medications are discontinued, doses are changed, or new medications (including over-the-counter products) are added; and carry medication information at all times in the event of emergency situations.       Patient is on a high risk medication and therefore requires close monitoring and follow up.    CHEST guidelines recommend frequent INR monitoring at regular intervals (a few days up to a max of 12 weeks) to ensure they are on the proper dose of warfarin and not having any complications from therapy.  INRs can dramatically change over a short time period due to diet, medications, and medical conditions.   The patient is instructed to go to the ER for falls with a head injury,  blood in urine or stool or any bleeding that last longer than 20 min.   For questions, please contact Outpatient Anticoagulation Service [phone].     ARIA Recinos.

## 2023-03-01 ENCOUNTER — TELEPHONE (OUTPATIENT)
Dept: CARDIOLOGY | Facility: MEDICAL CENTER | Age: 58
End: 2023-03-01
Payer: MEDICAID

## 2023-03-01 NOTE — TELEPHONE ENCOUNTER
----- Message from Oren Keller M.D. sent at 2/28/2023  3:54 PM PST -----  Baseline renal markers. Continue medications. Cinthia Barger!      -----------------------------------------------------------------    ----- Message -----  From: Denita Nix R.N.  Sent: 2/23/2023   2:28 PM PST  To: Oren Keller M.D.    Any changes to POC?    FU with you 3/2/23

## 2023-03-02 ENCOUNTER — OFFICE VISIT (OUTPATIENT)
Dept: CARDIOLOGY | Facility: PHYSICIAN GROUP | Age: 58
End: 2023-03-02
Payer: MEDICAID

## 2023-03-02 VITALS
BODY MASS INDEX: 37.04 KG/M2 | HEIGHT: 67 IN | RESPIRATION RATE: 12 BRPM | SYSTOLIC BLOOD PRESSURE: 140 MMHG | OXYGEN SATURATION: 95 % | HEART RATE: 69 BPM | WEIGHT: 236 LBS | DIASTOLIC BLOOD PRESSURE: 90 MMHG

## 2023-03-02 DIAGNOSIS — I48.0 PAROXYSMAL ATRIAL FIBRILLATION (HCC): Chronic | ICD-10-CM

## 2023-03-02 DIAGNOSIS — D68.69 SECONDARY HYPERCOAGULABLE STATE (HCC): ICD-10-CM

## 2023-03-02 DIAGNOSIS — Z98.890 HISTORY OF MITRAL VALVE REPAIR: Chronic | ICD-10-CM

## 2023-03-02 DIAGNOSIS — I42.8 OTHER CARDIOMYOPATHY (HCC): ICD-10-CM

## 2023-03-02 DIAGNOSIS — E78.2 MIXED HYPERLIPIDEMIA: ICD-10-CM

## 2023-03-02 PROCEDURE — 99214 OFFICE O/P EST MOD 30 MIN: CPT | Performed by: INTERNAL MEDICINE

## 2023-03-02 RX ORDER — CARVEDILOL 25 MG/1
25 TABLET ORAL 2 TIMES DAILY WITH MEALS
Qty: 180 TABLET | Refills: 3 | Status: SHIPPED | OUTPATIENT
Start: 2023-03-02

## 2023-03-02 ASSESSMENT — ENCOUNTER SYMPTOMS
FEVER: 0
SHORTNESS OF BREATH: 0
BACK PAIN: 0
CLAUDICATION: 0
DYSPNEA ON EXERTION: 0
DEPRESSION: 0
CONSTIPATION: 0
ABDOMINAL PAIN: 0
DIARRHEA: 0
WEIGHT LOSS: 0
FLANK PAIN: 0
PALPITATIONS: 0
ORTHOPNEA: 0
NAUSEA: 0
DIZZINESS: 0
DECREASED APPETITE: 0
COUGH: 0
HEARTBURN: 0
NEAR-SYNCOPE: 0
IRREGULAR HEARTBEAT: 0
BLURRED VISION: 0
ALTERED MENTAL STATUS: 0
WEIGHT GAIN: 0
PND: 0
SYNCOPE: 0
VOMITING: 0

## 2023-03-02 ASSESSMENT — FIBROSIS 4 INDEX: FIB4 SCORE: 1.94
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
## 2023-03-09 ENCOUNTER — ANTICOAGULATION VISIT (OUTPATIENT)
Dept: VASCULAR LAB | Facility: MEDICAL CENTER | Age: 58
End: 2023-03-09
Attending: INTERNAL MEDICINE
Payer: MEDICAID

## 2023-03-09 DIAGNOSIS — I48.0 PAROXYSMAL ATRIAL FIBRILLATION (HCC): Chronic | ICD-10-CM

## 2023-03-09 DIAGNOSIS — D68.69 SECONDARY HYPERCOAGULABLE STATE (HCC): ICD-10-CM

## 2023-03-09 DIAGNOSIS — I05.9 MITRAL VALVE DISEASE: ICD-10-CM

## 2023-03-09 LAB — INR PPP: 3.4 (ref 2–3.5)

## 2023-03-09 PROCEDURE — 99212 OFFICE O/P EST SF 10 MIN: CPT

## 2023-03-09 PROCEDURE — 85610 PROTHROMBIN TIME: CPT

## 2023-03-09 NOTE — PROGRESS NOTES
Anticoagulation Summary  As of 3/9/2023      INR goal:  2.0-3.0   TTR:  65.0 % (3.8 y)   INR used for dosing:  3.40 (3/9/2023)   Warfarin maintenance plan:  2.5 mg (5 mg x 0.5) every Tue, Thu; 5 mg (5 mg x 1) all other days   Weekly warfarin total:  30 mg   Plan last modified:  LYDIA MayesP.NTanner (9/26/2022)   Next INR check:  4/11/2023   Target end date:  Indefinite    Indications    Mitral valve disease [I05.9]  Paroxysmal atrial fibrillation (HCC) [I48.0]  Secondary hypercoagulable state (HCC) [D68.69]                 Anticoagulation Episode Summary       INR check location:      Preferred lab:      Send INR reminders to:      Comments:  Mitral valve repair - indefinite anticoag per 7/26/19 note from Dr Lawson          Anticoagulation Care Providers       Provider Role Specialty Phone number    LYDIA ParikhP.TAMMI Referring Thoracic Surgery (Cardiothoracic Vascular Surgery) [phone]    [facility] Anticoagulation Services Responsible  [phone]                  Refer to Patient Findings for HPI:  Patient Findings       Positives:  Change in health (Fell and sprained left hand - did not hit head), Upcoming dental procedure (9 teeth being extracted)    Negatives:  Signs/symptoms of thrombosis, Signs/symptoms of bleeding, Laboratory test error suspected, Change in alcohol use, Change in activity, Upcoming invasive procedure, Emergency department visit, Missed doses, Extra doses, Change in medications, Change in diet/appetite, Hospital admission, Bruising, Other complaints            There were no vitals filed for this visit.   pt declined vitals    Verified current warfarin dosing schedule.    Pt is on antiplatelet therapy Aspirin 81mg for MVR .      A/P   INR  SUPRA-therapeutic. Patient was just discharged from hospital this morning. He presented with Dental clearance form which was completed. Patient will hold warfarin 5 days prior to extraction and forgo bridge therapy with lovenox (discussed with  patient and he does not want to proceed with bridging at this time). Patient will need instruction per cardiology as to holding instructions for aspirin.     Warfarin dosing recommendation: Warfarin 2.5 mg tomorrow then resume 2.5 mg Tues/Thurs and 5 mg AOD.     Pt educated to contact our clinic with any changes in medications or s/s of bleeding or thrombosis. Pt is aware to seek immediate medical attention for falls, head injury or deep cuts.    Follow up appointment in 5 day(s) via telemedicine.    Silva MensahD

## 2023-03-14 ENCOUNTER — NON-PROVIDER VISIT (OUTPATIENT)
Dept: MEDICAL GROUP | Facility: PHYSICIAN GROUP | Age: 58
End: 2023-03-14
Payer: MEDICAID

## 2023-03-14 ENCOUNTER — ANTICOAGULATION MONITORING (OUTPATIENT)
Dept: VASCULAR LAB | Facility: MEDICAL CENTER | Age: 58
End: 2023-03-14

## 2023-03-14 ENCOUNTER — APPOINTMENT (OUTPATIENT)
Dept: VASCULAR LAB | Facility: MEDICAL CENTER | Age: 58
End: 2023-03-14
Attending: INTERNAL MEDICINE
Payer: MEDICAID

## 2023-03-14 ENCOUNTER — HOSPITAL ENCOUNTER (OUTPATIENT)
Dept: LAB | Facility: MEDICAL CENTER | Age: 58
End: 2023-03-14
Attending: INTERNAL MEDICINE
Payer: MEDICAID

## 2023-03-14 VITALS
OXYGEN SATURATION: 92 % | RESPIRATION RATE: 12 BRPM | TEMPERATURE: 99.2 F | HEART RATE: 88 BPM | SYSTOLIC BLOOD PRESSURE: 136 MMHG | DIASTOLIC BLOOD PRESSURE: 72 MMHG

## 2023-03-14 DIAGNOSIS — D68.69 SECONDARY HYPERCOAGULABLE STATE (HCC): ICD-10-CM

## 2023-03-14 DIAGNOSIS — Z79.01 CHRONIC ANTICOAGULATION: ICD-10-CM

## 2023-03-14 DIAGNOSIS — I05.9 MITRAL VALVE DISEASE: ICD-10-CM

## 2023-03-14 DIAGNOSIS — I48.0 PAROXYSMAL ATRIAL FIBRILLATION (HCC): Chronic | ICD-10-CM

## 2023-03-14 LAB
ALBUMIN SERPL BCP-MCNC: 3.6 G/DL (ref 3.2–4.9)
BUN SERPL-MCNC: 22 MG/DL (ref 8–22)
CALCIUM ALBUM COR SERPL-MCNC: 9.4 MG/DL (ref 8.5–10.5)
CALCIUM SERPL-MCNC: 9.1 MG/DL (ref 8.5–10.5)
CHLORIDE SERPL-SCNC: 107 MMOL/L (ref 96–112)
CO2 SERPL-SCNC: 24 MMOL/L (ref 20–33)
CREAT SERPL-MCNC: 1.3 MG/DL (ref 0.5–1.4)
GFR SERPLBLD CREATININE-BSD FMLA CKD-EPI: 63 ML/MIN/1.73 M 2
GLUCOSE SERPL-MCNC: 137 MG/DL (ref 65–99)
INR BLD: 2.9 (ref 0.9–1.2)
INR PPP: 2.9 (ref 2–3.5)
LOT NUMBER   180167: ABNORMAL
PHOSPHATE SERPL-MCNC: 3.8 MG/DL (ref 2.5–4.5)
POC PROTIME: 2.9
POCT INT CON NEG: NEGATIVE
POCT INT CON POS: POSITIVE
POTASSIUM SERPL-SCNC: 3.5 MMOL/L (ref 3.6–5.5)
SODIUM SERPL-SCNC: 142 MMOL/L (ref 135–145)

## 2023-03-14 PROCEDURE — 99213 OFFICE O/P EST LOW 20 MIN: CPT

## 2023-03-14 PROCEDURE — 99999 PR NO CHARGE: CPT | Performed by: NURSE PRACTITIONER

## 2023-03-14 PROCEDURE — 85610 PROTHROMBIN TIME: CPT | Performed by: FAMILY MEDICINE

## 2023-03-14 PROCEDURE — 86038 ANTINUCLEAR ANTIBODIES: CPT

## 2023-03-14 PROCEDURE — 86225 DNA ANTIBODY NATIVE: CPT

## 2023-03-14 PROCEDURE — 87340 HEPATITIS B SURFACE AG IA: CPT

## 2023-03-14 PROCEDURE — 82570 ASSAY OF URINE CREATININE: CPT

## 2023-03-14 PROCEDURE — 36415 COLL VENOUS BLD VENIPUNCTURE: CPT

## 2023-03-14 PROCEDURE — 86803 HEPATITIS C AB TEST: CPT

## 2023-03-14 PROCEDURE — 84155 ASSAY OF PROTEIN SERUM: CPT

## 2023-03-14 PROCEDURE — 86256 FLUORESCENT ANTIBODY TITER: CPT

## 2023-03-14 PROCEDURE — 83516 IMMUNOASSAY NONANTIBODY: CPT

## 2023-03-14 PROCEDURE — 84156 ASSAY OF PROTEIN URINE: CPT

## 2023-03-14 PROCEDURE — 86036 ANCA SCREEN EACH ANTIBODY: CPT

## 2023-03-14 PROCEDURE — 84165 PROTEIN E-PHORESIS SERUM: CPT

## 2023-03-14 PROCEDURE — 86706 HEP B SURFACE ANTIBODY: CPT

## 2023-03-14 PROCEDURE — 86255 FLUORESCENT ANTIBODY SCREEN: CPT

## 2023-03-14 PROCEDURE — 86160 COMPLEMENT ANTIGEN: CPT

## 2023-03-14 PROCEDURE — 80069 RENAL FUNCTION PANEL: CPT

## 2023-03-14 NOTE — PROGRESS NOTES
This evaluation was conducted via Telemed using secure and encrypted videoconferencing technology. The patient was physically located at [facility] in [place], [place]. The patient was presented by a medical professional at the originating site.   The patient's identity was confirmed and verbal consent was obtained for this telemedicine encounter.      OP Anticoagulation Service Note    Date: 3/14/2023  Blood Pressure: 136/72  Pulse: 88  Respiration: 12    Anticoagulation Summary  As of 3/14/2023      INR goal:  2.0-3.0   TTR:  64.9 % (3.9 y)   INR used for dosin.90 (3/14/2023)   Warfarin maintenance plan:  2.5 mg (5 mg x 0.5) every Tue, Thu; 5 mg (5 mg x 1) all other days   Weekly warfarin total:  30 mg   Plan last modified:  FARIDA Mayes (2022)   Next INR check:  3/21/2023   Target end date:  Indefinite    Indications    Mitral valve disease [I05.9]  Paroxysmal atrial fibrillation (HCC) [I48.0]  Secondary hypercoagulable state (HCC) [D68.69]                 Anticoagulation Episode Summary       INR check location:      Preferred lab:      Send INR reminders to:      Comments:  Mitral valve repair - indefinite anticoag per 19 note from Dr Lawson          Anticoagulation Care Providers       Provider Role Specialty Phone number    FARIDA Parikh Referring Thoracic Surgery (Cardiothoracic Vascular Surgery) [phone]    [facility] Anticoagulation Services Responsible  [phone]          Anticoagulation Patient Findings  Patient Findings       Positives:  Emergency department visit (syncope with glf, did not hit head), Upcoming dental procedure (7 teeth extraction 2023)    Negatives:  Signs/symptoms of thrombosis, Signs/symptoms of bleeding, Laboratory test error suspected, Change in health, Change in alcohol use, Change in activity, Upcoming invasive procedure, Missed doses, Extra doses, Change in medications, Change in diet/appetite, Hospital admission, Bruising, Other  complaints            THIS VISIT CONDUCTED WITH [employer]ENTER [employer]IA [employer] UTILIZING SECURE AND ENCRYPTED VIDEOCONFERENCING EQUIPMENT  ROS:    Pulm: Denies SOB, chest pain.    Card: Denies syncope, edema, palpitations.    Extremities: Denies redness, pain.     PE:    Pulm: No SOB, even and unlabored.    Card: Normal rate and rhythm.    Extremities: No redness, or edema.     INR  is-therapeutic.    Denies signs/symptoms of bleeding and/or thrombosis.    Denies changes to diet or medications.   Follow up appt in 1 weeks     Plan:  continue current dosing regimen, except decrease Saturday dose to 2.5mg (1/2 tablet)    Medication: Warfarin (Coumadin)         Sunday Monday Tuesday Wednesday Thursday Friday Saturday      5 mg    5 mg    2.5 mg    5 mg    2.5 mg    5 mg    2.5 mg      1 tab(s)    1 tab(s)    1/2 tab(s)    1 tab(s)    1/2 tab(s)    1 tab(s)  1/2 tab(s)     Next Appointment: Tuesday, 3/21/2023 @2pm     Has upcoming dental extractions 4/7/2023.  Clearance given previously by [facility].  Will address again at next appt.      Pt is on asa 81 for mvr.    Review all of your home medications and newly ordered medications with your doctor and / or pharmacist. Follow medication instructions as directed by your doctor and / or pharmacist. Please keep your medication list with you and share with your physician. Update the information when medications are discontinued, doses are changed, or new medications (including over-the-counter products) are added; and carry medication information at all times in the event of emergency situations.       Patient is on a high risk medication and therefore requires close monitoring and follow up.    CHEST guidelines recommend frequent INR monitoring at regular intervals (a few days up to a max of 12 weeks) to ensure they are on the proper dose of warfarin and not having any complications from therapy.  INRs can dramatically change over a short time period due to  diet, medications, and medical conditions.   The patient is instructed to go to the ER for falls with a head injury,  blood in urine or stool or any bleeding that last longer than 20 min.   For questions, please contact Outpatient Anticoagulation Service [phone].     ARIA Recinos.

## 2023-03-15 LAB
C3 SERPL-MCNC: 141 MG/DL (ref 87–200)
C4 SERPL-MCNC: 26.6 MG/DL (ref 19–52)
CREAT UR-MCNC: 19.66 MG/DL
HBV SURFACE AB SERPL IA-ACNC: <3.5 MIU/ML (ref 0–10)
HBV SURFACE AG SER QL: NORMAL
HCV AB SER QL: NORMAL
PROT UR-MCNC: <4 MG/DL (ref 0–15)
PROT/CREAT UR: NORMAL MG/G (ref 15–68)

## 2023-03-16 LAB
BM IGG SER QL IF: NEGATIVE
DSDNA AB TITR SER CLIF: NORMAL {TITER}
NUCLEAR IGG SER QL IA: NORMAL

## 2023-03-17 LAB
ANCA IFA PATTERN Q6048: NORMAL
ANCA IFA TITER Q6047: NORMAL
DSDNA IGG TITR SER CLIF: NORMAL {TITER}
MYELOPEROXIDASE AB SER-ACNC: 0 AU/ML (ref 0–19)
PROTEINASE3 AB SER-ACNC: 0 AU/ML (ref 0–19)

## 2023-03-18 LAB
ALBUMIN SERPL ELPH-MCNC: 3.53 G/DL (ref 3.75–5.01)
ALPHA1 GLOB SERPL ELPH-MCNC: 0.34 G/DL (ref 0.19–0.46)
ALPHA2 GLOB SERPL ELPH-MCNC: 0.49 G/DL (ref 0.48–1.05)
B-GLOBULIN SERPL ELPH-MCNC: 0.81 G/DL (ref 0.48–1.1)
EER PROT ELECT SER Q1092: ABNORMAL
GAMMA GLOB SERPL ELPH-MCNC: 1.13 G/DL (ref 0.62–1.51)
INTERPRETATION SERPL IFE-IMP: ABNORMAL
MONOCLONAL PROTEIN NL11656: ABNORMAL G/DL
PROT SERPL-MCNC: 6.3 G/DL (ref 6.3–8.2)

## 2023-03-21 ENCOUNTER — ANTICOAGULATION MONITORING (OUTPATIENT)
Dept: VASCULAR LAB | Facility: MEDICAL CENTER | Age: 58
End: 2023-03-21

## 2023-03-21 ENCOUNTER — NON-PROVIDER VISIT (OUTPATIENT)
Dept: MEDICAL GROUP | Facility: PHYSICIAN GROUP | Age: 58
End: 2023-03-21
Payer: MEDICAID

## 2023-03-21 ENCOUNTER — APPOINTMENT (OUTPATIENT)
Dept: VASCULAR LAB | Facility: MEDICAL CENTER | Age: 58
End: 2023-03-21
Payer: MEDICAID

## 2023-03-21 VITALS
TEMPERATURE: 98.6 F | OXYGEN SATURATION: 94 % | RESPIRATION RATE: 14 BRPM | SYSTOLIC BLOOD PRESSURE: 108 MMHG | DIASTOLIC BLOOD PRESSURE: 66 MMHG | HEART RATE: 82 BPM

## 2023-03-21 DIAGNOSIS — Z79.01 CHRONIC ANTICOAGULATION: ICD-10-CM

## 2023-03-21 DIAGNOSIS — I05.9 MITRAL VALVE DISEASE: ICD-10-CM

## 2023-03-21 DIAGNOSIS — I48.0 PAROXYSMAL ATRIAL FIBRILLATION (HCC): Chronic | ICD-10-CM

## 2023-03-21 DIAGNOSIS — D68.69 SECONDARY HYPERCOAGULABLE STATE (HCC): ICD-10-CM

## 2023-03-21 LAB
INR BLD: 2.4 (ref 0.9–1.2)
INR PPP: 2.4 (ref 2–3.5)
LOT NUMBER   180167: ABNORMAL
POC PROTIME: 2.4
POCT INT CON NEG: NEGATIVE
POCT INT CON POS: POSITIVE

## 2023-03-21 PROCEDURE — 85610 PROTHROMBIN TIME: CPT | Performed by: FAMILY MEDICINE

## 2023-03-21 PROCEDURE — 99213 OFFICE O/P EST LOW 20 MIN: CPT

## 2023-03-21 PROCEDURE — 99999 PR NO CHARGE: CPT | Performed by: NURSE PRACTITIONER

## 2023-03-21 RX ORDER — ENOXAPARIN SODIUM 150 MG/ML
1 INJECTION SUBCUTANEOUS DAILY
Qty: 10 EACH | Refills: 2 | Status: SHIPPED | OUTPATIENT
Start: 2023-03-21 | End: 2023-08-15 | Stop reason: SDUPTHER

## 2023-03-21 NOTE — PROGRESS NOTES
This evaluation was conducted via Telemed using secure and encrypted videoconferencing technology. The patient was physically located at [facility] in [place], [place]. The patient was presented by a medical professional at the originating site.   The patient's identity was confirmed and verbal consent was obtained for this telemedicine encounter.      OP Anticoagulation Service Note    Date: 3/21/2023  Blood Pressure: 108/66  Pulse: 82  Respiration: 14    Anticoagulation Summary  As of 3/21/2023      INR goal:  2.0-3.0   TTR:  65.0 % (3.9 y)   INR used for dosin.40 (3/21/2023)   Warfarin maintenance plan:  2.5 mg (5 mg x 0.5) every Tue, Thu; 5 mg (5 mg x 1) all other days   Weekly warfarin total:  30 mg   Plan last modified:  FARIDA Mayes (2022)   Next INR check:  2023   Target end date:  Indefinite    Indications    Mitral valve disease [I05.9]  Paroxysmal atrial fibrillation (HCC) [I48.0]  Secondary hypercoagulable state (HCC) [D68.69]                 Anticoagulation Episode Summary       INR check location:      Preferred lab:      Send INR reminders to:      Comments:  Mitral valve repair - indefinite anticoag per 19 note from Dr Lawson          Anticoagulation Care Providers       Provider Role Specialty Phone number    FARIDA Parikh Referring Thoracic Surgery (Cardiothoracic Vascular Surgery) [phone]    [facility] Anticoagulation Services Responsible  [phone]          Anticoagulation Patient Findings  Patient Findings       Positives:  Upcoming dental procedure (7 teeth extractions 2023)    Negatives:  Signs/symptoms of thrombosis, Signs/symptoms of bleeding, Laboratory test error suspected, Change in health, Change in alcohol use, Change in activity, Upcoming invasive procedure, Emergency department visit, Missed doses, Extra doses, Change in medications, Change in diet/appetite, Hospital admission, Bruising, Other complaints            THIS VISIT  CONDUCTED WITH [employer] UTILIZING SECURE AND ENCRYPTED VIDEOCONFERENCING EQUIPMENT  ROS:    Pulm: Denies SOB, chest pain.    Card: Denies syncope, edema, palpitations.    Extremities: Denies redness, pain.     PE:    Pulm: No SOB, even and unlabored.    Card: Normal rate and rhythm.    Extremities: No redness, or edema.     INR  is-therapeutic.    Denies signs/symptoms of bleeding and/or thrombosis.    Denies changes to diet or medications.   Follow up appt in 3 weeks     Plan:  continue with current dosing regimen.    Hold warfarin per dosing instructions below for prior to and after dental extractions.      Pt has elected to bridge warfarin for dental extractions.  Lovenox 120mg Daily ordered today.      Medication: Warfarin (Coumadin)        Next Appointment: Tuesday, 4/11/2023 @1:15      Pt is on asa 81 for mvr    Review all of your home medications and newly ordered medications with your doctor and / or pharmacist. Follow medication instructions as directed by your doctor and / or pharmacist. Please keep your medication list with you and share with your physician. Update the information when medications are discontinued, doses are changed, or new medications (including over-the-counter products) are added; and carry medication information at all times in the event of emergency situations.       Patient is on a high risk medication and therefore requires close monitoring and follow up.    CHEST guidelines recommend frequent INR monitoring at regular intervals (a few days up to a max of 12 weeks) to ensure they are on the proper dose of warfarin and not having any complications from therapy.  INRs can dramatically change over a short time period due to diet, medications, and medical conditions.   The patient is instructed to go to the ER for falls with a head injury,  blood in urine or stool or any bleeding that last longer than 20 min.   For questions, please contact Outpatient Anticoagulation  Service [phone].     ARIA Recinos.               Pt to have procedure on 4/7/2023. Due to pt history lovenox bridging is indicated.   Pt to follow instructions as below, after procedure to ask operating MD when safe to resume anticoagulation, if no instructions given, pt will follow as below.     Current weight:107kg  CrCl = 91.428  Lab Results   Component Value Date/Time    BUN 22 03/14/2023 11:09 AM    CREATININE 1.30 03/14/2023 11:09 AM          Used Lovenox before no        Date  Warfarin dosing PM Lovenox   5 Days before procedure 4/2/2023 0mg NONE   4 Days before procedure 4/3/2023 0mg Lovenox injection   3 Days before procedure 4/4/2023 0mg Lovenox injection   2 Days before procedure 4/5/2023 0mg Lovenox injection   1 Days before procedure 4/6/2023 0mg NONE   PROCEDURE 4/7/2023 0 NONE   1 Day after procedure 4/8/2023 12.5mg or 2.5 tablets Restart Lovenox injections      2 Days after procedure 4/9/2023 10mg or  2 tablets Lovenox injection   3 Days after procedure 4/10/2023 10 mg or 2 tablets Lovenox injection   4 Days after procedure 4/11/2023  Get INR checked! In office 5 mg or 1/2 tablet Lovenox injection   5 Days after procedure tbt tbt GET INR checked if needed     SALVADOR Recinos

## 2023-04-11 ENCOUNTER — NON-PROVIDER VISIT (OUTPATIENT)
Dept: MEDICAL GROUP | Facility: PHYSICIAN GROUP | Age: 58
End: 2023-04-11
Payer: MEDICAID

## 2023-04-11 ENCOUNTER — ANTICOAGULATION MONITORING (OUTPATIENT)
Dept: VASCULAR LAB | Facility: MEDICAL CENTER | Age: 58
End: 2023-04-11

## 2023-04-11 ENCOUNTER — APPOINTMENT (OUTPATIENT)
Dept: VASCULAR LAB | Facility: MEDICAL CENTER | Age: 58
End: 2023-04-11
Payer: MEDICAID

## 2023-04-11 VITALS
SYSTOLIC BLOOD PRESSURE: 128 MMHG | TEMPERATURE: 98.9 F | HEART RATE: 76 BPM | RESPIRATION RATE: 12 BRPM | OXYGEN SATURATION: 95 % | DIASTOLIC BLOOD PRESSURE: 82 MMHG

## 2023-04-11 DIAGNOSIS — I48.0 PAROXYSMAL ATRIAL FIBRILLATION (HCC): Chronic | ICD-10-CM

## 2023-04-11 DIAGNOSIS — I05.9 MITRAL VALVE DISEASE: ICD-10-CM

## 2023-04-11 DIAGNOSIS — Z79.01 CHRONIC ANTICOAGULATION: ICD-10-CM

## 2023-04-11 DIAGNOSIS — D68.69 SECONDARY HYPERCOAGULABLE STATE (HCC): ICD-10-CM

## 2023-04-11 LAB
INR BLD: 1.5 (ref 0.9–1.2)
INR PPP: 1.5 (ref 2–3.5)
LOT NUMBER   180167: ABNORMAL
POC PROTIME: 1.5
POCT INT CON NEG: NEGATIVE
POCT INT CON POS: POSITIVE

## 2023-04-11 PROCEDURE — 85610 PROTHROMBIN TIME: CPT | Performed by: FAMILY MEDICINE

## 2023-04-11 PROCEDURE — 85610 PROTHROMBIN TIME: CPT

## 2023-04-11 PROCEDURE — 99999 PR NO CHARGE: CPT | Performed by: NURSE PRACTITIONER

## 2023-04-11 RX ORDER — FUROSEMIDE 40 MG/1
TABLET ORAL
Qty: 90 TABLET | Refills: 4 | OUTPATIENT
Start: 2023-04-11

## 2023-04-11 NOTE — PROGRESS NOTES
This evaluation was conducted via Telemed using secure and encrypted videoconferencing technology. The patient was physically located at [facility] in [place], [place]. The patient was presented by a medical professional at the originating site.   The patient's identity was confirmed and verbal consent was obtained for this telemedicine encounter.      OP Anticoagulation Service Note    Date: 2023  Blood Pressure: 128/82  Pulse: 76  Respiration: 12    Anticoagulation Summary  As of 2023      INR goal:  2.0-3.0   TTR:  64.7 % (3.9 y)   INR used for dosin.50 (2023)   Warfarin maintenance plan:  2.5 mg (5 mg x 0.5) every Tue, Thu; 5 mg (5 mg x 1) all other days   Weekly warfarin total:  30 mg   Plan last modified:  FARIDA Mayes (2022)   Next INR check:  2023   Target end date:  Indefinite    Indications    Mitral valve disease [I05.9]  Paroxysmal atrial fibrillation (HCC) [I48.0]  Secondary hypercoagulable state (HCC) [D68.69]                 Anticoagulation Episode Summary       INR check location:      Preferred lab:      Send INR reminders to:      Comments:  Mitral valve repair - indefinite anticoag per 19 note from Dr Lawson          Anticoagulation Care Providers       Provider Role Specialty Phone number    FARIDA Parikh Referring Thoracic Surgery (Cardiothoracic Vascular Surgery) [phone]    [facility] Anticoagulation Services Responsible  [phone]          Anticoagulation Patient Findings  Patient Findings       Positives:  Upcoming dental procedure, Missed doses (had been off for dental extractions, but procedure was canceled.)    Negatives:  Signs/symptoms of thrombosis, Signs/symptoms of bleeding, Laboratory test error suspected, Change in health, Change in alcohol use, Change in activity, Upcoming invasive procedure, Emergency department visit, Extra doses, Change in medications, Change in diet/appetite, Hospital admission, Bruising, Other  complaints            THIS VISIT CONDUCTED WITH PRESENTER VIA TELE[employer]IC[employer] UTILIZING SECURE AND ENCRYPTED VIDEOCONFERENCING EQUIPMENT  ROS:    Pulm: Denies SOB, chest pain.    Card: Denies syncope, edema, palpitations.    Extremities: Denies redness, pain.     PE:    Pulm: No SOB, even and unlabored.    Card: Normal rate and rhythm.    Extremities: No redness, or edema.     INR  sub-therapeutic.    Denies signs/symptoms of bleeding and/or thrombosis.    Denies changes to diet or medications.   Follow up appt in 1 weeks     Pt had been off warfarin for dental extractions and bridging with lovenox.  Pt reports extractions were not completed as scheduled as the dental clinic did not receive clearance (pt previously mailed clearance, but clinic did not receive it).  Dental clinic is again requesting instructions on warfarin and ASA prior to extractions, and updated INR 24 hours prior to procedure.  Requested form scanned into media.    As previously discussed, pt will stop warfarin and ASA 5 days prior to extractions, and begin bridging with lovenox.  No current date for reschedule of extractions at this time, pt to update AC clinic once date determined to review dosing and plan.  Pt has standing order for INR checks, which he can obtain 1 day prior to his scheduled extraction appt.        Plan:  take 2 full tablets tonight, and 1.5 tablets tomorrow.  Then follow normal dosing regimen.  Will check INR in 1 week    Medication: Warfarin (Coumadin)        Next Appointment: Tuesday, 4/18/2023 @ 2:30       Pt is on asa 81 for mvr.    Review all of your home medications and newly ordered medications with your doctor and / or pharmacist. Follow medication instructions as directed by your doctor and / or pharmacist. Please keep your medication list with you and share with your physician. Update the information when medications are discontinued, doses are changed, or new medications (including over-the-counter products) are  added; and carry medication information at all times in the event of emergency situations.      The patient is on a high risk medication and is sub- therapeutic. This could lead to clot formation or risk of stroke. Therefore this patient requires close monitoring and follow up.     CHEST guidelines recommend frequent INR monitoring at regular intervals (a few days up to a max of 12 weeks) to ensure they are on the proper dose of warfarin and not having any complications from therapy.  INRs can dramatically change over a short time period due to diet, medications, and medical conditions.   The patient is instructed to go to the ER for falls with a head injury,  blood in urine or stool or any bleeding that last longer than 20 min.   For questions, please contact Outpatient Anticoagulation Service [phone].     ARIA Recinos.

## 2023-04-18 ENCOUNTER — ANTICOAGULATION MONITORING (OUTPATIENT)
Dept: VASCULAR LAB | Facility: MEDICAL CENTER | Age: 58
End: 2023-04-18

## 2023-04-18 ENCOUNTER — APPOINTMENT (OUTPATIENT)
Dept: VASCULAR LAB | Facility: MEDICAL CENTER | Age: 58
End: 2023-04-18
Payer: MEDICAID

## 2023-04-18 ENCOUNTER — NON-PROVIDER VISIT (OUTPATIENT)
Dept: MEDICAL GROUP | Facility: PHYSICIAN GROUP | Age: 58
End: 2023-04-18
Payer: MEDICAID

## 2023-04-18 VITALS
OXYGEN SATURATION: 92 % | HEART RATE: 110 BPM | TEMPERATURE: 98.3 F | RESPIRATION RATE: 18 BRPM | SYSTOLIC BLOOD PRESSURE: 140 MMHG | DIASTOLIC BLOOD PRESSURE: 88 MMHG

## 2023-04-18 DIAGNOSIS — I05.9 MITRAL VALVE DISEASE: ICD-10-CM

## 2023-04-18 DIAGNOSIS — Z79.01 CHRONIC ANTICOAGULATION: ICD-10-CM

## 2023-04-18 DIAGNOSIS — D68.69 SECONDARY HYPERCOAGULABLE STATE (HCC): ICD-10-CM

## 2023-04-18 DIAGNOSIS — I48.0 PAROXYSMAL ATRIAL FIBRILLATION (HCC): Chronic | ICD-10-CM

## 2023-04-18 LAB
INR BLD: 2.6 (ref 0.9–1.2)
INR PPP: 2.6 (ref 2–3.5)
LOT NUMBER   180167: ABNORMAL
POC PROTIME: 2.6
POCT INT CON NEG: NEGATIVE
POCT INT CON POS: POSITIVE

## 2023-04-18 PROCEDURE — 99999 PR NO CHARGE: CPT | Performed by: NURSE PRACTITIONER

## 2023-04-18 PROCEDURE — 85610 PROTHROMBIN TIME: CPT | Performed by: FAMILY MEDICINE

## 2023-04-18 RX ORDER — WARFARIN SODIUM 5 MG/1
2.5-5 TABLET ORAL EVERY EVENING
Qty: 90 TABLET | Refills: 3 | Status: SHIPPED | OUTPATIENT
Start: 2023-04-18 | End: 2024-02-14

## 2023-04-18 NOTE — PROGRESS NOTES
This evaluation was conducted via Telemed using secure and encrypted videoconferencing technology. The patient was physically located at [facility] in [place], [place]. The patient was presented by a medical professional at the originating site.   The patient's identity was confirmed and verbal consent was obtained for this telemedicine encounter.      OP Anticoagulation Service Note    Date: 2023  Blood Pressure: (!) 140/88 (just rode bike into appt)  Pulse: (!) 110 (just rode bicycle into appt)  Respiration: 18    Anticoagulation Summary  As of 2023      INR goal:  2.0-3.0   TTR:  64.7 % (3.9 y)   INR used for dosin.60 (2023)   Warfarin maintenance plan:  2.5 mg (5 mg x 0.5) every Tue, Thu; 5 mg (5 mg x 1) all other days   Weekly warfarin total:  30 mg   Plan last modified:  LYDIA MayesP.NTanner (2022)   Next INR check:  2023   Target end date:  Indefinite    Indications    Mitral valve disease [I05.9]  Paroxysmal atrial fibrillation (HCC) [I48.0]  Secondary hypercoagulable state (HCC) [D68.69]                 Anticoagulation Episode Summary       INR check location:      Preferred lab:      Send INR reminders to:      Comments:  Mitral valve repair - indefinite anticoag per 19 note from Dr Lawson          Anticoagulation Care Providers       Provider Role Specialty Phone number    LYDIA ParikhP.NTanner Referring Thoracic Surgery (Cardiothoracic Vascular Surgery) [phone]    [facility] Anticoagulation Services Responsible  [phone]          Anticoagulation Patient Findings  Patient Findings       Positives:  Upcoming dental procedure (at some point)    Negatives:  Signs/symptoms of thrombosis, Signs/symptoms of bleeding, Laboratory test error suspected, Change in health, Change in alcohol use, Change in activity, Upcoming invasive procedure, Emergency department visit, Missed doses, Extra doses, Change in medications, Change in diet/appetite, Hospital admission,  Bruising, Other complaints            THIS VISIT CONDUCTED WITH PRESENTER [employer] TE[employer] UTILIZING SECURE AND ENCRYPTED VIDEOCONFERENCING EQUIPMENT  ROS:    Pulm: Denies SOB, chest pain.    Card: Denies syncope, edema, palpitations.    Extremities: Denies redness, pain.     PE:    Pulm: No SOB, even and unlabored.    Card: Normal rate and rhythm.    Extremities: No redness, or edema.     INR  is-therapeutic.    Denies signs/symptoms of bleeding and/or thrombosis.    Denies changes to diet or medications.   Follow up appt in 2 weeks     Plan:  continue with current dosing regimen    Medication: Warfarin (Coumadin)        Next Appointment: Tuesday, 5/2/2023 @2:30     **Does have upcoming dental appt for extractions, not scheduled at this time.  Pt will update when has more information.      Pt is on asa 81 for mvr..    Review all of your home medications and newly ordered medications with your doctor and / or pharmacist. Follow medication instructions as directed by your doctor and / or pharmacist. Please keep your medication list with you and share with your physician. Update the information when medications are discontinued, doses are changed, or new medications (including over-the-counter products) are added; and carry medication information at all times in the event of emergency situations.       Patient is on a high risk medication and therefore requires close monitoring and follow up.    CHEST guidelines recommend frequent INR monitoring at regular intervals (a few days up to a max of 12 weeks) to ensure they are on the proper dose of warfarin and not having any complications from therapy.  INRs can dramatically change over a short time period due to diet, medications, and medical conditions.   The patient is instructed to go to the ER for falls with a head injury,  blood in urine or stool or any bleeding that last longer than 20 min.   For questions, please contact Outpatient Anticoagulation Service [phone].     ARIA Recinos.

## 2023-05-02 ENCOUNTER — NON-PROVIDER VISIT (OUTPATIENT)
Dept: MEDICAL GROUP | Facility: PHYSICIAN GROUP | Age: 58
End: 2023-05-02
Payer: MEDICAID

## 2023-05-02 ENCOUNTER — ANTICOAGULATION MONITORING (OUTPATIENT)
Dept: VASCULAR LAB | Facility: MEDICAL CENTER | Age: 58
End: 2023-05-02

## 2023-05-02 ENCOUNTER — APPOINTMENT (OUTPATIENT)
Dept: VASCULAR LAB | Facility: MEDICAL CENTER | Age: 58
End: 2023-05-02
Payer: MEDICAID

## 2023-05-02 VITALS
OXYGEN SATURATION: 95 % | RESPIRATION RATE: 14 BRPM | HEART RATE: 99 BPM | DIASTOLIC BLOOD PRESSURE: 90 MMHG | TEMPERATURE: 98.8 F | SYSTOLIC BLOOD PRESSURE: 140 MMHG

## 2023-05-02 DIAGNOSIS — I05.9 MITRAL VALVE DISEASE: ICD-10-CM

## 2023-05-02 DIAGNOSIS — D68.69 SECONDARY HYPERCOAGULABLE STATE (HCC): ICD-10-CM

## 2023-05-02 DIAGNOSIS — Z79.01 CHRONIC ANTICOAGULATION: ICD-10-CM

## 2023-05-02 DIAGNOSIS — I48.0 PAROXYSMAL ATRIAL FIBRILLATION (HCC): Chronic | ICD-10-CM

## 2023-05-02 PROCEDURE — 99999 PR NO CHARGE: CPT | Performed by: NURSE PRACTITIONER

## 2023-05-02 PROCEDURE — 99213 OFFICE O/P EST LOW 20 MIN: CPT

## 2023-05-02 PROCEDURE — 85610 PROTHROMBIN TIME: CPT | Performed by: FAMILY MEDICINE

## 2023-05-02 NOTE — PROGRESS NOTES
This evaluation was conducted via Telemed using secure and encrypted videoconferencing technology. The patient was physically located at [facility] in [place], [place]. The patient was presented by a medical professional at the originating site.   The patient's identity was confirmed and verbal consent was obtained for this telemedicine encounter.      OP Anticoagulation Service Note    Date: 2023  Blood Pressure: (!) 140/90  Pulse: 99  Respiration: 14    Anticoagulation Summary  As of 2023      INR goal:  2.0-3.0   TTR:  65.1 % (4 y)   INR used for dosin.60 (2023)   Warfarin maintenance plan:  2.5 mg (5 mg x 0.5) every Tue, Thu; 5 mg (5 mg x 1) all other days   Weekly warfarin total:  30 mg   Plan last modified:  FARIDA Mayes (2022)   Next INR check:  2023   Target end date:  Indefinite    Indications    Mitral valve disease [I05.9]  Paroxysmal atrial fibrillation (HCC) [I48.0]  Secondary hypercoagulable state (HCC) [D68.69]                 Anticoagulation Episode Summary       INR check location:      Preferred lab:      Send INR reminders to:      Comments:  Mitral valve repair - indefinite anticoag per 19 note from Dr Lawson          Anticoagulation Care Providers       Provider Role Specialty Phone number    FARIDA Parikh Referring Thoracic Surgery (Cardiothoracic Vascular Surgery) [phone]    [facility] Anticoagulation Services Responsible  [phone]          Anticoagulation Patient Findings  Patient Findings       Positives:  Upcoming dental procedure (will need dental extractions, not scheduled.)    Negatives:  Signs/symptoms of thrombosis, Signs/symptoms of bleeding, Laboratory test error suspected, Change in health, Change in alcohol use, Change in activity, Upcoming invasive procedure, Emergency department visit, Missed doses, Extra doses, Change in medications, Change in diet/appetite, Hospital admission, Bruising, Other complaints    Comments:   Has decided to wait for full upper mouth extractions until September.  But will need 1 tooth extracted prior as it is broken and sensitive.              THIS VISIT CONDUCTED WITH [employer] UTILIZING SECURE AND ENCRYPTED VIDEOCONFERENCING EQUIPMENT  ROS:    Pulm: Denies SOB, chest pain.    Card: Denies syncope, edema, palpitations.    Extremities: Denies redness, pain.     PE:    Pulm: No SOB, even and unlabored.    Card: Normal rate and rhythm.    Extremities: No redness, or edema.     INR  is-therapeutic.    Denies signs/symptoms of bleeding and/or thrombosis.    Denies changes to diet or medications.   Follow up appt in 3 weeks     Plan:  continue with current dosing regimen    Medication: Warfarin (Coumadin)        Next Appointment: Tuesday, 5/23/2023 @3pm       Pt is on asa 81 for mvr.    Pt will need 1 tooth extracted.  Has decided to wait to have rest of upper teeth extracted until September.      Please see below for current ADA recommendations for anticoagulation and extractions.       Review all of your home medications and newly ordered medications with your doctor and / or pharmacist. Follow medication instructions as directed by your doctor and / or pharmacist. Please keep your medication list with you and share with your physician. Update the information when medications are discontinued, doses are changed, or new medications (including over-the-counter products) are added; and carry medication information at all times in the event of emergency situations.       Patient is on a high risk medication and therefore requires close monitoring and follow up.    CHEST guidelines recommend frequent INR monitoring at regular intervals (a few days up to a max of 12 weeks) to ensure they are on the proper dose of warfarin and not having any complications from therapy.  INRs can dramatically change over a short time period due to diet, medications, and medical conditions.   The patient is  instructed to go to the ER for falls with a head injury,  blood in urine or stool or any bleeding that last longer than 20 min.   For questions, please contact Outpatient Anticoagulation Service [phone].     Due to the most recent evidence (see below), we would recommend that Ottoniel continue current dosing regimen of warfarin and ASA without stopping for your single tooth dental extraction.      Per the CHEST guidelines, Interruption of Warfarin anticoagulation for Dental Surgery:    In summary, local hemostatic measures are almost always sufficient for dental surgery in patients on warfarin, with no long-term sequelae. In contrast, thromboembolic events occurring in patients with warfarin interruption are much more likely to result in permanent disability or death. We, therefore, respectfully suggest that the option for alteration of warfarin therapy should be eliminated for minor dental procedures (including extractions) and reserved only for the most invasive oral surgical procedures in which a significant amount of blood loss is anticipated (eg, orthognathic surgery).    Per the American Dental Association - Anticoagulant and Antiplatelet Medications and Dental Procedures, last updated on 3/15/18:      There is general agreement that in most cases, treatment regimens with older anticoagulants (e.g., warfarin) and antiplatelet agents (e.g., clopidogrel, ticlopidine, prasugrel, ticagrelor, and/or aspirin) should not be altered before dental procedures. The risks of stopping or reducing these medication regimens (i.e., thromboembolism, stroke, MI) far outweigh the consequences of prolonged bleeding, which can be controlled with local measures. In patients with comorbid medical conditions that can increase the risk of prolonged bleeding after dental treatment or who are receiving other therapy that can increase bleeding risk, dental practitioners may wish to consult the patient’s physician to determine whether  care can safely be delivered in a primary care office. Any suggested modification to the medication regimen prior to dental surgery should be done in consultation and on advice of the patient’s physician.    On the basis of limited evidence, general consensus appears to be that in most patients who are receiving the newer direct-acting oral anticoagulants (i.e., dabigatran, rivaroxaban, apixaban, or edoxaban) and undergoing dental interventions (in conjunction with usual local measures to control bleeding), no change to the anticoagulant regimen is required. In patients deemed to be at higher risk of bleeding (e.g., patients with comorbid conditions or undergoing more extensive procedures associated with higher bleeding risk), consideration may be given, in consultation with and on advice of the patient’s physician, to postponing the timing of the daily dose of the anticoagulant until after the procedure; timing the dental intervention as late as possible after last dose of anticoagulant; or temporarily interrupting drug therapy for 24 to 48 hours. Further research is needed to definitively establish periprocedural management strategies for these patients, especially those considered to be at high risk of bleeding.      ARIA Recinos.

## 2023-05-05 ENCOUNTER — TELEPHONE (OUTPATIENT)
Dept: VASCULAR LAB | Facility: MEDICAL CENTER | Age: 58
End: 2023-05-05
Payer: MEDICAID

## 2023-05-05 NOTE — TELEPHONE ENCOUNTER
Received call from Dr Saldaña DDS office .  Spoke with Elly [phone].    Dr. Saldaña requesting clearance for pt to remain on warfarin, but to come off ASA for prior to 1 tooth extraction.    Reviewed current recommendations that for single tooth extraction, stopping or holding of warfarin and ASA is not recommended.    If further questions for ASA  arise, or concern for proceeding with procedure, recommend office follow up with pt cardiologist for further recommendations on holding if needed, Elly stated understanding.    Pt is currently scheduled to precede with single tooth extraction 5/17/2023.  Alyssa KNAPP  [facility]

## 2023-05-16 ENCOUNTER — TELEPHONE (OUTPATIENT)
Dept: VASCULAR LAB | Facility: MEDICAL CENTER | Age: 58
End: 2023-05-16
Payer: MEDICAID

## 2023-05-16 NOTE — TELEPHONE ENCOUNTER
[facility]    S/w pt again concerning upcoming tooth extraction x1.   Pt reports dentist had an issue with pt continuing ASA while off of warfarin.  Instructed pt we are happy to help with managing ASA and warfarin around the procedure although the ADA would suggest against stopping these medications for a single tooth extraction.    Pt states he plans on changing dentists anyway so there's no need to provide any melissa procedural instructions at this time.      Pt states that Alyssa MCCAIN knows of a dentist that takes medicaid in [place].  I'll message her to to find out if she knows of one.     Wilian Oliva, PharmD

## 2023-05-19 ENCOUNTER — HOSPITAL ENCOUNTER (OUTPATIENT)
Dept: LAB | Facility: MEDICAL CENTER | Age: 58
End: 2023-05-19
Attending: STUDENT IN AN ORGANIZED HEALTH CARE EDUCATION/TRAINING PROGRAM
Payer: MEDICAID

## 2023-05-19 LAB
BUN SERPL-MCNC: 28 MG/DL (ref 8–22)
CREAT SERPL-MCNC: 1.48 MG/DL (ref 0.5–1.4)
GFR SERPLBLD CREATININE-BSD FMLA CKD-EPI: 54 ML/MIN/1.73 M 2

## 2023-05-19 PROCEDURE — 84520 ASSAY OF UREA NITROGEN: CPT

## 2023-05-19 PROCEDURE — 82565 ASSAY OF CREATININE: CPT

## 2023-05-19 PROCEDURE — 36415 COLL VENOUS BLD VENIPUNCTURE: CPT

## 2023-05-23 ENCOUNTER — NON-PROVIDER VISIT (OUTPATIENT)
Dept: MEDICAL GROUP | Facility: PHYSICIAN GROUP | Age: 58
End: 2023-05-23
Payer: MEDICAID

## 2023-05-23 ENCOUNTER — ANTICOAGULATION MONITORING (OUTPATIENT)
Dept: VASCULAR LAB | Facility: MEDICAL CENTER | Age: 58
End: 2023-05-23

## 2023-05-23 ENCOUNTER — APPOINTMENT (OUTPATIENT)
Dept: VASCULAR LAB | Facility: MEDICAL CENTER | Age: 58
End: 2023-05-23
Payer: MEDICAID

## 2023-05-23 VITALS
SYSTOLIC BLOOD PRESSURE: 126 MMHG | OXYGEN SATURATION: 95 % | TEMPERATURE: 97.6 F | RESPIRATION RATE: 12 BRPM | DIASTOLIC BLOOD PRESSURE: 60 MMHG | HEART RATE: 80 BPM

## 2023-05-23 DIAGNOSIS — Z79.01 CHRONIC ANTICOAGULATION: ICD-10-CM

## 2023-05-23 DIAGNOSIS — I05.9 MITRAL VALVE DISEASE: ICD-10-CM

## 2023-05-23 DIAGNOSIS — I48.0 PAROXYSMAL ATRIAL FIBRILLATION (HCC): Chronic | ICD-10-CM

## 2023-05-23 DIAGNOSIS — D68.69 SECONDARY HYPERCOAGULABLE STATE (HCC): ICD-10-CM

## 2023-05-23 LAB
INR BLD: 5.1 (ref 0.9–1.2)
INR PPP: 5.1 (ref 2–3.5)
LOT NUMBER   180167: ABNORMAL
POC PROTIME: 5.1
POCT INT CON NEG: NEGATIVE
POCT INT CON POS: POSITIVE

## 2023-05-23 PROCEDURE — 3078F DIAST BP <80 MM HG: CPT

## 2023-05-23 PROCEDURE — 85610 PROTHROMBIN TIME: CPT | Performed by: FAMILY MEDICINE

## 2023-05-23 PROCEDURE — 99999 PR NO CHARGE: CPT | Performed by: NURSE PRACTITIONER

## 2023-05-23 PROCEDURE — 99213 OFFICE O/P EST LOW 20 MIN: CPT

## 2023-05-23 PROCEDURE — 3074F SYST BP LT 130 MM HG: CPT

## 2023-05-23 NOTE — PROGRESS NOTES
This evaluation was conducted via Telemed using secure and encrypted videoconferencing technology. The patient was physically located at [facility] in [place], [place]. The patient was presented by a medical professional at the originating site.   The patient's identity was confirmed and verbal consent was obtained for this telemedicine encounter.      OP Anticoagulation Service Note    Date: 2023  Blood Pressure: 126/60  Pulse: 80  Respiration: 12    Anticoagulation Summary  As of 2023      INR goal:  2.0-3.0   TTR:  64.3 % (4 y)   INR used for dosin.10 (2023)   Warfarin maintenance plan:  2.5 mg (5 mg x 0.5) every Tue, Thu, Sat; 5 mg (5 mg x 1) all other days   Weekly warfarin total:  27.5 mg   Plan last modified:  SALVADOR Recinos (2023)   Next INR check:  2023   Target end date:  Indefinite    Indications    Mitral valve disease [I05.9]  Paroxysmal atrial fibrillation (HCC) [I48.0]  Secondary hypercoagulable state (HCC) [D68.69]                 Anticoagulation Episode Summary       INR check location:      Preferred lab:      Send INR reminders to:      Comments:  Mitral valve repair - indefinite anticoag per 19 note from Dr Lawson          Anticoagulation Care Providers       Provider Role Specialty Phone number    FARIDA Parikh Referring Thoracic Surgery (Cardiothoracic Vascular Surgery) [phone]    [facility] Anticoagulation Services Responsible  [phone]          Anticoagulation Patient Findings  Patient Findings       Positives:  Signs/symptoms of bleeding (frequent <5 minute nosebleeds), Upcoming dental procedure, Change in diet/appetite (decreased veggies, decreased intake due to dental pain)    Negatives:  Signs/symptoms of thrombosis, Laboratory test error suspected, Change in health, Change in alcohol use, Change in activity, Upcoming invasive procedure, Emergency department visit, Missed doses, Extra doses, Change in medications,  Hospital admission, Bruising, Other complaints    Comments:  Reports DDS would not do single tooth extraction without stopping ASA.              THIS VISIT CONDUCTED WITH PRESENTER [employer]IA TELE[employer] UTILIZING SECURE AND ENCRYPTED VIDEOCONFERENCING EQUIPMENT  ROS:    Pulm: Denies SOB, chest pain.    Card: Denies syncope, edema, palpitations.    Extremities: Denies redness, pain.     PE:    Pulm: No SOB, even and unlabored.    Card: Normal rate and rhythm.    Extremities: No redness, or edema.     INR  supra-therapeutic.    Denies signs/symptoms of bleeding and/or thrombosis.    Denies changes to diet or medications.   Follow up appt in 1 weeks     Plan:  pt has already taken dose for today this morning.  Pt to HOLD dose 5/24, 5/25, and 5/26.    Pt to go to lab and have INR drawn Friday 5/26.    Will have AC clinic monitor for result and contact pt if still out of range,   Otherwise pt to resume normal dosing over weekend and will follow up in telemed on 5/30.    Medication: Warfarin (Coumadin)        Next Appointment: Tuesday, 5/30/2023 @4pm       Pt is on asa 81 for mvr..    Review all of your home medications and newly ordered medications with your doctor and / or pharmacist. Follow medication instructions as directed by your doctor and / or pharmacist. Please keep your medication list with you and share with your physician. Update the information when medications are discontinued, doses are changed, or new medications (including over-the-counter products) are added; and carry medication information at all times in the event of emergency situations.      The patient is on a high risk medication and is supra-therapeudic. This increases the patients risk of bleeding and therefore requires frequent monitoring and follow up.     CHEST guidelines recommend frequent INR monitoring at regular intervals (a few days up to a max of 12 weeks) to ensure they are on the proper dose of warfarin and not having any complications  from therapy.  INRs can dramatically change over a short time period due to diet, medications, and medical conditions.   The patient is instructed to go to the ER for falls with a head injury,  blood in urine or stool or any bleeding that last longer than 20 min.   For questions, please contact Outpatient Anticoagulation Service [phone].     ARIA Recinos.

## 2023-05-25 ENCOUNTER — HOSPITAL ENCOUNTER (OUTPATIENT)
Dept: RADIOLOGY | Facility: MEDICAL CENTER | Age: 58
End: 2023-05-25
Attending: STUDENT IN AN ORGANIZED HEALTH CARE EDUCATION/TRAINING PROGRAM
Payer: MEDICAID

## 2023-05-25 DIAGNOSIS — N28.89 OTHER SPECIFIED DISORDERS OF KIDNEY AND URETER: ICD-10-CM

## 2023-05-25 PROCEDURE — A9579 GAD-BASE MR CONTRAST NOS,1ML: HCPCS | Mod: UD | Performed by: STUDENT IN AN ORGANIZED HEALTH CARE EDUCATION/TRAINING PROGRAM

## 2023-05-25 PROCEDURE — 700117 HCHG RX CONTRAST REV CODE 255: Mod: UD | Performed by: STUDENT IN AN ORGANIZED HEALTH CARE EDUCATION/TRAINING PROGRAM

## 2023-05-25 PROCEDURE — 74183 MRI ABD W/O CNTR FLWD CNTR: CPT

## 2023-05-25 RX ADMIN — GADOTERIDOL 20 ML: 279.3 INJECTION, SOLUTION INTRAVENOUS at 14:54

## 2023-05-26 ENCOUNTER — HOSPITAL ENCOUNTER (OUTPATIENT)
Dept: LAB | Facility: MEDICAL CENTER | Age: 58
End: 2023-05-26
Attending: NURSE PRACTITIONER
Payer: MEDICAID

## 2023-05-26 DIAGNOSIS — Z79.01 CHRONIC ANTICOAGULATION: ICD-10-CM

## 2023-05-26 DIAGNOSIS — I48.0 PAROXYSMAL ATRIAL FIBRILLATION (HCC): ICD-10-CM

## 2023-05-26 LAB
INR PPP: 1.97 (ref 0.87–1.13)
PROTHROMBIN TIME: 21.9 SEC (ref 12–14.6)

## 2023-05-26 PROCEDURE — 85610 PROTHROMBIN TIME: CPT

## 2023-05-26 PROCEDURE — 36415 COLL VENOUS BLD VENIPUNCTURE: CPT

## 2023-05-30 ENCOUNTER — ANTICOAGULATION MONITORING (OUTPATIENT)
Dept: VASCULAR LAB | Facility: MEDICAL CENTER | Age: 58
End: 2023-05-30
Payer: MEDICAID

## 2023-05-30 ENCOUNTER — APPOINTMENT (OUTPATIENT)
Dept: VASCULAR LAB | Facility: MEDICAL CENTER | Age: 58
End: 2023-05-30
Payer: MEDICAID

## 2023-05-30 DIAGNOSIS — D68.69 SECONDARY HYPERCOAGULABLE STATE (HCC): ICD-10-CM

## 2023-05-30 DIAGNOSIS — I05.9 MITRAL VALVE DISEASE: ICD-10-CM

## 2023-05-30 DIAGNOSIS — I48.0 PAROXYSMAL ATRIAL FIBRILLATION (HCC): Chronic | ICD-10-CM

## 2023-05-30 NOTE — PROGRESS NOTES
Pt scheduled to be seen in Telemed today.  Will f/u with pt tomorrow if he no shows Telemed appt.  Yolis Crain, PharmD

## 2023-05-31 ENCOUNTER — HOSPITAL ENCOUNTER (OUTPATIENT)
Dept: LAB | Facility: MEDICAL CENTER | Age: 58
End: 2023-05-31
Attending: NURSE PRACTITIONER
Payer: MEDICAID

## 2023-05-31 DIAGNOSIS — Z79.01 CHRONIC ANTICOAGULATION: ICD-10-CM

## 2023-05-31 DIAGNOSIS — I48.0 PAROXYSMAL ATRIAL FIBRILLATION (HCC): ICD-10-CM

## 2023-05-31 LAB
INR PPP: 1.71 (ref 0.87–1.13)
PROTHROMBIN TIME: 19.7 SEC (ref 12–14.6)

## 2023-05-31 PROCEDURE — 85610 PROTHROMBIN TIME: CPT

## 2023-05-31 PROCEDURE — 36415 COLL VENOUS BLD VENIPUNCTURE: CPT

## 2023-05-31 NOTE — PROGRESS NOTES
Anticoagulation Summary  As of 2023      INR goal:  2.0-3.0   TTR:  64.2 % (4.1 y)   INR used for dosin.97 (2023)   Warfarin maintenance plan:  2.5 mg (5 mg x 0.5) every Tue, Thu, Sat; 5 mg (5 mg x 1) all other days   Weekly warfarin total:  27.5 mg   Plan last modified:  SALVADOR Recinos (2023)   Next INR check:  2023   Target end date:  Indefinite    Indications    Mitral valve disease [I05.9]  Paroxysmal atrial fibrillation (HCC) [I48.0]  Secondary hypercoagulable state (HCC) [D68.69]                 Anticoagulation Episode Summary       INR check location:      Preferred lab:      Send INR reminders to:      Comments:  Mitral valve repair - indefinite anticoag per 19 note from Dr Lawson          Anticoagulation Care Providers       Provider Role Specialty Phone number    FARIDA Parikh Referring Thoracic Surgery (Cardiothoracic Vascular Surgery) [phone]    [facility] Anticoagulation Services Responsible  [phone]            Refer to Anticoagulation Patient Findings for HPI  Patient Findings       Positives:  Upcoming dental procedure (Upcoming extractions, date TBD), Change in diet/appetite (Decreased appetite due to dental pain)    Negatives:  Signs/symptoms of thrombosis, Signs/symptoms of bleeding, Laboratory test error suspected, Change in health, Change in alcohol use, Change in activity, Upcoming invasive procedure, Emergency department visit, Missed doses, Extra doses, Change in medications, Hospital admission, Bruising, Other complaints            Spoke with patient.  INR is subtherapeutic at 1.97.     Pt verifies warfarin weekly dosing.     Pt is on Aspirin 81mg for MVR and must be reviewed again on with cardiology.    Will have pt repeat INR today. Continue with current regimen until INR results.    Repeat INR today.     Treatment regimen was discussed with Nicholas Vences, SilvaD.    Amanda Lares, PhT

## 2023-06-01 ENCOUNTER — TELEPHONE (OUTPATIENT)
Dept: CARDIOLOGY | Facility: MEDICAL CENTER | Age: 58
End: 2023-06-01
Payer: MEDICAID

## 2023-06-01 ENCOUNTER — ANTICOAGULATION MONITORING (OUTPATIENT)
Dept: VASCULAR LAB | Facility: MEDICAL CENTER | Age: 58
End: 2023-06-01
Payer: MEDICAID

## 2023-06-01 DIAGNOSIS — D68.69 SECONDARY HYPERCOAGULABLE STATE (HCC): ICD-10-CM

## 2023-06-01 DIAGNOSIS — I48.0 PAROXYSMAL ATRIAL FIBRILLATION (HCC): Chronic | ICD-10-CM

## 2023-06-01 DIAGNOSIS — I05.9 MITRAL VALVE DISEASE: ICD-10-CM

## 2023-06-01 NOTE — TELEPHONE ENCOUNTER
----- Message from Oren Keller M.D. sent at 6/1/2023  4:01 PM PDT -----  Regarding: RE: ASA for dental extractions  Understood. Can hold aspirin until sees cardiology again. He can hold coumadin 5 days prior and resume when hemostasis achieved post extraction. Thanks Alyssa!       ------------------------------------------------------------------------      ----- Message -----  From: SALVADOR Recinos  Sent: 5/23/2023   4:53 PM PDT  To: Oren Keller M.D.  Subject: ASA for dental extractions                       Hi Dr. Keller,  I have been seeing Ottoniel in the [facility].  He needs to have some dental extractions done.  The dentist is requesting he hold his ASA for a single tooth extraction.  He will also need to have 9 additional teeth extracted later this year (September, most likely).  Just clarifying instructions/recommendations for ASA for the extractions.    Pt has had to cancel and reschedule his extractions multiple times as the dentist will not do his single tooth extraction while on ASA and warfarin.  Thank you,  Alyssa KNAPP  [facility]

## 2023-06-01 NOTE — PROGRESS NOTES
Anticoagulation Summary  As of 2023      INR goal:  2.0-3.0   TTR:  64.0 % (4.1 y)   INR used for dosin.71 (2023)   Warfarin maintenance plan:  2.5 mg (5 mg x 0.5) every Tue, Thu, Sat; 5 mg (5 mg x 1) all other days   Weekly warfarin total:  27.5 mg   Plan last modified:  SALVADOR Recinos (2023)   Next INR check:  2023   Target end date:  Indefinite    Indications    Mitral valve disease [I05.9]  Paroxysmal atrial fibrillation (HCC) [I48.0]  Secondary hypercoagulable state (HCC) [D68.69]                 Anticoagulation Episode Summary       INR check location:      Preferred lab:      Send INR reminders to:      Comments:  Mitral valve repair - indefinite anticoag per 19 note from Dr Lawson          Anticoagulation Care Providers       Provider Role Specialty Phone number    FARIDA Parikh Referring Thoracic Surgery (Cardiothoracic Vascular Surgery) [phone]    [facility] Anticoagulation Services Responsible  [phone]            Refer to Anticoagulation Patient Findings for HPI  Patient Findings       Negatives:  Signs/symptoms of thrombosis, Signs/symptoms of bleeding, Laboratory test error suspected, Change in health, Change in alcohol use, Change in activity, Upcoming invasive procedure, Emergency department visit, Upcoming dental procedure, Missed doses, Extra doses, Change in medications, Change in diet/appetite, Hospital admission, Bruising, Other complaints            Spoke with pt.  INR is subtherapeutic.     Pt verifies warfarin weekly dosing.     Will have pt continue regimen for now. His INR decreased likely from holding warfarin x 3 days last week    Repeat INR in 5 day(s) via Telemed.     Yolis Crain, PharmD

## 2023-06-06 ENCOUNTER — TELEPHONE (OUTPATIENT)
Dept: VASCULAR LAB | Facility: MEDICAL CENTER | Age: 58
End: 2023-06-06

## 2023-06-06 ENCOUNTER — ANTICOAGULATION MONITORING (OUTPATIENT)
Dept: VASCULAR LAB | Facility: MEDICAL CENTER | Age: 58
End: 2023-06-06

## 2023-06-06 ENCOUNTER — NON-PROVIDER VISIT (OUTPATIENT)
Dept: MEDICAL GROUP | Facility: PHYSICIAN GROUP | Age: 58
End: 2023-06-06
Payer: MEDICAID

## 2023-06-06 ENCOUNTER — APPOINTMENT (OUTPATIENT)
Dept: VASCULAR LAB | Facility: MEDICAL CENTER | Age: 58
End: 2023-06-06
Payer: MEDICAID

## 2023-06-06 VITALS
RESPIRATION RATE: 12 BRPM | DIASTOLIC BLOOD PRESSURE: 90 MMHG | TEMPERATURE: 98.2 F | OXYGEN SATURATION: 95 % | SYSTOLIC BLOOD PRESSURE: 140 MMHG | HEART RATE: 74 BPM

## 2023-06-06 DIAGNOSIS — I48.0 PAROXYSMAL ATRIAL FIBRILLATION (HCC): Chronic | ICD-10-CM

## 2023-06-06 DIAGNOSIS — D68.69 SECONDARY HYPERCOAGULABLE STATE (HCC): ICD-10-CM

## 2023-06-06 DIAGNOSIS — I05.9 MITRAL VALVE DISEASE: ICD-10-CM

## 2023-06-06 DIAGNOSIS — Z79.01 CHRONIC ANTICOAGULATION: ICD-10-CM

## 2023-06-06 LAB
INR BLD: 3.5 (ref 0.9–1.2)
INR PPP: 3.5 (ref 2–3.5)
LOT NUMBER   180167: ABNORMAL
POC PROTIME: 3.5
POCT INT CON NEG: NEGATIVE
POCT INT CON POS: POSITIVE

## 2023-06-06 PROCEDURE — 85610 PROTHROMBIN TIME: CPT | Performed by: FAMILY MEDICINE

## 2023-06-06 PROCEDURE — 99213 OFFICE O/P EST LOW 20 MIN: CPT

## 2023-06-06 PROCEDURE — 99999 PR NO CHARGE: CPT | Performed by: NURSE PRACTITIONER

## 2023-06-06 PROCEDURE — 3077F SYST BP >= 140 MM HG: CPT

## 2023-06-06 PROCEDURE — 3080F DIAST BP >= 90 MM HG: CPT

## 2023-06-06 NOTE — TELEPHONE ENCOUNTER
Per pt request, called dental clinic ([facility] dental, Dr. Núñez, [phone]) where pt is to have extractions done to determine what specifically they need to precede with extractions.  Pt now wishing to move forward with all extractions if able, (all remaining teeth on uppers).  Unable to speak with office.  Left message with  to call back, contact information given.    Received communication from cardiologist, Dr Keller, that pt may stop ASA for now, and it will be readdressed at pts next follow up visit in September.     Instructions given previously regarding holding warfarin prior to extractions.  Will review with Dr. Núñez's office when able.    Will update pt once new information available.       Alyssa KNAPP  [facility]

## 2023-06-06 NOTE — PROGRESS NOTES
This evaluation was conducted via Telemed using secure and encrypted videoconferencing technology. The patient was physically located at [facility] in [place], [place]. The patient was presented by a medical professional at the originating site.   The patient's identity was confirmed and verbal consent was obtained for this telemedicine encounter.      OP Anticoagulation Service Note    Date: 6/6/2023  Blood Pressure: (!) 140/90  Pulse: 74  Respiration: 12    Anticoagulation Summary  As of 6/6/2023      INR goal:  2.0-3.0   TTR:  64.0 % (4.1 y)   INR used for dosing:  3.50 (6/6/2023)   Warfarin maintenance plan:  2.5 mg (5 mg x 0.5) every Tue, Thu, Sat; 5 mg (5 mg x 1) all other days   Weekly warfarin total:  27.5 mg   Plan last modified:  SALVADOR Recinos (5/23/2023)   Next INR check:  6/9/2023   Target end date:  Indefinite    Indications    Mitral valve disease [I05.9]  Paroxysmal atrial fibrillation (HCC) [I48.0]  Secondary hypercoagulable state (HCC) [D68.69]                 Anticoagulation Episode Summary       INR check location:      Preferred lab:      Send INR reminders to:      Comments:  Mitral valve repair - indefinite anticoag per 7/26/19 note from Dr Lawson          Anticoagulation Care Providers       Provider Role Specialty Phone number    FARIDA Parikh Referring Thoracic Surgery (Cardiothoracic Vascular Surgery) [phone]    [facility] Anticoagulation Services Responsible  [phone]          Anticoagulation Patient Findings  Patient Findings       Positives:  Upcoming dental procedure (needs teeth extractions, not scheduled), Change in diet/appetite (decreased intake due to dental pain)    Negatives:  Signs/symptoms of thrombosis, Signs/symptoms of bleeding, Laboratory test error suspected, Change in health, Change in alcohol use, Change in activity, Upcoming invasive procedure, Emergency department visit, Missed doses, Extra doses, Change in medications, Hospital  admission, Bruising, Other complaints            THIS VISIT CONDUCTED WITH [employer] UTILIZING SECURE AND ENCRYPTED VIDEOCONFERENCING EQUIPMENT  ROS:    Pulm: Denies SOB, chest pain.    Card: Denies syncope, edema, palpitations.    Extremities: Denies redness, pain.     PE:    Pulm: No SOB, even and unlabored.    Card: Normal rate and rhythm.    Extremities: No redness, or edema.     INR  supra-therapeutic.    Denies signs/symptoms of bleeding and/or thrombosis.    Denies changes to diet or medications.   Follow up appt in 1 weeks     Plan:  hold dose tomorrow 6/7 (as pt already took dose this am for today)  Then follow normal dosing regimen.  Pt is not eating as well due to dental pain, may need to adjust dosing accordingly.  Pt to go to lab to have INR checked on Friday 6/9  Anticoagulation clinic will follow up with instructions.    Pt will have upcoming dental extractions, does not have scheduled.  Pt will call with date.  Understands he will need to hold warfarin 5 days prior to extractions (will have full upper teeth extrated, 8-9 teeth).  Pt to call Anticoagulation clinic for instruction once have date scheduled.        Medication: Warfarin (Coumadin)        Next Appointment:   go to lab to have INR checked 6/9 Tuesday, 6/27/2023 @4:15 in telemed clinic      Pt is on asa 81 for mvr.  Instructions received from cardiology, Dr. Keller that pt may hold ASA until next appt to help assist with scheduling dental extractions.  ASA will be addressed again at next follow up appt 9/29/23.    Review all of your home medications and newly ordered medications with your doctor and / or pharmacist. Follow medication instructions as directed by your doctor and / or pharmacist. Please keep your medication list with you and share with your physician. Update the information when medications are discontinued, doses are changed, or new medications (including over-the-counter products) are added; and carry  medication information at all times in the event of emergency situations.      The patient is on a high risk medication and is supra-therapeudic. This increases the patients risk of bleeding and therefore requires frequent monitoring and follow up.     CHEST guidelines recommend frequent INR monitoring at regular intervals (a few days up to a max of 12 weeks) to ensure they are on the proper dose of warfarin and not having any complications from therapy.  INRs can dramatically change over a short time period due to diet, medications, and medical conditions.   The patient is instructed to go to the ER for falls with a head injury,  blood in urine or stool or any bleeding that last longer than 20 min.   For questions, please contact Outpatient Anticoagulation Service [phone].     ARIA Recinos.

## 2023-06-09 ENCOUNTER — HOSPITAL ENCOUNTER (OUTPATIENT)
Dept: LAB | Facility: MEDICAL CENTER | Age: 58
End: 2023-06-09
Attending: INTERNAL MEDICINE
Payer: MEDICAID

## 2023-06-09 ENCOUNTER — DOCUMENTATION (OUTPATIENT)
Dept: MEDICAL GROUP | Facility: PHYSICIAN GROUP | Age: 58
End: 2023-06-09
Payer: MEDICAID

## 2023-06-09 DIAGNOSIS — Z79.01 CHRONIC ANTICOAGULATION: ICD-10-CM

## 2023-06-09 DIAGNOSIS — I48.0 PAROXYSMAL ATRIAL FIBRILLATION (HCC): ICD-10-CM

## 2023-06-09 LAB
INR PPP: 2.43 (ref 0.87–1.13)
PROTHROMBIN TIME: 25.6 SEC (ref 12–14.6)

## 2023-06-09 PROCEDURE — 36415 COLL VENOUS BLD VENIPUNCTURE: CPT

## 2023-06-09 PROCEDURE — 85610 PROTHROMBIN TIME: CPT

## 2023-06-10 ENCOUNTER — ANTICOAGULATION MONITORING (OUTPATIENT)
Dept: MEDICAL GROUP | Facility: PHYSICIAN GROUP | Age: 58
End: 2023-06-10
Payer: MEDICAID

## 2023-06-10 DIAGNOSIS — D68.69 SECONDARY HYPERCOAGULABLE STATE (HCC): ICD-10-CM

## 2023-06-10 DIAGNOSIS — I05.9 MITRAL VALVE DISEASE: ICD-10-CM

## 2023-06-10 DIAGNOSIS — I48.0 PAROXYSMAL ATRIAL FIBRILLATION (HCC): Chronic | ICD-10-CM

## 2023-06-10 NOTE — PROGRESS NOTES
OP Anticoagulation Service Note    Date: 6/10/2023    Anticoagulation Summary  As of 6/10/2023      INR goal:  2.0-3.0   TTR:  64.0 % (4.1 y)   INR used for dosin.43 (2023)   Warfarin maintenance plan:  5 mg (5 mg x 1) every Mon, Wed, Fri; 2.5 mg (5 mg x 0.5) all other days   Weekly warfarin total:  25 mg   Plan last modified:  Damon Redmond, PharmD (6/10/2023)   Next INR check:  6/15/2023   Target end date:  Indefinite    Indications    Mitral valve disease [I05.9]  Paroxysmal atrial fibrillation (HCC) [I48.0]  Secondary hypercoagulable state (HCC) [D68.69]                 Anticoagulation Episode Summary       INR check location:      Preferred lab:      Send INR reminders to:      Comments:  Mitral valve repair - indefinite anticoag per 19 note from Dr Lawson          Anticoagulation Care Providers       Provider Role Specialty Phone number    FARIDA Parikh Referring Thoracic Surgery (Cardiothoracic Vascular Surgery) [phone]    [facility] Anticoagulation Services Responsible  [phone]          Anticoagulation Patient Findings        Patient's preferred phone number:  [phone]        HPI:   The reason for today's call is to prevent morbidity and mortality from a blood clot and/or stroke and to reduce the risk of bleeding while on a anticoagulant.     PCP:  Shane Vanegas M.D.  [address] [place] [address]    Assessment:     INR  therapeutic.     Lab Results   Component Value Date/Time    BUN 28 (H) 2023 11:31 AM    CREATININE 1.48 (H) 2023 11:31 AM     Lab Results   Component Value Date/Time    HEMOGLOBIN 8.9 (L) 2022 09:10 AM    HEMATOCRIT 29.2 (L) 2022 09:10 AM    PLATELETCT 149 (L) 2022 09:10 AM    ALKPHOSPHAT 110 (H) 2023 07:37 AM    ASTSGOT 12 2023 07:37 AM    ALTSGPT 6 2023 07:37 AM          Current Outpatient Medications:     warfarin, 2.5-5 mg, Oral, Q EVENING    enoxaparin, 1 Syringe, Subcutaneous, DAILY     carvedilol, 25 mg, Oral, BID WITH MEALS    Ferrous Sulfate (IRON PO), 65 mg, Oral, BID    losartan, 100 mg, Oral, DAILY    furosemide, 40 mg, Oral, DAILY    hydrALAZINE, 100 mg, Oral, TID    isosorbide dinitrate, 20 mg, Oral, TID    glipiZIDE, 5 mg, Oral, QDAY    potassium chloride SA, 20 mEq, Oral, BID    amitriptyline, 25 mg, Oral, Nightly    allopurinol, 300 mg, Oral, DAILY    aspirin, 81 mg, Oral, DAILY    levothyroxine, 300 mcg, Oral, DAILY    pravastatin, 40 mg, Oral, DAILY      Plan:     Decrease weekly warfarin dose as noted above.           Follow-up:     As seen above      Additional information discussed with patient:     Asked patient to please call the anticoagulation clinic if they have any signs/symptoms of bleeding and/or thrombosis or any changes to diet or medications.      National recommendations regarding anticoagulation therapy:     The CHEST guidelines recommends frequent INR monitoring at regular intervals (a few days up to a max of 12 weeks) to ensure patients are on the proper dose of warfarin, and patients are not having any complications from therapy.  INRs can dramatically change over a short time period due to diet, medications, and medical conditions.         [facility]  Phone: [phone]  Fax: [phone]  On call: [phone]  General scheduling/information [phone]  For emergencies please dial [phone]  Please do not use [employer] for urgent matters, call the phone numbers listed above.    This note was created using voice recognition software (Dragon). The accuracy of the dictation is limited by the abilities of the software. I have reviewed the note prior to signing, however some errors in grammar and context are still possible. If you have any questions related to this note please do not hesitate to contact our office.

## 2023-06-13 ENCOUNTER — TELEPHONE (OUTPATIENT)
Dept: CARDIOLOGY | Facility: MEDICAL CENTER | Age: 58
End: 2023-06-13
Payer: MEDICAID

## 2023-06-13 ENCOUNTER — HOSPITAL ENCOUNTER (OUTPATIENT)
Dept: LAB | Facility: MEDICAL CENTER | Age: 58
End: 2023-06-13
Attending: NURSE PRACTITIONER
Payer: MEDICAID

## 2023-06-13 DIAGNOSIS — I48.0 PAROXYSMAL ATRIAL FIBRILLATION (HCC): ICD-10-CM

## 2023-06-13 DIAGNOSIS — Z79.01 CHRONIC ANTICOAGULATION: ICD-10-CM

## 2023-06-13 LAB
INR PPP: 2.5 (ref 0.87–1.13)
PROTHROMBIN TIME: 26.2 SEC (ref 12–14.6)

## 2023-06-13 PROCEDURE — 36415 COLL VENOUS BLD VENIPUNCTURE: CPT

## 2023-06-13 PROCEDURE — 85610 PROTHROMBIN TIME: CPT

## 2023-06-13 NOTE — TELEPHONE ENCOUNTER
Called [facility] [phone] and [phone] and was on hold on each line for 10 mins without being able to get through to speak with someone.     Phone Number Called: [phone]    Call outcome: Spoke to patient regarding message below.    Message:     Called pt and let him know that I have tried two different phone numbers and was on hold for 10 mins each time without successfully getting through to any one. Pt gave me the fax number to [facility] to try to request what information that is needed from us by Dr. Trivedi. Fax# [phone] for [facility]. Letter drafted to [facility] requesting what information is needed from us and/or [facility] Clinic so that this pt can have his dental procedure done. Faxed letter to number above and received receipt confirmation. Scanned in to [employer].     (Please see tele encounter dated 06/01/2023 with VR recommendations and tele encounter dated 06/06/2023 with ADÁN Shah's attempt at getting a hold of [facility] regarding dental procedure/extractions with no success.)

## 2023-06-14 ENCOUNTER — ANTICOAGULATION MONITORING (OUTPATIENT)
Dept: VASCULAR LAB | Facility: MEDICAL CENTER | Age: 58
End: 2023-06-14
Payer: MEDICAID

## 2023-06-14 DIAGNOSIS — D68.69 SECONDARY HYPERCOAGULABLE STATE (HCC): ICD-10-CM

## 2023-06-14 DIAGNOSIS — I05.9 MITRAL VALVE DISEASE: ICD-10-CM

## 2023-06-14 DIAGNOSIS — I48.0 PAROXYSMAL ATRIAL FIBRILLATION (HCC): Chronic | ICD-10-CM

## 2023-06-14 NOTE — PROGRESS NOTES
Anticoagulation Summary  As of 6/14/2023      INR goal:  2.0-3.0   TTR:  64.0 % (4.1 y)   INR used for dosing:     Warfarin maintenance plan:  5 mg (5 mg x 1) every Mon, Wed, Fri; 2.5 mg (5 mg x 0.5) all other days   Weekly warfarin total:  25 mg   Plan last modified:  Damon Redmond PharmD (6/10/2023)   Next INR check:  6/21/2023   Target end date:  Indefinite    Indications    Mitral valve disease [I05.9]  Paroxysmal atrial fibrillation (HCC) [I48.0]  Secondary hypercoagulable state (HCC) [D68.69]                 Anticoagulation Episode Summary       INR check location:      Preferred lab:      Send INR reminders to:      Comments:  Mitral valve repair - indefinite anticoag per 7/26/19 note from Dr Lawson          Anticoagulation Care Providers       Provider Role Specialty Phone number    FARIDA Parikh Referring Thoracic Surgery (Cardiothoracic Vascular Surgery) [phone]    [facility] Anticoagulation Services Responsible  [phone]            Refer to Anticoagulation Patient Findings for HPI  Patient Findings       Negatives:  Signs/symptoms of thrombosis, Signs/symptoms of bleeding, Laboratory test error suspected, Change in health, Change in alcohol use, Change in activity, Upcoming invasive procedure, Emergency department visit, Upcoming dental procedure, Missed doses, Extra doses, Change in medications, Change in diet/appetite, Hospital admission, Bruising, Other complaints            Spoke with patient to report a therapeutic INR.      Pt is on asa 81 for mvr.    Pt instructed to continue with current warfarin dosing regimen, confirms dosing.   Will follow up in 1 week(s).     Malorie Romano PharmD

## 2023-06-20 ENCOUNTER — HOSPITAL ENCOUNTER (OUTPATIENT)
Dept: LAB | Facility: MEDICAL CENTER | Age: 58
End: 2023-06-20
Attending: NURSE PRACTITIONER
Payer: MEDICAID

## 2023-06-20 DIAGNOSIS — I48.0 PAROXYSMAL ATRIAL FIBRILLATION (HCC): ICD-10-CM

## 2023-06-20 DIAGNOSIS — Z79.01 CHRONIC ANTICOAGULATION: ICD-10-CM

## 2023-06-20 LAB
INR PPP: 2.84 (ref 0.87–1.13)
PROTHROMBIN TIME: 28.9 SEC (ref 12–14.6)

## 2023-06-20 PROCEDURE — 36415 COLL VENOUS BLD VENIPUNCTURE: CPT

## 2023-06-20 PROCEDURE — 85610 PROTHROMBIN TIME: CPT

## 2023-06-20 NOTE — TELEPHONE ENCOUNTER
Phone Number Called: [phone]    Call outcome: Spoke to patient regarding message below.    Message:     Spoke to pt and he is getting frustrated that he still has not heard anything back from the dental office. This RN looked in chart and we have not received any communication back from the dental office since the fax last week. RN offered to re-send the fax as a second attempt. Pt appreciative of this. Pt states that his mouth and tongue are swollen and it is getting harder for him to eat. RN provided ER precautions to pt x3 for an oral infection, denies fever at this time. Pt verbalized understanding and states that he will go to the ER if it gets worse. RN also offered [facility] contact information to see if they could possibly be of assistance to him. Pt very appreciative of call back.     Faxed letter to [facility] for a second attempt. Received receipt confirmation and scanned in to [employer].

## 2023-06-20 NOTE — TELEPHONE ENCOUNTER
Caller:  Mercedes Alcazar    Topic/issue: Ottoniel is still trying to get the Dental clearance sent over.     Callback Number: [phone]    Thank you,   Laila CASTILLO

## 2023-06-21 ENCOUNTER — ANTICOAGULATION MONITORING (OUTPATIENT)
Dept: VASCULAR LAB | Facility: MEDICAL CENTER | Age: 58
End: 2023-06-21
Payer: MEDICAID

## 2023-06-21 DIAGNOSIS — D68.69 SECONDARY HYPERCOAGULABLE STATE (HCC): ICD-10-CM

## 2023-06-21 DIAGNOSIS — I48.0 PAROXYSMAL ATRIAL FIBRILLATION (HCC): Chronic | ICD-10-CM

## 2023-06-21 DIAGNOSIS — I05.9 MITRAL VALVE DISEASE: ICD-10-CM

## 2023-06-21 NOTE — PROGRESS NOTES
Anticoagulation Summary  As of 2023      INR goal:  2.0-3.0   TTR:  64.2 % (4.1 y)   INR used for dosin.84 (2023)   Warfarin maintenance plan:  5 mg (5 mg x 1) every Mon, Wed, Fri; 2.5 mg (5 mg x 0.5) all other days   Weekly warfarin total:  25 mg   Plan last modified:  Damon Redmond PharmD (6/10/2023)   Next INR check:  2023   Target end date:  Indefinite    Indications    Mitral valve disease [I05.9]  Paroxysmal atrial fibrillation (HCC) [I48.0]  Secondary hypercoagulable state (HCC) [D68.69]                 Anticoagulation Episode Summary       INR check location:      Preferred lab:      Send INR reminders to:      Comments:  Mitral valve repair - indefinite anticoag per 19 note from Dr Lawson          Anticoagulation Care Providers       Provider Role Specialty Phone number    LYDIA ParikhP.N. Referring Thoracic Surgery (Cardiothoracic Vascular Surgery) [phone]    [facility] Anticoagulation Services Responsible  [phone]          Anticoagulation Patient Findings      Left voicemail message to report a therapeutic INR of 2.84.    Will have pt continue on with current warfarin dosing regimen.   Requested pt contact the clinic for any s/s of unusual bleeding, bruising, clotting or any changes to diet or medication.    FU INR in 1 week(s) in telemed.    Anil Quan PharmD, BCACP

## 2023-06-27 ENCOUNTER — APPOINTMENT (OUTPATIENT)
Dept: VASCULAR LAB | Facility: MEDICAL CENTER | Age: 58
End: 2023-06-27
Payer: MEDICAID

## 2023-06-27 ENCOUNTER — ANTICOAGULATION MONITORING (OUTPATIENT)
Dept: VASCULAR LAB | Facility: MEDICAL CENTER | Age: 58
End: 2023-06-27

## 2023-06-27 ENCOUNTER — NON-PROVIDER VISIT (OUTPATIENT)
Dept: MEDICAL GROUP | Facility: PHYSICIAN GROUP | Age: 58
End: 2023-06-27
Payer: MEDICAID

## 2023-06-27 VITALS
TEMPERATURE: 97.7 F | SYSTOLIC BLOOD PRESSURE: 110 MMHG | DIASTOLIC BLOOD PRESSURE: 70 MMHG | HEART RATE: 80 BPM | RESPIRATION RATE: 14 BRPM | OXYGEN SATURATION: 94 %

## 2023-06-27 DIAGNOSIS — Z79.01 CHRONIC ANTICOAGULATION: ICD-10-CM

## 2023-06-27 DIAGNOSIS — I05.9 MITRAL VALVE DISEASE: ICD-10-CM

## 2023-06-27 DIAGNOSIS — D68.69 SECONDARY HYPERCOAGULABLE STATE (HCC): ICD-10-CM

## 2023-06-27 DIAGNOSIS — I48.0 PAROXYSMAL ATRIAL FIBRILLATION (HCC): Chronic | ICD-10-CM

## 2023-06-27 LAB
INR BLD: 3.7 (ref 0.9–1.2)
INR PPP: 3.7 (ref 2–3.5)
LOT NUMBER   180167: ABNORMAL
POC PROTIME: 3.7
POCT INT CON NEG: NEGATIVE
POCT INT CON POS: POSITIVE

## 2023-06-27 PROCEDURE — 99999 PR NO CHARGE: CPT | Performed by: NURSE PRACTITIONER

## 2023-06-27 PROCEDURE — 3078F DIAST BP <80 MM HG: CPT | Mod: 95

## 2023-06-27 PROCEDURE — 3074F SYST BP LT 130 MM HG: CPT | Mod: 95

## 2023-06-27 PROCEDURE — 85610 PROTHROMBIN TIME: CPT | Performed by: FAMILY MEDICINE

## 2023-06-27 PROCEDURE — 99214 OFFICE O/P EST MOD 30 MIN: CPT | Mod: 95

## 2023-06-27 NOTE — PROGRESS NOTES
This evaluation was conducted via Telemed using secure and encrypted videoconferencing technology. The patient was physically located at [facility] in [place], [place]. The patient was presented by a medical professional at the originating site.   The patient's identity was confirmed and verbal consent was obtained for this telemedicine encounter.      OP Anticoagulation Service Note    Date: 6/27/2023  Blood Pressure: 110/70  Pulse: 80  Respiration: 14    Anticoagulation Summary  As of 6/27/2023      INR goal:  2.0-3.0   TTR:  64.0 % (4.1 y)   INR used for dosing:  3.70 (6/27/2023)   Warfarin maintenance plan:  5 mg (5 mg x 1) every Mon, Wed, Fri; 2.5 mg (5 mg x 0.5) all other days   Weekly warfarin total:  25 mg   Plan last modified:  Damon Redmond, PharmD (6/10/2023)   Next INR check:  7/3/2023   Target end date:  Indefinite    Indications    Mitral valve disease [I05.9]  Paroxysmal atrial fibrillation (HCC) [I48.0]  Secondary hypercoagulable state (HCC) [D68.69]                 Anticoagulation Episode Summary       INR check location:      Preferred lab:      Send INR reminders to:      Comments:  Mitral valve repair - indefinite anticoag per 7/26/19 note from Dr Lawson          Anticoagulation Care Providers       Provider Role Specialty Phone number    FARIDA Parikh Referring Thoracic Surgery (Cardiothoracic Vascular Surgery) [phone]    [facility] Anticoagulation Services Responsible  [phone]          Anticoagulation Patient Findings  Patient Findings       Positives:  Upcoming dental procedure, Change in diet/appetite (eating less due to tooth pain)    Negatives:  Signs/symptoms of thrombosis, Signs/symptoms of bleeding, Laboratory test error suspected, Change in health, Change in alcohol use, Change in activity, Upcoming invasive procedure, Emergency department visit, Missed doses, Extra doses, Change in medications, Hospital admission, Bruising, Other complaints            THIS  VISIT CONDUCTED WITH [employer] UTILIZING SECURE AND ENCRYPTED VIDEOCONFERENCING EQUIPMENT  ROS:    Pulm: Denies SOB, chest pain.    Card: Denies syncope, edema, palpitations.    Extremities: Denies redness, pain.     PE:    Pulm: No SOB, even and unlabored.    Card: Normal rate and rhythm.    Extremities: No redness, or edema.     INR  supra-therapeutic.    Denies signs/symptoms of bleeding and/or thrombosis.    Denies changes to diet or medications.   Follow up appt in 1 weeks     Plan:  hold dose tomorrow.  (Pt take warfarin in am, already took dose today)  Then resume normal dosing regimen     Medication: Warfarin (Coumadin)        Next Appointment: Tuesday, 7/18/2023  @2pm    Pt will be having consult at new dentist for extractions 6/29/2023.  Pt will update office with plan and when have scheduled.  Reviewed that pt can have up to 3 teeth extracted without interruption in warfarin or ASA.  Previous instructions have been given for stopping, but pt will call office and update when scheduled for full instructions again.      Pt is on asa 81 for mvr. - 6/2023 has stopped for now, pending dental extractions.  Will reevaluate with cardiology at 9/2023 appt    Review all of your home medications and newly ordered medications with your doctor and / or pharmacist. Follow medication instructions as directed by your doctor and / or pharmacist. Please keep your medication list with you and share with your physician. Update the information when medications are discontinued, doses are changed, or new medications (including over-the-counter products) are added; and carry medication information at all times in the event of emergency situations.      The patient is on a high risk medication and is supra-therapeudic. This increases the patients risk of bleeding and therefore requires frequent monitoring and follow up.     CHEST guidelines recommend frequent INR monitoring at regular intervals (a few days up to  a max of 12 weeks) to ensure they are on the proper dose of warfarin and not having any complications from therapy.  INRs can dramatically change over a short time period due to diet, medications, and medical conditions.   The patient is instructed to go to the ER for falls with a head injury,  blood in urine or stool or any bleeding that last longer than 20 min.   For questions, please contact Outpatient Anticoagulation Service [phone].     ARIA Recinos.

## 2023-06-27 NOTE — PROGRESS NOTES
Ottoniel Vamsi Samson is a 58 y.o. male here for a non-provider visit for INR check    If abnormal was an in office provider notified today (if so, indicate provider)? Yes- TELEMED pharmacist    Routed to PCP? No

## 2023-07-03 ENCOUNTER — HOSPITAL ENCOUNTER (OUTPATIENT)
Dept: LAB | Facility: MEDICAL CENTER | Age: 58
End: 2023-07-03
Attending: NURSE PRACTITIONER
Payer: MEDICAID

## 2023-07-03 DIAGNOSIS — I48.0 PAROXYSMAL ATRIAL FIBRILLATION (HCC): ICD-10-CM

## 2023-07-03 DIAGNOSIS — Z79.01 CHRONIC ANTICOAGULATION: ICD-10-CM

## 2023-07-03 LAB
INR PPP: 1.96 (ref 0.87–1.13)
PROTHROMBIN TIME: 21.9 SEC (ref 12–14.6)

## 2023-07-03 PROCEDURE — 36415 COLL VENOUS BLD VENIPUNCTURE: CPT

## 2023-07-03 PROCEDURE — 85610 PROTHROMBIN TIME: CPT

## 2023-07-05 ENCOUNTER — ANTICOAGULATION MONITORING (OUTPATIENT)
Dept: VASCULAR LAB | Facility: MEDICAL CENTER | Age: 58
End: 2023-07-05
Payer: MEDICAID

## 2023-07-05 DIAGNOSIS — I05.9 MITRAL VALVE DISEASE: ICD-10-CM

## 2023-07-05 DIAGNOSIS — I48.0 PAROXYSMAL ATRIAL FIBRILLATION (HCC): Chronic | ICD-10-CM

## 2023-07-05 DIAGNOSIS — D68.69 SECONDARY HYPERCOAGULABLE STATE (HCC): ICD-10-CM

## 2023-07-05 NOTE — PROGRESS NOTES
Anticoagulation Summary  As of 2023      INR goal:  2.0-3.0   TTR:  63.9 % (4.2 y)   INR used for dosin.96 (7/3/2023)   Warfarin maintenance plan:  5 mg (5 mg x 1) every Mon, Wed; 2.5 mg (5 mg x 0.5) all other days   Weekly warfarin total:  22.5 mg   Plan last modified:  Yolis Crain, Naomi (2023)   Next INR check:  2023   Target end date:  Indefinite    Indications    Mitral valve disease [I05.9]  Paroxysmal atrial fibrillation (HCC) [I48.0]  Secondary hypercoagulable state (HCC) [D68.69]                 Anticoagulation Episode Summary       INR check location:      Preferred lab:      Send INR reminders to:      Comments:  Mitral valve repair - indefinite anticoag per 19 note from Dr Lawson          Anticoagulation Care Providers       Provider Role Specialty Phone number    Kiley Perez A.P.N. Referring Thoracic Surgery (Cardiothoracic Vascular Surgery) [phone]    [facility] Anticoagulation Services Responsible  [phone]            Refer to Anticoagulation Patient Findings for HPI  Patient Findings       Negatives:  Signs/symptoms of thrombosis, Signs/symptoms of bleeding, Laboratory test error suspected, Change in health, Change in alcohol use, Change in activity, Upcoming invasive procedure, Emergency department visit, Upcoming dental procedure, Missed doses, Extra doses, Change in medications, Change in diet/appetite, Hospital admission, Bruising, Other complaints            Spoke with pt.  INR is subtherapeutic.     Pt verifies warfarin weekly dosing.     Will have pt reduce regimen as listed above as previous INR was supratherapeutic    Repeat INR in 1 week(s).     Yolis Crain, SilvaD

## 2023-07-11 ENCOUNTER — HOSPITAL ENCOUNTER (OUTPATIENT)
Dept: LAB | Facility: MEDICAL CENTER | Age: 58
End: 2023-07-11
Attending: NURSE PRACTITIONER
Payer: MEDICAID

## 2023-07-11 DIAGNOSIS — Z79.01 CHRONIC ANTICOAGULATION: ICD-10-CM

## 2023-07-11 DIAGNOSIS — I48.0 PAROXYSMAL ATRIAL FIBRILLATION (HCC): ICD-10-CM

## 2023-07-11 LAB
INR PPP: 2.63 (ref 0.87–1.13)
PROTHROMBIN TIME: 27.2 SEC (ref 12–14.6)

## 2023-07-11 PROCEDURE — 36415 COLL VENOUS BLD VENIPUNCTURE: CPT

## 2023-07-11 PROCEDURE — 85610 PROTHROMBIN TIME: CPT

## 2023-07-12 ENCOUNTER — ANTICOAGULATION MONITORING (OUTPATIENT)
Dept: VASCULAR LAB | Facility: MEDICAL CENTER | Age: 58
End: 2023-07-12
Payer: MEDICAID

## 2023-07-12 DIAGNOSIS — I05.9 MITRAL VALVE DISEASE: ICD-10-CM

## 2023-07-12 DIAGNOSIS — D68.69 SECONDARY HYPERCOAGULABLE STATE (HCC): ICD-10-CM

## 2023-07-12 DIAGNOSIS — I48.0 PAROXYSMAL ATRIAL FIBRILLATION (HCC): Chronic | ICD-10-CM

## 2023-07-12 NOTE — PROGRESS NOTES
Anticoagulation Summary  As of 2023      INR goal:  2.0-3.0   TTR:  64.1 % (4.2 y)   INR used for dosin.63 (2023)   Warfarin maintenance plan:  5 mg (5 mg x 1) every Mon, Wed; 2.5 mg (5 mg x 0.5) all other days   Weekly warfarin total:  22.5 mg   Plan last modified:  Yolis Crain, PharmD (2023)   Next INR check:  2023   Target end date:  Indefinite    Indications    Mitral valve disease [I05.9]  Paroxysmal atrial fibrillation (HCC) [I48.0]  Secondary hypercoagulable state (HCC) [D68.69]                 Anticoagulation Episode Summary       INR check location:      Preferred lab:      Send INR reminders to:      Comments:  Mitral valve repair - indefinite anticoag per 19 note from Dr Lawson          Anticoagulation Care Providers       Provider Role Specialty Phone number    Kilye Perez A.P.N. Referring Thoracic Surgery (Cardiothoracic Vascular Surgery) [phone]    [facility] Anticoagulation Services Responsible  [phone]            Refer to Anticoagulation Patient Findings for HPI  Patient Findings       Negatives:  Signs/symptoms of thrombosis, Signs/symptoms of bleeding, Laboratory test error suspected, Change in health, Change in alcohol use, Change in activity, Upcoming invasive procedure, Emergency department visit, Upcoming dental procedure, Missed doses, Extra doses, Change in medications, Change in diet/appetite, Hospital admission, Bruising, Other complaints            Spoke with patient to report a therapeutic INR.      Pt is on Aspirin for MVR     Pt instructed to continue with current warfarin dosing regimen, confirms dosing.   Will follow up in 1 week(s).     Peri Allen, Pharmacy Intern

## 2023-07-18 ENCOUNTER — DOCUMENTATION (OUTPATIENT)
Dept: VASCULAR LAB | Facility: MEDICAL CENTER | Age: 58
End: 2023-07-18
Payer: MEDICAID

## 2023-07-19 NOTE — PROGRESS NOTES
Pt missed telemed anticoagulation appt today.  Will reach out to reschedule pt.  Alyssa KNAPP  [facility]

## 2023-07-20 ENCOUNTER — HOSPITAL ENCOUNTER (OUTPATIENT)
Dept: LAB | Facility: MEDICAL CENTER | Age: 58
End: 2023-07-20
Attending: NURSE PRACTITIONER
Payer: MEDICAID

## 2023-07-20 DIAGNOSIS — Z79.01 CHRONIC ANTICOAGULATION: ICD-10-CM

## 2023-07-20 DIAGNOSIS — I48.0 PAROXYSMAL ATRIAL FIBRILLATION (HCC): ICD-10-CM

## 2023-07-20 LAB
INR PPP: 2.75 (ref 0.87–1.13)
PROTHROMBIN TIME: 28.2 SEC (ref 12–14.6)

## 2023-07-20 PROCEDURE — 85610 PROTHROMBIN TIME: CPT

## 2023-07-20 PROCEDURE — 36415 COLL VENOUS BLD VENIPUNCTURE: CPT

## 2023-07-21 ENCOUNTER — ANTICOAGULATION MONITORING (OUTPATIENT)
Dept: VASCULAR LAB | Facility: MEDICAL CENTER | Age: 58
End: 2023-07-21
Payer: MEDICAID

## 2023-07-21 DIAGNOSIS — I48.0 PAROXYSMAL ATRIAL FIBRILLATION (HCC): Chronic | ICD-10-CM

## 2023-07-21 DIAGNOSIS — D68.69 SECONDARY HYPERCOAGULABLE STATE (HCC): ICD-10-CM

## 2023-07-21 DIAGNOSIS — I05.9 MITRAL VALVE DISEASE: ICD-10-CM

## 2023-07-21 NOTE — PROGRESS NOTES
OP Anticoagulation Service Note    Date: 2023    Anticoagulation Summary  As of 2023      INR goal:  2.0-3.0   TTR:  64.3 % (4.2 y)   INR used for dosin.75 (2023)   Warfarin maintenance plan:  5 mg (5 mg x 1) every Mon, Wed; 2.5 mg (5 mg x 0.5) all other days   Weekly warfarin total:  22.5 mg   Plan last modified:  Yolis Crain, PharmD (2023)   Next INR check:  2023   Target end date:  Indefinite    Indications    Mitral valve disease [I05.9]  Paroxysmal atrial fibrillation (HCC) [I48.0]  Secondary hypercoagulable state (HCC) [D68.69]                 Anticoagulation Episode Summary       INR check location:      Preferred lab:      Send INR reminders to:      Comments:  Mitral valve repair - indefinite anticoag per 19 note from Dr Lawson          Anticoagulation Care Providers       Provider Role Specialty Phone number    FARIDA Parikh Referring Thoracic Surgery (Cardiothoracic Vascular Surgery) [phone]    [facility] Anticoagulation Services Responsible  [phone]          Anticoagulation Patient Findings        Patient's preferred phone number:  [phone]        HPI:   The reason for today's call is to prevent morbidity and mortality from a blood clot and/or stroke and to reduce the risk of bleeding while on a anticoagulant.     PCP:  Shane Vanegas M.D.  [address] [place] [address]    Assessment:     INR  therapeutic.     Lab Results   Component Value Date/Time    BUN 28 (H) 2023 11:31 AM    CREATININE 1.48 (H) 2023 11:31 AM     Lab Results   Component Value Date/Time    HEMOGLOBIN 8.9 (L) 2022 09:10 AM    HEMATOCRIT 29.2 (L) 2022 09:10 AM    PLATELETCT 149 (L) 2022 09:10 AM    ALKPHOSPHAT 110 (H) 2023 07:37 AM    ASTSGOT 12 2023 07:37 AM    ALTSGPT 6 2023 07:37 AM          Current Outpatient Medications:     warfarin, 2.5-5 mg, Oral, Q EVENING    enoxaparin, 1 Syringe, Subcutaneous, DAILY     carvedilol, 25 mg, Oral, BID WITH MEALS    Ferrous Sulfate (IRON PO), 65 mg, Oral, BID    losartan, 100 mg, Oral, DAILY    furosemide, 40 mg, Oral, DAILY    hydrALAZINE, 100 mg, Oral, TID    isosorbide dinitrate, 20 mg, Oral, TID    glipiZIDE, 5 mg, Oral, QDAY    potassium chloride SA, 20 mEq, Oral, BID    amitriptyline, 25 mg, Oral, Nightly    allopurinol, 300 mg, Oral, DAILY    aspirin, 81 mg, Oral, DAILY    levothyroxine, 300 mcg, Oral, DAILY    pravastatin, 40 mg, Oral, DAILY      Plan:     Continue the same warfarin dose, as noted above.       Follow-up:     As seen above      Additional information discussed with patient:     Asked patient to please call the anticoagulation clinic if they have any signs/symptoms of bleeding and/or thrombosis or any changes to diet or medications.      National recommendations regarding anticoagulation therapy:     The CHEST guidelines recommends frequent INR monitoring at regular intervals (a few days up to a max of 12 weeks) to ensure patients are on the proper dose of warfarin, and patients are not having any complications from therapy.  INRs can dramatically change over a short time period due to diet, medications, and medical conditions.         [facility]  Phone: [phone]  Fax: [phone]  On call: [phone]  General scheduling/information [phone]  For emergencies please dial [phone]  Please do not use [employer] for urgent matters, call the phone numbers listed above.    This note was created using voice recognition software (Dragon). The accuracy of the dictation is limited by the abilities of the software. I have reviewed the note prior to signing, however some errors in grammar and context are still possible. If you have any questions related to this note please do not hesitate to contact our office.

## 2023-07-27 ENCOUNTER — HOSPITAL ENCOUNTER (OUTPATIENT)
Dept: LAB | Facility: MEDICAL CENTER | Age: 58
End: 2023-07-27
Attending: NURSE PRACTITIONER
Payer: MEDICAID

## 2023-07-27 DIAGNOSIS — I48.0 PAROXYSMAL ATRIAL FIBRILLATION (HCC): ICD-10-CM

## 2023-07-27 DIAGNOSIS — Z79.01 CHRONIC ANTICOAGULATION: ICD-10-CM

## 2023-07-27 LAB
INR PPP: 3.97 (ref 0.87–1.13)
PROTHROMBIN TIME: 37.2 SEC (ref 12–14.6)

## 2023-07-27 PROCEDURE — 36415 COLL VENOUS BLD VENIPUNCTURE: CPT

## 2023-07-27 PROCEDURE — 85610 PROTHROMBIN TIME: CPT

## 2023-08-15 ENCOUNTER — ANTICOAGULATION MONITORING (OUTPATIENT)
Dept: VASCULAR LAB | Facility: MEDICAL CENTER | Age: 58
End: 2023-08-15

## 2023-08-15 ENCOUNTER — NON-PROVIDER VISIT (OUTPATIENT)
Dept: MEDICAL GROUP | Facility: PHYSICIAN GROUP | Age: 58
End: 2023-08-15
Payer: MEDICAID

## 2023-08-15 ENCOUNTER — APPOINTMENT (OUTPATIENT)
Dept: VASCULAR LAB | Facility: MEDICAL CENTER | Age: 58
End: 2023-08-15
Payer: MEDICAID

## 2023-08-15 VITALS
OXYGEN SATURATION: 95 % | BODY MASS INDEX: 33.02 KG/M2 | HEART RATE: 60 BPM | RESPIRATION RATE: 12 BRPM | DIASTOLIC BLOOD PRESSURE: 60 MMHG | TEMPERATURE: 96.8 F | WEIGHT: 210.8 LBS | SYSTOLIC BLOOD PRESSURE: 108 MMHG

## 2023-08-15 DIAGNOSIS — I48.0 PAROXYSMAL ATRIAL FIBRILLATION (HCC): Chronic | ICD-10-CM

## 2023-08-15 DIAGNOSIS — Z79.01 CHRONIC ANTICOAGULATION: ICD-10-CM

## 2023-08-15 DIAGNOSIS — D68.69 SECONDARY HYPERCOAGULABLE STATE (HCC): ICD-10-CM

## 2023-08-15 DIAGNOSIS — I05.9 MITRAL VALVE DISEASE: ICD-10-CM

## 2023-08-15 LAB
INR BLD: 3 (ref 0.9–1.2)
INR PPP: 3 (ref 2–3.5)
LOT NUMBER   180167: ABNORMAL
POC PROTIME: 3
POCT INT CON NEG: NEGATIVE
POCT INT CON POS: POSITIVE

## 2023-08-15 PROCEDURE — 85610 PROTHROMBIN TIME: CPT | Performed by: FAMILY MEDICINE

## 2023-08-15 RX ORDER — ENOXAPARIN SODIUM 150 MG/ML
1 INJECTION SUBCUTANEOUS DAILY
Qty: 10 EACH | Refills: 2 | Status: SHIPPED | OUTPATIENT
Start: 2023-08-15 | End: 2023-09-20

## 2023-08-15 ASSESSMENT — FIBROSIS 4 INDEX: FIB4 SCORE: 1.97

## 2023-08-15 NOTE — PROGRESS NOTES
This evaluation was conducted via Telemed using secure and encrypted videoconferencing technology. The patient was physically located at [facility] in [place], [place]. The patient was presented by a medical professional at the originating site.   The patient's identity was confirmed and verbal consent was obtained for this telemedicine encounter.      OP Anticoagulation Service Note    Date: 8/15/2023  Blood Pressure: 108/60  Pulse: 60  Respiration: 12    Anticoagulation Summary  As of 8/15/2023      INR goal:  2.0-3.0   TTR:  63.4 % (4.3 y)   INR used for dosing:  3.00 (8/15/2023)   Warfarin maintenance plan:  5 mg (5 mg x 1) every Mon, Wed; 2.5 mg (5 mg x 0.5) all other days   Weekly warfarin total:  22.5 mg   Plan last modified:  Yolis Crain, PharmD (7/5/2023)   Next INR check:  8/31/2023   Target end date:  Indefinite    Indications    Mitral valve disease [I05.9]  Paroxysmal atrial fibrillation (HCC) [I48.0]  Secondary hypercoagulable state (HCC) [D68.69]                 Anticoagulation Episode Summary       INR check location:      Preferred lab:      Send INR reminders to:      Comments:  Mitral valve repair - indefinite anticoag per 7/26/19 note from Dr Lawson          Anticoagulation Care Providers       Provider Role Specialty Phone number    FARIDA Parikh Referring Thoracic Surgery (Cardiothoracic Vascular Surgery) [phone]    [facility] Anticoagulation Services Responsible  [phone]          Anticoagulation Patient Findings  Patient Findings       Positives:  Upcoming dental procedure (upper teeth extractions scheduled 8/28/23)    Negatives:  Signs/symptoms of thrombosis, Signs/symptoms of bleeding, Laboratory test error suspected, Change in health, Change in alcohol use, Change in activity, Upcoming invasive procedure, Emergency department visit, Missed doses, Extra doses, Change in medications, Change in diet/appetite, Hospital admission, Bruising, Other complaints             THIS VISIT CONDUCTED WITH PRESENTER [employer]IA [employer] UTILIZING SECURE AND ENCRYPTED VIDEOCONFERENCING EQUIPMENT  ROS:    Pulm: Denies SOB, chest pain.    Card: Denies syncope, edema, palpitations.    Extremities: Denies redness, pain.     PE:    Pulm: No SOB, even and unlabored.    Card: Normal rate and rhythm.    Extremities: No redness, or edema.     INR  is-therapeutic.    Denies signs/symptoms of bleeding and/or thrombosis.    Denies changes to diet or medications.   Follow up appt in 1 weeks     Plan:  continue with current dosing regimen.  Pt to have upcoming dental extractions 8/28/2023.  Follow dosing instructions as directed below.      Medication: Warfarin (Coumadin)        Next Appointment: Tuesday, 8/28/2023 @10 in [place], prior to dental appt       Pt is not on antiplatelet therapy, being held until reeval with cardiology 9/2023 appt.    Review all of your home medications and newly ordered medications with your doctor and / or pharmacist. Follow medication instructions as directed by your doctor and / or pharmacist. Please keep your medication list with you and share with your physician. Update the information when medications are discontinued, doses are changed, or new medications (including over-the-counter products) are added; and carry medication information at all times in the event of emergency situations.       Patient is on a high risk medication and therefore requires close monitoring and follow up.    CHEST guidelines recommend frequent INR monitoring at regular intervals (a few days up to a max of 12 weeks) to ensure they are on the proper dose of warfarin and not having any complications from therapy.  INRs can dramatically change over a short time period due to diet, medications, and medical conditions.   The patient is instructed to go to the ER for falls with a head injury,  blood in urine or stool or any bleeding that last longer than 20 min.   For questions, please contact  Outpatient Anticoagulation Service [phone].     ARIA Recinos.        Pt to have procedure on 8/28/2023. Due to pt history lovenox bridging is indicated.   Pt to follow instructions as below, after procedure to ask operating MD when safe to resume anticoagulation, if no instructions given, pt will follow as below.     CHADSvasc 2  Clot history no    Current weight: 95.6 kg  CrCl = 71.405  Lab Results   Component Value Date/Time    BUN 28 (H) 05/19/2023 11:31 AM    CREATININE 1.48 (H) 05/19/2023 11:31 AM          Used Lovenox before yes        Date  Warfarin dosing PM Lovenox   5 Days before procedure 8/23 0mg NONE   4 Days before procedure 8/24 0mg Lovenox injection   3 Days before procedure 8/25 0mg Lovenox injection   2 Days before procedure 8/26 0mg Lovenox injection   1 Days before procedure 8/27 0mg NONE   PROCEDURE 8/28 10 NONE   1 Day after procedure 8/29 10 Restart Lovenox injections      2 Days after procedure 8/30 5 Lovenox injection   3 Days after procedure 8/31 2.5 Lovenox injection  Get INR checked   4 Days after procedure 9/1 2.5 Lovenox injection   5 Days after procedure 9/2 2.5 GET INR checked?     ARIA Recinos.

## 2023-08-28 ENCOUNTER — ANTICOAGULATION VISIT (OUTPATIENT)
Dept: VASCULAR LAB | Facility: MEDICAL CENTER | Age: 58
End: 2023-08-28
Attending: INTERNAL MEDICINE
Payer: MEDICAID

## 2023-08-28 DIAGNOSIS — D68.69 SECONDARY HYPERCOAGULABLE STATE (HCC): ICD-10-CM

## 2023-08-28 DIAGNOSIS — I48.0 PAROXYSMAL ATRIAL FIBRILLATION (HCC): Chronic | ICD-10-CM

## 2023-08-28 DIAGNOSIS — I05.9 MITRAL VALVE DISEASE: ICD-10-CM

## 2023-08-28 LAB — INR PPP: 1.3 (ref 2–3.5)

## 2023-08-28 PROCEDURE — 85610 PROTHROMBIN TIME: CPT

## 2023-08-28 PROCEDURE — 99211 OFF/OP EST MAY X REQ PHY/QHP: CPT

## 2023-08-28 NOTE — PROGRESS NOTES
Anticoagulation Summary  As of 2023      INR goal:  2.0-3.0   TTR:  63.3 % (4.3 y)   INR used for dosin.30 (2023)   Warfarin maintenance plan:  5 mg (5 mg x 1) every Mon, Wed; 2.5 mg (5 mg x 0.5) all other days   Weekly warfarin total:  22.5 mg   Plan last modified:  Yolis Crain, PharmD (2023)   Next INR check:  2023   Target end date:  Indefinite    Indications    Mitral valve disease [I05.9]  Paroxysmal atrial fibrillation (HCC) [I48.0]  Secondary hypercoagulable state (HCC) [D68.69]                 Anticoagulation Episode Summary       INR check location:      Preferred lab:      Send INR reminders to:      Comments:  Mitral valve repair - indefinite anticoag per 19 note from Dr Lawson          Anticoagulation Care Providers       Provider Role Specialty Phone number    Kiley Perez A.P.N. Referring Thoracic Surgery (Cardiothoracic Vascular Surgery) [phone]    [facility] Anticoagulation Services Responsible  [phone]                  Refer to Patient Findings for HPI:  Patient Findings       Positives:  Upcoming dental procedure (teeth extraction-- patient is bridging lovenox)    Negatives:  Signs/symptoms of thrombosis, Signs/symptoms of bleeding, Laboratory test error suspected, Change in health, Change in alcohol use, Change in activity, Upcoming invasive procedure, Emergency department visit, Missed doses, Extra doses, Change in medications, Change in diet/appetite, Hospital admission, Bruising, Other complaints            There were no vitals filed for this visit.  pt declined vitals    Verified current warfarin dosing schedule.    Medications reconciled: No  Pt is not on antiplatelet therapy (provider STOPPED aspirin therapy d/t upcoming dental procedure - reassess with provider      A/P   INR is subtherapeutic    Warfarin dosing recommendation: patient to continue with current warfarin + lovenox regimen as listed on calendar. Reassess injections on next INR this  Friday.     Pt educated to contact our clinic with any changes in medications or s/s of bleeding or thrombosis. Pt is aware to seek immediate medical attention for falls, head injury or deep cuts.    Follow up INR with lab in [place] on 09/01/2023    Vesta Collado, pharmacy student   Vinicio Finnegan, PharmD  Seen with Silva TejedaD.

## 2023-09-01 ENCOUNTER — HOSPITAL ENCOUNTER (OUTPATIENT)
Dept: LAB | Facility: MEDICAL CENTER | Age: 58
End: 2023-09-01
Attending: NURSE PRACTITIONER
Payer: MEDICAID

## 2023-09-01 DIAGNOSIS — I48.0 PAROXYSMAL ATRIAL FIBRILLATION (HCC): ICD-10-CM

## 2023-09-01 LAB
INR PPP: 2.38 (ref 0.87–1.13)
PROTHROMBIN TIME: 26.3 SEC (ref 12–14.6)

## 2023-09-01 PROCEDURE — 36415 COLL VENOUS BLD VENIPUNCTURE: CPT

## 2023-09-01 PROCEDURE — 85610 PROTHROMBIN TIME: CPT

## 2023-09-05 ENCOUNTER — ANTICOAGULATION MONITORING (OUTPATIENT)
Dept: VASCULAR LAB | Facility: MEDICAL CENTER | Age: 58
End: 2023-09-05

## 2023-09-05 ENCOUNTER — NON-PROVIDER VISIT (OUTPATIENT)
Dept: MEDICAL GROUP | Facility: PHYSICIAN GROUP | Age: 58
End: 2023-09-05
Payer: MEDICAID

## 2023-09-05 ENCOUNTER — APPOINTMENT (OUTPATIENT)
Dept: VASCULAR LAB | Facility: MEDICAL CENTER | Age: 58
End: 2023-09-05
Payer: MEDICAID

## 2023-09-05 VITALS
HEART RATE: 63 BPM | TEMPERATURE: 97.5 F | DIASTOLIC BLOOD PRESSURE: 60 MMHG | SYSTOLIC BLOOD PRESSURE: 110 MMHG | RESPIRATION RATE: 14 BRPM | OXYGEN SATURATION: 95 %

## 2023-09-05 DIAGNOSIS — I05.9 MITRAL VALVE DISEASE: ICD-10-CM

## 2023-09-05 DIAGNOSIS — Z79.01 CHRONIC ANTICOAGULATION: ICD-10-CM

## 2023-09-05 DIAGNOSIS — I48.0 PAROXYSMAL ATRIAL FIBRILLATION (HCC): Chronic | ICD-10-CM

## 2023-09-05 DIAGNOSIS — D68.69 SECONDARY HYPERCOAGULABLE STATE (HCC): ICD-10-CM

## 2023-09-05 PROCEDURE — 99213 OFFICE O/P EST LOW 20 MIN: CPT | Mod: 95

## 2023-09-05 PROCEDURE — 3078F DIAST BP <80 MM HG: CPT

## 2023-09-05 PROCEDURE — 85610 PROTHROMBIN TIME: CPT | Performed by: FAMILY MEDICINE

## 2023-09-05 PROCEDURE — 3074F SYST BP LT 130 MM HG: CPT

## 2023-09-05 NOTE — PROGRESS NOTES
This evaluation was conducted via Telemed using secure and encrypted videoconferencing technology. The patient was physically located at [facility] in [place], [place]. The patient was presented by a medical professional at the originating site.   The patient's identity was confirmed and verbal consent was obtained for this telemedicine encounter.      OP Anticoagulation Service Note    Date: 2023  Blood Pressure: 110/60  Pulse: 63  Respiration: 14    Anticoagulation Summary  As of 2023      INR goal:  2.0-3.0   TTR:  63.3 % (4.3 y)   INR used for dosin.50 (2023)   Warfarin maintenance plan:  5 mg (5 mg x 1) every Tue, Thu; 2.5 mg (5 mg x 0.5) all other days   Weekly warfarin total:  22.5 mg   Plan last modified:  SALVADOR Recinos (2023)   Next INR check:  2023   Target end date:  Indefinite    Indications    Mitral valve disease [I05.9]  Paroxysmal atrial fibrillation (HCC) [I48.0]  Secondary hypercoagulable state (HCC) [D68.69]                 Anticoagulation Episode Summary       INR check location:      Preferred lab:      Send INR reminders to:      Comments:  Mitral valve repair - indefinite anticoag per 19 note from Dr Lawson          Anticoagulation Care Providers       Provider Role Specialty Phone number    FARIDA Parikh Referring Thoracic Surgery (Cardiothoracic Vascular Surgery) [phone]    [facility] Anticoagulation Services Responsible  [phone]          Anticoagulation Patient Findings  Patient Findings       Negatives:  Signs/symptoms of thrombosis, Signs/symptoms of bleeding, Laboratory test error suspected, Change in health, Change in alcohol use, Change in activity, Upcoming invasive procedure, Emergency department visit, Upcoming dental procedure, Missed doses, Extra doses, Change in medications, Change in diet/appetite, Hospital admission, Bruising, Other complaints            THIS VISIT CONDUCTED WITH [employer] TELE[employer]ICINE  UTILIZING SECURE AND ENCRYPTED VIDEOCONFERENCING EQUIPMENT  ROS:    Pulm: Denies SOB, chest pain.    Card: Denies syncope, edema, palpitations.    Extremities: Denies redness, pain.     PE:    Pulm: No SOB, even and unlabored.    Card: Normal rate and rhythm.    Extremities: No redness, or edema.     INR  is-therapeutic.    Denies signs/symptoms of bleeding and/or thrombosis.    Denies changes to diet or medications.   Follow up appt in 2 weeks     Plan:  continue with current dosing regimen  STOP lovenox shots    Medication: Warfarin (Coumadin)        Next Appointment: Wednesday, 9/20/2023 @1:45       Pt is not on antiplatelet therapy, but has been on for MVR, will reevaluate at next cardiology appt 9/292023     Review all of your home medications and newly ordered medications with your doctor and / or pharmacist. Follow medication instructions as directed by your doctor and / or pharmacist. Please keep your medication list with you and share with your physician. Update the information when medications are discontinued, doses are changed, or new medications (including over-the-counter products) are added; and carry medication information at all times in the event of emergency situations.       Patient is on a high risk medication and therefore requires close monitoring and follow up.    CHEST guidelines recommend frequent INR monitoring at regular intervals (a few days up to a max of 12 weeks) to ensure they are on the proper dose of warfarin and not having any complications from therapy.  INRs can dramatically change over a short time period due to diet, medications, and medical conditions.   The patient is instructed to go to the ER for falls with a head injury,  blood in urine or stool or any bleeding that last longer than 20 min.   For questions, please contact Outpatient Anticoagulation Service [phone].     ARIA Recinos.

## 2023-09-20 ENCOUNTER — NON-PROVIDER VISIT (OUTPATIENT)
Dept: MEDICAL GROUP | Facility: PHYSICIAN GROUP | Age: 58
End: 2023-09-20
Payer: MEDICAID

## 2023-09-20 ENCOUNTER — ANTICOAGULATION MONITORING (OUTPATIENT)
Dept: VASCULAR LAB | Facility: MEDICAL CENTER | Age: 58
End: 2023-09-20

## 2023-09-20 VITALS — OXYGEN SATURATION: 96 % | HEART RATE: 78 BPM | SYSTOLIC BLOOD PRESSURE: 126 MMHG | DIASTOLIC BLOOD PRESSURE: 78 MMHG

## 2023-09-20 DIAGNOSIS — Z79.01 CHRONIC ANTICOAGULATION: ICD-10-CM

## 2023-09-20 DIAGNOSIS — I05.9 MITRAL VALVE DISEASE: ICD-10-CM

## 2023-09-20 DIAGNOSIS — I48.0 PAROXYSMAL ATRIAL FIBRILLATION (HCC): Chronic | ICD-10-CM

## 2023-09-20 DIAGNOSIS — D68.69 SECONDARY HYPERCOAGULABLE STATE (HCC): ICD-10-CM

## 2023-09-20 PROCEDURE — 99213 OFFICE O/P EST LOW 20 MIN: CPT | Mod: 95 | Performed by: NURSE PRACTITIONER

## 2023-09-20 PROCEDURE — 3074F SYST BP LT 130 MM HG: CPT | Performed by: NURSE PRACTITIONER

## 2023-09-20 PROCEDURE — 3078F DIAST BP <80 MM HG: CPT | Performed by: NURSE PRACTITIONER

## 2023-09-20 PROCEDURE — 85610 PROTHROMBIN TIME: CPT | Performed by: FAMILY MEDICINE

## 2023-09-20 NOTE — Clinical Note
Nando Keller, I saw this pt in the [facility] clinic today.  Can I please clarify if he should be on ASA 81 mg? He was taking it up until June but stopped for dental work and never restarted. Thanks for your time, Gayatri

## 2023-09-20 NOTE — PROGRESS NOTES
[facility] Tele[facility] Anticoagulation Service Note    09/20/23    This evaluation was conducted via telemedicine visit using secure and encrypted videoconferencing technology. The patient was physically located at [facility] in [place], [place]. The patient was presented by a medical professional at the originating site. The patient's identity was confirmed and verbal consent for the telemedicine encounter was obtained.    Anticoagulation Summary  As of 9/20/2023      INR goal:  2.0-3.0   TTR:  63.3 % (4.3 y)   INR used for dosing:     Warfarin maintenance plan:  5 mg (5 mg x 1) every Tue, Thu; 2.5 mg (5 mg x 0.5) all other days   Weekly warfarin total:  22.5 mg   Plan last modified:  SALVADOR Recinos (9/5/2023)   Next INR check:     Target end date:  Indefinite    Indications    Mitral valve disease [I05.9]  Paroxysmal atrial fibrillation (HCC) [I48.0]  Secondary hypercoagulable state (HCC) [D68.69]                 Anticoagulation Episode Summary       INR check location:      Preferred lab:      Send INR reminders to:      Comments:  Mitral valve repair - indefinite anticoag per 7/26/19 note from Dr Lawson          Anticoagulation Care Providers       Provider Role Specialty Phone number    FARIDA Parikh Referring Thoracic Surgery (Cardiothoracic Vascular Surgery) [phone]    [facility] Anticoagulation Services Responsible  [phone]                Refer to Patient Findings for HPI:  Patient Findings       Negatives:  Signs/symptoms of thrombosis, Signs/symptoms of bleeding, Laboratory test error suspected, Change in health, Change in alcohol use, Change in activity, Upcoming invasive procedure, Emergency department visit, Upcoming dental procedure, Missed doses, Extra doses, Change in medications, Change in diet/appetite, Hospital admission, Bruising, Other complaints             Vitals:    09/20/23 1345   BP: 126/78   Pulse: 78   SpO2: 96%       Verified current warfarin dosing schedule.    Pt not currently taking aspirin. Will clarify with Dr RITTER    Review of Systems   HENT: Negative for nosebleeds.   Respiratory: Negative for shortness of breath.  Gastrointestinal: Negative for blood in stool.   Genitourinary: Negative for hematuria.   Psychiatric/Behavioral: The patient is not nervous/anxious.     Physical Exam   Constitutional: Oriented to person, place, and time. Appears well-developed and well-nourished.   Pulmonary/Chest: Effort normal. No respiratory distress.   Skin: Not diaphoretic.  Psychiatric: Normal mood and affect. Behavior is normal.    A/P   INR is therapeutic.    Warfarin dosing recommendation: Continue regimen as listed above..       Sunday Monday Tuesday Wednesday Thursday Friday Saturday      2.5 mg    2.5 mg    5 mg    2.5 mg    5 mg    2.5 mg    2.5 mg      1/2 tab(s)    1/2 tab(s)    1 tab(s)    1/2 tab(s)    1 tab(s)    1/2 tab(s)  1/2 tab(s)     Next Appointment: Wednesday, October 11, 2023 at 2:15 pm.     Pt educated to contact our clinic with any changes in medications or s/s of bleeding or thrombosis. Pt is aware to seek immediate medical attention for falls, head injury or deep cuts. Review all of your home medications and newly ordered medications with your doctor and / or pharmacist. Follow medication instructions as directed by your doctor and / or pharmacist. Please keep your medication list with you and share with your physician. Update the information when medications are discontinued, doses are changed, or new medications (including over-the-counter products) are added; and carry medication information at all times in the event of emergency situations.    CHEST guidelines recommend frequent INR monitoring at regular intervals (a few days up to a max of 12 weeks) to ensure they are on the proper dose of warfarin and not having any  complications from therapy.  INRs can dramatically change over a short time period due to diet, medications, and medical conditions.   The patient is instructed to go to the ER for falls with a head injury, blood in urine or stool or any bleeding that last longer than 20 min.   For questions, please contact Outpatient Anticoagulation Service [phone].         JANELLE Mayes.  [facility]  [phone]

## 2023-09-29 ENCOUNTER — HOSPITAL ENCOUNTER (OUTPATIENT)
Dept: LAB | Facility: MEDICAL CENTER | Age: 58
End: 2023-09-29
Attending: NURSE PRACTITIONER
Payer: MEDICAID

## 2023-09-29 ENCOUNTER — OFFICE VISIT (OUTPATIENT)
Dept: CARDIOLOGY | Facility: PHYSICIAN GROUP | Age: 58
End: 2023-09-29
Payer: MEDICAID

## 2023-09-29 VITALS
HEIGHT: 67 IN | RESPIRATION RATE: 12 BRPM | HEART RATE: 84 BPM | OXYGEN SATURATION: 97 % | WEIGHT: 205 LBS | SYSTOLIC BLOOD PRESSURE: 120 MMHG | DIASTOLIC BLOOD PRESSURE: 72 MMHG | BODY MASS INDEX: 32.18 KG/M2

## 2023-09-29 DIAGNOSIS — Z98.890 HISTORY OF MITRAL VALVE REPAIR: Chronic | ICD-10-CM

## 2023-09-29 DIAGNOSIS — D68.69 SECONDARY HYPERCOAGULABLE STATE (HCC): ICD-10-CM

## 2023-09-29 DIAGNOSIS — E78.2 MIXED HYPERLIPIDEMIA: ICD-10-CM

## 2023-09-29 DIAGNOSIS — I48.0 PAROXYSMAL ATRIAL FIBRILLATION (HCC): Chronic | ICD-10-CM

## 2023-09-29 DIAGNOSIS — I05.9 MITRAL VALVE DISEASE: ICD-10-CM

## 2023-09-29 DIAGNOSIS — I10 ESSENTIAL HYPERTENSION: ICD-10-CM

## 2023-09-29 DIAGNOSIS — I42.8 OTHER CARDIOMYOPATHY (HCC): ICD-10-CM

## 2023-09-29 LAB
ALBUMIN SERPL BCP-MCNC: 3.9 G/DL (ref 3.2–4.9)
BUN SERPL-MCNC: 20 MG/DL (ref 8–22)
CALCIUM ALBUM COR SERPL-MCNC: 9.2 MG/DL (ref 8.5–10.5)
CALCIUM SERPL-MCNC: 9.1 MG/DL (ref 8.5–10.5)
CHLORIDE SERPL-SCNC: 106 MMOL/L (ref 96–112)
CO2 SERPL-SCNC: 22 MMOL/L (ref 20–33)
CREAT SERPL-MCNC: 1.27 MG/DL (ref 0.5–1.4)
CREAT UR-MCNC: 17.69 MG/DL
GFR SERPLBLD CREATININE-BSD FMLA CKD-EPI: 64 ML/MIN/1.73 M 2
GLUCOSE SERPL-MCNC: 150 MG/DL (ref 65–99)
PHOSPHATE SERPL-MCNC: 3.7 MG/DL (ref 2.5–4.5)
POTASSIUM SERPL-SCNC: 3.6 MMOL/L (ref 3.6–5.5)
PROT UR-MCNC: 5 MG/DL (ref 0–15)
PROT/CREAT UR: 283 MG/G (ref 15–68)
SODIUM SERPL-SCNC: 142 MMOL/L (ref 135–145)

## 2023-09-29 PROCEDURE — 36415 COLL VENOUS BLD VENIPUNCTURE: CPT

## 2023-09-29 PROCEDURE — 82570 ASSAY OF URINE CREATININE: CPT

## 2023-09-29 PROCEDURE — 99214 OFFICE O/P EST MOD 30 MIN: CPT | Performed by: INTERNAL MEDICINE

## 2023-09-29 PROCEDURE — 3074F SYST BP LT 130 MM HG: CPT | Performed by: INTERNAL MEDICINE

## 2023-09-29 PROCEDURE — 84156 ASSAY OF PROTEIN URINE: CPT

## 2023-09-29 PROCEDURE — 3078F DIAST BP <80 MM HG: CPT | Performed by: INTERNAL MEDICINE

## 2023-09-29 PROCEDURE — 80069 RENAL FUNCTION PANEL: CPT

## 2023-09-29 ASSESSMENT — ENCOUNTER SYMPTOMS
CONSTIPATION: 0
DIZZINESS: 0
FEVER: 0
DECREASED APPETITE: 0
DEPRESSION: 0
DIARRHEA: 0
PALPITATIONS: 0
PND: 0
NEAR-SYNCOPE: 0
ALTERED MENTAL STATUS: 0
WEIGHT LOSS: 0
WEIGHT GAIN: 0
COUGH: 0
SHORTNESS OF BREATH: 0
BLURRED VISION: 0
ABDOMINAL PAIN: 0
IRREGULAR HEARTBEAT: 0
FLANK PAIN: 0
BACK PAIN: 0
ORTHOPNEA: 0
HEARTBURN: 0
SYNCOPE: 0
DYSPNEA ON EXERTION: 0
CLAUDICATION: 0
VOMITING: 0
NAUSEA: 0

## 2023-09-29 ASSESSMENT — FIBROSIS 4 INDEX: FIB4 SCORE: 1.97
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
## 2023-10-17 ENCOUNTER — HOSPITAL ENCOUNTER (OUTPATIENT)
Dept: RADIOLOGY | Facility: MEDICAL CENTER | Age: 58
End: 2023-10-17
Attending: STUDENT IN AN ORGANIZED HEALTH CARE EDUCATION/TRAINING PROGRAM
Payer: MEDICAID

## 2023-10-17 DIAGNOSIS — N28.89 OTHER SPECIFIED DISORDERS OF KIDNEY AND URETER: ICD-10-CM

## 2023-10-17 DIAGNOSIS — I48.0 PAROXYSMAL ATRIAL FIBRILLATION (HCC): ICD-10-CM

## 2023-10-17 PROCEDURE — 700117 HCHG RX CONTRAST REV CODE 255: Mod: UD | Performed by: STUDENT IN AN ORGANIZED HEALTH CARE EDUCATION/TRAINING PROGRAM

## 2023-10-17 PROCEDURE — A9579 GAD-BASE MR CONTRAST NOS,1ML: HCPCS | Mod: UD | Performed by: STUDENT IN AN ORGANIZED HEALTH CARE EDUCATION/TRAINING PROGRAM

## 2023-10-17 PROCEDURE — 74183 MRI ABD W/O CNTR FLWD CNTR: CPT

## 2023-10-17 RX ADMIN — GADOTERIDOL 20 ML: 279.3 INJECTION, SOLUTION INTRAVENOUS at 15:53

## 2023-10-31 ENCOUNTER — HOSPITAL ENCOUNTER (OUTPATIENT)
Dept: LAB | Facility: MEDICAL CENTER | Age: 58
End: 2023-10-31
Attending: NURSE PRACTITIONER
Payer: MEDICAID

## 2023-10-31 DIAGNOSIS — I48.0 PAROXYSMAL ATRIAL FIBRILLATION (HCC): ICD-10-CM

## 2023-10-31 LAB
INR PPP: 2.27 (ref 0.87–1.13)
PROTHROMBIN TIME: 25.4 SEC (ref 12–14.6)

## 2023-10-31 PROCEDURE — 36415 COLL VENOUS BLD VENIPUNCTURE: CPT

## 2023-10-31 PROCEDURE — 85610 PROTHROMBIN TIME: CPT

## 2023-11-01 ENCOUNTER — ANTICOAGULATION MONITORING (OUTPATIENT)
Dept: VASCULAR LAB | Facility: MEDICAL CENTER | Age: 58
End: 2023-11-01
Payer: MEDICAID

## 2023-11-01 DIAGNOSIS — D68.69 SECONDARY HYPERCOAGULABLE STATE (HCC): ICD-10-CM

## 2023-11-01 DIAGNOSIS — I48.0 PAROXYSMAL ATRIAL FIBRILLATION (HCC): Chronic | ICD-10-CM

## 2023-11-01 DIAGNOSIS — I05.9 MITRAL VALVE DISEASE: ICD-10-CM

## 2023-11-01 NOTE — PROGRESS NOTES
Anticoagulation Summary  As of 2023      INR goal:  2.0-3.0   TTR:  64.6 % (4.5 y)   INR used for dosin.27 (10/31/2023)   Warfarin maintenance plan:  5 mg (5 mg x 1) every Tue, Thu; 2.5 mg (5 mg x 0.5) all other days   Weekly warfarin total:  22.5 mg   Plan last modified:  TAYLOR RecinosRTannerNTanner (2023)   Next INR check:  11/15/2023   Target end date:  Indefinite    Indications    Mitral valve disease [I05.9]  Paroxysmal atrial fibrillation (HCC) [I48.0]  Secondary hypercoagulable state (HCC) [D68.69]                 Anticoagulation Episode Summary       INR check location:      Preferred lab:      Send INR reminders to:      Comments:  Mitral valve repair - indefinite anticoag per 19 note from Dr Lawson          Anticoagulation Care Providers       Provider Role Specialty Phone number    LYDIA ParikhPJOHN Referring Thoracic Surgery (Cardiothoracic Vascular Surgery) [phone]    [facility] Anticoagulation Services Responsible  [phone]          Anticoagulation Patient Findings  Patient Findings       Negatives:  Signs/symptoms of thrombosis, Signs/symptoms of bleeding, Laboratory test error suspected, Change in health, Change in alcohol use, Change in activity, Upcoming invasive procedure, Emergency department visit, Upcoming dental procedure, Missed doses, Extra doses, Change in medications, Change in diet/appetite, Hospital admission, Bruising, Other complaints                Spoke with patient on the phone.   Patient is therapeutic today.   Confirmed dosing.  Instructed patient to call clinic if any unusual bleeding or bruising occurs.   Will continue dosing as outlined.   Will follow-up with patient in 2 week(s).  Judie Sharpe, Student Pharmacist

## 2023-11-29 ENCOUNTER — ANTICOAGULATION MONITORING (OUTPATIENT)
Dept: VASCULAR LAB | Facility: MEDICAL CENTER | Age: 58
End: 2023-11-29

## 2023-11-29 ENCOUNTER — NON-PROVIDER VISIT (OUTPATIENT)
Dept: MEDICAL GROUP | Facility: PHYSICIAN GROUP | Age: 58
End: 2023-11-29
Payer: MEDICAID

## 2023-11-29 VITALS
OXYGEN SATURATION: 97 % | RESPIRATION RATE: 14 BRPM | HEART RATE: 75 BPM | TEMPERATURE: 97.9 F | DIASTOLIC BLOOD PRESSURE: 78 MMHG | SYSTOLIC BLOOD PRESSURE: 122 MMHG

## 2023-11-29 DIAGNOSIS — I48.0 PAROXYSMAL ATRIAL FIBRILLATION (HCC): Chronic | ICD-10-CM

## 2023-11-29 DIAGNOSIS — D68.69 SECONDARY HYPERCOAGULABLE STATE (HCC): ICD-10-CM

## 2023-11-29 DIAGNOSIS — I05.9 MITRAL VALVE DISEASE: ICD-10-CM

## 2023-11-29 DIAGNOSIS — Z79.01 CHRONIC ANTICOAGULATION: ICD-10-CM

## 2023-11-29 LAB
INR BLD: 2.2 (ref 0.9–1.2)
INR PPP: 2.2 (ref 2–3.5)
LOT NUMBER   180167: ABNORMAL
POC PROTIME: 2.2
POCT INT CON NEG: NEGATIVE
POCT INT CON POS: POSITIVE

## 2023-11-29 PROCEDURE — 3074F SYST BP LT 130 MM HG: CPT | Mod: 95 | Performed by: NURSE PRACTITIONER

## 2023-11-29 PROCEDURE — 3078F DIAST BP <80 MM HG: CPT | Mod: 95 | Performed by: NURSE PRACTITIONER

## 2023-11-29 PROCEDURE — 99213 OFFICE O/P EST LOW 20 MIN: CPT | Mod: 95 | Performed by: NURSE PRACTITIONER

## 2023-11-29 PROCEDURE — 85610 PROTHROMBIN TIME: CPT | Performed by: FAMILY MEDICINE

## 2023-11-29 NOTE — PROGRESS NOTES
[facility] Tele[facility]icine Anticoagulation Service Note    23    This evaluation was conducted via telemedicine visit using secure and encrypted videoconferencing technology. The patient was physically located at [facility] in [place], [place]. The patient was presented by a medical professional (Shana Nicole MA) at the originating site. The patient's identity was confirmed and verbal consent for the telemedicine encounter was obtained for this telemedicine encounter.    Anticoagulation Summary  As of 2023      INR goal:  2.0-3.0   TTR:  65.2 % (4.6 y)   INR used for dosin.20 (2023)   Warfarin maintenance plan:  5 mg (5 mg x 1) every Tue, Thu; 2.5 mg (5 mg x 0.5) all other days   Weekly warfarin total:  22.5 mg   Plan last modified:  SALVADOR Recinos (2023)   Next INR check:  1/3/2024   Target end date:  Indefinite    Indications    Mitral valve disease [I05.9]  Paroxysmal atrial fibrillation (HCC) [I48.0]  Secondary hypercoagulable state (HCC) [D68.69]                 Anticoagulation Episode Summary       INR check location:      Preferred lab:      Send INR reminders to:      Comments:  Mitral valve repair/AF - indefinite anticoag per 19 note from Dr Lawson          Anticoagulation Care Providers       Provider Role Specialty Phone number    FARIDA Parikh Referring Nurse Practitioner [phone]    [facility] Anticoagulation Services Responsible  [phone]                Refer to Patient Findings for HPI:  Patient Findings       Negatives:  Signs/symptoms of thrombosis, Signs/symptoms of bleeding, Laboratory test error suspected, Change in health, Change in alcohol use, Change in activity, Upcoming invasive procedure, Emergency department visit, Upcoming dental procedure, Missed doses, Extra doses, Change in medications, Change in diet/appetite, Hospital  admission, Bruising, Other complaints            Vitals:    11/29/23 1439   BP: 122/78   Pulse: 75   Resp: 14   Temp: 36.6 °C (97.9 °F)   SpO2: 97%       Verified current warfarin dosing schedule.    Pt is not on antiplatelet therapy.    Review of Systems   HENT: Negative for nosebleeds.   Respiratory: Negative for shortness of breath.  Gastrointestinal: Negative for blood in stool.   Genitourinary: Negative for hematuria.   Psychiatric/Behavioral: The patient is not nervous/anxious.     Physical Exam   Constitutional: Oriented to person, place, and time. Appears well-developed and well-nourished.   Pulmonary/Chest: Effort normal. No respiratory distress.   Skin: Not diaphoretic.  Psychiatric: Normal mood and affect. Behavior is normal.    A/P   INR is therapeutic.      Warfarin dosing recommendation: Continue current regimen:       Sunday Monday Tuesday Wednesday Thursday Friday Saturday      2.5 mg    2.5 mg    5 mg    2.5 mg    5 mg    2.5 mg    2.5 mg      1/2 tab(s)    1/2 tab(s)    1 tab(s)    1/2 tab(s)    1 tab(s)    1/2 tab(s)  1/2 tab(s)     Follow up appointment in 5 week(s).    Next Appointment: Wednesday, January 3, 2024 at 2:30 pm.     Pt educated to contact our clinic with any changes in medications or s/s of bleeding or thrombosis. Pt is aware to seek immediate medical attention for falls, head injury or deep cuts. Review all of your home medications and newly ordered medications with your doctor and / or pharmacist. Follow medication instructions as directed by your doctor and / or pharmacist. Please keep your medication list with you and share with your physician. Update the information when medications are discontinued, doses are changed, or new medications (including over-the-counter products) are added; and carry medication information at all times in the event of emergency situations.      CHEST guidelines recommend frequent INR monitoring at regular intervals (a few days up to a  max of 12 weeks) to ensure they are on the proper dose of warfarin and not having any complications from therapy.  INRs can dramatically change over a short time period due to diet, medications, and medical conditions.   The patient is instructed to go to the ER for falls with a head injury, blood in urine or stool or any bleeding that last longer than 20 min.   For questions, please contact Outpatient Anticoagulation Service [phone].     JANELLE Mayes.  [facility]  [phone]

## 2023-12-04 ENCOUNTER — HOSPITAL ENCOUNTER (OUTPATIENT)
Dept: LAB | Facility: MEDICAL CENTER | Age: 58
End: 2023-12-04
Attending: STUDENT IN AN ORGANIZED HEALTH CARE EDUCATION/TRAINING PROGRAM
Payer: MEDICAID

## 2023-12-04 LAB
BUN SERPL-MCNC: 32 MG/DL (ref 8–22)
CREAT SERPL-MCNC: 1.5 MG/DL (ref 0.5–1.4)
GFR SERPLBLD CREATININE-BSD FMLA CKD-EPI: 53 ML/MIN/1.73 M 2

## 2023-12-04 PROCEDURE — 82565 ASSAY OF CREATININE: CPT

## 2023-12-04 PROCEDURE — 84520 ASSAY OF UREA NITROGEN: CPT

## 2023-12-04 PROCEDURE — 36415 COLL VENOUS BLD VENIPUNCTURE: CPT

## 2024-01-03 ENCOUNTER — ANTICOAGULATION MONITORING (OUTPATIENT)
Dept: VASCULAR LAB | Facility: MEDICAL CENTER | Age: 59
End: 2024-01-03

## 2024-01-03 ENCOUNTER — NON-PROVIDER VISIT (OUTPATIENT)
Dept: MEDICAL GROUP | Facility: PHYSICIAN GROUP | Age: 59
End: 2024-01-03
Payer: MEDICAID

## 2024-01-03 VITALS
DIASTOLIC BLOOD PRESSURE: 80 MMHG | HEART RATE: 60 BPM | TEMPERATURE: 98.2 F | SYSTOLIC BLOOD PRESSURE: 126 MMHG | OXYGEN SATURATION: 90 %

## 2024-01-03 DIAGNOSIS — I48.0 PAROXYSMAL ATRIAL FIBRILLATION (HCC): Chronic | ICD-10-CM

## 2024-01-03 DIAGNOSIS — I05.9 MITRAL VALVE DISEASE: ICD-10-CM

## 2024-01-03 DIAGNOSIS — D68.69 SECONDARY HYPERCOAGULABLE STATE (HCC): ICD-10-CM

## 2024-01-03 LAB — INR PPP: 2.8 (ref 2–3.5)

## 2024-01-03 PROCEDURE — 3074F SYST BP LT 130 MM HG: CPT | Mod: 95 | Performed by: NURSE PRACTITIONER

## 2024-01-03 PROCEDURE — 3079F DIAST BP 80-89 MM HG: CPT | Mod: 95 | Performed by: NURSE PRACTITIONER

## 2024-01-03 PROCEDURE — 99213 OFFICE O/P EST LOW 20 MIN: CPT | Mod: 95 | Performed by: NURSE PRACTITIONER

## 2024-01-03 NOTE — PROGRESS NOTES
[facility] Tele[facility]icine Anticoagulation Service Note    01/03/24    This evaluation was conducted via telemedicine visit using secure and encrypted videoconferencing technology. The patient was physically located at [facility] in [place], [place]. The patient was presented by a medical professional (Mandi Bowden MA) at the originating site. The patient's identity was confirmed and verbal consent for the telemedicine encounter was obtained for this telemedicine encounter.    Anticoagulation Summary  As of 1/3/2024      INR goal:  2.0-3.0   TTR:  65.2 % (4.6 y)   INR used for dosing:     Warfarin maintenance plan:  5 mg (5 mg x 1) every Tue, Thu; 2.5 mg (5 mg x 0.5) all other days   Weekly warfarin total:  22.5 mg   Plan last modified:  SALVADOR Recinos (9/5/2023)   Next INR check:     Target end date:  Indefinite    Indications    Mitral valve disease [I05.9]  Paroxysmal atrial fibrillation (HCC) [I48.0]  Secondary hypercoagulable state (HCC) [D68.69]                 Anticoagulation Episode Summary       INR check location:      Preferred lab:      Send INR reminders to:      Comments:  Mitral valve repair/AF - indefinite anticoag per 7/26/19 note from Dr Lawson          Anticoagulation Care Providers       Provider Role Specialty Phone number    FARIDA Parikh Referring Thoracic Surgery (Cardiothoracic Vascular Surgery) [phone]    [facility] Anticoagulation Services Responsible  [phone]                Refer to Patient Findings for HPI:      Vitals:    01/03/24 1540   BP: 126/80   Pulse: 60   Temp: 36.8 °C (98.2 °F)   SpO2: 90%       Verified current warfarin dosing schedule.    Pt is not on antiplatelet therapy.    Review of Systems   HENT: Negative for nosebleeds.   Respiratory: Negative for shortness of breath.  Gastrointestinal: Negative for blood in stool.   Genitourinary:  Negative for hematuria.   Psychiatric/Behavioral: The patient is not nervous/anxious.     Physical Exam   Constitutional: Oriented to person, place, and time. Appears well-developed and well-nourished.   Pulmonary/Chest: Effort normal. No respiratory distress.   Skin: Not diaphoretic.  Psychiatric: Normal mood and affect. Behavior is normal.    A/P   INR is therapeutic.      Warfarin dosing recommendation:        Sunday Monday Tuesday Wednesday Thursday Friday Saturday      2.5 mg    2.5 mg    5 mg    2.5 mg    5 mg    2.5 mg    2.5 mg      1/2 tab(s)    1/2 tab(s)    1 tab(s)    1/2 tab(s)    1 tab(s)    1/2 tab(s)  1/2 tab(s)     Follow up appointment in 6 week(s).    Next Appointment: Wednesday, February 14, 2024 at 2:00 pm.     Pt educated to contact our clinic with any changes in medications or s/s of bleeding or thrombosis. Pt is aware to seek immediate medical attention for falls, head injury or deep cuts. Review all of your home medications and newly ordered medications with your doctor and / or pharmacist. Follow medication instructions as directed by your doctor and / or pharmacist. Please keep your medication list with you and share with your physician. Update the information when medications are discontinued, doses are changed, or new medications (including over-the-counter products) are added; and carry medication information at all times in the event of emergency situations.      CHEST guidelines recommend frequent INR monitoring at regular intervals (a few days up to a max of 12 weeks) to ensure they are on the proper dose of warfarin and not having any complications from therapy.  INRs can dramatically change over a short time period due to diet, medications, and medical conditions.   The patient is instructed to go to the ER for falls with a head injury, blood in urine or stool or any bleeding that last longer than 20 min.   For questions, please contact Outpatient Anticoagulation  Service [phone].     JANELLE Mayes.  [facility]  [phone]

## 2024-01-23 ENCOUNTER — HOSPITAL ENCOUNTER (OUTPATIENT)
Dept: LAB | Facility: MEDICAL CENTER | Age: 59
End: 2024-01-23
Attending: INTERNAL MEDICINE
Payer: MEDICAID

## 2024-01-23 LAB
25(OH)D3 SERPL-MCNC: 32 NG/ML (ref 30–100)
ALBUMIN SERPL BCP-MCNC: 4 G/DL (ref 3.2–4.9)
APPEARANCE UR: CLEAR
BACTERIA #/AREA URNS HPF: NEGATIVE /HPF
BASOPHILS # BLD AUTO: 0.4 % (ref 0–1.8)
BASOPHILS # BLD: 0.04 K/UL (ref 0–0.12)
BILIRUB UR QL STRIP.AUTO: NEGATIVE
BUN SERPL-MCNC: 32 MG/DL (ref 8–22)
CALCIUM ALBUM COR SERPL-MCNC: 8.6 MG/DL (ref 8.5–10.5)
CALCIUM SERPL-MCNC: 8.6 MG/DL (ref 8.5–10.5)
CHLORIDE SERPL-SCNC: 107 MMOL/L (ref 96–112)
CO2 SERPL-SCNC: 21 MMOL/L (ref 20–33)
COLOR UR: YELLOW
CREAT SERPL-MCNC: 1.79 MG/DL (ref 0.5–1.4)
CREAT UR-MCNC: 79.11 MG/DL
CREAT UR-MCNC: 81.56 MG/DL
EOSINOPHIL # BLD AUTO: 0.21 K/UL (ref 0–0.51)
EOSINOPHIL NFR BLD: 2.2 % (ref 0–6.9)
EPI CELLS #/AREA URNS HPF: NEGATIVE /HPF
ERYTHROCYTE [DISTWIDTH] IN BLOOD BY AUTOMATED COUNT: 51.8 FL (ref 35.9–50)
GFR SERPLBLD CREATININE-BSD FMLA CKD-EPI: 43 ML/MIN/1.73 M 2
GLUCOSE SERPL-MCNC: 125 MG/DL (ref 65–99)
GLUCOSE UR STRIP.AUTO-MCNC: NEGATIVE MG/DL
HCT VFR BLD AUTO: 36.7 % (ref 42–52)
HGB BLD-MCNC: 11.9 G/DL (ref 14–18)
HYALINE CASTS #/AREA URNS LPF: ABNORMAL /LPF
IMM GRANULOCYTES # BLD AUTO: 0.04 K/UL (ref 0–0.11)
IMM GRANULOCYTES NFR BLD AUTO: 0.4 % (ref 0–0.9)
KETONES UR STRIP.AUTO-MCNC: NEGATIVE MG/DL
LEUKOCYTE ESTERASE UR QL STRIP.AUTO: NEGATIVE
LYMPHOCYTES # BLD AUTO: 1.16 K/UL (ref 1–4.8)
LYMPHOCYTES NFR BLD: 12.2 % (ref 22–41)
MCH RBC QN AUTO: 26.2 PG (ref 27–33)
MCHC RBC AUTO-ENTMCNC: 32.4 G/DL (ref 32.3–36.5)
MCV RBC AUTO: 80.7 FL (ref 81.4–97.8)
MICRO URNS: ABNORMAL
MICROALBUMIN UR-MCNC: 30.4 MG/DL
MICROALBUMIN/CREAT UR: 373 MG/G (ref 0–30)
MONOCYTES # BLD AUTO: 0.94 K/UL (ref 0–0.85)
MONOCYTES NFR BLD AUTO: 9.9 % (ref 0–13.4)
NEUTROPHILS # BLD AUTO: 7.1 K/UL (ref 1.82–7.42)
NEUTROPHILS NFR BLD: 74.9 % (ref 44–72)
NITRITE UR QL STRIP.AUTO: NEGATIVE
NRBC # BLD AUTO: 0 K/UL
NRBC BLD-RTO: 0 /100 WBC (ref 0–0.2)
PH UR STRIP.AUTO: 5.5 [PH] (ref 5–8)
PHOSPHATE SERPL-MCNC: 3.7 MG/DL (ref 2.5–4.5)
PLATELET # BLD AUTO: 206 K/UL (ref 164–446)
PMV BLD AUTO: 11.3 FL (ref 9–12.9)
POTASSIUM SERPL-SCNC: 3.4 MMOL/L (ref 3.6–5.5)
PROT UR QL STRIP: 100 MG/DL
PROT UR-MCNC: 53 MG/DL (ref 0–15)
PROT/CREAT UR: 670 MG/G (ref 15–68)
PTH-INTACT SERPL-MCNC: 139 PG/ML (ref 14–72)
RBC # BLD AUTO: 4.55 M/UL (ref 4.7–6.1)
RBC # URNS HPF: ABNORMAL /HPF
RBC UR QL AUTO: ABNORMAL
SODIUM SERPL-SCNC: 143 MMOL/L (ref 135–145)
SP GR UR STRIP.AUTO: 1.01
UROBILINOGEN UR STRIP.AUTO-MCNC: 0.2 MG/DL
WBC # BLD AUTO: 9.5 K/UL (ref 4.8–10.8)
WBC #/AREA URNS HPF: ABNORMAL /HPF

## 2024-01-23 PROCEDURE — 83970 ASSAY OF PARATHORMONE: CPT

## 2024-01-23 PROCEDURE — 82043 UR ALBUMIN QUANTITATIVE: CPT

## 2024-01-23 PROCEDURE — 36415 COLL VENOUS BLD VENIPUNCTURE: CPT

## 2024-01-23 PROCEDURE — 86038 ANTINUCLEAR ANTIBODIES: CPT

## 2024-01-23 PROCEDURE — 80069 RENAL FUNCTION PANEL: CPT

## 2024-01-23 PROCEDURE — 82306 VITAMIN D 25 HYDROXY: CPT

## 2024-01-23 PROCEDURE — 85025 COMPLETE CBC W/AUTO DIFF WBC: CPT

## 2024-01-23 PROCEDURE — 84156 ASSAY OF PROTEIN URINE: CPT

## 2024-01-23 PROCEDURE — 82570 ASSAY OF URINE CREATININE: CPT

## 2024-01-23 PROCEDURE — 86256 FLUORESCENT ANTIBODY TITER: CPT

## 2024-01-23 PROCEDURE — 86225 DNA ANTIBODY NATIVE: CPT

## 2024-01-23 PROCEDURE — 81001 URINALYSIS AUTO W/SCOPE: CPT

## 2024-01-25 LAB
DSDNA AB TITR SER CLIF: NORMAL {TITER}
NUCLEAR IGG SER QL IA: NORMAL

## 2024-01-27 LAB — DSDNA IGG TITR SER CLIF: NORMAL {TITER}

## 2024-02-14 ENCOUNTER — NON-PROVIDER VISIT (OUTPATIENT)
Dept: MEDICAL GROUP | Facility: PHYSICIAN GROUP | Age: 59
End: 2024-02-14
Payer: MEDICAID

## 2024-02-14 ENCOUNTER — TELEPHONE (OUTPATIENT)
Dept: VASCULAR LAB | Facility: MEDICAL CENTER | Age: 59
End: 2024-02-14

## 2024-02-14 ENCOUNTER — ANTICOAGULATION MONITORING (OUTPATIENT)
Dept: VASCULAR LAB | Facility: MEDICAL CENTER | Age: 59
End: 2024-02-14

## 2024-02-14 VITALS
DIASTOLIC BLOOD PRESSURE: 78 MMHG | SYSTOLIC BLOOD PRESSURE: 130 MMHG | HEART RATE: 76 BPM | OXYGEN SATURATION: 97 % | RESPIRATION RATE: 16 BRPM | TEMPERATURE: 98 F

## 2024-02-14 DIAGNOSIS — D68.69 SECONDARY HYPERCOAGULABLE STATE (HCC): ICD-10-CM

## 2024-02-14 DIAGNOSIS — Z79.01 CHRONIC ANTICOAGULATION: ICD-10-CM

## 2024-02-14 DIAGNOSIS — I05.9 MITRAL VALVE DISEASE: ICD-10-CM

## 2024-02-14 DIAGNOSIS — I48.0 PAROXYSMAL ATRIAL FIBRILLATION (HCC): Chronic | ICD-10-CM

## 2024-02-14 PROCEDURE — 3078F DIAST BP <80 MM HG: CPT | Mod: 95 | Performed by: NURSE PRACTITIONER

## 2024-02-14 PROCEDURE — 3075F SYST BP GE 130 - 139MM HG: CPT | Mod: 95 | Performed by: NURSE PRACTITIONER

## 2024-02-14 PROCEDURE — 85610 PROTHROMBIN TIME: CPT | Performed by: FAMILY MEDICINE

## 2024-02-14 PROCEDURE — 99214 OFFICE O/P EST MOD 30 MIN: CPT | Mod: 95 | Performed by: NURSE PRACTITIONER

## 2024-02-14 NOTE — PROGRESS NOTES
Ottoniel Vamsi Davies is a 58 y.o. male here for a non-provider visit for inr check    If abnormal was an in office provider notified today (if so, indicate provider)? No    Routed to PCP? No

## 2024-02-14 NOTE — PROGRESS NOTES
[facility] Tele[facility]icine Anticoagulation Service Note    24    This evaluation was conducted via telemedicine visit using secure and encrypted videoconferencing technology. The patient was physically located at [facility] in [place], [place]. The patient was presented by a medical professional (Charmaine Mensah MA) at the originating site. The patient's identity was confirmed and verbal consent for the telemedicine encounter was obtained for this telemedicine encounter.    Anticoagulation Summary  As of 2024      INR goal:  2.0-3.0   TTR:  66.7% (4.8 y)   INR used for dosin.60 (2024)   Warfarin maintenance plan:  5 mg (5 mg x 1) every Tue, Thu; 2.5 mg (5 mg x 0.5) all other days   Weekly warfarin total:  22.5 mg   Plan last modified:  SALVADOR Recinos (2023)   Next INR check:     Target end date:  Indefinite    Indications    Mitral valve disease [I05.9]  Paroxysmal atrial fibrillation (HCC) [I48.0]  Secondary hypercoagulable state (HCC) [D68.69]                 Anticoagulation Episode Summary       INR check location:      Preferred lab:      Send INR reminders to:      Comments:  Mitral valve repair/AF - indefinite anticoag per 19 note from Dr Lawson          Anticoagulation Care Providers       Provider Role Specialty Phone number    FARIDA Parikh Referring Thoracic Surgery (Cardiothoracic Vascular Surgery) [phone]    [facility] Anticoagulation [facility] Responsible  [phone]                Refer to Patient Findings for HPI:      Vitals:    24 1401   BP: 130/78   Pulse: 76   Resp: 16   Temp: 36.7 °C (98 °F)   SpO2: 97%       Verified current warfarin dosing schedule.    Pt is not on antiplatelet therapy.    Review of Systems   HENT: Negative for nosebleeds.   Respiratory: Negative for shortness of breath.  Gastrointestinal: Negative for blood in  stool.   Genitourinary: Negative for hematuria.   Psychiatric/Behavioral: The patient is not nervous/anxious.     Physical Exam   Constitutional: Oriented to person, place, and time. Appears well-developed and well-nourished.   Pulmonary/Chest: Effort normal. No respiratory distress.   Skin: Not diaphoretic.  Psychiatric: Normal mood and affect. Behavior is normal.    A/P   INR is therapeutic. Discussed option of DOAC. He has AF and hx of MV repair. He is interested in transitioning. He has medicaid so shouldn't be a copay. Rx sent to [facility]. Pt to continue taking warfarin for now and we will plan to start Eliquis next week with his next INR appt. Confirmed pt knows NOT to start Eliquis until he sees me next week.    Cr 1.79. Based on age and wt he qualifies for 5 mg BID.    Warfarin dosing recommendation:        Sunday Monday Tuesday Wednesday Thursday Friday Saturday      2.5 mg    2.5 mg    5 mg    2.5 mg    5 mg    2.5 mg    2.5 mg      1/2 tab(s)    1/2 tab(s)    1 tab(s)    1/2 tab(s)    1 tab(s)    1/2 tab(s)  1/2 tab(s)     Follow up appointment in 1 week(s).    Next Appointment: Wednesday, February 21, 2024 at 2:15 pm.     Pt educated to contact our clinic with any changes in medications or s/s of bleeding or thrombosis. Pt is aware to seek immediate medical attention for falls, head injury or deep cuts. Review all of your home medications and newly ordered medications with your doctor and / or pharmacist. Follow medication instructions as directed by your doctor and / or pharmacist. Please keep your medication list with you and share with your physician. Update the information when medications are discontinued, doses are changed, or new medications (including over-the-counter products) are added; and carry medication information at all times in the event of emergency situations.      CHEST guidelines recommend frequent INR monitoring at regular intervals (a few days up to a max of 12 weeks) to  ensure they are on the proper dose of warfarin and not having any complications from therapy.  INRs can dramatically change over a short time period due to diet, medications, and medical conditions.   The patient is instructed to go to the ER for falls with a head injury, blood in urine or stool or any bleeding that last longer than 20 min.   For questions, please contact Outpatient Anticoagulation Service [phone].     FARIDA Mayes  [facility]  [phone]

## 2024-02-14 NOTE — TELEPHONE ENCOUNTER
Received New start PA request via [employer]  for ELIQUIS 5MG TABLETS. (Quantity:60, Day Supply:30)     Insurance: [place]  Member ID:  [id number]  BIN: [id number]  PCN: [id number]  [id number]     Ran Test claim via [employer] medication Pays for a $0/30DS copay. Will outreach to patient to offer specialty pharmacy services and or release to preferred pharmacy    REBECCA Wright, PhT  Vascular Pharmacy Liaison (Rx Coordinator)  P: [phone]  2/14/2024 2:32 PM

## 2024-02-21 ENCOUNTER — NON-PROVIDER VISIT (OUTPATIENT)
Dept: MEDICAL GROUP | Facility: PHYSICIAN GROUP | Age: 59
End: 2024-02-21
Payer: MEDICAID

## 2024-02-21 ENCOUNTER — ANTICOAGULATION MONITORING (OUTPATIENT)
Dept: VASCULAR LAB | Facility: MEDICAL CENTER | Age: 59
End: 2024-02-21

## 2024-02-21 VITALS
OXYGEN SATURATION: 94 % | HEART RATE: 66 BPM | SYSTOLIC BLOOD PRESSURE: 132 MMHG | DIASTOLIC BLOOD PRESSURE: 70 MMHG | TEMPERATURE: 96.7 F

## 2024-02-21 DIAGNOSIS — I05.9 MITRAL VALVE DISEASE: ICD-10-CM

## 2024-02-21 DIAGNOSIS — I48.0 PAROXYSMAL ATRIAL FIBRILLATION (HCC): Chronic | ICD-10-CM

## 2024-02-21 DIAGNOSIS — D68.69 SECONDARY HYPERCOAGULABLE STATE (HCC): ICD-10-CM

## 2024-02-21 LAB — INR PPP: 2.8 (ref 2–3.5)

## 2024-02-21 PROCEDURE — 3075F SYST BP GE 130 - 139MM HG: CPT | Mod: 95 | Performed by: NURSE PRACTITIONER

## 2024-02-21 PROCEDURE — 99214 OFFICE O/P EST MOD 30 MIN: CPT | Mod: 95 | Performed by: NURSE PRACTITIONER

## 2024-02-21 PROCEDURE — 3078F DIAST BP <80 MM HG: CPT | Mod: 95 | Performed by: NURSE PRACTITIONER

## 2024-02-21 NOTE — PROGRESS NOTES
[facility] Tele[facility]icine Anticoagulation Service Note    24    This evaluation was conducted via telemedicine visit using secure and encrypted videoconferencing technology. The patient was physically located at [facility] in [place], [place]. The patient was presented by a medical professional (Mandi Bowden MA) at the originating site. The patient's identity was confirmed and verbal consent for the telemedicine encounter was obtained for this telemedicine encounter.    Anticoagulation Summary  As of 2024      INR goal:  2.0-3.0   TTR:  66.9% (4.8 y)   INR used for dosin.80 (2024)   Warfarin maintenance plan:  No maintenance plan   Plan last modified:  SALVADOR Recinos (2023)   Next INR check:     Target end date:  Indefinite    Indications    Mitral valve disease [I05.9]  Paroxysmal atrial fibrillation (HCC) [I48.0]  Secondary hypercoagulable state (HCC) [D68.69]                 Anticoagulation Episode Summary       INR check location:      Preferred lab:      Send INR reminders to:      Comments:  Mitral valve repair/AF - indefinite anticoag per 19 note from Dr Lawson          Anticoagulation Care Providers       Provider Role Specialty Phone number    FARIDA Parikh Referring Thoracic Surgery (Cardiothoracic Vascular Surgery) [phone]    [facility] Anticoagulation Services Responsible  [phone]                Refer to Patient Findings for HPI:  Patient Findings       Negatives:  Signs/symptoms of thrombosis, Signs/symptoms of bleeding, Laboratory test error suspected, Change in health, Change in alcohol use, Change in activity, Upcoming invasive procedure, Emergency department visit, Upcoming dental procedure, Missed doses, Extra doses, Change in medications, Change in diet/appetite, Hospital admission, Bruising, Other complaints            Vitals:     02/21/24 1357   BP: 132/70   Pulse: 66   Temp: 35.9 °C (96.7 °F)   SpO2: 94%       Verified current warfarin dosing schedule.    Pt is not on antiplatelet therapy. Pt states he was told to stop taking. Hx of MV repair. I will clarify with his cardiologist.    Review of Systems   HENT: Negative for nosebleeds.   Respiratory: Negative for shortness of breath.  Gastrointestinal: Negative for blood in stool.   Genitourinary: Negative for hematuria.   Psychiatric/Behavioral: The patient is not nervous/anxious.     Physical Exam   Constitutional: Oriented to person, place, and time. Appears well-developed and well-nourished.   Pulmonary/Chest: Effort normal. No respiratory distress.   Skin: Not diaphoretic.  Psychiatric: Normal mood and affect. Behavior is normal.    A/P   INR is 2.8 today. He received Eliquis 5 mg from his pharmacy. No copay. He has already taken warfarin today. Last creatinine in Jan was 1.79. Based on age/wt, he qualifies to 5 mg BID. H/h trends low. He takes a fe supplement. Will monitor.      2/22/24 - STOP warfarin  2/23/24 - began taking Eliquis 5 mg by mouth twice daily - 1st dose in the morning and continue taking 1 tablet every morning and evening.    Have your CBC blood test drawn prior to your next appointment.      Follow up appointment in 4 week(s).    Next Appointment: Wednesday, March 21, 2024 at 2:00 pm.    Pt educated to contact our clinic with any changes in medications or s/s of bleeding or thrombosis. Pt is aware to seek immediate medical attention for falls, head injury or deep cuts. Review all of your home medications and newly ordered medications with your doctor and / or pharmacist. Follow medication instructions as directed by your doctor and / or pharmacist. Please keep your medication list with you and share with your physician. Update the information when medications are discontinued, doses are changed, or new medications (including over-the-counter products) are added; and  carry medication information at all times in the event of emergency situations.      CHEST guidelines recommend frequent INR monitoring at regular intervals (a few days up to a max of 12 weeks) to ensure they are on the proper dose of warfarin and not having any complications from therapy.  INRs can dramatically change over a short time period due to diet, medications, and medical conditions.   The patient is instructed to go to the ER for falls with a head injury, blood in urine or stool or any bleeding that last longer than 20 min.   For questions, please contact Outpatient Anticoagulation Service [phone].     FARIDA Mayes  [facility]  [phone]    Cc: Dr Keller

## 2024-02-21 NOTE — Clinical Note
Hi Dr Keller, I'm seeing this pt in the telemed [facility] clinic and switched him from warfarin to Eliquis. I wanted to clarify with you if he should be taking baby aspirin for hx of MV repair. Looks like he was taking it but then stopped.  Can you let me know if he should be on aspirin? Thanks so much, Gayatri

## 2024-03-06 ENCOUNTER — TELEPHONE (OUTPATIENT)
Dept: CARDIOLOGY | Facility: MEDICAL CENTER | Age: 59
End: 2024-03-06
Payer: MEDICAID

## 2024-03-07 NOTE — TELEPHONE ENCOUNTER
Called Dr Diaz, pt there with dizzines and found to have afib started on eliquis EF on echo now 35% was better in [id number].    Please get him scheduled next avialble in [place]

## 2024-03-14 ENCOUNTER — HOSPITAL ENCOUNTER (OUTPATIENT)
Dept: LAB | Facility: MEDICAL CENTER | Age: 59
End: 2024-03-14
Attending: INTERNAL MEDICINE
Payer: MEDICAID

## 2024-03-14 LAB
25(OH)D3 SERPL-MCNC: 30 NG/ML (ref 30–100)
ALBUMIN SERPL BCP-MCNC: 4 G/DL (ref 3.2–4.9)
ANION GAP SERPL CALC-SCNC: 13 MMOL/L (ref 7–16)
APPEARANCE UR: CLEAR
BACTERIA #/AREA URNS HPF: NEGATIVE /HPF
BILIRUB UR QL STRIP.AUTO: NEGATIVE
BUN SERPL-MCNC: 25 MG/DL (ref 8–22)
BUN SERPL-MCNC: 26 MG/DL (ref 8–22)
CALCIUM ALBUM COR SERPL-MCNC: 8.8 MG/DL (ref 8.5–10.5)
CALCIUM SERPL-MCNC: 8.8 MG/DL (ref 8.5–10.5)
CALCIUM SERPL-MCNC: 8.9 MG/DL (ref 8.5–10.5)
CHLORIDE SERPL-SCNC: 106 MMOL/L (ref 96–112)
CHLORIDE SERPL-SCNC: 108 MMOL/L (ref 96–112)
CO2 SERPL-SCNC: 22 MMOL/L (ref 20–33)
CO2 SERPL-SCNC: 23 MMOL/L (ref 20–33)
COLOR UR: YELLOW
CREAT SERPL-MCNC: 1.3 MG/DL (ref 0.5–1.4)
CREAT SERPL-MCNC: 1.31 MG/DL (ref 0.5–1.4)
EPI CELLS #/AREA URNS HPF: NEGATIVE /HPF
GFR SERPLBLD CREATININE-BSD FMLA CKD-EPI: 62 ML/MIN/1.73 M 2
GFR SERPLBLD CREATININE-BSD FMLA CKD-EPI: 62 ML/MIN/1.73 M 2
GLUCOSE SERPL-MCNC: 107 MG/DL (ref 65–99)
GLUCOSE SERPL-MCNC: 109 MG/DL (ref 65–99)
GLUCOSE UR STRIP.AUTO-MCNC: NEGATIVE MG/DL
HYALINE CASTS #/AREA URNS LPF: ABNORMAL /LPF
KETONES UR STRIP.AUTO-MCNC: NEGATIVE MG/DL
LEUKOCYTE ESTERASE UR QL STRIP.AUTO: NEGATIVE
MICRO URNS: ABNORMAL
NITRITE UR QL STRIP.AUTO: NEGATIVE
PH UR STRIP.AUTO: 6 [PH] (ref 5–8)
PHOSPHATE SERPL-MCNC: 3.1 MG/DL (ref 2.5–4.5)
POTASSIUM SERPL-SCNC: 3.7 MMOL/L (ref 3.6–5.5)
POTASSIUM SERPL-SCNC: 3.7 MMOL/L (ref 3.6–5.5)
PROT UR QL STRIP: NEGATIVE MG/DL
PTH-INTACT SERPL-MCNC: 93 PG/ML (ref 14–72)
RBC # URNS HPF: ABNORMAL /HPF
RBC UR QL AUTO: ABNORMAL
SODIUM SERPL-SCNC: 141 MMOL/L (ref 135–145)
SODIUM SERPL-SCNC: 141 MMOL/L (ref 135–145)
SP GR UR STRIP.AUTO: 1.01
UROBILINOGEN UR STRIP.AUTO-MCNC: 0.2 MG/DL
WBC #/AREA URNS HPF: ABNORMAL /HPF

## 2024-03-14 PROCEDURE — 81001 URINALYSIS AUTO W/SCOPE: CPT

## 2024-03-14 PROCEDURE — 80069 RENAL FUNCTION PANEL: CPT

## 2024-03-14 PROCEDURE — 86256 FLUORESCENT ANTIBODY TITER: CPT

## 2024-03-14 PROCEDURE — 83970 ASSAY OF PARATHORMONE: CPT

## 2024-03-14 PROCEDURE — 84156 ASSAY OF PROTEIN URINE: CPT

## 2024-03-14 PROCEDURE — 86038 ANTINUCLEAR ANTIBODIES: CPT

## 2024-03-14 PROCEDURE — 86225 DNA ANTIBODY NATIVE: CPT

## 2024-03-14 PROCEDURE — 82306 VITAMIN D 25 HYDROXY: CPT

## 2024-03-14 PROCEDURE — 36415 COLL VENOUS BLD VENIPUNCTURE: CPT

## 2024-03-14 PROCEDURE — 85025 COMPLETE CBC W/AUTO DIFF WBC: CPT

## 2024-03-14 PROCEDURE — 82043 UR ALBUMIN QUANTITATIVE: CPT

## 2024-03-14 PROCEDURE — 80048 BASIC METABOLIC PNL TOTAL CA: CPT

## 2024-03-14 PROCEDURE — 82570 ASSAY OF URINE CREATININE: CPT | Mod: 91

## 2024-03-15 LAB
BASOPHILS # BLD AUTO: 0.3 % (ref 0–1.8)
BASOPHILS # BLD: 0.02 K/UL (ref 0–0.12)
CREAT UR-MCNC: 19.02 MG/DL
CREAT UR-MCNC: 20.13 MG/DL
EOSINOPHIL # BLD AUTO: 0.18 K/UL (ref 0–0.51)
EOSINOPHIL NFR BLD: 2.3 % (ref 0–6.9)
ERYTHROCYTE [DISTWIDTH] IN BLOOD BY AUTOMATED COUNT: 54.8 FL (ref 35.9–50)
HCT VFR BLD AUTO: 36 % (ref 42–52)
HGB BLD-MCNC: 11.3 G/DL (ref 14–18)
IMM GRANULOCYTES # BLD AUTO: 0.02 K/UL (ref 0–0.11)
IMM GRANULOCYTES NFR BLD AUTO: 0.3 % (ref 0–0.9)
LYMPHOCYTES # BLD AUTO: 0.97 K/UL (ref 1–4.8)
LYMPHOCYTES NFR BLD: 12.4 % (ref 22–41)
MCH RBC QN AUTO: 26.7 PG (ref 27–33)
MCHC RBC AUTO-ENTMCNC: 31.4 G/DL (ref 32.3–36.5)
MCV RBC AUTO: 84.9 FL (ref 81.4–97.8)
MICROALBUMIN UR-MCNC: 4.4 MG/DL
MICROALBUMIN/CREAT UR: 219 MG/G (ref 0–30)
MONOCYTES # BLD AUTO: 0.81 K/UL (ref 0–0.85)
MONOCYTES NFR BLD AUTO: 10.3 % (ref 0–13.4)
NEUTROPHILS # BLD AUTO: 5.83 K/UL (ref 1.82–7.42)
NEUTROPHILS NFR BLD: 74.4 % (ref 44–72)
NRBC # BLD AUTO: 0 K/UL
NRBC BLD-RTO: 0 /100 WBC (ref 0–0.2)
PLATELET # BLD AUTO: 183 K/UL (ref 164–446)
PMV BLD AUTO: 12 FL (ref 9–12.9)
PROT UR-MCNC: 10 MG/DL (ref 0–15)
PROT/CREAT UR: 526 MG/G (ref 15–68)
RBC # BLD AUTO: 4.24 M/UL (ref 4.7–6.1)
WBC # BLD AUTO: 7.8 K/UL (ref 4.8–10.8)

## 2024-03-16 LAB
DSDNA AB TITR SER CLIF: NORMAL {TITER}
NUCLEAR IGG SER QL IA: NORMAL

## 2024-03-19 LAB — DSDNA IGG TITR SER CLIF: NORMAL {TITER}

## 2024-03-20 ENCOUNTER — ANTICOAGULATION MONITORING (OUTPATIENT)
Dept: VASCULAR LAB | Facility: MEDICAL CENTER | Age: 59
End: 2024-03-20
Payer: MEDICAID

## 2024-03-20 DIAGNOSIS — I48.0 PAROXYSMAL ATRIAL FIBRILLATION (HCC): Chronic | ICD-10-CM

## 2024-03-20 DIAGNOSIS — D68.69 SECONDARY HYPERCOAGULABLE STATE (HCC): ICD-10-CM

## 2024-03-20 DIAGNOSIS — I05.9 MITRAL VALVE DISEASE: ICD-10-CM

## 2024-03-20 NOTE — PROGRESS NOTES
Patient missed his telemedicine anticoagulation visit today. Spoke with him by phone. He had dental surgery yesterday and forgot about his appt. Rescheduled for Wednesday, April 3, 2024 at 1:30 pm. He is taking Eliquis 5 mg BID. Held one dose prior to his dental surgery. Denies any problems with bleeding. Will f/u with pt in 2 weeks.    Gayatri MCCAIN

## 2024-04-01 DIAGNOSIS — I48.0 PAROXYSMAL ATRIAL FIBRILLATION (HCC): Chronic | ICD-10-CM

## 2024-04-01 NOTE — TELEPHONE ENCOUNTER
Is the patient due for a refill? Yes    Was the patient seen the past year? Yes    Date of last office visit: 09/29/2023    Does the patient have an upcoming appointment?  No   If yes, When?     Provider to refill:VR    Does the patients insurance require a 100 day supply?  No

## 2024-04-02 RX ORDER — CARVEDILOL 25 MG/1
25 TABLET ORAL 2 TIMES DAILY WITH MEALS
Qty: 180 TABLET | Refills: 1 | Status: SHIPPED | OUTPATIENT
Start: 2024-04-02

## 2024-04-03 ENCOUNTER — NON-PROVIDER VISIT (OUTPATIENT)
Dept: MEDICAL GROUP | Facility: PHYSICIAN GROUP | Age: 59
End: 2024-04-03
Payer: MEDICAID

## 2024-04-03 ENCOUNTER — ANTICOAGULATION MONITORING (OUTPATIENT)
Dept: VASCULAR LAB | Facility: MEDICAL CENTER | Age: 59
End: 2024-04-03

## 2024-04-03 VITALS
OXYGEN SATURATION: 98 % | SYSTOLIC BLOOD PRESSURE: 122 MMHG | TEMPERATURE: 97.7 F | RESPIRATION RATE: 16 BRPM | HEART RATE: 90 BPM | DIASTOLIC BLOOD PRESSURE: 70 MMHG

## 2024-04-03 DIAGNOSIS — I48.0 PAROXYSMAL ATRIAL FIBRILLATION (HCC): Chronic | ICD-10-CM

## 2024-04-03 DIAGNOSIS — I05.9 MITRAL VALVE DISEASE: ICD-10-CM

## 2024-04-03 DIAGNOSIS — D68.69 SECONDARY HYPERCOAGULABLE STATE (HCC): ICD-10-CM

## 2024-04-03 PROCEDURE — 3078F DIAST BP <80 MM HG: CPT | Mod: 95 | Performed by: NURSE PRACTITIONER

## 2024-04-03 PROCEDURE — 3074F SYST BP LT 130 MM HG: CPT | Mod: 95 | Performed by: NURSE PRACTITIONER

## 2024-04-03 PROCEDURE — 99214 OFFICE O/P EST MOD 30 MIN: CPT | Mod: 95 | Performed by: NURSE PRACTITIONER

## 2024-04-03 NOTE — PROGRESS NOTES
Ottoniel Vamsi Samson is a 59 y.o. male here for a non-provider visit for eliquis follow up    If abnormal was an in office provider notified today (if so, indicate provider)? No    Routed to PCP? No

## 2024-04-03 NOTE — PROGRESS NOTES
Continue taking Eliquis 5 mg by mouth twice daily.    Please have CBC lab draw done prior to your next visit.    Next appointment in Telemed [place]: Wednesday, July 10, 2024 at 1:15 pm.    Gayatri MCCAIN  [facility]  [phone]

## 2024-04-03 NOTE — PROGRESS NOTES
[facility] Tele[facility] Anticoagulation Service Note    04/03/24    This evaluation was conducted via telemedicine visit using secure and encrypted videoconferencing technology. The patient was physically located at [facility] in [place], [place]. The patient was presented by a medical professional (Charmaine Mensah MA) at the originating site. The patient's identity was confirmed and verbal consent for the telemedicine encounter was obtained for this telemedicine encounter.    Diagnosis: AF  Drug: Eliquis 5 mg BID  LOT: indefinite  CHADSVASC: 2 (htn, dm)  HAS-BLED: 0    Anticoagulation Summary  As of 4/3/2024      INR goal:  2.0-3.0   TTR:  66.9% (4.8 y)   INR used for dosing:  No new INR was available at the time of this encounter.   Warfarin maintenance plan:  No maintenance plan   Plan last modified:  SALVADOR Recinos (9/5/2023)   Next INR check:     Target end date:  Indefinite    Indications    Mitral valve disease [I05.9]  Paroxysmal atrial fibrillation (HCC) [I48.0]  Secondary hypercoagulable state (HCC) [D68.69]                 Anticoagulation Episode Summary       INR check location:      Preferred lab:      Send INR reminders to:      Comments:  Mitral valve repair/AF - indefinite anticoag per 7/26/19 note from Dr Lawson          Anticoagulation Care Providers       Provider Role Specialty Phone number    FARIDA Parikh Referring Thoracic Surgery (Cardiothoracic Vascular Surgery) [phone]    [facility] Responsible  [phone]                Refer to Patient Findings for HPI:  Patient Findings       Negatives:  Signs/symptoms of thrombosis, Signs/symptoms of bleeding, Laboratory test error suspected, Change in health, Change in alcohol use, Change in activity, Upcoming invasive procedure, Emergency department visit, Upcoming dental procedure, Missed  doses, Extra doses, Change in medications, Change in diet/appetite, Hospital admission, Bruising, Other complaints            Vitals:    04/03/24 1330   BP: 122/70   Pulse: 90   Resp: 16   Temp: 36.5 °C (97.7 °F)   SpO2: 98%       Health Status Since Last Assessment  Any new relevant medical problems, ED visits/hospitalizations - no  Any embolic events (stroke / TIA / systemic embolism) - no    Doing well on Eliquis. No problems with bleeding. No s/sx of CVA/TIA. His h/h trending low (11.3/36) which is slightly lower than his labs done in January. He is taking an fe supplement per his PCP. Denies any blood in his bowel movements or back sticky stools. Platelet count normal.     Adherence with DOAC Therapy  0 missed dose(s) aside from holding one dose recently for a dental procedure.  BLEEDING RISK ASSESSMENT NB:    Bleeding Risk Assessment  Severe epistaxis - no   Hemoptysis - no  Excessive or unusual bruising / hematomas - no  GIB/melena/BRBPR/hematemesis - no  Hematuria - no   Abnormal vaginal bleeding - n/a  Concerning daily headache or subdural hematoma symptoms - no  Decreasing hemoglobin or new anemia - trends low - will monitor  Falls, presyncope, syncope, or seizures - no  Platelets: 183  Latest hemoglobin:     Lab Results   Component Value Date/Time    WBC 7.8 03/14/2024 02:26 PM    RBC 4.24 (L) 03/14/2024 02:26 PM    HEMOGLOBIN 11.3 (L) 03/14/2024 02:26 PM    HEMATOCRIT 36.0 (L) 03/14/2024 02:26 PM    MCV 84.9 03/14/2024 02:26 PM    MCH 26.7 (L) 03/14/2024 02:26 PM    MCHC 31.4 (L) 03/14/2024 02:26 PM    MPV 12.0 03/14/2024 02:26 PM    NEUTSPOLYS 74.40 (H) 03/14/2024 02:26 PM    LYMPHOCYTES 12.40 (L) 03/14/2024 02:26 PM    MONOCYTES 10.30 03/14/2024 02:26 PM    EOSINOPHILS 2.30 03/14/2024 02:26 PM    BASOPHILS 0.30 03/14/2024 02:26 PM    HYPOCHROMIA 1+ 02/21/2014 08:00 AM    ANISOCYTOSIS 1+ 12/06/2022 12:31 AM        HAS-BLED:  Hypertension (uncontrolled, >160 mmHg systolic) - no  Renal disease (dialysis,  transplant, Cr >2.26 mg/dL or >200 µmol/L) - no  Liver disease (cirrhosis or bilirubin >2x normal with AST/ALT/AP >3x normal) - no  Stroke history - no  Prior major bleeding or predisposition to bleeding - no  Labile INR Unstable/high INRs, time in therapeutic range <60% - no  Age >65 - no  Medication usage predisposing to bleeding (aspirin, clopidogrel, NSAIDs) - no  Alcohol use  ?8 drinks/week - no    Creatinine Clearance/Renal Function  Latest eGFR / creatinine:  Lab Results   Component Value Date/Time    SODIUM 141 03/14/2024 02:26 PM    SODIUM 141 03/14/2024 02:26 PM    POTASSIUM 3.7 03/14/2024 02:26 PM    POTASSIUM 3.7 03/14/2024 02:26 PM    CHLORIDE 108 03/14/2024 02:26 PM    CHLORIDE 106 03/14/2024 02:26 PM    CO2 23 03/14/2024 02:26 PM    CO2 22 03/14/2024 02:26 PM    GLUCOSE 107 (H) 03/14/2024 02:26 PM    GLUCOSE 109 (H) 03/14/2024 02:26 PM    BUN 25 (H) 03/14/2024 02:26 PM    BUN 26 (H) 03/14/2024 02:26 PM    CREATININE 1.31 03/14/2024 02:26 PM    CREATININE 1.30 03/14/2024 02:26 PM       Is eGFR less than 50ml/min  no  Cr cl 78 ml/min  Cr 1.31    If YES, calculate CrCl (see back)  Any recent dehydrating illness or medications added/changed? i.e. Diuretics    Drug Interactions  ASA/antiplatelets - avoids  NSAID - avoids  Other drug interactions - none per Epic (Review med list / OTCs;)  Current Outpatient Medications on File Prior to Visit   Medication Sig Dispense Refill    carvedilol (COREG) 25 MG Tab TAKE 1 TABLET BY MOUTH TWICE DAILY WITH MEALS 180 Tablet 1    apixaban (ELIQUIS) 5mg Tab Take 1 Tablet by mouth 2 times a day. 60 Tablet 5    Ferrous Sulfate (IRON PO) Take 65 mg by mouth 2 times a day.      losartan (COZAAR) 100 MG Tab Take 100 mg by mouth every day.      furosemide (LASIX) 40 MG Tab Take 1 Tablet by mouth every day. 90 Tablet 4    hydrALAZINE (APRESOLINE) 100 MG tablet Take 1 Tablet by mouth 3 times a day. 270 Tablet 4    isosorbide dinitrate (ISORDIL) 20 MG Tab Take 1 Tablet by mouth 3  times a day. 270 Tablet 4    glipiZIDE (GLUCOTROL) 5 MG Tab Take 5 mg by mouth every day.      potassium chloride SA (KDUR) 20 MEQ Tab CR Take 20 mEq by mouth 2 times a day. 2 tablets = 40 mEq      amitriptyline (ELAVIL) 25 MG Tab Take 25 mg by mouth every evening.      allopurinol (ZYLOPRIM) 300 MG Tab Take 300 mg by mouth every day.      levothyroxine (SYNTHROID) 300 MCG TABS Take 1 Tab by mouth every day. 30 Tab 3    pravastatin (PRAVACHOL) 40 MG tablet Take 1 Tab by mouth every day. 30 Tab 11     No current facility-administered medications on file prior to visit.     Verified no anticonvulsant or azole therapy, education provided for future use.    ASA 81 mg daily for MV repair.    Review of Systems   HENT: Negative for nosebleeds.   Respiratory: Negative for shortness of breath.  Gastrointestinal: Negative for blood in stool.   Genitourinary: Negative for hematuria.   Psychiatric/Behavioral: The patient is not nervous/anxious.     Physical Exam   Constitutional: Oriented to person, place, and time. Appears well-developed and well-nourished.   Pulmonary/Chest: Effort normal. No respiratory distress.   Skin: Not diaphoretic.  Psychiatric: Normal mood and affect. Behavior is normal.  Significant gait impairment / imbalance / high risk for falls - no obvious risks    Final Assessment and Recommendations:  Patient appears stable from the anticoagulation standpoint  Benefits of continued DOAC therapy outweigh risks for this patient  Confirmed copay is affordable.   Based on age, wt and creatinine, he still qualifies for 5 mg BID.  Will monitor CBC. He will have drawn prior to his next appointment.  Pt knows to watch closely for any unusual or abnormal bleeding.    - Continue taking Eliquis 5 mg by mouth twice daily    Other Actions:   The rationale for continued DOAC therapy  The potential for minor, major or life-threatening bleeding  Dosing instructions, adherence, risks of non-adherence, handling missed  doses  Avoiding OTC ASA & NSAIDs & minimizing EtOH to reduce bleeding risks  Dosing around upcoming procedure / surgery if applicable (see back)    Follow up:  Will follow up with patient in 3 months    Gayatri MCCAIN    Cc: Dr Vanegas

## 2024-05-09 ENCOUNTER — TELEPHONE (OUTPATIENT)
Dept: CARDIOLOGY | Facility: MEDICAL CENTER | Age: 59
End: 2024-05-09
Payer: MEDICAID

## 2024-05-09 NOTE — TELEPHONE ENCOUNTER
VR    Caller: Ottoniel Davies    Topic/issue: Patient called because he has been having fainting spells. He said he gets dizzy, lightheaded, and sees double and almost passed out but held himself up with a wall. He said this has happened 3 times now, the first 2 times he did pass out.     Please advise.     Callback Number: [phone]    Thank you,     Brittany LAZARO

## 2024-05-09 NOTE — TELEPHONE ENCOUNTER
"To VR, please see pt concerns below and advise, thank you!    To lavonne Huang, please request for ER records from [place], thank you!    To schedulers please schedule pt for next available appt in [place] but is willing to [place].  Thank you!    ================    Upon chart review pt has no [facility] scheduled.     Called pt to review CW recommendations.  Pt states the episodes he discussed the first to episodes was previous known episode [place] a [place] to the hospital, the 2nd episode he was \"working on the fence, I went down\" and his brother picked him up.  Ottoniel was told it was from dehydration.  The third episode happened this morning, but he did not pass out however he did experience double vision.  Pt states he waited and until his symptoms went away.      Pt states he is \"doing ok\" at this time but has been sick the last couple weeks.  Pt was last seen by primary care and was diagnosed with bronchitis and given antibiotics.  He was advised to reach out to our office regarding this mornings episode.  Pt was also evaluated in the ER on Sunday as he was not feeling well.    ED precautions noted for worsening symptoms.  Pt verbalizes understanding and states no other concerns or questions at this time.  Pt is appreciative of information given.  "

## 2024-05-10 ENCOUNTER — TELEPHONE (OUTPATIENT)
Dept: CARDIOLOGY | Facility: MEDICAL CENTER | Age: 59
End: 2024-05-10
Payer: MEDICAID

## 2024-05-13 NOTE — TELEPHONE ENCOUNTER
Returned pt call, [phone], to review VR recommendations.  unable to reach.  Left detailed message on voicemail regarding VR recommendations to return this call at their earliest convenience if he has further questions or concerns.    ===================    Other (Medical Advice )   Oren Keller M.D.  You6 hours ago (8:18 AM)     Stay hydrated and track vitals at home. Bring results to upcoming appointment. Avoid prolonged heat exposure until appointment. Thank you!

## 2024-05-14 ENCOUNTER — OFFICE VISIT (OUTPATIENT)
Dept: CARDIOLOGY | Facility: MEDICAL CENTER | Age: 59
End: 2024-05-14
Payer: MEDICAID

## 2024-05-14 ENCOUNTER — TELEPHONE (OUTPATIENT)
Dept: CARDIOLOGY | Facility: MEDICAL CENTER | Age: 59
End: 2024-05-14
Payer: MEDICAID

## 2024-05-14 VITALS
SYSTOLIC BLOOD PRESSURE: 100 MMHG | DIASTOLIC BLOOD PRESSURE: 60 MMHG | RESPIRATION RATE: 20 BRPM | OXYGEN SATURATION: 97 % | HEIGHT: 67 IN | WEIGHT: 190 LBS | BODY MASS INDEX: 29.82 KG/M2 | HEART RATE: 84 BPM

## 2024-05-14 DIAGNOSIS — I48.0 HYPERCOAGULABLE STATE DUE TO PAROXYSMAL ATRIAL FIBRILLATION (HCC): ICD-10-CM

## 2024-05-14 DIAGNOSIS — D68.69 HYPERCOAGULABLE STATE DUE TO PAROXYSMAL ATRIAL FIBRILLATION (HCC): ICD-10-CM

## 2024-05-14 DIAGNOSIS — I50.30 ACC/AHA STAGE C HEART FAILURE WITH PRESERVED EJECTION FRACTION (HCC): ICD-10-CM

## 2024-05-14 DIAGNOSIS — Z79.01 CHRONIC ANTICOAGULATION: ICD-10-CM

## 2024-05-14 DIAGNOSIS — I48.0 PAROXYSMAL ATRIAL FIBRILLATION (HCC): Chronic | ICD-10-CM

## 2024-05-14 DIAGNOSIS — E03.9 HYPOTHYROIDISM, UNSPECIFIED TYPE: ICD-10-CM

## 2024-05-14 DIAGNOSIS — G47.33 OSA ON CPAP: Chronic | ICD-10-CM

## 2024-05-14 DIAGNOSIS — Z79.899 HIGH RISK MEDICATION USE: ICD-10-CM

## 2024-05-14 DIAGNOSIS — I48.19 PERSISTENT ATRIAL FIBRILLATION (HCC): ICD-10-CM

## 2024-05-14 DIAGNOSIS — I50.20 HEART FAILURE WITH REDUCED EJECTION FRACTION (HCC): ICD-10-CM

## 2024-05-14 DIAGNOSIS — E78.2 MIXED HYPERLIPIDEMIA: ICD-10-CM

## 2024-05-14 DIAGNOSIS — I10 ESSENTIAL HYPERTENSION: ICD-10-CM

## 2024-05-14 LAB — EKG IMPRESSION: NORMAL

## 2024-05-14 PROCEDURE — 93010 ELECTROCARDIOGRAM REPORT: CPT | Performed by: INTERNAL MEDICINE

## 2024-05-14 PROCEDURE — 3074F SYST BP LT 130 MM HG: CPT

## 2024-05-14 PROCEDURE — 3078F DIAST BP <80 MM HG: CPT

## 2024-05-14 PROCEDURE — 99214 OFFICE O/P EST MOD 30 MIN: CPT

## 2024-05-14 RX ORDER — HYDRALAZINE HYDROCHLORIDE 50 MG/1
50 TABLET, FILM COATED ORAL 3 TIMES DAILY
Qty: 90 TABLET | Refills: 3 | Status: SHIPPED | OUTPATIENT
Start: 2024-05-14

## 2024-05-14 RX ORDER — SACUBITRIL AND VALSARTAN 49; 51 MG/1; MG/1
1 TABLET, FILM COATED ORAL 2 TIMES DAILY
Qty: 180 TABLET | Refills: 3 | Status: SHIPPED | OUTPATIENT
Start: 2024-05-14

## 2024-05-14 ASSESSMENT — FIBROSIS 4 INDEX: FIB4 SCORE: 1.632993161855452066

## 2024-05-14 ASSESSMENT — ENCOUNTER SYMPTOMS
SYNCOPE: 0
LIGHT-HEADEDNESS: 1
ORTHOPNEA: 1
PND: 0
PALPITATIONS: 0
SHORTNESS OF BREATH: 0
NEAR-SYNCOPE: 1
DIZZINESS: 1
HEADACHES: 0
DYSPNEA ON EXERTION: 0

## 2024-05-14 NOTE — PATIENT INSTRUCTIONS
DECREASE hydralazine to 50 mg three times a day    STOP losartan 100 mg   START Entresto 49-51 two times daily    Get labs done in 1 week

## 2024-05-14 NOTE — PROGRESS NOTES
Chief Complaint   Patient presents with    Atrial Fibrillation     Follow up    Hypertension    Dizziness          Subjective:   Ottoniel Davies is a 59 y.o. male who presents today for follow-up on dizziness, near syncope and atrial fibrillation.     Patient of Dr. Keller.  Current medical problems include HFrecEF, mitral valve repair 4/2019, HTN, HLD, hypothyroidism, paroxysmal AF. Their last clinic visit was 9/29/2023 with Dr. Keller.    Today's visit:  Patient is here at the visit alone. He is a poor historian on the timeline of his events of dizziness. He reports that there have been three events the first being when he fell in [place] on March 6, 2024. He stood up from a table and took one step and passed out. Was taken to the [facility]. Dr. Diaz called Dr. Whitmore and informed that patient was back in afib and started on eliquis and EF had dropped to 35%. Patient previous HFrecEF with EF 65% in 2022. He had an event two weeks ago when he was outside fixing his fence in the sun, saw double vision and fainted. Last was one week ago but he was able to hold himself up. He has not missed a dose of eliquis since starting it in March.    He denies chest pain or palpitations. Denies fevers or chills or signs of bleeding. He reports increased shortness of breath and one night he woke up feeling short of breath and coughing.    Blood pressure when he does take them at home 120/80 but rarely takes it. He weighs himself at home but has not had any weight gain and has been losing weight. He has lost 50 lbs in the past year.     He wears his CPAP at night.     He has been under a lot of stress since January. His dad had been sick and passed away last week.     Cardiovascular Risk Factors:  1. Smoking status: Former smoker  2. Type II Diabetes Mellitus: Yes   Lab Results   Component Value Date/Time    HBA1C 6.6 (H) 01/13/2022 02:20 PM    HBA1C 6.4 (H) 11/09/2021 07:54 AM     3. Hypertension: Yes  4.  Dyslipidemia: Yes   Cholesterol,Tot   Date Value Ref Range Status   06/10/2021 134 100 - 199 mg/dL Final     LDL   Date Value Ref Range Status   06/10/2021 76 <100 mg/dL Final     HDL   Date Value Ref Range Status   06/10/2021 30 (A) >=40 mg/dL Final     Triglycerides   Date Value Ref Range Status   06/10/2021 142 0 - 149 mg/dL Final     5. Family history of early Coronary Artery Disease in a first degree relative (Male less than 55 years of age; Female less than 65 years of age): No      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    Heart murmur     Heart valve disease     History of mitral valve repair 4/2019     Hyperlipidemia     Hypertension     Hypothyroid     DEBI on CPAP     Thyroid ca (HCC)          Family History   Problem Relation Age of Onset    Cancer Mother         breast/skin ca    Diabetes Father     Diabetes Maternal Grandmother     Stroke Maternal Grandmother     Lung Disease Neg Hx     Heart Disease Neg Hx          Social History     Tobacco Use    Smoking status: Former     Current packs/day: 0.00     Average packs/day: 0.3 packs/day for 36.0 years (9.0 ttl pk-yrs)     Types: Cigarettes     Start date: 8/11/1977     Quit date: 8/2/2013     Years since quitting: 10.7    Smokeless tobacco: Former     Types: Chew    Tobacco comments:     quit weeks ago   Vaping Use    Vaping Use: Never used   Substance Use Topics    Alcohol use: Not Currently     Comment: quit in 1994    Drug use: Yes     Types: Inhaled     Comment: marijuana         No Known Allergies      Current Outpatient Medications   Medication Sig    hydrALAZINE (APRESOLINE) 50 MG Tab Take 1 Tablet by mouth 3 times a day.    sacubitril-valsartan (ENTRESTO) 49-51 MG Tab Take 1 Tablet by mouth 2 times a day.    carvedilol (COREG) 25 MG Tab TAKE 1 TABLET BY MOUTH TWICE DAILY WITH MEALS    apixaban (ELIQUIS) 5mg Tab Take 1  "Tablet by mouth 2 times a day.    Ferrous Sulfate (IRON PO) Take 65 mg by mouth 2 times a day.    furosemide (LASIX) 40 MG Tab Take 1 Tablet by mouth every day.    isosorbide dinitrate (ISORDIL) 20 MG Tab Take 1 Tablet by mouth 3 times a day. (Patient taking differently: Take 20 mg by mouth every day.)    glipiZIDE (GLUCOTROL) 5 MG Tab Take 5 mg by mouth every day.    potassium chloride SA (KDUR) 20 MEQ Tab CR Take 20 mEq by mouth 2 times a day. 2 tablets = 40 mEq    amitriptyline (ELAVIL) 25 MG Tab Take 25 mg by mouth every evening.    allopurinol (ZYLOPRIM) 300 MG Tab Take 300 mg by mouth every day.    levothyroxine (SYNTHROID) 300 MCG TABS Take 1 Tab by mouth every day.    pravastatin (PRAVACHOL) 40 MG tablet Take 1 Tab by mouth every day.         Review of Systems   Constitutional: Negative for malaise/fatigue.   Cardiovascular:  Positive for near-syncope and orthopnea. Negative for chest pain, dyspnea on exertion, leg swelling, palpitations, paroxysmal nocturnal dyspnea and syncope.   Respiratory:  Negative for shortness of breath.    Neurological:  Positive for dizziness and light-headedness. Negative for headaches.           Objective:   /60 (BP Location: Left arm, Patient Position: Sitting)   Pulse 84   Resp 20   Ht 1.702 m (5' 7\")   Wt 86.2 kg (190 lb)   SpO2 97%  Body mass index is 29.76 kg/m².         Physical Exam  Constitutional:       General: He is not in acute distress.     Appearance: Normal appearance.   HENT:      Head: Normocephalic and atraumatic.   Cardiovascular:      Rate and Rhythm: Normal rate. Rhythm irregular.      Pulses: Normal pulses.      Heart sounds: No murmur heard.  Pulmonary:      Effort: Pulmonary effort is normal. No respiratory distress.      Breath sounds: Decreased breath sounds present.      Comments: Crackles in bases  Musculoskeletal:      Right lower le+ Pitting Edema present.      Left lower le+ Pitting Edema present.   Neurological:      Mental " Status: He is alert and oriented to person, place, and time.      Gait: Gait normal.   Psychiatric:         Behavior: Behavior normal.             Lab Results   Component Value Date/Time    SODIUM 141 03/14/2024 02:26 PM    SODIUM 141 03/14/2024 02:26 PM    POTASSIUM 3.7 03/14/2024 02:26 PM    POTASSIUM 3.7 03/14/2024 02:26 PM    CHLORIDE 108 03/14/2024 02:26 PM    CHLORIDE 106 03/14/2024 02:26 PM    CO2 23 03/14/2024 02:26 PM    CO2 22 03/14/2024 02:26 PM    GLUCOSE 107 (H) 03/14/2024 02:26 PM    GLUCOSE 109 (H) 03/14/2024 02:26 PM    BUN 25 (H) 03/14/2024 02:26 PM    BUN 26 (H) 03/14/2024 02:26 PM    CREATININE 1.31 03/14/2024 02:26 PM    CREATININE 1.30 03/14/2024 02:26 PM      Lab Results   Component Value Date/Time    WBC 7.8 03/14/2024 02:26 PM    RBC 4.24 (L) 03/14/2024 02:26 PM    HEMOGLOBIN 11.3 (L) 03/14/2024 02:26 PM    HEMATOCRIT 36.0 (L) 03/14/2024 02:26 PM    MCV 84.9 03/14/2024 02:26 PM    MCH 26.7 (L) 03/14/2024 02:26 PM    MCHC 31.4 (L) 03/14/2024 02:26 PM    MPV 12.0 03/14/2024 02:26 PM    NEUTSPOLYS 74.40 (H) 03/14/2024 02:26 PM    LYMPHOCYTES 12.40 (L) 03/14/2024 02:26 PM    MONOCYTES 10.30 03/14/2024 02:26 PM    EOSINOPHILS 2.30 03/14/2024 02:26 PM    BASOPHILS 0.30 03/14/2024 02:26 PM    HYPOCHROMIA 1+ 02/21/2014 08:00 AM    ANISOCYTOSIS 1+ 12/06/2022 12:31 AM      Lab Results   Component Value Date/Time    CHOLSTRLTOT 134 06/10/2021 11:31 AM    LDL 76 06/10/2021 11:31 AM    HDL 30 (A) 06/10/2021 11:31 AM    TRIGLYCERIDE 142 06/10/2021 11:31 AM       Lab Results   Component Value Date/Time    TROPONINT 38 (H) 12/05/2022 1030     Lab Results   Component Value Date/Time    NTPROBNP 1621 (H) 02/21/2023 0737     Echocardiogram 3/5/2024 reviewed from [facility]   Left ventricular systolic function is moderately reduced with visually estimated ejection fraction of 35%  Left ventricular diastolic function unable to assess due to A-fib and mitral valve repair  Left atrial chamber dimension is severely  enlarged  Trace to mild mitral valve regurgitation    Echocardiogram 12/6/2022  Normal left ventricular size, thickness, and systolic function. EF 65%  Mildly dilated right ventricle.  Known mitral valve repair which is functioning normally with   appropriate transvalvular gradient.  Trace/Mild mitral regurgitation.    MICAH 4/13/2019  CONCLUSIONS  Significant Findings:     Pre-Intervention:  LV:  Mildly reduced left ventricular systolic function. Mild -    ejection fraction 45-54 %.  Reduced EF due mainly to global hypokinesis   with regional variation, worst in the inferior wall.  Grade III   diastolic dysfunction (restrictive pattern).   RV:  Moderately dilated right ventricle. Normal right ventricular   systolic function.   MV:  Prolapsed P2 and P3 (mainly P3) leaflets with anteriorly directed   wall hugging jet which is severe in nature by definition.     Post-Intervention:    LV:Improved global EF (55%-60%) with use of mild inotropic support:    Epi at 0.04 mcg/kg/min.  MV:  s/p mitral valve repair with addition of ring annuloplasty.    Trivial to mild mitral regurgitation with no evidence of mitral   stensosis (Mean PG of 2 mmHg).       Assessment:   1. DEBI on CPAP    2. Paroxysmal atrial fibrillation (HCC)  - EKG - Clinic Performed  - CL-CARDIOVERSION; Future  - REFERRAL TO CARDIOLOGY    3. Mixed hyperlipidemia  - Lipid Profile; Future    4. Essential hypertension  - hydrALAZINE (APRESOLINE) 50 MG Tab; Take 1 Tablet by mouth 3 times a day.  Dispense: 90 Tablet; Refill: 3    5. Chronic anticoagulation    6. Hypercoagulable state due to paroxysmal atrial fibrillation (HCC)    7. High risk medication use    8. ACC/AHA stage C heart failure with preserved ejection fraction (HCC)    9. Hypothyroidism, unspecified type  - TSH; Future    10. Heart failure with reduced ejection fraction (HCC)  - sacubitril-valsartan (ENTRESTO) 49-51 MG Tab; Take 1 Tablet by mouth 2 times a day.  Dispense: 180 Tablet; Refill: 3  -  Basic Metabolic Panel; Future  - proBrain Natriuretic Peptide, NT; Future    11. Persistent atrial fibrillation (HCC)        Medical Decision Making:  Today's Assessment / Plan:   Persistent atrial fibrillation  Chronic anticoagulation  -EKG interpreted by me in office today in the atrial fibrillation rate 85  -Continue carvidelol 25 mg twice a day  -Continue eliquis 5 mg twice a day  -No signs of bleeding  -Patient does not feel when he goes in or out of afib. -Patient in afib at ER visit 3/6/2024. Continues to be in afib in office today. Decreased EF with afib.   -Patient has not reported any missed doses of eliquis so will attempt to get patient out of afib with cardioversion and refer to EP for recommendation of ablation or AAD     HFrEF, Stage C, Class III, LVEF 35%: patient fluid overload on exam, 1+ pitting edema BLE, decreased breath sounds and crackles in bases, JVD.   -Heart failure due to tachyarrhythmia   -Discussed Heart failure trajectory and prognosis with patient. Will continue to optimize medical therapy as tolerated. Advanced HF treatment, need for remote monitoring consideration at every visit.   -ACE-I/ARB/ARNI: Start Entresto 49-51 mg twice daily  -Evidence Based Beta-blocker: Continue carvedilol 25 mg twice daily  -Aldosterone Antagonist: Consider starting spironolactone at follow up   -Diuretic: Continue lasix 40 mg daily with 20 mEq of potassium twice a day   -SGLT2 inhibitor: Consider starting Jardiance at follow up  -Other: Decrease hydralazine to 50 mg three times a day   -Labs: BMP, BNP, TSH Labs Ordered, to be done in 1 week, Will continue to closely monitor for side effects of patient's high risk medication(s) including renal function, liver function NTproBNP/cardiac markers, and electrolytes as needed  -discussed importance of exercise and regular activity  -Discussed/reviewed maintaining a low Sodium and hydration recommendations  -Reinforced s/sx of worsening heart failure with  patient and weight monitoring. Pt verbalizes understanding. Pt to call office or RTC if present.    -PUMP line number [id number] (PUMP) reviewed with patient  -Heart Failure Education: (Chart and discuss 3 from Below)  Discussed the Definition of heart failure (linking disease, symptoms, and treatment) and causes of heart failure  Recognition of escalating symptoms and concrete plan for response to particular symptoms  Discussed the indication for ACE-I/ARB/ARNI, Beta-Blocker, Aldosterone antagonist, SGLT2 inhibitor  Risk modification for heart failure progression  Specific diet recommendations: individualized low-sodium diet, recommendation for alcohol intake, etc.  Specific activity and exercise recommendations  Importance of treatment adherence  Behavioral strategies to promote treatment adherence    Hypertension  - Good control  - continue carvedilol 25 mg twice a day  -Stop losartan 100 mg day and start Entresto 49-51 twice a day  -Decrease hydralazine to 50 mg three times a day  - goal < 130/80  -Continue to check blood pressure at home and keep a log and bring to follow up  -Move slowly from laying to sitting to standing.     Hyperlipidemia  -Most recent LDL 76 in 2021  -Continue pravastatin 40 mg daily  -Goal of less than 100  -Check lipid panel in 1 week        Hypothyroidism s/p remote hx of papillary thyroid cancinoma s/p thyroidectomy  -TSH lab to be completed in 1 week  -Continue synthroid 300 mcg daily    Return in about 2 weeks (around 5/28/2024).  Sooner if problems.    ARIA Tovar.

## 2024-05-15 NOTE — TELEPHONE ENCOUNTER
ARIA Tovar.  Kandice Gilmore  Please schedule cardioversion without MICAH. If possible with VR. With anesthesia. Thank you.

## 2024-05-16 ENCOUNTER — TELEPHONE (OUTPATIENT)
Dept: CARDIOLOGY | Facility: MEDICAL CENTER | Age: 59
End: 2024-05-16
Payer: MEDICAID

## 2024-05-16 NOTE — TELEPHONE ENCOUNTER
Prior Authorization for Entresto 49-51MG tablets has been approved for a quantity of 180 , day supply 90    Prior Authorization reference number: [id number]  Insurance: medicaid  Effective dates: 5/16/24 to 5/16/25  Copay: $0       Is patient eligible to fill with [facility]? Yes    Next Steps:  too soon to fill

## 2024-05-16 NOTE — TELEPHONE ENCOUNTER
Prior Authorization for Entresto 49-51MG tablets   (Quantity: 180, Days: 909) has been submitted via [id number]    Insurance: medicaid    Will follow up in 24-48 business hours.

## 2024-05-16 NOTE — TELEPHONE ENCOUNTER
Received [employer] request via [employer]  for Entresto 49-51MG tablets. (Quantity:180, Day Supply:90)     Insurance: [employer]  Member ID:  [id number]  BIN: [id number]  PCN: [id number]  [id number]     Ran Test claim via [place] [facility] medication Rejects stating prior authorization is required.

## 2024-05-16 NOTE — TELEPHONE ENCOUNTER
Patient is scheduled for a Cardioversion without anesthesia on 06- with Dr. Keller. Patient has been instructed to check in at 6:00 am for 8:00 procedure. Hold Glipizide the morning of. Message sent to authorizations. Emailed message to pt with instructions.  No device. FYI sent to Arianna and Maylin.

## 2024-05-17 ENCOUNTER — APPOINTMENT (OUTPATIENT)
Dept: ADMISSIONS | Facility: MEDICAL CENTER | Age: 59
End: 2024-05-17
Attending: INTERNAL MEDICINE
Payer: MEDICAID

## 2024-05-17 ENCOUNTER — HOSPITAL ENCOUNTER (OUTPATIENT)
Facility: MEDICAL CENTER | Age: 59
End: 2024-05-17
Attending: INTERNAL MEDICINE | Admitting: INTERNAL MEDICINE
Payer: MEDICAID

## 2024-05-17 ENCOUNTER — HOSPITAL ENCOUNTER (OUTPATIENT)
Dept: LAB | Facility: MEDICAL CENTER | Age: 59
End: 2024-05-17
Payer: MEDICAID

## 2024-05-17 DIAGNOSIS — E03.9 HYPOTHYROIDISM, UNSPECIFIED TYPE: ICD-10-CM

## 2024-05-17 DIAGNOSIS — I50.20 HEART FAILURE WITH REDUCED EJECTION FRACTION (HCC): ICD-10-CM

## 2024-05-17 DIAGNOSIS — E78.2 MIXED HYPERLIPIDEMIA: ICD-10-CM

## 2024-05-17 LAB
ANION GAP SERPL CALC-SCNC: 14 MMOL/L (ref 7–16)
BUN SERPL-MCNC: 32 MG/DL (ref 8–22)
CALCIUM SERPL-MCNC: 9.2 MG/DL (ref 8.5–10.5)
CHLORIDE SERPL-SCNC: 110 MMOL/L (ref 96–112)
CHOLEST SERPL-MCNC: 95 MG/DL (ref 100–199)
CO2 SERPL-SCNC: 19 MMOL/L (ref 20–33)
CREAT SERPL-MCNC: 1.68 MG/DL (ref 0.5–1.4)
FASTING STATUS PATIENT QL REPORTED: NORMAL
GFR SERPLBLD CREATININE-BSD FMLA CKD-EPI: 46 ML/MIN/1.73 M 2
GLUCOSE SERPL-MCNC: 109 MG/DL (ref 65–99)
HDLC SERPL-MCNC: 32 MG/DL
LDLC SERPL CALC-MCNC: 52 MG/DL
NT-PROBNP SERPL IA-MCNC: 6745 PG/ML (ref 0–125)
POTASSIUM SERPL-SCNC: 3.6 MMOL/L (ref 3.6–5.5)
SODIUM SERPL-SCNC: 143 MMOL/L (ref 135–145)
TRIGL SERPL-MCNC: 57 MG/DL (ref 0–149)

## 2024-05-18 LAB — TSH SERPL DL<=0.005 MIU/L-ACNC: <0.005 UIU/ML (ref 0.38–5.33)

## 2024-05-28 ENCOUNTER — OFFICE VISIT (OUTPATIENT)
Dept: CARDIOLOGY | Facility: MEDICAL CENTER | Age: 59
End: 2024-05-28
Payer: MEDICAID

## 2024-05-28 VITALS
RESPIRATION RATE: 18 BRPM | WEIGHT: 183 LBS | OXYGEN SATURATION: 96 % | SYSTOLIC BLOOD PRESSURE: 118 MMHG | HEART RATE: 54 BPM | BODY MASS INDEX: 28.72 KG/M2 | DIASTOLIC BLOOD PRESSURE: 68 MMHG | HEIGHT: 67 IN

## 2024-05-28 DIAGNOSIS — G47.33 OSA ON CPAP: ICD-10-CM

## 2024-05-28 DIAGNOSIS — I10 ESSENTIAL HYPERTENSION: ICD-10-CM

## 2024-05-28 DIAGNOSIS — I48.0 PAROXYSMAL ATRIAL FIBRILLATION (HCC): ICD-10-CM

## 2024-05-28 DIAGNOSIS — I50.20 HEART FAILURE WITH REDUCED EJECTION FRACTION (HCC): ICD-10-CM

## 2024-05-28 DIAGNOSIS — E03.9 HYPOTHYROIDISM, UNSPECIFIED TYPE: ICD-10-CM

## 2024-05-28 ASSESSMENT — ENCOUNTER SYMPTOMS
ORTHOPNEA: 0
SYNCOPE: 0
HEADACHES: 0
LIGHT-HEADEDNESS: 0
NEAR-SYNCOPE: 0
DIZZINESS: 0
COUGH: 1
PALPITATIONS: 0
PND: 0
DYSPNEA ON EXERTION: 0
SHORTNESS OF BREATH: 0

## 2024-05-28 ASSESSMENT — FIBROSIS 4 INDEX: FIB4 SCORE: 1.632993161855452066

## 2024-05-28 NOTE — PROGRESS NOTES
Chief Complaint   Patient presents with    Atrial Fibrillation    Hyperlipidemia    Hypertension          Subjective:   Ottoniel Davies is a 59 y.o. male who presents today for follow-up on dizziness, near syncope and atrial fibrillation.     Patient of Dr. Keller.  Current medical problems include HFrecEF, mitral valve repair 4/2019, HTN, HLD, hypothyroidism, paroxysmal AF. Their last clinic visit was 5/14/2024 with me.    5/14/2024 visit:  Patient is here at the visit alone. He is a poor historian on the timeline of his events of dizziness. He reports that there have been three events the first being when he fell in [place] 6, 2024. He stood up from a table and took one step and passed out. Was taken to the [facility] ED. Dr. Diaz called Dr. Whitmore and informed that patient was back in afib and started on eliquis and EF had dropped to 35%. Patient previous HFrecEF with EF 65% in 2022. He had an event two weeks ago when he was outside fixing his fence in the sun, saw double vision and fainted. Last was one week ago but he was able to hold himself up. He has not missed a dose of eliquis since starting it in March.    He wears his CPAP at night.     He has been under a lot of stress since January. His dad had been sick and passed away last week.     Today's visit:  Patient schedule for cardioversion with Dr. Keller on 6/4/2024. Patient reports improvement in shortness of breath symptoms. He denies chest pain, orthopnea, PND, swelling in his legs or feet, dizziness, lightheadedness, syncope, or palpitations.     Patient has been on current thyroid dose for years. Has not had follow up with an endocrinologist in years. He currently does not have an endocrinologist and his PCP is not managing it. Discussed importance of following up with PCP and printed labs for patient to bring to follow up.    He has been taking the Entresto for over one week and has not had any side effects. He is taking his  blood pressure and ranges from /60-70s.     He reports he has decreased his meals. He has an appetite but gets full quickly and doesn't finish his food. He has had a 7 lb weight loss in two weeks per clinic scale. He does not weigh himself at home.    He continues to be under high stress. Since his father passed away he has had to deal with having to move out of his house and is currently looking for new housing and has to be out of his current house by June 1st.         Cardiovascular Risk Factors:  1. Smoking status: Former smoker  2. Type II Diabetes Mellitus: Yes   Lab Results   Component Value Date/Time    HBA1C 6.6 (H) 01/13/2022 02:20 PM    HBA1C 6.4 (H) 11/09/2021 07:54 AM     3. Hypertension: Yes  4. Dyslipidemia: Yes   Cholesterol,Tot   Date Value Ref Range Status   06/10/2021 134 100 - 199 mg/dL Final     LDL   Date Value Ref Range Status   06/10/2021 76 <100 mg/dL Final     HDL   Date Value Ref Range Status   06/10/2021 30 (A) >=40 mg/dL Final     Triglycerides   Date Value Ref Range Status   06/10/2021 142 0 - 149 mg/dL Final     5. Family history of early Coronary Artery Disease in a first degree relative (Male less than 55 years of age; Female less than 65 years of age): No      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    Heart murmur     Heart valve disease     History of mitral valve repair 4/2019     Hyperlipidemia     Hypertension     Hypothyroid     DEBI on CPAP     Thyroid ca (HCC)          Family History   Problem Relation Age of Onset    Cancer Mother         breast/skin ca    Diabetes Father     Diabetes Maternal Grandmother     Stroke Maternal Grandmother     Lung Disease Neg Hx     Heart Disease Neg Hx          Social History     Tobacco Use    Smoking status: Former     Current packs/day: 0.00     Average packs/day: 0.3 packs/day for 36.0 years (9.0 ttl pk-yrs)      Types: Cigarettes     Start date: 8/11/1977     Quit date: 8/2/2013     Years since quitting: 10.8    Smokeless tobacco: Former     Types: Chew    Tobacco comments:     quit weeks ago   Vaping Use    Vaping status: Never Used   Substance Use Topics    Alcohol use: Not Currently     Comment: quit in 1994    Drug use: Yes     Types: Inhaled     Comment: marijuana         No Known Allergies      Current Outpatient Medications   Medication Sig    hydrALAZINE (APRESOLINE) 50 MG Tab Take 1 Tablet by mouth 3 times a day.    sacubitril-valsartan (ENTRESTO) 49-51 MG Tab Take 1 Tablet by mouth 2 times a day.    carvedilol (COREG) 25 MG Tab TAKE 1 TABLET BY MOUTH TWICE DAILY WITH MEALS    apixaban (ELIQUIS) 5mg Tab Take 1 Tablet by mouth 2 times a day.    Ferrous Sulfate (IRON PO) Take 65 mg by mouth 2 times a day.    furosemide (LASIX) 40 MG Tab Take 1 Tablet by mouth every day.    isosorbide dinitrate (ISORDIL) 20 MG Tab Take 1 Tablet by mouth 3 times a day. (Patient taking differently: Take 20 mg by mouth every day.)    glipiZIDE (GLUCOTROL) 5 MG Tab Take 5 mg by mouth every day.    potassium chloride SA (KDUR) 20 MEQ Tab CR Take 20 mEq by mouth 2 times a day. 2 tablets = 40 mEq    amitriptyline (ELAVIL) 25 MG Tab Take 25 mg by mouth every evening.    allopurinol (ZYLOPRIM) 300 MG Tab Take 300 mg by mouth every day.    levothyroxine (SYNTHROID) 300 MCG TABS Take 1 Tab by mouth every day.    pravastatin (PRAVACHOL) 40 MG tablet Take 1 Tab by mouth every day. (Patient not taking: Reported on 5/28/2024)         Review of Systems   Constitutional: Negative for malaise/fatigue.   Cardiovascular:  Negative for chest pain, dyspnea on exertion, leg swelling, near-syncope, orthopnea, palpitations, paroxysmal nocturnal dyspnea and syncope.   Respiratory:  Positive for cough. Negative for shortness of breath.    Neurological:  Negative for dizziness, headaches and light-headedness.           Objective:   /68 (BP Location:  "Left arm, Patient Position: Sitting, BP Cuff Size: Adult)   Pulse (!) 54   Resp 18   Ht 1.702 m (5' 7\")   Wt 83 kg (183 lb)   SpO2 96%  Body mass index is 28.66 kg/m².         Physical Exam  Constitutional:       General: He is not in acute distress.     Appearance: Normal appearance.   HENT:      Head: Normocephalic and atraumatic.   Cardiovascular:      Rate and Rhythm: Normal rate. Rhythm irregular.      Pulses: Normal pulses.      Heart sounds: No murmur heard.  Pulmonary:      Effort: Pulmonary effort is normal. No respiratory distress.      Breath sounds: Decreased breath sounds present.      Comments: Crackles in bases  Musculoskeletal:      Right lower leg: Edema present.      Left lower leg: Edema present.      Comments: trace   Neurological:      Mental Status: He is alert and oriented to person, place, and time.      Gait: Gait normal.   Psychiatric:         Behavior: Behavior normal.             Lab Results   Component Value Date/Time    SODIUM 143 05/17/2024 11:28 AM    POTASSIUM 3.6 05/17/2024 11:28 AM    CHLORIDE 110 05/17/2024 11:28 AM    CO2 19 (L) 05/17/2024 11:28 AM    GLUCOSE 109 (H) 05/17/2024 11:28 AM    BUN 32 (H) 05/17/2024 11:28 AM    CREATININE 1.68 (H) 05/17/2024 11:28 AM      Lab Results   Component Value Date/Time    WBC 7.8 03/14/2024 02:26 PM    RBC 4.24 (L) 03/14/2024 02:26 PM    HEMOGLOBIN 11.3 (L) 03/14/2024 02:26 PM    HEMATOCRIT 36.0 (L) 03/14/2024 02:26 PM    MCV 84.9 03/14/2024 02:26 PM    MCH 26.7 (L) 03/14/2024 02:26 PM    MCHC 31.4 (L) 03/14/2024 02:26 PM    MPV 12.0 03/14/2024 02:26 PM    NEUTSPOLYS 74.40 (H) 03/14/2024 02:26 PM    LYMPHOCYTES 12.40 (L) 03/14/2024 02:26 PM    MONOCYTES 10.30 03/14/2024 02:26 PM    EOSINOPHILS 2.30 03/14/2024 02:26 PM    BASOPHILS 0.30 03/14/2024 02:26 PM    HYPOCHROMIA 1+ 02/21/2014 08:00 AM    ANISOCYTOSIS 1+ 12/06/2022 12:31 AM      Lab Results   Component Value Date/Time    CHOLSTRLTOT 95 (L) 05/17/2024 11:28 AM    LDL 52 05/17/2024 " 11:28 AM    HDL 32 (A) 05/17/2024 11:28 AM    TRIGLYCERIDE 57 05/17/2024 11:28 AM       Lab Results   Component Value Date/Time    TROPONINT 38 (H) 12/05/2022 1030     Lab Results   Component Value Date/Time    NTPROBNP 1621 (H) 02/21/2023 0737     Echocardiogram 3/5/2024 reviewed from [facility]   Left ventricular systolic function is moderately reduced with visually estimated ejection fraction of 35%  Left ventricular diastolic function unable to assess due to A-fib and mitral valve repair  Left atrial chamber dimension is severely enlarged  Trace to mild mitral valve regurgitation    Echocardiogram 12/6/2022  Normal left ventricular size, thickness, and systolic function. EF 65%  Mildly dilated right ventricle.  Known mitral valve repair which is functioning normally with   appropriate transvalvular gradient.  Trace/Mild mitral regurgitation.    MICAH 4/13/2019  CONCLUSIONS  Significant Findings:     Pre-Intervention:  LV:  Mildly reduced left ventricular systolic function. Mild -    ejection fraction 45-54 %.  Reduced EF due mainly to global hypokinesis   with regional variation, worst in the inferior wall.  Grade III   diastolic dysfunction (restrictive pattern).   RV:  Moderately dilated right ventricle. Normal right ventricular   systolic function.   MV:  Prolapsed P2 and P3 (mainly P3) leaflets with anteriorly directed   wall hugging jet which is severe in nature by definition.     Post-Intervention:    LV:Improved global EF (55%-60%) with use of mild inotropic support:    Epi at 0.04 mcg/kg/min.  MV:  s/p mitral valve repair with addition of ring annuloplasty.    Trivial to mild mitral regurgitation with no evidence of mitral   stensosis (Mean PG of 2 mmHg).       Assessment:   1. DEBI on CPAP    2. Paroxysmal atrial fibrillation (HCC)    3. Essential hypertension    4. Hypothyroidism, unspecified type  - Referral to Endocrinology    5. Heart failure with reduced ejection fraction (HCC)          Medical Decision  Making:  Today's Assessment / Plan:   Persistent atrial fibrillation  Chronic anticoagulation  -Continue carvidelol 25 mg twice a day  -Continue eliquis 5 mg twice a day  -No signs of bleeding  -Patient does not feel when he goes in or out of afib. -Patient in afib at ER visit 3/6/2024. Continues to be in afib in office today. Decreased EF with afib.   -Scheduled cardioversion with Dr. Keller June 4, 2024.  -TSH <0.005, referral to endocrinology sent and instructed patient to follow up with PCP to manage sooner until can get established with endocrinology.  -Going to cancel scheduled cardioversion until able to get thyroid levels stabilized.      HFrEF, Stage C, Class III, LVEF 35%:   -Heart failure due to tachyarrhythmia   -ACE-I/ARB/ARNI: Continue Entresto 49-51 mg twice daily  -Evidence Based Beta-blocker: Continue carvedilol 25 mg twice daily  -Aldosterone Antagonist: Consider starting spironolactone at follow up   -Diuretic: Continue lasix 40 mg daily with 20 mEq of potassium twice a day   -SGLT2 inhibitor: Consider starting Jardiance at follow up  -Other: Continue hydralazine to 50 mg three times a day. Doing well with decrease dose and blood pressure. Consider continue down titration of dose if having low blood pressures     Hypertension  - Good control  - continue carvedilol 25 mg twice a day  -Continue Entresto 49-51 twice a day  -Continue hydralazine to 50 mg three times a day  - goal < 130/80  -Continue to check blood pressure at home and keep a log and bring to follow up  -Move slowly from laying to sitting to standing.     Hyperlipidemia  -Most recent LDL 76 in 2021  -Continue pravastatin 40 mg daily  -Goal of less than 100  -Check lipid panel in 1 week    Hypothyroidism s/p remote hx of papillary thyroid cancinoma s/p thyroidectomy  -TSH lab reviewed with patient. TSH <0.005 and discussed importance of establishing with endocrinology and following up with PCP for management until able to see  endocrinology. Patient last saw endocrinology in [place] and does not remember doctors name.    Return in about 3 months (around 8/28/2024).  Sooner if problems.    ARIA Tovar.

## 2024-06-04 ENCOUNTER — APPOINTMENT (OUTPATIENT)
Dept: CARDIOLOGY | Facility: MEDICAL CENTER | Age: 59
End: 2024-06-04
Payer: MEDICAID

## 2024-07-03 ENCOUNTER — HOSPITAL ENCOUNTER (OUTPATIENT)
Dept: LAB | Facility: MEDICAL CENTER | Age: 59
End: 2024-07-03
Attending: NURSE PRACTITIONER
Payer: MEDICAID

## 2024-07-03 DIAGNOSIS — I05.9 MITRAL VALVE DISEASE: ICD-10-CM

## 2024-07-03 DIAGNOSIS — I48.0 PAROXYSMAL ATRIAL FIBRILLATION (HCC): Chronic | ICD-10-CM

## 2024-07-03 DIAGNOSIS — D68.69 SECONDARY HYPERCOAGULABLE STATE (HCC): ICD-10-CM

## 2024-07-03 LAB
ERYTHROCYTE [DISTWIDTH] IN BLOOD BY AUTOMATED COUNT: 53.2 FL (ref 35.9–50)
HCT VFR BLD AUTO: 38 % (ref 42–52)
HGB BLD-MCNC: 12 G/DL (ref 14–18)
MCH RBC QN AUTO: 28.1 PG (ref 27–33)
MCHC RBC AUTO-ENTMCNC: 31.6 G/DL (ref 32.3–36.5)
MCV RBC AUTO: 89 FL (ref 81.4–97.8)
PLATELET # BLD AUTO: 179 K/UL (ref 164–446)
PMV BLD AUTO: 12 FL (ref 9–12.9)
RBC # BLD AUTO: 4.27 M/UL (ref 4.7–6.1)
WBC # BLD AUTO: 6.4 K/UL (ref 4.8–10.8)

## 2024-07-03 PROCEDURE — 36415 COLL VENOUS BLD VENIPUNCTURE: CPT

## 2024-07-03 PROCEDURE — 85027 COMPLETE CBC AUTOMATED: CPT

## 2024-07-10 ENCOUNTER — ANTICOAGULATION MONITORING (OUTPATIENT)
Dept: VASCULAR LAB | Facility: MEDICAL CENTER | Age: 59
End: 2024-07-10

## 2024-07-10 ENCOUNTER — ANTICOAGULATION MONITORING (OUTPATIENT)
Dept: VASCULAR LAB | Facility: MEDICAL CENTER | Age: 59
End: 2024-07-10
Payer: MEDICAID

## 2024-07-10 ENCOUNTER — NON-PROVIDER VISIT (OUTPATIENT)
Dept: MEDICAL GROUP | Facility: PHYSICIAN GROUP | Age: 59
End: 2024-07-10
Payer: MEDICAID

## 2024-07-10 VITALS
SYSTOLIC BLOOD PRESSURE: 128 MMHG | WEIGHT: 176 LBS | OXYGEN SATURATION: 100 % | TEMPERATURE: 96.4 F | DIASTOLIC BLOOD PRESSURE: 80 MMHG | BODY MASS INDEX: 27.57 KG/M2 | RESPIRATION RATE: 16 BRPM | HEART RATE: 96 BPM

## 2024-07-10 DIAGNOSIS — D68.69 SECONDARY HYPERCOAGULABLE STATE (HCC): ICD-10-CM

## 2024-07-10 DIAGNOSIS — I05.9 MITRAL VALVE DISEASE: ICD-10-CM

## 2024-07-10 DIAGNOSIS — I48.0 PAROXYSMAL ATRIAL FIBRILLATION (HCC): Chronic | ICD-10-CM

## 2024-07-10 PROCEDURE — 3074F SYST BP LT 130 MM HG: CPT | Performed by: NURSE PRACTITIONER

## 2024-07-10 PROCEDURE — 99211 OFF/OP EST MAY X REQ PHY/QHP: CPT | Performed by: NURSE PRACTITIONER

## 2024-07-10 PROCEDURE — 3079F DIAST BP 80-89 MM HG: CPT | Performed by: NURSE PRACTITIONER

## 2024-07-10 ASSESSMENT — FIBROSIS 4 INDEX: FIB4 SCORE: 1.67

## 2024-08-17 ENCOUNTER — HOSPITAL ENCOUNTER (OUTPATIENT)
Dept: RADIOLOGY | Facility: MEDICAL CENTER | Age: 59
End: 2024-08-17
Attending: STUDENT IN AN ORGANIZED HEALTH CARE EDUCATION/TRAINING PROGRAM
Payer: MEDICAID

## 2024-08-17 DIAGNOSIS — N28.89 OTHER SPECIFIED DISORDERS OF KIDNEY AND URETER: ICD-10-CM

## 2024-08-17 PROCEDURE — A9579 GAD-BASE MR CONTRAST NOS,1ML: HCPCS | Mod: JZ,UD

## 2024-08-17 PROCEDURE — 74183 MRI ABD W/O CNTR FLWD CNTR: CPT

## 2024-08-17 PROCEDURE — 700117 HCHG RX CONTRAST REV CODE 255: Mod: JZ,UD

## 2024-08-17 RX ADMIN — GADOTERIDOL 17 ML: 279.3 INJECTION, SOLUTION INTRAVENOUS at 17:30

## 2024-12-01 DIAGNOSIS — I48.0 PAROXYSMAL ATRIAL FIBRILLATION (HCC): Chronic | ICD-10-CM

## 2024-12-02 RX ORDER — CARVEDILOL 25 MG/1
25 TABLET ORAL 2 TIMES DAILY WITH MEALS
Qty: 180 TABLET | Refills: 1 | Status: SHIPPED | OUTPATIENT
Start: 2024-12-02

## 2024-12-02 NOTE — TELEPHONE ENCOUNTER
Is the patient due for a refill? Yes    Was the patient seen the past year? Yes    Date of last office visit: 5/28/24    Does the patient have an upcoming appointment?  No    Provider to refill:HL    Does the patient have [facility] and need 100-day supply? (This applies to ALL medications) Patient does not have SCP

## 2024-12-05 DIAGNOSIS — I48.0 PAROXYSMAL ATRIAL FIBRILLATION (HCC): ICD-10-CM

## 2025-01-15 ENCOUNTER — APPOINTMENT (OUTPATIENT)
Dept: VASCULAR LAB | Facility: MEDICAL CENTER | Age: 60
End: 2025-01-15
Attending: INTERNAL MEDICINE
Payer: MEDICAID

## 2025-01-15 ENCOUNTER — ANTICOAGULATION MONITORING (OUTPATIENT)
Dept: VASCULAR LAB | Facility: MEDICAL CENTER | Age: 60
End: 2025-01-15

## 2025-01-15 ENCOUNTER — NON-PROVIDER VISIT (OUTPATIENT)
Dept: MEDICAL GROUP | Facility: PHYSICIAN GROUP | Age: 60
End: 2025-01-15
Payer: MEDICAID

## 2025-01-15 VITALS
DIASTOLIC BLOOD PRESSURE: 70 MMHG | OXYGEN SATURATION: 94 % | TEMPERATURE: 97.8 F | RESPIRATION RATE: 16 BRPM | SYSTOLIC BLOOD PRESSURE: 100 MMHG | HEART RATE: 83 BPM

## 2025-01-15 DIAGNOSIS — D68.69 SECONDARY HYPERCOAGULABLE STATE (HCC): ICD-10-CM

## 2025-01-15 DIAGNOSIS — I05.9 MITRAL VALVE DISEASE: ICD-10-CM

## 2025-01-15 DIAGNOSIS — I48.0 PAROXYSMAL ATRIAL FIBRILLATION (HCC): Chronic | ICD-10-CM

## 2025-01-15 PROCEDURE — 3074F SYST BP LT 130 MM HG: CPT | Mod: 95 | Performed by: NURSE PRACTITIONER

## 2025-01-15 PROCEDURE — 3078F DIAST BP <80 MM HG: CPT | Mod: 95 | Performed by: NURSE PRACTITIONER

## 2025-01-15 PROCEDURE — 99214 OFFICE O/P EST MOD 30 MIN: CPT | Mod: 95 | Performed by: NURSE PRACTITIONER

## 2025-01-15 NOTE — PROGRESS NOTES
Continue taking Eliquis 5 mg by mouth twice daily.     Please have your lab work done in the next 1-2 weeks.     Next appointment in [facility] [place]: Wednesday, July 23, 2025 at 2:30 pm.     Gayatri MCCAIN  [facility]  [phone]

## 2025-01-15 NOTE — PROGRESS NOTES
Ottoniel Vamsi Samson is a 59 y.o. male here for a non-provider visit for eliquis    If abnormal was an in office provider notified today (if so, indicate provider)? No    Routed to PCP? No

## 2025-01-15 NOTE — PROGRESS NOTES
Addendum 1/27/25) - reviewed labs. H/h 12.0/37.9 (12.0/38.0 6 mo prior). H/h trend low. Platelets WNL. Cr 1.82, cr cl ~48 ml/min. LFTs WNL. Based on age and weight, pt still qualifies for Eliquis 5 mg BID. Will f/u with pt in July as scheduled. Labs forwarded to PCP.                                                                                                             [facility] Service Note    01/15/25    This evaluation was conducted via telemedicine visit using secure and encrypted videoconferencing technology. The patient was physically located at [facility] in [place], [place]. The patient was presented by a medical professional (Charmaine Mensah MA) at the originating site. The patient's identity was confirmed and verbal consent for the telemedicine encounter was obtained for this telemedicine encounter.    Diagnosis: AF  Drug: Eliquis 5 mg BID  LOT: indefinite  CHADSVASC: 2 (htn, dm)  HAS-BLED: 0    Anticoagulation Summary  As of 1/15/2025      INR goal:  2.0-3.0   TTR:  66.9% (4.8 y)   INR used for dosing:  No new INR was available at the time of this encounter.   Warfarin maintenance plan:  No maintenance plan   Plan last modified:  TAYLOR RecinosRTannerNTanner (9/5/2023)   Next INR check:  7/23/2025   Target end date:  Indefinite    Indications    Mitral valve disease [I05.9]  Paroxysmal atrial fibrillation (HCC) [I48.0]  Secondary hypercoagulable state (HCC) [D68.69]                 Anticoagulation Episode Summary       INR check location:  --    Preferred lab:  --    Send INR reminders to:  --    Comments:  Mitral valve repair/AF - indefinite anticoag per 7/26/19 note from Dr Lawson          Anticoagulation Care Providers       Provider Role Specialty Phone number    FARIDA Parikh Referring Thoracic Surgery (Cardiothoracic Vascular Surgery) [phone]    [facility] Responsible  [phone]                Refer to Patient Findings  for HPI:  Patient Findings       Negatives:  Signs/symptoms of thrombosis, Signs/symptoms of bleeding, Laboratory test error suspected, Change in health, Change in alcohol use, Change in activity, Upcoming invasive procedure, Emergency department visit, Upcoming dental procedure, Missed doses, Extra doses, Change in medications, Change in diet/appetite, Hospital admission, Bruising, Other complaints                Vitals:    01/15/25 1415   BP: 100/70   Pulse: 83   Resp: 16   Temp: 36.6 °C (97.8 °F)   SpO2: 94%         Health Status Since Last Assessment  Any new relevant medical problems, ED visits/hospitalizations - no  Any embolic events (stroke / TIA / systemic embolism) - no    Doing well on Eliquis. No problems with bleeding. No s/sx of CVA/TIA. Due for labs. He is taking an fe supplement per his PCP. Copay covered by Medicaid. No new concerns today. He missed his f/u with cards a couple of months ago - he will call them to r/s.    Adherence with DOAC Therapy  Occasional missed evening dose.    BLEEDING RISK ASSESSMENT:    Bleeding Risk Assessment  Severe epistaxis - no   Hemoptysis - no  Excessive or unusual bruising / hematomas - no  GIB/melena/BRBPR/hematemesis - no  Hematuria - no   Abnormal vaginal bleeding - n/a  Concerning daily headache or subdural hematoma symptoms - no  Decreasing hemoglobin or new anemia - needs updating  Falls, presyncope, syncope, or seizures - no  Platelets: TBD  Latest hemoglobin:     Lab Results   Component Value Date/Time    WBC 6.4 07/03/2024 01:11 PM    RBC 4.27 (L) 07/03/2024 01:11 PM    HEMOGLOBIN 12.0 (L) 07/03/2024 01:11 PM    HEMATOCRIT 38.0 (L) 07/03/2024 01:11 PM    MCV 89.0 07/03/2024 01:11 PM    MCH 28.1 07/03/2024 01:11 PM    MCHC 31.6 (L) 07/03/2024 01:11 PM    MPV 12.0 07/03/2024 01:11 PM    NEUTSPOLYS 74.40 (H) 03/14/2024 02:26 PM    LYMPHOCYTES 12.40 (L) 03/14/2024 02:26 PM    MONOCYTES 10.30 03/14/2024 02:26 PM    EOSINOPHILS 2.30 03/14/2024 02:26 PM     BASOPHILS 0.30 03/14/2024 02:26 PM    HYPOCHROMIA 1+ 02/21/2014 08:00 AM    ANISOCYTOSIS 1+ 12/06/2022 12:31 AM        HAS-BLED:  Hypertension (uncontrolled, >160 mmHg systolic) - no  Renal disease (dialysis, transplant, Cr >2.26 mg/dL or >200 µmol/L) - no  Liver disease (cirrhosis or bilirubin >2x normal with AST/ALT/AP >3x normal) - no  Stroke history - no  Prior major bleeding or predisposition to bleeding - no  Labile INR Unstable/high INRs, time in therapeutic range <60% - no  Age >65 - no  Medication usage predisposing to bleeding (aspirin, clopidogrel, NSAIDs) - no  Alcohol use  >=8 drinks/week - no    Creatinine Clearance/Renal Function  Latest eGFR / creatinine:  Lab Results   Component Value Date/Time    SODIUM 143 05/17/2024 11:28 AM    POTASSIUM 3.6 05/17/2024 11:28 AM    CHLORIDE 110 05/17/2024 11:28 AM    CO2 19 (L) 05/17/2024 11:28 AM    GLUCOSE 109 (H) 05/17/2024 11:28 AM    BUN 32 (H) 05/17/2024 11:28 AM    CREATININE 1.68 (H) 05/17/2024 11:28 AM       Is eGFR less than 50ml/min  TBD  Cr cl TBD  Cr TBD    If YES, calculate CrCl (see back)  Any recent dehydrating illness or medications added/changed? i.e. Diuretics    Drug Interactions  ASA/antiplatelets - avoids  NSAID - avoids  Other drug interactions - none per Epic (Review med list / OTCs;)  Current Outpatient Medications on File Prior to Visit   Medication Sig Dispense Refill    pravastatin (PRAVACHOL) 40 MG tablet Take 1 Tab by mouth every day. 30 Tab 11    carvedilol (COREG) 25 MG Tab TAKE 1 TABLET BY MOUTH TWICE DAILY WITH MEALS 180 Tablet 1    hydrALAZINE (APRESOLINE) 50 MG Tab Take 1 Tablet by mouth 3 times a day. 90 Tablet 3    sacubitril-valsartan (ENTRESTO) 49-51 MG Tab Take 1 Tablet by mouth 2 times a day. 180 Tablet 3    Ferrous Sulfate (IRON PO) Take 65 mg by mouth 2 times a day.      furosemide (LASIX) 40 MG Tab Take 1 Tablet by mouth every day. 90 Tablet 4    isosorbide dinitrate (ISORDIL) 20 MG Tab Take 1 Tablet by mouth 3 times a  day. (Patient taking differently: Take 20 mg by mouth every day.) 270 Tablet 4    glipiZIDE (GLUCOTROL) 5 MG Tab Take 5 mg by mouth every day.      potassium chloride SA (KDUR) 20 MEQ Tab CR Take 20 mEq by mouth 2 times a day. 2 tablets = 40 mEq      amitriptyline (ELAVIL) 25 MG Tab Take 25 mg by mouth every evening.      allopurinol (ZYLOPRIM) 300 MG Tab Take 300 mg by mouth every day.      levothyroxine (SYNTHROID) 300 MCG TABS Take 1 Tab by mouth every day. 30 Tab 3     No current facility-administered medications on file prior to visit.     Verified no anticonvulsant or azole therapy, education provided for future use.    He is not taking an antiplatelet.    Review of Systems   HENT: Negative for nosebleeds.   Respiratory: Negative for shortness of breath.  Gastrointestinal: Negative for blood in stool.   Genitourinary: Negative for hematuria.   Psychiatric/Behavioral: The patient is not nervous/anxious.     Physical Exam   Constitutional: Oriented to person, place, and time. Appears well-developed and well-nourished.   Pulmonary/Chest: Effort normal. No respiratory distress.   Skin: Not diaphoretic.  Psychiatric: Normal mood and affect. Behavior is normal.  Significant gait impairment / imbalance / high risk for falls - no obvious risks    Final Assessment and Recommendations:  Patient appears stable from the anticoagulation standpoint  Benefits of continued DOAC therapy outweigh risks for this patient  Confirmed copay is affordable.   Based on age, wt and creatinine, he still qualifies for 5 mg BID.  Labs this month and again in 6 months. He will go this week or next week. Will review results when I receive.  Pt knows to watch closely for any unusual or abnormal bleeding.    - Continue taking Eliquis 5 mg by mouth twice daily    Other Actions:   The rationale for continued DOAC therapy  The potential for minor, major or life-threatening bleeding  Dosing instructions, adherence, risks of non-adherence, handling  missed doses  Avoiding OTC ASA & NSAIDs & minimizing EtOH to reduce bleeding risks  Dosing around upcoming procedure / surgery if applicable (see back)    Follow up:  Will follow up with patient in 6 months    Gayatri MCCAIN

## 2025-01-23 ENCOUNTER — HOSPITAL ENCOUNTER (OUTPATIENT)
Dept: LAB | Facility: MEDICAL CENTER | Age: 60
End: 2025-01-23
Attending: NURSE PRACTITIONER
Payer: MEDICAID

## 2025-01-23 DIAGNOSIS — D68.69 SECONDARY HYPERCOAGULABLE STATE (HCC): ICD-10-CM

## 2025-01-23 DIAGNOSIS — I05.9 MITRAL VALVE DISEASE: ICD-10-CM

## 2025-01-23 DIAGNOSIS — I48.0 PAROXYSMAL ATRIAL FIBRILLATION (HCC): Chronic | ICD-10-CM

## 2025-01-23 LAB
ALBUMIN SERPL BCP-MCNC: 4.2 G/DL (ref 3.2–4.9)
ALBUMIN/GLOB SERPL: 1.4 G/DL
ALP SERPL-CCNC: 108 U/L (ref 30–99)
ALT SERPL-CCNC: <5 U/L (ref 2–50)
ANION GAP SERPL CALC-SCNC: 13 MMOL/L (ref 7–16)
AST SERPL-CCNC: 14 U/L (ref 12–45)
BILIRUB SERPL-MCNC: 0.4 MG/DL (ref 0.1–1.5)
BUN SERPL-MCNC: 38 MG/DL (ref 8–22)
CALCIUM ALBUM COR SERPL-MCNC: 9.8 MG/DL (ref 8.5–10.5)
CALCIUM SERPL-MCNC: 10 MG/DL (ref 8.5–10.5)
CHLORIDE SERPL-SCNC: 106 MMOL/L (ref 96–112)
CO2 SERPL-SCNC: 24 MMOL/L (ref 20–33)
CREAT SERPL-MCNC: 1.82 MG/DL (ref 0.5–1.4)
ERYTHROCYTE [DISTWIDTH] IN BLOOD BY AUTOMATED COUNT: 52.2 FL (ref 35.9–50)
GFR SERPLBLD CREATININE-BSD FMLA CKD-EPI: 42 ML/MIN/1.73 M 2
GLOBULIN SER CALC-MCNC: 2.9 G/DL (ref 1.9–3.5)
GLUCOSE SERPL-MCNC: 90 MG/DL (ref 65–99)
HCT VFR BLD AUTO: 37.9 % (ref 42–52)
HGB BLD-MCNC: 12 G/DL (ref 14–18)
MCH RBC QN AUTO: 29.8 PG (ref 27–33)
MCHC RBC AUTO-ENTMCNC: 31.7 G/DL (ref 32.3–36.5)
MCV RBC AUTO: 94 FL (ref 81.4–97.8)
PLATELET # BLD AUTO: 226 K/UL (ref 164–446)
PMV BLD AUTO: 10.8 FL (ref 9–12.9)
POTASSIUM SERPL-SCNC: 4 MMOL/L (ref 3.6–5.5)
PROT SERPL-MCNC: 7.1 G/DL (ref 6–8.2)
RBC # BLD AUTO: 4.03 M/UL (ref 4.7–6.1)
SODIUM SERPL-SCNC: 143 MMOL/L (ref 135–145)
WBC # BLD AUTO: 6.6 K/UL (ref 4.8–10.8)

## 2025-01-23 PROCEDURE — 85027 COMPLETE CBC AUTOMATED: CPT

## 2025-01-23 PROCEDURE — 36415 COLL VENOUS BLD VENIPUNCTURE: CPT

## 2025-01-23 PROCEDURE — 80053 COMPREHEN METABOLIC PANEL: CPT

## 2025-05-02 NOTE — ASSESSMENT & PLAN NOTE
Continue Synthroid   Left message for patient to contact the office or he can view message sent via [employer].

## 2025-06-06 DIAGNOSIS — I50.20 HEART FAILURE WITH REDUCED EJECTION FRACTION (HCC): ICD-10-CM

## 2025-06-06 RX ORDER — SACUBITRIL AND VALSARTAN 49; 51 MG/1; MG/1
1 TABLET, FILM COATED ORAL 2 TIMES DAILY
Qty: 180 TABLET | Refills: 0 | Status: SHIPPED | OUTPATIENT
Start: 2025-06-06

## 2025-06-06 NOTE — TELEPHONE ENCOUNTER
Is the patient due for a refill? Yes    Was the patient seen the last 15 months? Yes    Date of last office visit: 05/28/2024    Does the patient have an upcoming appointment?  No      Provider to refill:[facility]    Does the patient have [facility] Plus and need 100-day supply? (This applies to ALL medications) Patient does not have SCP

## 2025-06-13 ENCOUNTER — TELEPHONE (OUTPATIENT)
Dept: CARDIOLOGY | Facility: MEDICAL CENTER | Age: 60
End: 2025-06-13
Payer: MEDICAID

## 2025-06-13 NOTE — TELEPHONE ENCOUNTER
Prior Authorization for Entresto 49-51MG tablets   has been approved for a quantity of 60 , day supply 30    Prior Authorization reference number: [id number]  Insurance: medicaid  Effective dates: 6/13/26  Copay: $0     Is patient eligible to fill with [facility]? Yes    Next Steps: calling pt

## 2025-06-13 NOTE — TELEPHONE ENCOUNTER
Received [employer] request via [employer]  for Entresto 49-51MG tablets  . (Quantity:60, Day Supply:30)     Insurance: medicaid  Member ID:  [id number]  BIN: [id number]  PCN: [id number]  [id number]     Ran Test claim via [place] & medication Rejects stating prior authorization is required.    KEY -[id number]

## 2025-07-16 DIAGNOSIS — I48.0 PAROXYSMAL ATRIAL FIBRILLATION (HCC): Chronic | ICD-10-CM

## 2025-07-17 RX ORDER — CARVEDILOL 25 MG/1
25 TABLET ORAL 2 TIMES DAILY WITH MEALS
Qty: 180 TABLET | Refills: 0 | Status: SHIPPED | OUTPATIENT
Start: 2025-07-17

## 2025-07-17 NOTE — TELEPHONE ENCOUNTER
Is the patient due for a refill? Yes    Was the patient seen the last 15 months? Yes    Date of last office visit: 5/28/24    Does the patient have an upcoming appointment?  No    Provider to refill:[facility]    Does the patient have [facility]term Plus and need 100-day supply? (This applies to ALL medications) Patient does not have SCP

## 2025-07-21 ENCOUNTER — TELEPHONE (OUTPATIENT)
Dept: CARDIOLOGY | Facility: MEDICAL CENTER | Age: 60
End: 2025-07-21
Payer: MEDICAID

## 2025-07-23 ENCOUNTER — HOSPITAL ENCOUNTER (OUTPATIENT)
Dept: RADIOLOGY | Facility: MEDICAL CENTER | Age: 60
End: 2025-07-23
Attending: STUDENT IN AN ORGANIZED HEALTH CARE EDUCATION/TRAINING PROGRAM
Payer: MEDICAID

## 2025-07-23 DIAGNOSIS — N28.89 OTHER SPECIFIED DISORDERS OF KIDNEY AND URETER: ICD-10-CM

## 2025-07-23 PROCEDURE — 700117 HCHG RX CONTRAST REV CODE 255: Mod: JZ,UD | Performed by: STUDENT IN AN ORGANIZED HEALTH CARE EDUCATION/TRAINING PROGRAM

## 2025-07-23 PROCEDURE — A9579 GAD-BASE MR CONTRAST NOS,1ML: HCPCS | Mod: JZ,UD | Performed by: STUDENT IN AN ORGANIZED HEALTH CARE EDUCATION/TRAINING PROGRAM

## 2025-07-23 PROCEDURE — 74183 MRI ABD W/O CNTR FLWD CNTR: CPT

## 2025-07-23 RX ORDER — GADOTERIDOL 279.3 MG/ML
15 INJECTION INTRAVENOUS ONCE
Status: COMPLETED | OUTPATIENT
Start: 2025-07-23 | End: 2025-07-23

## 2025-07-23 RX ADMIN — GADOTERIDOL 15 ML: 279.3 INJECTION, SOLUTION INTRAVENOUS at 15:11

## 2025-08-06 ENCOUNTER — DOCUMENTATION (OUTPATIENT)
Dept: VASCULAR LAB | Facility: MEDICAL CENTER | Age: 60
End: 2025-08-06
Payer: MEDICAID

## 2025-08-13 ENCOUNTER — DOCUMENTATION (OUTPATIENT)
Dept: VASCULAR LAB | Facility: MEDICAL CENTER | Age: 60
End: 2025-08-13
Payer: MEDICAID

## 2025-08-13 DIAGNOSIS — D68.69 SECONDARY HYPERCOAGULABLE STATE (HCC): ICD-10-CM

## 2025-08-13 DIAGNOSIS — I05.9 MITRAL VALVE DISEASE: Primary | ICD-10-CM

## 2025-08-13 DIAGNOSIS — I48.0 PAROXYSMAL ATRIAL FIBRILLATION (HCC): ICD-10-CM

## 2025-08-18 ENCOUNTER — HOSPITAL ENCOUNTER (OUTPATIENT)
Dept: LAB | Facility: MEDICAL CENTER | Age: 60
End: 2025-08-18
Attending: NURSE PRACTITIONER
Payer: MEDICAID

## 2025-08-18 DIAGNOSIS — I48.0 PAROXYSMAL ATRIAL FIBRILLATION (HCC): ICD-10-CM

## 2025-08-18 DIAGNOSIS — D68.69 SECONDARY HYPERCOAGULABLE STATE (HCC): ICD-10-CM

## 2025-08-18 DIAGNOSIS — I05.9 MITRAL VALVE DISEASE: ICD-10-CM

## 2025-08-18 LAB
ALBUMIN SERPL BCP-MCNC: 4.2 G/DL (ref 3.2–4.9)
ALBUMIN/GLOB SERPL: 1.4 G/DL
ALP SERPL-CCNC: 111 U/L (ref 30–99)
ALT SERPL-CCNC: <5 U/L (ref 2–50)
ANION GAP SERPL CALC-SCNC: 14 MMOL/L (ref 7–16)
AST SERPL-CCNC: 18 U/L (ref 12–45)
BILIRUB SERPL-MCNC: 0.3 MG/DL (ref 0.1–1.5)
BUN SERPL-MCNC: 34 MG/DL (ref 8–22)
CALCIUM ALBUM COR SERPL-MCNC: 9.2 MG/DL (ref 8.5–10.5)
CALCIUM SERPL-MCNC: 9.4 MG/DL (ref 8.5–10.5)
CHLORIDE SERPL-SCNC: 108 MMOL/L (ref 96–112)
CO2 SERPL-SCNC: 21 MMOL/L (ref 20–33)
CREAT SERPL-MCNC: 1.9 MG/DL (ref 0.5–1.4)
ERYTHROCYTE [DISTWIDTH] IN BLOOD BY AUTOMATED COUNT: 49.1 FL (ref 35.9–50)
GFR SERPLBLD CREATININE-BSD FMLA CKD-EPI: 39 ML/MIN/1.73 M 2
GLOBULIN SER CALC-MCNC: 2.9 G/DL (ref 1.9–3.5)
GLUCOSE SERPL-MCNC: 136 MG/DL (ref 65–99)
HCT VFR BLD AUTO: 41.2 % (ref 42–52)
HGB BLD-MCNC: 13.3 G/DL (ref 14–18)
MCH RBC QN AUTO: 29.7 PG (ref 27–33)
MCHC RBC AUTO-ENTMCNC: 32.3 G/DL (ref 32.3–36.5)
MCV RBC AUTO: 92 FL (ref 81.4–97.8)
PLATELET # BLD AUTO: 195 K/UL (ref 164–446)
PMV BLD AUTO: 11 FL (ref 9–12.9)
POTASSIUM SERPL-SCNC: 3.8 MMOL/L (ref 3.6–5.5)
PROT SERPL-MCNC: 7.1 G/DL (ref 6–8.2)
RBC # BLD AUTO: 4.48 M/UL (ref 4.7–6.1)
SODIUM SERPL-SCNC: 143 MMOL/L (ref 135–145)
WBC # BLD AUTO: 6.8 K/UL (ref 4.8–10.8)

## 2025-08-18 PROCEDURE — 80053 COMPREHEN METABOLIC PANEL: CPT

## 2025-08-18 PROCEDURE — 85027 COMPLETE CBC AUTOMATED: CPT

## 2025-08-18 PROCEDURE — 36415 COLL VENOUS BLD VENIPUNCTURE: CPT

## 2025-08-20 ENCOUNTER — NON-PROVIDER VISIT (OUTPATIENT)
Dept: MEDICAL GROUP | Facility: PHYSICIAN GROUP | Age: 60
End: 2025-08-20
Payer: MEDICAID

## 2025-08-20 ENCOUNTER — APPOINTMENT (OUTPATIENT)
Dept: VASCULAR LAB | Facility: MEDICAL CENTER | Age: 60
End: 2025-08-20
Payer: MEDICAID

## 2025-08-20 DIAGNOSIS — Z79.01 CHRONIC ANTICOAGULATION: ICD-10-CM

## 2025-08-21 ENCOUNTER — TELEPHONE (OUTPATIENT)
Dept: CARDIOLOGY | Facility: MEDICAL CENTER | Age: 60
End: 2025-08-21

## 2025-08-21 ENCOUNTER — OFFICE VISIT (OUTPATIENT)
Dept: CARDIOLOGY | Facility: MEDICAL CENTER | Age: 60
End: 2025-08-21
Payer: MEDICAID

## 2025-08-21 VITALS
WEIGHT: 170 LBS | SYSTOLIC BLOOD PRESSURE: 120 MMHG | BODY MASS INDEX: 26.68 KG/M2 | OXYGEN SATURATION: 97 % | HEART RATE: 88 BPM | HEIGHT: 67 IN | DIASTOLIC BLOOD PRESSURE: 74 MMHG | RESPIRATION RATE: 18 BRPM

## 2025-08-21 DIAGNOSIS — I10 ESSENTIAL HYPERTENSION: ICD-10-CM

## 2025-08-21 DIAGNOSIS — I48.0 PAROXYSMAL ATRIAL FIBRILLATION (HCC): Primary | ICD-10-CM

## 2025-08-21 DIAGNOSIS — E03.9 HYPOTHYROIDISM, UNSPECIFIED TYPE: ICD-10-CM

## 2025-08-21 DIAGNOSIS — I50.20 HEART FAILURE WITH REDUCED EJECTION FRACTION (HCC): ICD-10-CM

## 2025-08-21 DIAGNOSIS — E78.2 MIXED HYPERLIPIDEMIA: ICD-10-CM

## 2025-08-21 DIAGNOSIS — I48.0 HYPERCOAGULABLE STATE DUE TO PAROXYSMAL ATRIAL FIBRILLATION (HCC): ICD-10-CM

## 2025-08-21 DIAGNOSIS — D68.69 SECONDARY HYPERCOAGULABLE STATE (HCC): ICD-10-CM

## 2025-08-21 DIAGNOSIS — Z79.01 CHRONIC ANTICOAGULATION: ICD-10-CM

## 2025-08-21 DIAGNOSIS — D68.69 HYPERCOAGULABLE STATE DUE TO PAROXYSMAL ATRIAL FIBRILLATION (HCC): ICD-10-CM

## 2025-08-21 LAB — EKG IMPRESSION: NORMAL

## 2025-08-21 PROCEDURE — 99214 OFFICE O/P EST MOD 30 MIN: CPT

## 2025-08-21 PROCEDURE — 93010 ELECTROCARDIOGRAM REPORT: CPT | Performed by: INTERNAL MEDICINE

## 2025-08-21 PROCEDURE — 93005 ELECTROCARDIOGRAM TRACING: CPT | Mod: TC

## 2025-08-21 RX ORDER — LOSARTAN POTASSIUM 100 MG/1
100 TABLET ORAL DAILY
COMMUNITY
End: 2025-08-21

## 2025-08-21 RX ORDER — SACUBITRIL AND VALSARTAN 97; 103 MG/1; MG/1
1 TABLET ORAL 2 TIMES DAILY
Qty: 180 TABLET | Refills: 3 | Status: SHIPPED | OUTPATIENT
Start: 2025-08-21

## 2025-08-21 RX ORDER — SACUBITRIL AND VALSARTAN 49; 51 MG/1; MG/1
1 TABLET ORAL 2 TIMES DAILY
Qty: 180 TABLET | Refills: 0 | Status: SHIPPED | OUTPATIENT
Start: 2025-08-21 | End: 2025-08-21

## 2025-08-21 RX ORDER — AMIODARONE HYDROCHLORIDE 200 MG/1
TABLET ORAL
Qty: 72 TABLET | Refills: 0 | Status: SHIPPED | OUTPATIENT
Start: 2025-08-21 | End: 2025-10-04

## 2025-08-21 RX ORDER — CARVEDILOL 25 MG/1
25 TABLET ORAL 2 TIMES DAILY WITH MEALS
Qty: 180 TABLET | Refills: 0 | Status: SHIPPED | OUTPATIENT
Start: 2025-08-21

## 2025-08-21 ASSESSMENT — ENCOUNTER SYMPTOMS
DIZZINESS: 0
HEADACHES: 0
COUGH: 0
SHORTNESS OF BREATH: 0
PND: 0
NEAR-SYNCOPE: 0
LIGHT-HEADEDNESS: 0
ORTHOPNEA: 0
PALPITATIONS: 0
SYNCOPE: 0
DYSPNEA ON EXERTION: 0

## 2025-08-21 ASSESSMENT — LIFESTYLE VARIABLES
SKIP TO QUESTIONS 9-10: 1
HOW OFTEN DO YOU HAVE SIX OR MORE DRINKS ON ONE OCCASION: NEVER
AUDIT-C TOTAL SCORE: 0
HOW OFTEN DO YOU HAVE A DRINK CONTAINING ALCOHOL: NEVER
HOW MANY STANDARD DRINKS CONTAINING ALCOHOL DO YOU HAVE ON A TYPICAL DAY: PATIENT DOES NOT DRINK

## 2025-08-21 ASSESSMENT — FIBROSIS 4 INDEX: FIB4 SCORE: 2.7

## (undated) DEVICE — TRAY SURESTEP FOLEY TEMP SENSING 16FR (10EA/CA) ORDER  #18764 FOR TEMP FOLEY ONLY

## (undated) DEVICE — SET LEADWIRE 5 LEAD BEDSIDE DISPOSABLE ECG (1SET OF 5/EA)

## (undated) DEVICE — SET EXTENSION WITH 2 PORTS (48EA/CA) ***PART #2C8610 IS A SUBSTITUTE*****

## (undated) DEVICE — SOD. CHL. INJ. 0.9% 1000 ML - (14EA/CA 60CA/PF)

## (undated) DEVICE — KIT INTROPERCUTANEOUS SHEATH - 8.5 FR W/MAX BARRIER AND BIOPATCH  (5/CA)

## (undated) DEVICE — LACTATED RINGERS INJ 1000 ML - (14EA/CA 60CA/PF)

## (undated) DEVICE — GLOVE BIOGEL INDICATOR SZ 8 SURGICAL PF LTX - (50/BX 4BX/CA)

## (undated) DEVICE — BANDAGE ELASTIC 4 IN X 5 YDS - LATEX FREE(10/BX 5BX/CA)

## (undated) DEVICE — GOWN WARMING STANDARD FLEX - (30/CA)

## (undated) DEVICE — TUBE CHEST 32FR. RIGHT ANGLED (10EA/CA)

## (undated) DEVICE — SUTURE 4-0 PROLENE RB-1 D/A 36 (36PK/BX)"

## (undated) DEVICE — TUBE CONNECT SUCTION CLEAR 120 X 1/4" (50EA/CA)"

## (undated) DEVICE — SYS DLV COST CLS RM TEMP - INJECTATE (CO-SET II) (10EA/CA)

## (undated) DEVICE — SUCTION INSTRUMENT YANKAUER BULBOUS TIP W/O VENT (50EA/CA)

## (undated) DEVICE — SUTURE 4-0 30CM STRATAFIX SPIRAL PS-2 (12EA/BX)

## (undated) DEVICE — ELECTRODE DUAL RETURN W/ CORD - (50/PK)

## (undated) DEVICE — INSERT STEALTH #5 - (10/BX)

## (undated) DEVICE — ORGANIZER SUTURE GABBAY-FRAT - ER STERILE (3/SET 4ST/BX)

## (undated) DEVICE — KIT ANESTHESIA W/CIRCUIT & 3/LT BAG W/FILTER (20EA/CA)

## (undated) DEVICE — DRAIN CHEST ADULT (6EA/CA) DELETED ITEM  ORDER #15909

## (undated) DEVICE — MICRODRIP PRIMARY VENTED 60 (48EA/CA) THIS WAS PART #2C8428 WHICH WAS DISCONTINUED

## (undated) DEVICE — LEAD PACING TEMP MYO - (12/BX)

## (undated) DEVICE — CANISTER SUCTION 3000ML MECHANICAL FILTER AUTO SHUTOFF MEDI-VAC NONSTERILE LF DISP  (40EA/CA)

## (undated) DEVICE — MASK ANESTHESIA ADULT  - (100/CA)

## (undated) DEVICE — DERMABOND ADVANCED - (12EA/BX)

## (undated) DEVICE — SET FLUID WARMING STANDARD FLOW - (10/CA)

## (undated) DEVICE — KIT RADIAL ARTERY 20GA W/MAX BARRIER AND BIOPATCH  (5EA/CA) #10740 IS FOR THE SET RADIAL ARTERIAL

## (undated) DEVICE — SUTURE 6-0 PROLENE RB-2 D/A 30 (36PK/BX)"

## (undated) DEVICE — ARMBOARD  SMALL IV 9 INLONG - (25EA/CA)

## (undated) DEVICE — SODIUM CHL IRRIGATION 0.9% 1000ML (12EA/CA)

## (undated) DEVICE — GLOVE BIOGEL SZ 7.5 SURGICAL PF LTX - (50PR/BX 4BX/CA)

## (undated) DEVICE — PROTECTOR ULNA NERVE - (36PR/CA)

## (undated) DEVICE — SUTURE 4-0 PROLENE V-7 D/A (36PK/BX)

## (undated) DEVICE — D-5-W INJ. 500 ML - (24EA/CA)

## (undated) DEVICE — BLADE STERNUM SAW SURGICAL 32.0 X 6.4 MM STERILE (1/EA)

## (undated) DEVICE — BAG RESUSCITATION DISPOSABLE - WITH MASK (10 EA/CA)

## (undated) DEVICE — STOPCOCK MALE 4-WAY - (50/CA)

## (undated) DEVICE — TRANSDUCER BIFURCATED MONITORING KIT (10EA/CA)

## (undated) DEVICE — BLADE SURGICAL #15 - (50/BX 3BX/CA)

## (undated) DEVICE — HEAD HOLDER JUNIOR/ADULT

## (undated) DEVICE — CHLORAPREP 26 ML APPLICATOR - ORANGE TINT(25/CA)

## (undated) DEVICE — PACK E SUTURE USED FOR - OPEN HEART  (5/BX)

## (undated) DEVICE — NEPTUNE 4 PORT MANIFOLD - (20/PK)

## (undated) DEVICE — SLEEVE, VASO, THIGH, MED

## (undated) DEVICE — KIT ROOM DECONTAMINATION

## (undated) DEVICE — CATHETER THERMALDILUTION SWAN - (5EA/CA)

## (undated) DEVICE — TRAY MULTI-LUMEN 7FR PRESSURE W/MAX BARRIER AND BIOPATCH - (5/CA)

## (undated) DEVICE — TUBING CLEARLINK DUO-VENT - C-FLO (48EA/CA)

## (undated) DEVICE — WIRE STEEL 5-0 B&S 20 OHS - 5/PK 12PK/BX ITEM. D5329 OR D6625 CAN BE USED AS A SUB

## (undated) DEVICE — SET BIFURCATED BLOOD - (48EA/CS)

## (undated) DEVICE — SUTURE 5-0 PROLENE C-1 D/A 24 (36PK/BX)"

## (undated) DEVICE — PACK CV DRAPING/BASIN 2PART - (1/CA)

## (undated) DEVICE — TUBE CHEST 32FR. STRAIGHT - (10EA/CA)

## (undated) DEVICE — TUBE E-T HI-LO CUFF 8.5MM (10EA/PK)

## (undated) DEVICE — SUTURE NONABSORBABLE ETHIBOND EXCEL 2-0 V-5 2 ARM BRAID GREEN WHITE (6EA/BX)

## (undated) DEVICE — SODIUM CHL. INJ. 0.9% 500ML (24EA/CA 50CA/PF)

## (undated) DEVICE — ELECTRODE 850 FOAM ADHESIVE - HYDROGEL RADIOTRNSPRNT (50/PK)

## (undated) DEVICE — SUTURE OHS

## (undated) DEVICE — SENSOR CEREBRAL AND SOMATIC MONITORING (20/CA)

## (undated) DEVICE — FIBRILLAR SURGICEL 4X4 - 10/CA

## (undated) DEVICE — SUTURE 4-0 ETHIBOND RB-1 EXCEL (12/BX)

## (undated) DEVICE — SENSOR SPO2 NEO LNCS ADHESIVE (20/BX) SEE USER NOTES